# Patient Record
Sex: MALE | Race: WHITE | NOT HISPANIC OR LATINO | Employment: FULL TIME | URBAN - METROPOLITAN AREA
[De-identification: names, ages, dates, MRNs, and addresses within clinical notes are randomized per-mention and may not be internally consistent; named-entity substitution may affect disease eponyms.]

---

## 2017-03-30 ENCOUNTER — THERAPY VISIT (OUTPATIENT)
Dept: PHYSICAL THERAPY | Facility: CLINIC | Age: 67
End: 2017-03-30
Payer: COMMERCIAL

## 2017-03-30 ENCOUNTER — OFFICE VISIT (OUTPATIENT)
Dept: SURGERY | Facility: CLINIC | Age: 67
End: 2017-03-30
Payer: COMMERCIAL

## 2017-03-30 VITALS
BODY MASS INDEX: 33.32 KG/M2 | DIASTOLIC BLOOD PRESSURE: 100 MMHG | HEIGHT: 71 IN | SYSTOLIC BLOOD PRESSURE: 172 MMHG | WEIGHT: 238 LBS | HEART RATE: 81 BPM

## 2017-03-30 DIAGNOSIS — M25.561 RIGHT KNEE PAIN: ICD-10-CM

## 2017-03-30 DIAGNOSIS — K42.9 UMBILICAL HERNIA WITHOUT OBSTRUCTION AND WITHOUT GANGRENE: Primary | ICD-10-CM

## 2017-03-30 PROCEDURE — 99203 OFFICE O/P NEW LOW 30 MIN: CPT | Performed by: SURGERY

## 2017-03-30 PROCEDURE — 97112 NEUROMUSCULAR REEDUCATION: CPT | Mod: GP | Performed by: PHYSICAL THERAPIST

## 2017-03-30 PROCEDURE — 97110 THERAPEUTIC EXERCISES: CPT | Mod: GP | Performed by: PHYSICAL THERAPIST

## 2017-03-30 NOTE — MR AVS SNAPSHOT
"              After Visit Summary   3/30/2017    Don Samson    MRN: [id number]           Patient Information     Date Of Birth          1950        Visit Information        Provider Department      3/30/2017 1:30 PM Tay Kothari MD [facility]       Follow-ups after your visit        Your next 10 appointments already scheduled     Apr 14, 2017  8:10 AM CDT   DANA Extremity with Tee Rai, PT   [facility] ([facility]  )    [address] [id number] [phone]              Who to contact     If you have questions or need follow up information about today's clinic visit or your schedule please contact [facility] directly at [phone].  Normal or non-critical lab and imaging results will be communicated to you by [employer], letter or phone within 4 business days after the clinic has received the results. If you do not hear from us within 7 days, please contact the clinic through [employer] or phone. If you have a critical or abnormal lab result, we will notify you by phone as soon as possible.  Submit refill requests through [employer] or call your pharmacy and they will forward the refill request to us. Please allow 3 business days for your refill to be completed.          Additional Information About Your Visit        MyChart Information     [employer] lets you send messages to your doctor, view your test results, renew your prescriptions, schedule appointments and more. To sign up, go to [email or web address] . Click on \"Log in\" on the left side of the screen, which will take you to the Welcome page. Then click on \"Sign up Now\" on the right side of the page.     You will be asked to enter the access code listed below, as well as some personal information. Please follow the directions to create your username and password.     Your access code is: " [email or web address]  Expires: 2017  1:52 PM     Your access code will  in 90 days. If you need help or a new code, please call your [place] clinic or [phone].        Care EveryWhere ID     This is your Care EveryWhere ID. This could be used by other organizations to access your [facility] medical records  [id number]        Your Vitals Were     Pulse Height BMI (Body Mass Index)             81 5' 11\" (1.803 m) 33.19 kg/m2          Blood Pressure from Last 3 Encounters:   17 (!) 172/100   16 152/90   16 (!) 159/91    Weight from Last 3 Encounters:   17 238 lb (108 kg)   16 235 lb (106.6 kg)   16 230 lb (104.3 kg)              Today, you had the following     No orders found for display       Primary Care Provider Office Phone # Fax #    William Blanco [phone]       [address]  [facility] [address]        Thank you!     Thank you for choosing [facility]  for your care. Our goal is always to provide you with excellent care. Hearing back from our patients is one way we can continue to improve our services. Please take a few minutes to complete the written survey that you may receive in the mail after your visit with us. Thank you!             Your Updated Medication List - Protect others around you: Learn how to safely use, store and throw away your medicines at www.disposemymeds.org.          This list is accurate as of: 3/30/17  1:52 PM.  Always use your most recent med list.                   Brand Name Dispense Instructions for use    acetaminophen-caffeine 500-65 MG Tabs    EXCEDRIN TENSION HEADACHE     Take 1 tablet by mouth 2 times daily as needed for mild pain Reported on 3/30/2017       acetaminophen-codeine 300-30 MG per tablet    TYLENOL #3    18 tablet    Take 1-2 tablets by mouth nightly as needed for pain maximum 2 tablet(s) per day       ALEVE PO      Reported on 3/30/2017       baclofen 10 MG tablet "    LIORESAL    90 tablet    Take 1 tablet (10 mg) by mouth 3 times daily       COLD MEDICINE PLUS PO      Take 325 mg by mouth as needed       * diclofenac 1 % Gel topical gel    VOLTAREN    100 g    Apply 2 times daily to feet/foot.       * diclofenac 1 % Gel topical gel    VOLTAREN    100 g    Apply 4 grams to knees or 2 grams to hands four times daily using enclosed dosing card.       HYDROcodone-acetaminophen 5-325 MG per tablet    NORCO    30 tablet    Take 1 tablet by mouth every 4 hours as needed for pain maximum 2 tablet(s) per day       naproxen 500 MG tablet    NAPROSYN    60 tablet    Take 1 tablet (500 mg) by mouth 2 times daily as needed for moderate pain       * Notice:  This list has 2 medication(s) that are the same as other medications prescribed for you. Read the directions carefully, and ask your doctor or other care provider to review them with you.

## 2017-03-30 NOTE — MR AVS SNAPSHOT
"              After Visit Summary   3/30/2017    Don Samson    MRN: [id number]           Patient Information     Date Of Birth          1950        Visit Information        Provider Department      3/30/2017 8:10 AM Tee Rai PT [facility]        Today's Diagnoses     Right knee pain           Follow-ups after your visit        Your next 10 appointments already scheduled     Apr 14, 2017  8:10 AM CDT   DANA Extremity with Tee Rai PT   [facility] ([facility]  )    [address] [place] [id number] [phone]              Who to contact     If you have questions or need follow up information about today's clinic visit or your schedule please contact [facility] directly at [phone].  Normal or non-critical lab and imaging results will be communicated to you by MyChart, letter or phone within 4 business days after the clinic has received the results. If you do not hear from us within 7 days, please contact the clinic through [employer] or phone. If you have a critical or abnormal lab result, we will notify you by phone as soon as possible.  Submit refill requests through [employer] or call your pharmacy and they will forward the refill request to us. Please allow 3 business days for your refill to be completed.          Additional Information About Your Visit        MyChart Information     [employer] lets you send messages to your doctor, view your test results, renew your prescriptions, schedule appointments and more. To sign up, go to [email or web address] . Click on \"Log in\" on the left side of the screen, which will take you to the Welcome page. Then click on \"Sign up Now\" on the right side of the page.     You will be asked to enter the access code listed below, as well as some personal information. Please follow the " directions to create your username and password.     Your access code is: [email or web address]  Expires: 2017  1:52 PM     Your access code will  in 90 days. If you need help or a new code, please call your [place] clinic or [phone].        Care EveryWhere ID     This is your Care EveryWhere ID. This could be used by other organizations to access your [facility] medical records  [id number]         Blood Pressure from Last 3 Encounters:   17 (!) 172/100   16 152/90   16 (!) 159/91    Weight from Last 3 Encounters:   17 108 kg (238 lb)   16 106.6 kg (235 lb)   16 104.3 kg (230 lb)              We Performed the Following     NEUROMUSCULAR RE-EDUCATION     THERAPEUTIC EXERCISES        Primary Care Provider Office Phone # Fax #    William Blanco [phone]       [address] [address]        Thank you!     Thank you for [facility]  for your care. Our goal is always to provide you with excellent care. Hearing back from our patients is one way we can continue to improve our services. Please take a few minutes to complete the written survey that you may receive in the mail after your visit with us. Thank you!             Your Updated Medication List - Protect others around you: Learn how to safely use, store and throw away your medicines at www.disposemymeds.org.          This list is accurate as of: 3/30/17 11:59 PM.  Always use your most recent med list.                   Brand Name Dispense Instructions for use    acetaminophen-caffeine 500-65 MG Tabs    EXCEDRIN TENSION HEADACHE     Take 1 tablet by mouth 2 times daily as needed for mild pain Reported on 3/30/2017       acetaminophen-codeine 300-30 MG per tablet    TYLENOL #3    18 tablet    Take 1-2 tablets by mouth nightly as needed for pain maximum 2 tablet(s) per day       ALEVE PO      Reported on 3/30/2017        baclofen 10 MG tablet    LIORESAL    90 tablet    Take 1 tablet (10 mg) by mouth 3 times daily       COLD MEDICINE PLUS PO      Take 325 mg by mouth as needed       * diclofenac 1 % Gel topical gel    VOLTAREN    100 g    Apply 2 times daily to feet/foot.       * diclofenac 1 % Gel topical gel    VOLTAREN    100 g    Apply 4 grams to knees or 2 grams to hands four times daily using enclosed dosing card.       HYDROcodone-acetaminophen 5-325 MG per tablet    NORCO    30 tablet    Take 1 tablet by mouth every 4 hours as needed for pain maximum 2 tablet(s) per day       naproxen 500 MG tablet    NAPROSYN    60 tablet    Take 1 tablet (500 mg) by mouth 2 times daily as needed for moderate pain       * Notice:  This list has 2 medication(s) that are the same as other medications prescribed for you. Read the directions carefully, and ask your doctor or other care provider to review them with you.

## 2017-03-30 NOTE — PROGRESS NOTES
"[facility]  Surgery Consultation    HPI: Patient is a 67 year old male who is here for consultation requested by William Blanco [phone] for evaluation of umbilical hernia. Hernia has been present for last 10 + years. He was mainly asymptomatic from it but did develop some pain prompting evaluation. Pain is primarily located in the periumbilical. Patient states pain is worse with exercise. Pain is rated as mild. Symptoms are improved with rest. Hernia has not been incarcerated. Patient has not had any symptoms of bowel obstruction. He has had an open repair of bilateral inguinal hernias in 2004 and 85. His CT showed a questionable fat containing hernia in the left groin but he denies any symptoms from this at this time. Patient denies fevers, chills, nausea, vomiting, SOB, chest pain, abdominal pain..    PMH:   has a past medical history of Depression and HTN (hypertension).  PSH:    has a past surgical history that includes nasal/sinus polypectomy and Herniorrhaphy inguinal bilateral.  Social History:   reports that he quit smoking about 47 years ago. He has never used smokeless tobacco. He reports that he drinks alcohol. He reports that he does not use illicit drugs.  Family History:   family history includes Hypertension in his mother.  Medications/Allergies: Home medications and allergies reviewed.    ROS:  The 10 point Review of Systems is negative other than noted in the HPI.    Physical Exam:  BP (!) 172/100  Pulse 81  Ht 1.803 m (5' 11\")  Wt 108 kg (238 lb)  BMI 33.19 kg/m2  GENERAL: Generally appears well.  Psych: Alert and Oriented.  Normal affect  Eyes: Sclera clear  Respiratory:  Lungs clear to ausculation bilaterally with good air excursion  Cardiovascular:  Regular Rate and Rhythm with no murmurs gallops or rubs, normal peripheral pulses  GI: Abdomen Soft moderate to large umbilical hernia. Large amount of reducible fat reduced. Defect measures approximately 4-5 cm.  Groin- " I examined the patient in both the standing and supine positions. Right Groin- No hernia Palpated.  No tenderness on palpation. Left Groin- questionable very small hernia versus a cord lipoma. No tenderness. N scrotal or testicle abnormalities.  Lymphatic/Hematologic/Immune:  No femoral or cervical lymphadenopathy.  Integumentary:  No rashes  Neurological: grossly intact     All new lab and imaging data was reviewed.     Impression and Plan:  Patient is a 67 year old male with umbilical hernia    PLAN:   I discussed the pathophysiology of hernias and options for repair including laparoscopic VS open. The patient has a large symptomatic umbilical hernia. There is question about a left inguinal hernia but I think this is related to his repair and I would not recommend any further treatment at this time. I would recommend a laparoscopic and open repair of this umbilical hernia at his convenience. He will schedule when he is able to get off work. The risks associated with the procedure including, but not limited to, recurrence, nerve entrapment or injury, persistence of pain, injury to the bowel/bladder, infertility, hematoma, mesh migration, mesh infection, MI, and PE were discussed with the patient. He indicated understanding of the discussion, asked appropriate questions, and provided consent. Signs and symptoms of incarceration were discussed. If these develop in the interim, he promises to call or go straight to the ER. I have provided the patient with an information pamphlet.      Thank you very much for this consult.    Tay Kothari M.D.  [facility]  [phone]    Please route or send letter to:  Primary Care Provider (PCP) and Referring Provider

## 2017-03-31 NOTE — PROGRESS NOTES
Subjective:    HPI                    Objective:    System    Physical Exam    General     ROS    Assessment/Plan:      {REHAB NOTES:500847}

## 2017-04-14 ENCOUNTER — THERAPY VISIT (OUTPATIENT)
Dept: PHYSICAL THERAPY | Facility: CLINIC | Age: 67
End: 2017-04-14
Payer: COMMERCIAL

## 2017-04-14 DIAGNOSIS — M25.561 RIGHT KNEE PAIN: ICD-10-CM

## 2017-04-14 PROCEDURE — 97112 NEUROMUSCULAR REEDUCATION: CPT | Mod: GP | Performed by: PHYSICAL THERAPIST

## 2017-04-14 PROCEDURE — 97110 THERAPEUTIC EXERCISES: CPT | Mod: GP | Performed by: PHYSICAL THERAPIST

## 2017-05-31 ENCOUNTER — RADIANT APPOINTMENT (OUTPATIENT)
Dept: GENERAL RADIOLOGY | Facility: CLINIC | Age: 67
End: 2017-05-31
Attending: EMERGENCY MEDICINE
Payer: COMMERCIAL

## 2017-05-31 ENCOUNTER — OFFICE VISIT (OUTPATIENT)
Dept: URGENT CARE | Facility: URGENT CARE | Age: 67
End: 2017-05-31
Payer: COMMERCIAL

## 2017-05-31 ENCOUNTER — TELEPHONE (OUTPATIENT)
Dept: FAMILY MEDICINE | Facility: CLINIC | Age: 67
End: 2017-05-31

## 2017-05-31 VITALS
HEIGHT: 71 IN | OXYGEN SATURATION: 98 % | SYSTOLIC BLOOD PRESSURE: 137 MMHG | HEART RATE: 78 BPM | DIASTOLIC BLOOD PRESSURE: 87 MMHG | WEIGHT: 238 LBS | BODY MASS INDEX: 33.32 KG/M2 | TEMPERATURE: 97.8 F

## 2017-05-31 DIAGNOSIS — R05.9 COUGH: ICD-10-CM

## 2017-05-31 DIAGNOSIS — J22 LOWER RESPIRATORY INFECTION: ICD-10-CM

## 2017-05-31 DIAGNOSIS — R05.9 COUGH: Primary | ICD-10-CM

## 2017-05-31 PROCEDURE — 99214 OFFICE O/P EST MOD 30 MIN: CPT | Performed by: EMERGENCY MEDICINE

## 2017-05-31 PROCEDURE — 71020 XR CHEST 2 VW: CPT

## 2017-05-31 RX ORDER — AZITHROMYCIN 250 MG/1
TABLET, FILM COATED ORAL
Qty: 6 TABLET | Refills: 0 | Status: SHIPPED | OUTPATIENT
Start: 2017-05-31 | End: 2017-10-27

## 2017-05-31 RX ORDER — CODEINE PHOSPHATE AND GUAIFENESIN 10; 100 MG/5ML; MG/5ML
1 SOLUTION ORAL EVERY 4 HOURS PRN
Qty: 120 ML | Refills: 0 | Status: SHIPPED | OUTPATIENT
Start: 2017-05-31 | End: 2017-10-27

## 2017-05-31 NOTE — TELEPHONE ENCOUNTER
STAN to schedule MA only appointment for blood pressure follow up or he can go to any [place] pharmacy as well.    Anisa Looney

## 2017-06-01 ASSESSMENT — ENCOUNTER SYMPTOMS
NAUSEA: 0
VOMITING: 0
FEVER: 1
MYALGIAS: 0
SORE THROAT: 0
COUGH: 1
CHILLS: 1
WEAKNESS: 0
FATIGUE: 1
ANOREXIA: 0
DIAPHORESIS: 0
ABDOMINAL PAIN: 0

## 2017-06-01 NOTE — PATIENT INSTRUCTIONS
What Is Acute Bronchitis?  Acute or short-term bronchitis last for days or weeks. It occurs when the bronchial tubes (airways in the lungs) are irritated by a virus, bacteria, or allergen. This causes a cough that produces yellow or greenish mucus.  Inside healthy lungs    Air travels in and out of the lungs through the airways. The linings of these airways produce sticky mucus. This mucus traps particles that enter the lungs. Tiny structures called cilia then sweep the particles out of the airways.     Healthy airway: Airways are normally open. Air moves in and out easily.      Healthy cilia: Tiny, hairlike cilia sweep mucus and particles up and out of the airways.   Lungs with bronchitis  Bronchitis often occurs with a cold or the flu virus. The airways become inflamed (red and swollen). There is a deep  hacking  cough from the extra mucus. Other symptoms may include:    Wheezing    Chest discomfort    Shortness of breath    Mild fever  A second infection, this time due to bacteria, may then occur. And airways irritated by allergens or smoke are more likely to get infected.        Inflamed airway: Inflammation and extra mucus narrow the airway, causing shortness of breath.      Impaired cilia: Extra mucus impairs cilia, causing congestion and wheezing. Smoking makes the problem worse.   Making a diagnosis  A physical exam, health history, and certain tests help your healthcare provider make the diagnosis.  Health history  Your healthcare provider will ask you about your symptoms.  The exam  Your provider listens to your chest for signs of congestion. He or she may also check your ears, nose, and throat.  Possible tests    A sputum test for bacteria. This requires a sample of mucus from the lungs.    A nasal or throat swab for bacterial infection.    A chest X-ray if your healthcare provider thinks you have pneumonia.    Tests to check for an underlying condition, such as allergies, asthma, or COPD. You may need  to see a specialist for more lung function testing.  Treating a cough  The main treatment for bronchitis is easing symptoms. Avoiding smoke, allergens, and other things that trigger coughing can often help. If the infection is bacterial, you may be given antibiotics. During the illness, it's important to get plenty of sleep. To ease symptoms:    Don t smoke, and avoid secondhand smoke.    Use a humidifier, or breathe in steam from a hot shower. This may help loosen mucus.    Drink a lot of water and juice. They can soothe the throat and may help thin mucus.    Sit up or use extra pillows when in bed to help lessen coughing and congestion.    Ask your provider about using cough medicine, pain and fever medicine, or a decongestant.  Antibiotics  Most cases of bronchitis are caused by cold or flu viruses. Antibiotics don t treat viral illness. Taking antibiotics when they are not needed increases your risk of getting an infection later that is antibiotic-resistant. Your provider will prescribe antibiotics if the infection is caused by bacteria. If they are prescribed:    Take the medicine until it is used up, even if symptoms have improved. If you don t, the bronchitis may come back.    Take them as directed. For instance, some medicines should be taken with food.    Ask your provider or pharmacist what side effects are common, and what to do about them.  Follow-up care  You should see your provider again in 2 to 3 weeks. By this time, symptoms should have improved. An infection that lasts longer may mean you have a more serious problem.  Prevention    Avoid tobacco smoke. If you smoke, quit. Stay away from smoky places. Ask friends and family not to smoke around you, or in your home or car.    Get checked for allergies.    Ask your provider about getting a yearly flu shot, and pneumococcal or pneumonia shots.    Wash your hands often. This helps reduce the chance of picking up viruses that cause colds and flu.  Call  your healthcare provider if:    Symptoms worsen, or new symptoms develop.    Breathing problems worsen or  become severe.    Symptoms don t get better within a week, or within 3 days of taking antibiotics.     9898-6906 [employer]. [address] [place] [address], [place] [address]. All rights reserved. This information is not intended as a substitute for professional medical care. Always follow your healthcare professional's instructions.

## 2017-06-01 NOTE — NURSING NOTE
"Chief Complaint   Patient presents with     Urgent Care     Nasal Congestion     c/o nasal congestion for 1 week       Initial /87  Pulse 78  Temp 97.8  F (36.6  C) (Tympanic)  Ht 5' 11\" (1.803 m)  Wt 238 lb (108 kg)  SpO2 98%  BMI 33.19 kg/m2 Estimated body mass index is 33.19 kg/(m^2) as calculated from the following:    Height as of this encounter: 5' 11\" (1.803 m).    Weight as of this encounter: 238 lb (108 kg).  Medication Reconciliation: complete   Brooke Bradford MA    "

## 2017-06-01 NOTE — PROGRESS NOTES
"URI   This is a new problem. The current episode started in the past 7 days. The problem occurs constantly. The problem has been unchanged. Associated symptoms include chills, congestion, coughing, fatigue and a fever. Pertinent negatives include no abdominal pain, anorexia, diaphoresis, myalgias, nausea, rash, sore throat, vomiting or weakness. Nothing aggravates the symptoms. Treatments tried: dayquil. The treatment provided no relief.       Past Medical History:   Diagnosis Date     Depression     declines treatment 4/1/16     HTN (hypertension)     declines treatment 4/1/16       Past Surgical History:   Procedure Laterality Date     HERNIORRHAPHY INGUINAL BILATERAL      RIH 2004, LIH 1985     NASAL/SINUS POLYPECTOMY      Nasal-Sinus Polypectomy       Social History     Social History     Marital status: Single     Spouse name: N/A     Number of children: N/A     Years of education: N/A     Occupational History     Not on file.     Social History Main Topics     Smoking status: Former Smoker     Quit date: 1/1/1970     Smokeless tobacco: Never Used     Alcohol use 0.0 oz/week     0 Standard drinks or equivalent per week     Drug use: No     Sexual activity: No     Other Topics Concern     Parent/Sibling W/ Cabg, Mi Or Angioplasty Before 65f 55m? No     Social History Narrative       Family History   Problem Relation Age of Onset     Hypertension Mother        Review of Systems   Constitutional: Positive for chills, fatigue, fever and malaise/fatigue. Negative for diaphoresis.   HENT: Positive for congestion. Negative for sore throat.    Respiratory: Positive for cough.    Gastrointestinal: Negative for abdominal pain, anorexia, nausea and vomiting.   Musculoskeletal: Negative for myalgias.   Skin: Negative for rash.   Neurological: Negative for weakness.       /87  Pulse 78  Temp 97.8  F (36.6  C) (Tympanic)  Ht 5' 11\" (1.803 m)  Wt 238 lb (108 kg)  SpO2 98%  BMI 33.19 kg/m2    Physical Exam "   Constitutional: He is oriented to person, place, and time and well-developed, well-nourished, and in no distress. No distress.   HENT:   Head: Normocephalic and atraumatic.   Right Ear: External ear normal.   Left Ear: External ear normal.   Mouth/Throat: Oropharynx is clear and moist. No oropharyngeal exudate.   Eyes: EOM are normal.   Cardiovascular: Normal rate, regular rhythm and normal heart sounds.    Pulmonary/Chest: Effort normal and breath sounds normal.   Neurological: He is alert and oriented to person, place, and time.   Skin: Skin is warm and dry. He is not diaphoretic.   Psychiatric: Mood normal. His affect is blunt. He is agitated.   Nursing note and vitals reviewed.      CXR:  No acute findings per my exam, await final read.    Assessment/Plan:  Don was seen today for urgent care and nasal congestion.    Diagnoses and all orders for this visit:    Cough  -     XR Chest 2 Views; Future  -     guaiFENesin-codeine (ROBITUSSIN AC) 100-10 MG/5ML SOLN solution; Take 5 mLs by mouth every 4 hours as needed for cough    Lower respiratory infection  -     azithromycin (ZITHROMAX) 250 MG tablet; Two tablets first day, then one tablet daily for four days      Plan as above.  I do think that he has a cold, but since he has been ill for a week or so I did give him a course of azithromycin to use if not getting better over the nest few days.  Recommend f/u if getting worse.  He agreed to plans.    Flavio Woo MD

## 2017-10-27 ENCOUNTER — OFFICE VISIT (OUTPATIENT)
Dept: FAMILY MEDICINE | Facility: CLINIC | Age: 67
End: 2017-10-27
Payer: COMMERCIAL

## 2017-10-27 VITALS
HEART RATE: 82 BPM | TEMPERATURE: 99.1 F | SYSTOLIC BLOOD PRESSURE: 167 MMHG | DIASTOLIC BLOOD PRESSURE: 90 MMHG | RESPIRATION RATE: 20 BRPM

## 2017-10-27 DIAGNOSIS — F43.23 ADJUSTMENT DISORDER WITH MIXED ANXIETY AND DEPRESSED MOOD: ICD-10-CM

## 2017-10-27 DIAGNOSIS — J20.9 ACUTE BRONCHITIS WITH SYMPTOMS > 10 DAYS: Primary | ICD-10-CM

## 2017-10-27 PROCEDURE — 99213 OFFICE O/P EST LOW 20 MIN: CPT | Performed by: FAMILY MEDICINE

## 2017-10-27 RX ORDER — AZITHROMYCIN 250 MG/1
TABLET, FILM COATED ORAL
Qty: 6 TABLET | Refills: 0 | Status: SHIPPED | OUTPATIENT
Start: 2017-10-27 | End: 2018-03-07

## 2017-10-27 NOTE — PROGRESS NOTES
SUBJECTIVE:   Don Samson is a 67 year old male who presents to clinic today for the following health issues:      RESPIRATORY SYMPTOMS      Duration: 2 weeks    Description  cough, fever and chills    Severity: severe    Accompanying signs and symptoms: None    History (predisposing factors):  none    Precipitating or alleviating factors: None    Therapies tried and outcome:  rest and fluids    Not really coughing anything up. Not getting better.    ROS  Has felt feverish  No sore throat    EXAM:  /90  Pulse 82  Temp 99.1  F (37.3  C) (Oral)  Resp 20  Constitutional: Healthy, alert, no distress   EENT: PERRL, TM's clear bilaterally, oropharynx with moderate erythema, no anterior cervical lymphadenopathy   Cardiovascular: RRR. No murmurs   Respiratory: Clear to auscultation, mild cough/congestion    ASSESSMENT    ICD-10-CM    1. Acute bronchitis with symptoms > 10 days J20.9 azithromycin (ZITHROMAX) 250 MG tablet   2. Adjustment disorder with mixed anxiety and depressed mood F43.23 DEPRESSION ACTION PLAN (DAP)      Plan:  Patient Instructions   Guaifenisen 400-600mg twice daily to loosen secretions.  Avoid decongestants.  Herbal (mint or césar tea) with lemon and honey.    Consider returning for BP check.     Pal Burton MD  Family Medicine Physician

## 2017-10-27 NOTE — PATIENT INSTRUCTIONS
Guaifenisen 400-600mg twice daily to loosen secretions.  Avoid decongestants.  Herbal (mint or césar tea) with lemon and honey.    Consider returning for BP check.

## 2017-10-27 NOTE — MR AVS SNAPSHOT
"              After Visit Summary   10/27/2017    Don Samson    MRN: [id number]           Patient Information     Date Of Birth          1950        Visit Information        Provider Department      10/27/2017 10:15 AM Pal Clark MD [facility]        Today's Diagnoses     Acute bronchitis with symptoms > 10 days    -  1    Adjustment disorder with mixed anxiety and depressed mood          Care Instructions    Guaifenisen 400-600mg twice daily to loosen secretions.  Avoid decongestants.  Herbal (mint or césar tea) with lemon and honey.    Consider returning for BP check.          Follow-ups after your visit        Who to contact     If you have questions or need follow up information about today's clinic visit or your schedule please contact [facility] directly at [phone].  Normal or non-critical lab and imaging results will be communicated to you by MyChart, letter or phone within 4 business days after the clinic has received the results. If you do not hear from us within 7 days, please contact the clinic through MyChart or phone. If you have a critical or abnormal lab result, we will notify you by phone as soon as possible.  Submit refill requests through [employer] or call your pharmacy and they will forward the refill request to us. Please allow 3 business days for your refill to be completed.          Additional Information About Your Visit        MyChart Information     [employer] lets you send messages to your doctor, view your test results, renew your prescriptions, schedule appointments and more. To sign up, go to [email or web address] . Click on \"Log in\" on the left side of the screen, which will take you to the Welcome page. Then click on \"Sign up Now\" on the right side of the page.     You will be asked to enter the access code listed below, as well as some personal information. Please follow the directions to create your username " and password.     Your access code is: [email or web address]  Expires: 2018  4:30 PM     Your access code will  in 90 days. If you need help or a new code, please call your [place] clinic or [phone].        Care EveryWhere ID     This is your Care EveryWhere ID. This could be used by other organizations to access your [facility] medical records  [id number]        Your Vitals Were     Pulse Temperature Respirations             82 99.1  F (37.3  C) (Oral) 20          Blood Pressure from Last 3 Encounters:   10/27/17 167/90   17 137/87   17 (!) 172/100    Weight from Last 3 Encounters:   17 238 lb (108 kg)   17 238 lb (108 kg)   16 235 lb (106.6 kg)              We Performed the Following     DEPRESSION ACTION PLAN (DAP)          Today's Medication Changes          These changes are accurate as of: 10/27/17  4:30 PM.  If you have any questions, ask your nurse or doctor.               Start taking these medicines.        Dose/Directions    azithromycin 250 MG tablet   Commonly known as:  ZITHROMAX   Used for:  Acute bronchitis with symptoms > 10 days   Started by:  Pal Clark MD        Two tablets first day, then one tablet daily for four days.   Quantity:  6 tablet   Refills:  0            Where to get your medicines      These medications were sent to [facility] - [place], [place] - [address] [place], [place] [address], [place] [address]     Phone:  [phone]     azithromycin 250 MG tablet                Primary Care Provider Office Phone # Fax #    William Blanco [phone]       [address]  [place] [id number]        Equal Access to Services     [facility] Hospital[facility]ONNA AH: Hadii tom latham Sodemario, waaxda luqadaha, qaybta kaalmada adeegyada, ruddy rooney. So [facility] [phone].    ATENCIÓN: Si habla español, tiene a torres disposición servicios gratuitos de asistencia lingüística. Llame al  [phone].    We comply with applicable federal civil rights laws and [place] laws. We do not discriminate on the basis of race, color, national origin, age, disability, sex, sexual orientation, or gender identity.            Thank you!     Thank you for choosing [facility]  for your care. Our goal is always to provide you with excellent care. Hearing back from our patients is one way we can continue to improve our services. Please take a few minutes to complete the written survey that you may receive in the mail after your visit with us. Thank you!             Your Updated Medication List - Protect others around you: Learn how to safely use, store and throw away your medicines at www.disposemymeds.org.          This list is accurate as of: 10/27/17  4:30 PM.  Always use your most recent med list.                   Brand Name Dispense Instructions for use Diagnosis    acetaminophen-caffeine 500-65 MG Tabs    EXCEDRIN TENSION HEADACHE     Take 1 tablet by mouth 2 times daily as needed for mild pain Reported on 3/30/2017        acetaminophen-codeine 300-30 MG per tablet    TYLENOL #3    18 tablet    Take 1-2 tablets by mouth nightly as needed for pain maximum 2 tablet(s) per day    Acute pain of right knee       ALEVE PO      Reported on 3/30/2017        azithromycin 250 MG tablet    ZITHROMAX    6 tablet    Two tablets first day, then one tablet daily for four days.    Acute bronchitis with symptoms > 10 days       baclofen 10 MG tablet    LIORESAL    90 tablet    Take 1 tablet (10 mg) by mouth 3 times daily    Midline low back pain without sciatica, unspecified chronicity       COLD MEDICINE PLUS PO      Take 325 mg by mouth as needed        * diclofenac 1 % Gel topical gel    VOLTAREN    100 g    Apply 2 times daily to feet/foot.    Chronic pain of left ankle       * diclofenac 1 % Gel topical gel    VOLTAREN    100 g    Apply 4 grams to knees or 2 grams to hands four times daily using  enclosed dosing card.    Acute pain of right knee       HYDROcodone-acetaminophen 5-325 MG per tablet    NORCO    30 tablet    Take 1 tablet by mouth every 4 hours as needed for pain maximum 2 tablet(s) per day    Chronic pain of right knee       naproxen 500 MG tablet    NAPROSYN    60 tablet    Take 1 tablet (500 mg) by mouth 2 times daily as needed for moderate pain    Acute pain of right knee       * Notice:  This list has 2 medication(s) that are the same as other medications prescribed for you. Read the directions carefully, and ask your doctor or other care provider to review them with you.

## 2017-10-27 NOTE — LETTER
[address] [address]  Phone: [phone]    October 27, 2017        Don Samson  [address]  [facility] [address]          To whom it may concern:    RE: Don CLARKE [place]    Patient was seen and treated today at our clinic. He may return to work when symptoms resolve.    Sincerely,        Pal Burton MD

## 2017-10-27 NOTE — NURSING NOTE
"Chief Complaint   Patient presents with     URI       Initial /90  Pulse 82  Temp 99.1  F (37.3  C) (Oral)  Resp 20 Estimated body mass index is 33.19 kg/(m^2) as calculated from the following:    Height as of 5/31/17: 5' 11\" (1.803 m).    Weight as of 5/31/17: 238 lb (108 kg).  Medication Reconciliation: unable or not appropriate to perform     Dot Martin MA    "

## 2017-11-18 ENCOUNTER — OFFICE VISIT (OUTPATIENT)
Dept: URGENT CARE | Facility: URGENT CARE | Age: 67
End: 2017-11-18
Payer: COMMERCIAL

## 2017-11-18 VITALS
WEIGHT: 235 LBS | SYSTOLIC BLOOD PRESSURE: 144 MMHG | OXYGEN SATURATION: 95 % | BODY MASS INDEX: 32.9 KG/M2 | HEART RATE: 67 BPM | TEMPERATURE: 97.4 F | HEIGHT: 71 IN | DIASTOLIC BLOOD PRESSURE: 94 MMHG

## 2017-11-18 DIAGNOSIS — S05.91XA EYE INJURY, NON-PENETRATING, RIGHT, INITIAL ENCOUNTER: Primary | ICD-10-CM

## 2017-11-18 PROCEDURE — 99213 OFFICE O/P EST LOW 20 MIN: CPT | Performed by: INTERNAL MEDICINE

## 2017-11-18 NOTE — PATIENT INSTRUCTIONS
Cool compress for discomfort  Tylenol or aleve      See Ophthalmology if not improved   Call or return to clinic if symptoms worsen or fail to improve as anticipated.

## 2017-11-18 NOTE — PROGRESS NOTES
SUBJECTIVE:  Chief Complaint:   Chief Complaint   Patient presents with     Urgent Care     Eye Problem     c/o eye pain      History of Present Illness:    Cleaning with hose & sprayed self with eye    No vision changes  Slight intermittently painful  right eye       Associated Signs and Symptoms: none  Treatment measures tried include: none  Contact wearer : No    Past Medical History:   Diagnosis Date     Depression     declines treatment 4/1/16     HTN (hypertension)     declines treatment 4/1/16     Current Outpatient Prescriptions   Medication Sig Dispense Refill     azithromycin (ZITHROMAX) 250 MG tablet Two tablets first day, then one tablet daily for four days. (Patient not taking: Reported on 11/18/2017) 6 tablet 0     HYDROcodone-acetaminophen (NORCO) 5-325 MG per tablet Take 1 tablet by mouth every 4 hours as needed for pain maximum 2 tablet(s) per day (Patient not taking: Reported on 11/18/2017) 30 tablet 0     acetaminophen-codeine (TYLENOL #3) 300-30 MG per tablet Take 1-2 tablets by mouth nightly as needed for pain maximum 2 tablet(s) per day (Patient not taking: Reported on 11/18/2017) 18 tablet 0     Naproxen Sodium (ALEVE PO) Reported on 3/30/2017       diclofenac (VOLTAREN) 1 % GEL Apply 4 grams to knees or 2 grams to hands four times daily using enclosed dosing card. (Patient not taking: Reported on 11/18/2017) 100 g 1     naproxen (NAPROSYN) 500 MG tablet Take 1 tablet (500 mg) by mouth 2 times daily as needed for moderate pain (Patient not taking: Reported on 11/18/2017) 60 tablet 0     Chlorphen-Pseudoephed-APAP (COLD MEDICINE PLUS PO) Take 325 mg by mouth as needed        diclofenac (VOLTAREN) 1 % GEL Apply 2 times daily to feet/foot. (Patient not taking: Reported on 11/18/2017) 100 g 1     baclofen (LIORESAL) 10 MG tablet Take 1 tablet (10 mg) by mouth 3 times daily (Patient not taking: Reported on 11/18/2017) 90 tablet 0     acetaminophen-caffeine (EXCEDRIN TENSION HEADACHE) 500-65 MG TABS  "Take 1 tablet by mouth 2 times daily as needed for mild pain Reported on 3/30/2017          OBJECTIVE:  BP (!) 144/94  Pulse 67  Temp 97.4  F (36.3  C) (Tympanic)  Ht 5' 11\" (1.803 m)  Wt 235 lb (106.6 kg)  SpO2 95%  BMI 32.78 kg/m2  General: no acute distress  Eye exam: right eye normal lid,   Mild conjunctival injection,   cornea, pupil   PERRLA, EOMI.  Topical anesthetic applied. Fluorescein dye exam shows no abrasions  ASSESSMENT:    ICD-10-CM    1. Eye injury, non-penetrating, right, initial encounter S05.91XA     no abrasion noted.  should heal fine       Patient Instructions   Cool compress for discomfort  Tylenol or aleve      See Ophthalmology if not improved   Call or return to clinic if symptoms worsen or fail to improve as anticipated.      "

## 2017-11-18 NOTE — NURSING NOTE
"Chief Complaint   Patient presents with     Urgent Care     Eye Problem     c/o eye pain        Initial BP (!) 144/94  Pulse 67  Temp 97.4  F (36.3  C) (Tympanic)  Ht 5' 11\" (1.803 m)  Wt 235 lb (106.6 kg)  SpO2 95%  BMI 32.78 kg/m2 Estimated body mass index is 32.78 kg/(m^2) as calculated from the following:    Height as of this encounter: 5' 11\" (1.803 m).    Weight as of this encounter: 235 lb (106.6 kg).  Medication Reconciliation: complete   Brooke Bradford MA    "

## 2018-01-19 ENCOUNTER — OFFICE VISIT (OUTPATIENT)
Dept: URGENT CARE | Facility: URGENT CARE | Age: 68
End: 2018-01-19
Payer: COMMERCIAL

## 2018-01-19 VITALS
SYSTOLIC BLOOD PRESSURE: 144 MMHG | OXYGEN SATURATION: 97 % | DIASTOLIC BLOOD PRESSURE: 84 MMHG | TEMPERATURE: 98.1 F | HEIGHT: 72 IN | HEART RATE: 76 BPM | WEIGHT: 240 LBS | BODY MASS INDEX: 32.51 KG/M2

## 2018-01-19 DIAGNOSIS — M62.830 BACK MUSCLE SPASM: Primary | ICD-10-CM

## 2018-01-19 PROCEDURE — 99213 OFFICE O/P EST LOW 20 MIN: CPT | Performed by: FAMILY MEDICINE

## 2018-01-19 RX ORDER — CYCLOBENZAPRINE HCL 10 MG
10 TABLET ORAL
Qty: 14 TABLET | Refills: 0 | Status: SHIPPED | OUTPATIENT
Start: 2018-01-19 | End: 2018-04-10

## 2018-01-19 NOTE — PATIENT INSTRUCTIONS
Aleve 2 tablets (take with food) every 12 hours for the next 2 days, then 1 tablet every 12 hours as needed.   Heat (or ice) if needed.   Avoid aggravating activity.   Recheck if not improving over the next 2 weeks as expected.       You can take the muscle relaxant at bedtime if desired, along with the aleve.

## 2018-01-19 NOTE — MR AVS SNAPSHOT
"              After Visit Summary   1/19/2018    Don Samson    MRN: [id number]           Patient Information     Date Of Birth          1950        Visit Information        Provider Department      1/19/2018 5:00 PM Erika Lucero DO [facility]        Today's Diagnoses     Back muscle spasm    -  1      Care Instructions    Aleve 2 tablets (take with food) every 12 hours for the next 2 days, then 1 tablet every 12 hours as needed.   Heat (or ice) if needed.   Avoid aggravating activity.   Recheck if not improving over the next 2 weeks as expected.       You can take the muscle relaxant at bedtime if desired, along with the aleve.             Follow-ups after your visit        Who to contact     If you have questions or need follow up information about today's clinic visit or your schedule please contact [facility] directly at [phone].  Normal or non-critical lab and imaging results will be communicated to you by [employer], letter or phone within 4 business days after the clinic has received the results. If you do not hear from us within 7 days, please contact the clinic through [employer] or phone. If you have a critical or abnormal lab result, we will notify you by phone as soon as possible.  Submit refill requests through [facility] or call your pharmacy and they will forward the refill request to us. Please allow 3 business days for your refill to be completed.          Additional Information About Your Visit        MyChart Information     [facility] lets you send messages to your doctor, view your test results, renew your prescriptions, schedule appointments and more. To sign up, go to [email or web address] . Click on \"Log in\" on the left side of the screen, which will take you to the Welcome page. Then click on \"Sign up Now\" on the right side of the page.     You will be asked to enter the access code listed below, as well as some personal " information. Please follow the directions to create your username and password.     Your access code is: [email or web address]  Expires: 2018  3:30 PM     Your access code will  in 90 days. If you need help or a new code, please call your [place] clinic or [phone].        Care EveryWhere ID     This is your Care EveryWhere ID. This could be used by other organizations to access your [place] medical records  [id number]        Your Vitals Were     Pulse Temperature Height Pulse Oximetry BMI (Body Mass Index)       76 98.1  F (36.7  C) (Tympanic) 6' (1.829 m) 97% 32.55 kg/m2        Blood Pressure from Last 3 Encounters:   18 144/84   17 (!) 144/94   10/27/17 167/90    Weight from Last 3 Encounters:   18 240 lb (108.9 kg)   17 235 lb (106.6 kg)   17 238 lb (108 kg)              Today, you had the following     No orders found for display         Today's Medication Changes          These changes are accurate as of: 18  5:17 PM.  If you have any questions, ask your nurse or doctor.               Start taking these medicines.        Dose/Directions    cyclobenzaprine 10 MG tablet   Commonly known as:  FLEXERIL   Used for:  Back muscle spasm   Started by:  Erika Lucero DO        Dose:  10 mg   Take 1 tablet (10 mg) by mouth nightly as needed for muscle spasms   Quantity:  14 tablet   Refills:  0            Where to get your medicines      [facility] [address] [place] [address] AT [address] [address]     Phone:  [phone]     cyclobenzaprine 10 MG tablet                Primary Care Provider Office Phone # Fax #    William Blanco [phone] [address] [facility] [address]        Equal Access to Services     ANDRE TAYLOR AH: Hadii tom Garcia, wajonda luqadaha, qaybta karuddy bonilla  lahammad rooney. So [facility] [phone].    ATENCIÓN: Si habla yadira, tiene a torres disposición servicios gratuitos de asistencia lingüística. Deya celeste [phone].    We comply with applicable federal civil rights laws and [place] laws. We do not discriminate on the basis of race, color, national origin, age, disability, sex, sexual orientation, or gender identity.            Thank you!     Thank you for choosing [facility]  for your care. Our goal is always to provide you with excellent care. Hearing back from our patients is one way we can continue to improve our services. Please take a few minutes to complete the written survey that you may receive in the mail after your visit with us. Thank you!             Your Updated Medication List - Protect others around you: Learn how to safely use, store and throw away your medicines at www.disposemymeds.org.          This list is accurate as of: 1/19/18  5:17 PM.  Always use your most recent med list.                   Brand Name Dispense Instructions for use Diagnosis    acetaminophen-caffeine 500-65 MG Tabs    EXCEDRIN TENSION HEADACHE     Take 1 tablet by mouth 2 times daily as needed for mild pain Reported on 3/30/2017        acetaminophen-codeine 300-30 MG per tablet    TYLENOL #3    18 tablet    Take 1-2 tablets by mouth nightly as needed for pain maximum 2 tablet(s) per day    Acute pain of right knee       ALEVE PO      Reported on 3/30/2017        azithromycin 250 MG tablet    ZITHROMAX    6 tablet    Two tablets first day, then one tablet daily for four days.    Acute bronchitis with symptoms > 10 days       baclofen 10 MG tablet    LIORESAL    90 tablet    Take 1 tablet (10 mg) by mouth 3 times daily    Midline low back pain without sciatica, unspecified chronicity       COLD MEDICINE PLUS PO      Take 325 mg by mouth as needed        cyclobenzaprine 10 MG tablet    FLEXERIL    14 tablet    Take 1 tablet (10 mg) by mouth nightly as needed for  muscle spasms    Back muscle spasm       * diclofenac 1 % Gel topical gel    VOLTAREN    100 g    Apply 2 times daily to feet/foot.    Chronic pain of left ankle       * diclofenac 1 % Gel topical gel    VOLTAREN    100 g    Apply 4 grams to knees or 2 grams to hands four times daily using enclosed dosing card.    Acute pain of right knee       HYDROcodone-acetaminophen 5-325 MG per tablet    NORCO    30 tablet    Take 1 tablet by mouth every 4 hours as needed for pain maximum 2 tablet(s) per day    Chronic pain of right knee       naproxen 500 MG tablet    NAPROSYN    60 tablet    Take 1 tablet (500 mg) by mouth 2 times daily as needed for moderate pain    Acute pain of right knee       * Notice:  This list has 2 medication(s) that are the same as other medications prescribed for you. Read the directions carefully, and ask your doctor or other care provider to review them with you.

## 2018-01-19 NOTE — PROGRESS NOTES
SUBJECTIVE  Don Samson is a 67 year old male is here with complaints of back pain.  Pain is described as intermittent bilateral lower back muscle spasms.   Radiating: does not radiate  Pain started how long ago? ~ 2 days ago  Aggravating factors: none   Therapies to improve symptoms include: ice and aleve  Recent injury:none recalled by the patient  Personal hx of back pain is recurrent self limited episodes of low back pain in the past.  No h/o back surgery or injections.  No saddle anesthesia, no numbness or weakness, no foot drop.  No bowel/bladder changes    Also wondering about his elevated BP's for past several months.  Was on BP meds in the past.   Doesn't take any currently.       OBJECTIVE:  /84  Pulse 76  Temp 98.1  F (36.7  C) (Tympanic)  Ht 6' (1.829 m)  Wt 240 lb (108.9 kg)  SpO2 97%  BMI 32.55 kg/m2  Back examination: No CVA tenderness.  No swelling, erythema or bruising.  No midline TTP.  No tissue tension.     GENERAL APPEARANCE: healthy, alert and no distress  NEURO: Normal strength and tone with no weakness or sensory deficit noted, reflexes normal   SKIN: no suspicious lesions or rashes    ASSESSMENT/PLAN    ICD-10-CM    1. Back muscle spasm M62.830 cyclobenzaprine (FLEXERIL) 10 MG tablet     No injury.   We discussed the expected course and symptomatic cares in detail, including return to care if symptoms not improving as expected, do not resolve completely, or if any new or worsening symptoms develop.    Patient Instructions   Aleve 2 tablets (take with food) every 12 hours for the next 2 days, then 1 tablet every 12 hours as needed.   Heat (or ice) if needed.   Avoid aggravating activity.   Recheck if not improving over the next 2 weeks as expected.       You can take the muscle relaxant at bedtime if desired, along with the aleve.

## 2018-01-19 NOTE — NURSING NOTE
Chief Complaint   Patient presents with     Urgent Care     Back Pain     c/o back pain for 1 day       Initial /84  Pulse 76  Temp 98.1  F (36.7  C) (Tympanic)  Ht 6' (1.829 m)  Wt 240 lb (108.9 kg)  SpO2 97%  BMI 32.55 kg/m2 Estimated body mass index is 32.55 kg/(m^2) as calculated from the following:    Height as of this encounter: 6' (1.829 m).    Weight as of this encounter: 240 lb (108.9 kg).  Medication Reconciliation: complete   Brooke Bradford MA

## 2018-03-07 ENCOUNTER — OFFICE VISIT (OUTPATIENT)
Dept: FAMILY MEDICINE | Facility: CLINIC | Age: 68
End: 2018-03-07
Payer: COMMERCIAL

## 2018-03-07 VITALS
DIASTOLIC BLOOD PRESSURE: 94 MMHG | RESPIRATION RATE: 12 BRPM | WEIGHT: 244 LBS | BODY MASS INDEX: 33.09 KG/M2 | OXYGEN SATURATION: 98 % | SYSTOLIC BLOOD PRESSURE: 152 MMHG | HEART RATE: 76 BPM | TEMPERATURE: 97.4 F

## 2018-03-07 DIAGNOSIS — M54.2 CERVICALGIA: ICD-10-CM

## 2018-03-07 DIAGNOSIS — A08.4 VIRAL GASTROENTERITIS: Primary | ICD-10-CM

## 2018-03-07 DIAGNOSIS — I10 HYPERTENSION GOAL BP (BLOOD PRESSURE) < 140/90: ICD-10-CM
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
## 2018-03-07 PROCEDURE — 99214 OFFICE O/P EST MOD 30 MIN: CPT | Performed by: NURSE PRACTITIONER

## 2018-03-07 RX ORDER — LISINOPRIL 10 MG/1
10 TABLET ORAL DAILY
Qty: 30 TABLET | Refills: 1 | Status: SHIPPED | OUTPATIENT
Start: 2018-03-07 | End: 2018-03-15

## 2018-03-07 RX ORDER — ONDANSETRON 8 MG/1
4-8 TABLET, FILM COATED ORAL EVERY 8 HOURS PRN
Qty: 20 TABLET | Refills: 1 | Status: SHIPPED | OUTPATIENT
Start: 2018-03-07 | End: 2018-03-15
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
## 2018-03-07 NOTE — LETTER
[place] [id number] [place] [id number]  Phone: [phone]    March 7, 2018        Don Samson  [address]  [facility] [address]          To whom it may concern:    RE: Don CLARKE [place]    Patient was seen and treated today at our clinic and missed work.  He was diagnosed with medical illness and advised to not return to work until his symptoms improve.      Please contact me for questions or concerns.      Sincerely,        CONSTANCE Doyle CNP
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
## 2018-03-15 ENCOUNTER — OFFICE VISIT (OUTPATIENT)
Dept: FAMILY MEDICINE | Facility: CLINIC | Age: 68
End: 2018-03-15
Payer: COMMERCIAL

## 2018-03-15 VITALS
RESPIRATION RATE: 14 BRPM | BODY MASS INDEX: 31.87 KG/M2 | OXYGEN SATURATION: 97 % | WEIGHT: 235 LBS | TEMPERATURE: 97.9 F | DIASTOLIC BLOOD PRESSURE: 84 MMHG | SYSTOLIC BLOOD PRESSURE: 154 MMHG | HEART RATE: 66 BPM

## 2018-03-15 DIAGNOSIS — I10 HYPERTENSION GOAL BP (BLOOD PRESSURE) < 140/90: Primary | ICD-10-CM

## 2018-03-15 DIAGNOSIS — F33.1 MAJOR DEPRESSIVE DISORDER, RECURRENT, MODERATE (H): ICD-10-CM

## 2018-03-15 PROCEDURE — 99213 OFFICE O/P EST LOW 20 MIN: CPT | Performed by: NURSE PRACTITIONER

## 2018-03-15 RX ORDER — LISINOPRIL 40 MG/1
40 TABLET ORAL DAILY
Qty: 30 TABLET | Refills: 0 | Status: SHIPPED | OUTPATIENT
Start: 2018-03-15 | End: 2018-03-22

## 2018-03-15 NOTE — PATIENT INSTRUCTIONS
1.  Increase lisinopril to 40mg once a dya, labs today, follow up in 1 week for recheck  2.  I think you would benefit from meeting with Hernando Guillen our therapist here.

## 2018-03-15 NOTE — MR AVS SNAPSHOT
After Visit Summary   3/15/2018    Don Samson    MRN: [id number]           Patient Information     Date Of Birth          1950        Visit Information        Provider Department      3/15/2018 10:00 AM Flakita Pastor APRN CNP [facility]        Today's Diagnoses     Hypertension goal BP (blood pressure) < 140/90    -  1      Care Instructions    1.  Increase lisinopril to 40mg once a dya, labs today, follow up in 1 week for recheck  2.  I think you would benefit from meeting with Hernando Guillen our therapist here.            Follow-ups after your visit        Your next 10 appointments already scheduled     Mar 22, 2018  4:00 PM CDT   Office Visit with CONSTANCE Dolye CNP   [facility]    [address] [place] [id number] [phone]           Bring a current list of meds and any records pertaining to this visit. For Physicals, please bring immunization records and any forms needing to be filled out. Please arrive 10 minutes early to complete paperwork.            Mar 28, 2018 10:00 AM CDT   New Visit with YUDI Solis   [facility]    [address] [place] [id number] [phone]              Who to contact     If you have questions or need follow up information about today's clinic visit or your schedule please contact [facility] directly at [phone].  Normal or non-critical lab and imaging results will be communicated to you by MyChart, letter or phone within 4 business days after the clinic has received the results. If you do not hear from us within 7 days, please contact the clinic through [employer] or phone. If you have a critical or abnormal lab result, we will notify you by phone as soon as possible.  Submit refill requests through [employer] or call your pharmacy and they will forward the refill request to  "us. Please allow 3 business days for your refill to be completed.          Additional Information About Your Visit        [employer] Information     [employer] lets you send messages to your doctor, view your test results, renew your prescriptions, schedule appointments and more. To sign up, go to [email or web address] . Click on \"Log in\" on the left side of the screen, which will take you to the Welcome page. Then click on \"Sign up Now\" on the right side of the page.     You will be asked to enter the access code listed below, as well as some personal information. Please follow the directions to create your username and password.     Your access code is: [email or web address]  Expires: 2018 11:30 AM     Your access code will  in 90 days. If you need help or a new code, please call your [facility] clinic or [phone].        Care EveryWhere ID     This is your [facility] EveryWhere ID. This could be used by other organizations to access your [facility] medical records  [id number]        Your Vitals Were     Pulse Temperature Respirations Pulse Oximetry BMI (Body Mass Index)       66 97.9  F (36.6  C) (Oral) 14 97% 31.87 kg/m2        Blood Pressure from Last 3 Encounters:   03/15/18 154/84   18 (!) 152/94   18 144/84    Weight from Last 3 Encounters:   03/15/18 235 lb (106.6 kg)   18 244 lb (110.7 kg)   18 240 lb (108.9 kg)              We Performed the Following     Basic metabolic panel          Today's Medication Changes          These changes are accurate as of 3/15/18 10:41 AM.  If you have any questions, ask your nurse or doctor.               These medicines have changed or have updated prescriptions.        Dose/Directions    lisinopril 40 MG tablet   Commonly known as:  PRINIVIL/ZESTRIL   This may have changed:    - medication strength  - how much to take   Used for:  Hypertension goal BP (blood pressure) < 140/90   Changed by:  Flakita Pastor APRN CNP        Dose:  40 mg   Take 1 " tablet (40 mg) by mouth daily   Quantity:  30 tablet   Refills:  0         Stop taking these medicines if you haven't already. Please contact your care team if you have questions.     ondansetron 8 MG tablet   Commonly known as:  ZOFRAN   Stopped by:  Flaktia Pastor APRN CNP                Where to get your medicines      These medications were sent to [facility] [place], [place] - [address]  [address], [facility] [address]     Phone:  [phone]     lisinopril 40 MG tablet                Primary Care Provider Office Phone # Fax #    William Jaleel Blanco [phone] [address]  [facility] [address]        Equal Access to Services     [facility] CLAUDIA : Hadii tom escalante hadasho Sodemario, waaxda luqadaha, qaybta kaalmada adeegyada, waxjose e vincentin colt bach . So [facility] [phone].    ATENCIÓN: Si habla español, tiene a torres disposición servicios gratuitos de asistencia lingüística. [facility] [phone].    We comply with applicable federal civil rights laws and [place] laws. We do not discriminate on the basis of race, color, national origin, age, disability, sex, sexual orientation, or gender identity.            Thank you!     Thank you for choosing [facility]  for your care. Our goal is always to provide you with excellent care. Hearing back from our patients is one way we can continue to improve our services. Please take a few minutes to complete the written survey that you may receive in the mail after your visit with us. Thank you!             Your Updated Medication List - Protect others around you: Learn how to safely use, store and throw away your medicines at www.disposemymeds.org.          This list is accurate as of 3/15/18 10:41 AM.  Always use your most recent med list.                   Brand Name Dispense Instructions for use Diagnosis    cyclobenzaprine 10 MG tablet    FLEXERIL    14 tablet    Take 1 tablet (10 mg) by  mouth nightly as needed for muscle spasms    Back muscle spasm       lisinopril 40 MG tablet    PRINIVIL/ZESTRIL    30 tablet    Take 1 tablet (40 mg) by mouth daily    Hypertension goal BP (blood pressure) < 140/90

## 2018-03-20 ENCOUNTER — TELEPHONE (OUTPATIENT)
Dept: FAMILY MEDICINE | Facility: CLINIC | Age: 68
End: 2018-03-20

## 2018-03-20 NOTE — TELEPHONE ENCOUNTER
Recommend he take the medication at night.  If that doesn't work for him we can consider dose reduction and adding second med.    Flakita Pastor, CNP

## 2018-03-20 NOTE — TELEPHONE ENCOUNTER
Left message on machine to call back.  Ask to speak to an RN, let them know it's a return call.  Leave a number and time that you can be reached. Joanne Ortiz RN

## 2018-03-20 NOTE — TELEPHONE ENCOUNTER
"Patient states she was started on a blood pressure medication a couple weeks ago  Per Chart was started on Lisinopril 10 mg on 3/7/18  Dose was increased to 40 mg a day on 3/15/18  Patient states medication is making him way too sleepy.  States he takes med at 5 AM when he gets up and by later in the day he is falling asleep  Patient reports he goes to bed around 10 M and falls asleep around 11 or 12 PM  Gets up at 5 AM.  States this is a \"pretty bad side effect.\"  Patient does have a follow-up appointment scheduled for 3/22/18 (This Thursday).  I asked if patient noticed feeling tired after initiating the medication at the 10 mg dose and he is not sure.  Might have just started after the dose increase.  Please advise.  Joanne Ortiz RN  "

## 2018-03-21 NOTE — TELEPHONE ENCOUNTER
Flakita HOLLIS,  --Patient called back and I read your message to him.  --He said he takes it in the morning and is not willing to try taking it at night.  --He said he is just thinks he is going to stop taking it.  --I told him there are a lot of other options for medication for blood pressure.  --I asked him if he would be in for his appointment tomorrow and can discuss alternatives and he said he was not sure if he was going to keep that appointment and would let us know.  --I told him it is important to take something for elevated blood pressure because of  negative effects of elevated pressure.  --He said the side effects are not worth it and hung up.     Glenda DHALIWAL

## 2018-03-21 NOTE — PROGRESS NOTES
SUBJECTIVE:   Don Samson is a 68 year old male who presents to clinic today for the following health issues:    Hypertension Follow-up      Outpatient blood pressures are not being checked.    Low Salt Diet: no added salt      Noted to be hypertensive at recent visit 3/7/18, was previously on HCTZ and amlodipine (caused nausea, pt had stopped).  Started on lisinopril last week and dose increased to 40mg 3/15 due to uncontrolled blood pressure.  Pt called 3/20 noting lisinopril made him tired.  Advised to change to hs dosing but pt declined.    Denies other causes of fatigue including increased stress or sleep changes.    Concern for uncontrolled depression symptoms last visit, pt scheduled with Hernando Guillen 4/6/18.  Doesn't want to talk about medications or other treatment options.    Not interested in FIT or PCV vaccine today.    Patient Active Problem List   Diagnosis     Plantar fasciitis     Ankle sprain and strain     Left knee pain     Adjustment disorder with mixed anxiety and depressed mood     Depression     Ankle pain     Tibialis posterior tendonitis     CARDIOVASCULAR SCREENING; LDL GOAL LESS THAN 130     Fatigue     Right knee pain     Hypertension goal BP (blood pressure) < 140/90     Major depressive disorder, recurrent, moderate (H)     Past Surgical History:   Procedure Laterality Date     HERNIORRHAPHY INGUINAL BILATERAL      [facility] 2004, [facility] 1985     NASAL/SINUS POLYPECTOMY      Nasal-Sinus Polypectomy       Social History   Substance Use Topics     Smoking status: Former Smoker     Quit date: 1/1/1970     Smokeless tobacco: Never Used     Alcohol use 0.0 oz/week     0 Standard drinks or equivalent per week     Family History   Problem Relation Age of Onset     Hypertension Mother          Current Outpatient Prescriptions   Medication Sig Dispense Refill     lisinopril (PRINIVIL/ZESTRIL) 40 MG tablet Take 1 tablet (40 mg) by mouth daily 90 tablet 3     cyclobenzaprine (FLEXERIL) 10 MG tablet  Take 1 tablet (10 mg) by mouth nightly as needed for muscle spasms 14 tablet 0     [DISCONTINUED] lisinopril (PRINIVIL/ZESTRIL) 40 MG tablet Take 1 tablet (40 mg) by mouth daily 30 tablet 0       ROS:  CV, Resp, Psych as above, otherwise negative       OBJECTIVE:                                                    /72 (BP Location: Right arm, Patient Position: Chair, Cuff Size: Adult Large)  Pulse 68  Temp 97.7  F (36.5  C) (Oral)  Resp 12  Wt 238 lb (108 kg)  SpO2 99%  BMI 32.28 kg/m2   GENERAL APPEARANCE: healthy, alert and no distress  EYES: Eyes grossly normal to inspection and conjunctivae and sclerae normal  RESP: lungs clear to auscultation - no rales, rhonchi or wheezes  CV: regular rates and rhythm, normal S1 S2, no S3 or S4 and no murmur, click or rub  PSYCH: affect flat        ASSESSMENT/PLAN:                                                    (I10) Hypertension goal BP (blood pressure) < 140/90  (primary encounter diagnosis)  Comment: now controlled, fatigue on lisinopril  Plan: Basic metabolic panel, lisinopril         (PRINIVIL/ZESTRIL) 40 MG tablet        Discussed changing to hs dosing or considering witching to bb (but can also cause fatigue).  He is up for sticking with lisinopril, fatigue seems to be improving.  Follow up BMP today.  He will follow up if fatigue does not cont to improve.      (F33.1) Major depressive disorder, recurrent, moderate (H)  Comment: with flat affect and psychomotor slowing, not interested in discussing treatment, he is scheduled to see Hernando Guillen  Plan: follow up with Hernando Guillen as planned      See Patient Instructions    Flakita Pastor, [facility]    Patient Instructions   1.  Blood pressure is normal today, lab recheck today, consider taking it in the evening.  Follow up if fatigue is not improving.  We should see you once a year or sooner if needed    2.  See Hernando Guillen in April as scheduled

## 2018-03-22 ENCOUNTER — OFFICE VISIT (OUTPATIENT)
Dept: FAMILY MEDICINE | Facility: CLINIC | Age: 68
End: 2018-03-22
Payer: COMMERCIAL

## 2018-03-22 VITALS
RESPIRATION RATE: 12 BRPM | TEMPERATURE: 97.7 F | DIASTOLIC BLOOD PRESSURE: 72 MMHG | SYSTOLIC BLOOD PRESSURE: 121 MMHG | BODY MASS INDEX: 32.28 KG/M2 | HEART RATE: 68 BPM | OXYGEN SATURATION: 99 % | WEIGHT: 238 LBS

## 2018-03-22 DIAGNOSIS — I10 HYPERTENSION GOAL BP (BLOOD PRESSURE) < 140/90: Primary | ICD-10-CM

## 2018-03-22 DIAGNOSIS — F33.1 MAJOR DEPRESSIVE DISORDER, RECURRENT, MODERATE (H): ICD-10-CM

## 2018-03-22 PROCEDURE — 36415 COLL VENOUS BLD VENIPUNCTURE: CPT | Performed by: NURSE PRACTITIONER

## 2018-03-22 PROCEDURE — 99213 OFFICE O/P EST LOW 20 MIN: CPT | Performed by: NURSE PRACTITIONER

## 2018-03-22 PROCEDURE — 80048 BASIC METABOLIC PNL TOTAL CA: CPT | Performed by: NURSE PRACTITIONER

## 2018-03-22 RX ORDER — LISINOPRIL 40 MG/1
40 TABLET ORAL DAILY
Qty: 90 TABLET | Refills: 3 | Status: SHIPPED | OUTPATIENT
Start: 2018-03-22 | End: 2018-07-02

## 2018-03-22 NOTE — PATIENT INSTRUCTIONS
1.  Blood pressure is normal today, lab recheck today, consider taking it in the evening.  Follow up if fatigue is not improving.  We should see you once a year or sooner if needed    2.  See Hernando Guillen in April as scheduled

## 2018-03-22 NOTE — NURSING NOTE
"Pt declined PHQ-9 stating that he \"does not\" do this questionnaire.    Darlene Lindsay, [facility]    "

## 2018-03-22 NOTE — LETTER
March 23, 2018      Don Samson  [address]  [facility] [address]        Dear ,    We are writing to inform you of your test results.    Labs are stable.    Resulted Orders   Basic metabolic panel   Result Value Ref Range    Sodium 143 133 - 144 mmol/L    Potassium 4.5 3.4 - 5.3 mmol/L    Chloride 108 94 - 109 mmol/L    Carbon Dioxide 28 20 - 32 mmol/L    Anion Gap 7 3 - 14 mmol/L    Glucose 97 70 - 99 mg/dL      Comment:      Non Fasting    Urea Nitrogen 17 7 - 30 mg/dL    Creatinine 1.00 0.66 - 1.25 mg/dL    GFR Estimate 75 >60 mL/min/1.7m2      Comment:      Non  GFR Calc    GFR Estimate If Black >90 >60 mL/min/1.7m2      Comment:       GFR Calc    Calcium 9.0 8.5 - 10.1 mg/dL       If you have any questions or concerns, please call the clinic at the number listed above.     Sincerely,    Flakita Pastor, APRN CNP/nr

## 2018-03-23 LAB
ANION GAP SERPL CALCULATED.3IONS-SCNC: 7 MMOL/L (ref 3–14)
BUN SERPL-MCNC: 17 MG/DL (ref 7–30)
CALCIUM SERPL-MCNC: 9 MG/DL (ref 8.5–10.1)
CHLORIDE SERPL-SCNC: 108 MMOL/L (ref 94–109)
CO2 SERPL-SCNC: 28 MMOL/L (ref 20–32)
CREAT SERPL-MCNC: 1 MG/DL (ref 0.66–1.25)
GFR SERPL CREATININE-BSD FRML MDRD: 75 ML/MIN/1.7M2
GLUCOSE SERPL-MCNC: 97 MG/DL (ref 70–99)
POTASSIUM SERPL-SCNC: 4.5 MMOL/L (ref 3.4–5.3)
SODIUM SERPL-SCNC: 143 MMOL/L (ref 133–144)

## 2018-03-23 NOTE — PROGRESS NOTES
Please send out result letter with the following note, thanks.    Labs are stable.    -Flakita Pastor, CNP

## 2018-04-06 ENCOUNTER — OFFICE VISIT (OUTPATIENT)
Dept: BEHAVIORAL HEALTH | Facility: CLINIC | Age: 68
End: 2018-04-06
Payer: COMMERCIAL

## 2018-04-06 DIAGNOSIS — F33.1 MODERATE EPISODE OF RECURRENT MAJOR DEPRESSIVE DISORDER (H): Primary | ICD-10-CM

## 2018-04-06 PROCEDURE — 90837 PSYTX W PT 60 MINUTES: CPT | Performed by: SOCIAL WORKER

## 2018-04-06 NOTE — PROGRESS NOTES
"[facility]  April 6, 2018    Behavioral Health Clinician Progress Note    Voice recognition technology may have been utilized for some of the information in this medical record.      Patient Name: Don Samson         Service Type: Individual           Service Location:  Face to Face in Clinic      Session Start Time:  11  Session End Time: 12      Session Length: 53 - 60      Attendees: Client    Visit Activities (Refresh list every visit): NEW      Diagnostic Assessment Date: To be completed if patient continues.  Patient had refused to complete the PHQ 9, and FCC intake package.  Patient states this is \"useless\".  Initial focus was on engaging patient in the therapeutic process.    Treatment Plan Review Date: To be completed.      Depression and Anxiety Follow-Up    Status since last visit:     PHQ-9 9/25/2015 4/7/2016 9/28/2016   Total Score 9 8 4   Q9: Suicide Ideation Not at all Not at all Not at all     ANNE-7 SCORE 4/7/2016   Total Score 10         ANTOINETTE LEVEL:  ANTOINETTE Score (Last Two) 1/13/2016   ANTOINETTE Raw Score 36   Activation Score 47.4   ANTOINETTE Level 2       DATA  Extended Session (60+ minutes): No  Interactive Complexity: No  Crisis: No  Merged with [facility] Patient No    Treatment Objective(s) Addressed in This Session:  Target Behavior(s):  Depressed Mood: Decrease frequency and intensity of feeling down, depressed, hopeless  Improve quantity and quality of night time sleep / decrease daytime naps  Feel less tired and more energy during the day   Improve diet, appetite, mindful eating, and / or meal planning  Identify negative self-talk and behaviors: challenge core beliefs, myths, and actions  Improve concentration, focus, and mindfulness in daily activities   Feel less fidgety, restless or slow in daily activities / interpersonal interactions      Current Stressors / Issues:    Patient had met with his primary care physician on March 22 and was referred to the [facility].  Please see summary below by " "primary care physician.  **Hypertension Follow-up       Outpatient blood pressures are not being checked.    Low Salt Diet: no added salt        Noted to be hypertensive at recent visit 3/7/18, was previously on HCTZ and amlodipine (caused nausea, pt had stopped).  Started on lisinopril last week and dose increased to 40mg 3/15 due to uncontrolled blood pressure.  Pt called 3/20 noting lisinopril made him tired.  Advised to change to hs dosing but pt declined.     Denies other causes of fatigue including increased stress or sleep changes.     Concern for uncontrolled depression symptoms last visit, pt scheduled with Hernando Guillen 18.  Doesn't want to talk about medications or other treatment options*    Today    Patient presents as lethargic, withdrawn and guarded.  Initial focus was on engaging patient in therapeutic process.  Patient declined to complete any paperwork or answer any PHQ 9, ANNE 7 questions.  Began to explore patient's perception of his mood issues.  Patient began to open up stating he does have a history of depression and was prescribed medication by psychiatrist and therapist in the past.  Patient was extremely vague about time during the interview.  Patient states therapy and medication was not helpful.  Patient states there is a reason why he is resistant at the present time.    Patient's initial statement was \"life sucks and then you die\".  Patient denied active suicidal thoughts but admits passive thoughts.  Patient states if he  of a heart attack tomorrow that would be okay but has note intent or plan to harm himself.  He denied a history of substance abuse, inpatient admission or previous suicide attempts.    Patient states experiencing less energy, anhedonia, psychomotor retardation, and sleeps most of the day.  On a scale 1-10, 10 being happier patient reports his mood is usually under 2 or 3.  Patient states his appetite is poor and most of the time except food or eats leftovers.  " "Patient states he used to prepare meals for himself.  Patient states she lives alone, has few friends.  Patient was  when he was younger but has no current partner or children.  Patient states he does not like people and prefers to be by himself.  Question diagnosis more of schizoid Personality disorder.    Patient states he does like his life.  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  Patient reports other staff identify them as the \"[employer]\".  Patient expressed a lot of frustration and irritability at work.  Reflected back to patient that he is 68 and eligible for nursing home.  Patient states he has not explored that possibility or Medicare either.  Patient responds to most questions was I do not care\".    Patient identified 1 support is going to the coffee shop where he talks with the staff.  Patient admits he is probably more irritable is attending they are less often.    Focused on patient's energy and motivation to come see the therapist today.  She reluctantly shifted and stated he may be open to change.  Patient struggled to identify activities that bring him enjoyment with things he used to do in the past.  Patient states he spends most of his days by himself, sleeping and watching TV.  However pot patient was able to identify he enjoyed riding his bicycle but has not done that for the past year.  Patient identified some repairs that needed to be done.  [facility] supportive patient to go to a local bike store to repair his bike.  [facility] has a connection with a local bike store.  Affect seemed to improve talking about his bicycle and where he used to go riding.  Patient described the past he used to do the 2 day michael rides across [place].    Plan    Patient felt the session was \"somewhat\" helpful and agreed to reschedule.  Discussed the possibility of medication but patient refused at this time.    Progress on Treatment Objective(s) / " Homework:  Minimal progress - CONTEMPLATION (Considering change and yet undecided); Intervened by assessing the negative and positive thinking (ambivalence) about behavior change    Motivational Interviewing    MI Intervention: Expressed Empathy/Understanding, Supported Autonomy, Collaboration, Evocation, Permission to raise concern or advise and Open-ended questions     Change Talk Expressed by the Patient: Reasons to change    Provider Response to Change Talk: E - Evoked more info from patient about behavior change and A - Affirmed patient's thoughts, decisions, or attempts at behavior change    Also provided psychoeducation about behavioral health condition, symptoms, and treatment options    PSYCHODYMANIC PSYCHOTHERAPY: Discussed patient's emotional dynamics and issues and how they impact behaviors,Explored patient's history of relationships and how they impact present behaviors, Explored how to work with and make changes in these schemas and patterns    Care Plan review completed: No    Medication Review:  No current psychiatric medications prescribed    Medication Compliance:  NA    Changes in Health Issues:   None reported    Chemical Use Review:   Substance Use: Chemical use reviewed, no active concerns identified      Tobacco Use: No current tobacco use.      Assessment: Current Emotional / Mental Status (status of significant symptoms):    Risk status (Self / Other harm or suicidal ideation)  Patient denies current fears or concerns for personal safety.  Patient reports the following current or recent suicidal ideation or behaviors: See summary.  Patient denies current or recent homicidal ideation or behaviors.  Patient denies current or recent self injurious behavior or ideation.  Patient denies other safety concerns.  A safety and risk management plan has not been developed at this time, however patient was encouraged to call [facility] should there be a change in any of these risk  factors.    Appearance:   Disheveled   Eye Contact:   Poor  Psychomotor Behavior: Retarded (Slowed)   Attitude:   Guarded   Orientation:   All  Speech   Rate / Production: Monotone  Slow    Volume:  Soft   Mood:    Depressed   Affect:    Flat   Thought Content:  Clear   Thought Form:  Blocking  Coherent   Insight:    Poor     Provisional Diagnoses:  1. Moderate episode of recurrent major depressive disorder (H)        Collateral Reports Completed:  Routed note to PCP    Plan: (Homework, other):  Patient was given information about behavioral services and encouraged to schedule a follow up appointment with the clinic [facility] in 2 weeks.  He was also given information about mental health symptoms and treatment options .  CD Recommendations: No indications of CD issues.  YUDI Brower, [facility]

## 2018-04-06 NOTE — MR AVS SNAPSHOT
"              After Visit Summary   2018    Don Samson    MRN: [id number]           Patient Information     Date Of Birth          1950        Visit Information        Provider Department      2018 10:30 AM Hernando Guillen, [facility]        Today's Diagnoses     Moderate episode of recurrent major depressive disorder (H)    -  1       Follow-ups after your visit        Who to contact     If you have questions or need follow up information about today's clinic visit or your schedule please contact [facility] directly at [phone].  Normal or non-critical lab and imaging results will be communicated to you by [employer], letter or phone within 4 business days after the clinic has received the results. If you do not hear from us within 7 days, please contact the clinic through [employer] or phone. If you have a critical or abnormal lab result, we will notify you by phone as soon as possible.  Submit refill requests through [employer] or call your pharmacy and they will forward the refill request to us. Please allow 3 business days for your refill to be completed.          Additional Information About Your Visit        MyChart Information     [employer] lets you send messages to your doctor, view your test results, renew your prescriptions, schedule appointments and more. To sign up, go to [email or web address] . Click on \"Log in\" on the left side of the screen, which will take you to the Welcome page. Then click on \"Sign up Now\" on the right side of the page.     You will be asked to enter the access code listed below, as well as some personal information. Please follow the directions to create your username and password.     Your access code is: [email or web address]  Expires: 2018 11:30 AM     Your access code will  in 90 days. If you need help or a new code, please call your [facility] or [phone].        Care EveryWhere ID     This is your Care EveryWhere " ID. This could be used by other organizations to access your [facility] medical records  [id number]         Blood Pressure from Last 3 Encounters:   03/22/18 121/72   03/15/18 154/84   03/07/18 (!) 152/94    Weight from Last 3 Encounters:   03/22/18 108 kg (238 lb)   03/15/18 106.6 kg (235 lb)   03/07/18 110.7 kg (244 lb)              Today, you had the following     No orders found for display       Primary Care Provider Office Phone # Fax #    Flakita Newman Dawit, APRN [facility] [phone] [address]  [facility] [address]        Equal Access to Services     ANDRE TAYLOR : Hadii tom manriqueo Sodemario, waaxda luqadaha, qaybta kaalmada adeegyada, ruddy bach . So [facility] [phone].    ATENCIÓN: Si habla español, tiene a torres disposición servicios gratuitos de asistencia lingüística. Llame al [phone].    We comply with applicable federal civil rights laws and [place] laws. We do not discriminate on the basis of race, color, national origin, age, disability, sex, sexual orientation, or gender identity.            Thank you!     Thank you for choosing [facility]  for your care. Our goal is always to provide you with excellent care. Hearing back from our patients is one way we can continue to improve our services. Please take a few minutes to complete the written survey that you may receive in the mail after your visit with us. Thank you!             Your Updated Medication List - Protect others around you: Learn how to safely use, store and throw away your medicines at www.disposemymeds.org.          This list is accurate as of 4/6/18  1:06 PM.  Always use your most recent med list.                   Brand Name Dispense Instructions for use Diagnosis    cyclobenzaprine 10 MG tablet    FLEXERIL    14 tablet    Take 1 tablet (10 mg) by mouth nightly as needed for muscle spasms    Back muscle spasm       lisinopril 40 MG tablet    PRINIVIL/ZESTRIL     90 tablet    Take 1 tablet (40 mg) by mouth daily    Hypertension goal BP (blood pressure) < 140/90

## 2018-04-10 ENCOUNTER — OFFICE VISIT (OUTPATIENT)
Dept: FAMILY MEDICINE | Facility: CLINIC | Age: 68
End: 2018-04-10
Payer: COMMERCIAL

## 2018-04-10 VITALS
OXYGEN SATURATION: 98 % | TEMPERATURE: 97.7 F | BODY MASS INDEX: 31.19 KG/M2 | RESPIRATION RATE: 17 BRPM | DIASTOLIC BLOOD PRESSURE: 82 MMHG | SYSTOLIC BLOOD PRESSURE: 124 MMHG | HEART RATE: 71 BPM | WEIGHT: 230 LBS

## 2018-04-10 DIAGNOSIS — M54.50 ACUTE BILATERAL LOW BACK PAIN WITHOUT SCIATICA: Primary | ICD-10-CM

## 2018-04-10 DIAGNOSIS — M62.830 BACK MUSCLE SPASM: ICD-10-CM

## 2018-04-10 PROCEDURE — 99213 OFFICE O/P EST LOW 20 MIN: CPT | Performed by: PHYSICIAN ASSISTANT

## 2018-04-10 RX ORDER — CYCLOBENZAPRINE HCL 10 MG
10 TABLET ORAL
Qty: 30 TABLET | Refills: 1 | Status: SHIPPED | OUTPATIENT
Start: 2018-04-10 | End: 2018-11-13

## 2018-04-10 NOTE — LETTER
[place] [id number] [place] [id number] [phone]          April 10, 2018    RE:  Don Samson                                                                                                                                                        [id number]  [facility] [address]            To whom it may concern:      The above named patient was seen in office today for acute symptoms.    Sincerely,        Alysia Gomez PA-C

## 2018-04-10 NOTE — PATIENT INSTRUCTIONS
For acute pain, rest and application of heat and ice intermittently as needed and especially after any offending activity (do not sleep on heating pad).   May use over the counter analgesics (NSAIDS (ibuprofen, advil, aleve type products) 400-600 mg every 6 to 8 hours as needed for pain, please take with food ) and/or acetaminophen (Tylenol - 1000 mg three times a day)  Prescription for muscle relaxants (flexeril) at bedtime especially suggested as well.  Discussed longer term treatment plan of prn NSAIDS (ibuprofen, advil, aleve type products) and importance at home stretches/exercise program.    Proper lifting with avoidance of heavy lifting discussed. As pain recedes, begin normal activities slowly as tolerated.    Consider Physical Therapy and XRay studies if not improving. Call or return to clinic prn if these symptoms worsen or fail to improve as anticipated with symptomatic care.

## 2018-04-10 NOTE — PROGRESS NOTES
SUBJECTIVE:   Don Samson is a 68 year old male who presents to clinic today for the following health issues:    Back Pain       Duration: few days        Specific cause: none    Description:   Location of pain: middle of back  Character of pain: intermittent  Pain radiation:none  New numbness or weakness in legs, not attributed to pain:  no     Intensity: Currently 5/10    History:   Pain interferes with job: YES  Sees a specialist for back pain:  No    Alleviating factors:   Improved by: cold, heat, aleve and massage      Precipitating factors:  Worsened by: Lying flat for too long    History of similar symptoms earlier in the year - given prescription for muscle relaxer with some improvement.      Problem list and histories reviewed & adjusted, as indicated.  Additional history: as documented    Patient Active Problem List   Diagnosis     Plantar fasciitis     Ankle sprain and strain     Left knee pain     Depression     Ankle pain     Tibialis posterior tendonitis     CARDIOVASCULAR SCREENING; LDL GOAL LESS THAN 130     Fatigue     Right knee pain     Hypertension goal BP (blood pressure) < 140/90     Major depressive disorder, recurrent, moderate (H)     Past Surgical History:   Procedure Laterality Date     HERNIORRHAPHY INGUINAL BILATERAL      [facility] 2004, [facility] 1985     NASAL/SINUS POLYPECTOMY      Nasal-Sinus Polypectomy       Social History   Substance Use Topics     Smoking status: Former Smoker     Quit date: 1/1/1970     Smokeless tobacco: Never Used     Alcohol use 0.0 oz/week     0 Standard drinks or equivalent per week     Family History   Problem Relation Age of Onset     Hypertension Mother          Current Outpatient Prescriptions   Medication Sig Dispense Refill     cyclobenzaprine (FLEXERIL) 10 MG tablet Take 1 tablet (10 mg) by mouth nightly as needed for muscle spasms 30 tablet 1     lisinopril (PRINIVIL/ZESTRIL) 40 MG tablet Take 1 tablet (40 mg) by mouth daily 90 tablet 3     No Known  Allergies  BP Readings from Last 3 Encounters:   04/10/18 124/82   03/22/18 121/72   03/15/18 154/84    Wt Readings from Last 3 Encounters:   04/10/18 230 lb (104.3 kg)   03/22/18 238 lb (108 kg)   03/15/18 235 lb (106.6 kg)                 Reviewed and updated as needed this visit by clinical staff  Tobacco  Allergies  Meds  Problems  Med Hx  Surg Hx  Fam Hx  Soc Hx        Reviewed and updated as needed this visit by Provider  Allergies  Meds  Problems         ROS:  Constitutional, HEENT, cardiovascular, pulmonary, GI, , musculoskeletal, neuro, skin, endocrine and psych systems are negative, except as otherwise noted.    OBJECTIVE:     /82 (BP Location: Left arm, Patient Position: Sitting, Cuff Size: Adult Large)  Pulse 71  Temp 97.7  F (36.5  C) (Oral)  Resp 17  Wt 230 lb (104.3 kg)  SpO2 98%  BMI 31.19 kg/m2  Body mass index is 31.19 kg/(m^2).  GENERAL: healthy, alert and no distress  NECK: no adenopathy, no asymmetry, masses, or scars and thyroid normal to palpation  RESP: lungs clear to auscultation - no rales, rhonchi or wheezes  CV: regular rate and rhythm, normal S1 S2, no S3 or S4, no murmur, click or rub, no peripheral edema and peripheral pulses strong  MS: no gross musculoskeletal defects noted, no edema  BACK: no CVA tenderness, no paralumbar tenderness but slight midline tenderness in lumbar area to deep palpation; FROM and negative straight leg raise.  PSYCH: mentation appears normal, affect normal/bright    Diagnostic Test Results:  none     ASSESSMENT/PLAN:       ICD-10-CM    1. Acute bilateral low back pain without sciatica M54.5 cyclobenzaprine (FLEXERIL) 10 MG tablet   2. Back muscle spasm M62.830 cyclobenzaprine (FLEXERIL) 10 MG tablet       Patient Instructions   For acute pain, rest and application of heat and ice intermittently as needed and especially after any offending activity (do not sleep on heating pad).   May use over the counter analgesics (NSAIDS (ibuprofen,  advil, aleve type products) 400-600 mg every 6 to 8 hours as needed for pain, please take with food ) and/or acetaminophen (Tylenol - 1000 mg three times a day)  Prescription for muscle relaxants (flexeril) at bedtime especially suggested as well.  Discussed longer term treatment plan of prn NSAIDS (ibuprofen, advil, aleve type products) and importance at home stretches/exercise program.    Proper lifting with avoidance of heavy lifting discussed. As pain recedes, begin normal activities slowly as tolerated.    Consider Physical Therapy and XRay studies if not improving. Call or return to clinic prn if these symptoms worsen or fail to improve as anticipated with symptomatic care.       Alsyia Gomez PA-C  [facility]

## 2018-04-10 NOTE — MR AVS SNAPSHOT
After Visit Summary   4/10/2018    Don Samson    MRN: [id number]           Patient Information     Date Of Birth          1950        Visit Information        Provider Department      4/10/2018 10:40 AM Alysia Gomez PA-C [facility]        Today's Diagnoses     Acute bilateral low back pain without sciatica    -  1    Back muscle spasm          Care Instructions    For acute pain, rest and application of heat and ice intermittently as needed and especially after any offending activity (do not sleep on heating pad).   May use over the counter analgesics (NSAIDS (ibuprofen, advil, aleve type products) 400-600 mg every 6 to 8 hours as needed for pain, please take with food ) and/or acetaminophen (Tylenol - 1000 mg three times a day)  Prescription for muscle relaxants (flexeril) at bedtime especially suggested as well.  Discussed longer term treatment plan of prn NSAIDS (ibuprofen, advil, aleve type products) and importance at home stretches/exercise program.    Proper lifting with avoidance of heavy lifting discussed. As pain recedes, begin normal activities slowly as tolerated.    Consider Physical Therapy and XRay studies if not improving. Call or return to clinic prn if these symptoms worsen or fail to improve as anticipated with symptomatic care.           Follow-ups after your visit        Who to contact     If you have questions or need follow up information about today's clinic visit or your schedule please contact [facility] directly at [phone].  Normal or non-critical lab and imaging results will be communicated to you by MyChart, letter or phone within 4 business days after the clinic has received the results. If you do not hear from us within 7 days, please contact the clinic through [employer] or phone. If you have a critical or abnormal lab result, we will notify you by phone as soon as possible.  Submit refill requests through [employer] or  "call your pharmacy and they will forward the refill request to us. Please allow 3 business days for your refill to be completed.          Additional Information About Your Visit        MyChart Information     [employer] lets you send messages to your doctor, view your test results, renew your prescriptions, schedule appointments and more. To sign up, go to [email or web address] . Click on \"Log in\" on the left side of the screen, which will take you to the Welcome page. Then click on \"Sign up Now\" on the right side of the page.     You will be asked to enter the access code listed below, as well as some personal information. Please follow the directions to create your username and password.     Your access code is: [email or web address]  Expires: 2018 11:30 AM     Your access code will  in 90 days. If you need help or a new code, please call your [place] clinic or [phone].        Care EveryWhere ID     This is your Care EveryWhere ID. This could be used by other organizations to access your [place] medical records  [id number]        Your Vitals Were     Pulse Temperature Respirations Pulse Oximetry BMI (Body Mass Index)       66 97.7  F (36.5  C) (Oral) 17 98% 31.19 kg/m2        Blood Pressure from Last 3 Encounters:   04/10/18 147/89   18 121/72   03/15/18 154/84    Weight from Last 3 Encounters:   04/10/18 230 lb (104.3 kg)   18 238 lb (108 kg)   03/15/18 235 lb (106.6 kg)              Today, you had the following     No orders found for display         Where to get your medicines      These medications were sent to [facility] [address] [facility] [address] AT [address] [address]     Phone:  [phone]     cyclobenzaprine 10 MG tablet          Primary Care Provider Office Phone # Fax #    Flakita CON[facility] [phone] [address] [facility] [address]        Equal Access to " Services     [facility]: Hadii aad ku hadkarendimas Sarademario, waaxda luqadaha, qaybta kaalmapiper [place], ruddy bach . So [facility] [phone].    ATENCIÓN: Si habla español, tiene a torres disposición servicios gratuitos de asistencia lingüística. Llame al [phone].    We comply with applicable federal civil rights laws and [place] laws. We do not discriminate on the basis of race, color, national origin, age, disability, sex, sexual orientation, or gender identity.            Thank you!     Thank you for choosing [facility]  for your care. Our goal is always to provide you with excellent care. Hearing back from our patients is one way we can continue to improve our services. Please take a few minutes to complete the written survey that you may receive in the mail after your visit with us. Thank you!             Your Updated Medication List - Protect others around you: Learn how to safely use, store and throw away your medicines at www.disposemymeds.org.          This list is accurate as of 4/10/18 11:00 AM.  Always use your most recent med list.                   Brand Name Dispense Instructions for use Diagnosis    cyclobenzaprine 10 MG tablet    FLEXERIL    30 tablet    Take 1 tablet (10 mg) by mouth nightly as needed for muscle spasms    Back muscle spasm, Acute bilateral low back pain without sciatica       lisinopril 40 MG tablet    PRINIVIL/ZESTRIL    90 tablet    Take 1 tablet (40 mg) by mouth daily    Hypertension goal BP (blood pressure) < 140/90

## 2018-04-10 NOTE — LETTER
April 10, 2018      Don Samson   [address]  [facility] [address]        To Whom It May Concern,      Patient was seen in office today for acute symptoms.        Sincerely,        Alysia Gomez PA-C

## 2018-04-13 ENCOUNTER — OFFICE VISIT (OUTPATIENT)
Dept: FAMILY MEDICINE | Facility: CLINIC | Age: 68
End: 2018-04-13
Payer: COMMERCIAL

## 2018-04-13 VITALS
HEART RATE: 104 BPM | DIASTOLIC BLOOD PRESSURE: 86 MMHG | SYSTOLIC BLOOD PRESSURE: 124 MMHG | TEMPERATURE: 97.3 F | HEIGHT: 72 IN | OXYGEN SATURATION: 96 %

## 2018-04-13 DIAGNOSIS — J01.90 ACUTE SINUSITIS WITH SYMPTOMS > 10 DAYS: ICD-10-CM

## 2018-04-13 DIAGNOSIS — M54.50 ACUTE MIDLINE LOW BACK PAIN WITHOUT SCIATICA: Primary | ICD-10-CM

## 2018-04-13 PROCEDURE — 99214 OFFICE O/P EST MOD 30 MIN: CPT | Performed by: PHYSICIAN ASSISTANT

## 2018-04-13 NOTE — NURSING NOTE
Chief Complaint   Patient presents with     URI     /86  Pulse 104  Temp 97.3  F (36.3  C) (Oral)  Ht 6' (1.829 m)  SpO2 96% Estimated body mass index is 31.19 kg/(m^2) as calculated from the following:    Height as of 1/19/18: 6' (1.829 m).    Weight as of 4/10/18: 230 lb (104.3 kg).  Medication Reconciliation: complete      Health Maintenance due pending provider review:  NONE    n/a    Briseyda Brewer CMA

## 2018-04-13 NOTE — MR AVS SNAPSHOT
After Visit Summary   4/13/2018    Don Samson    MRN: [id number]           Patient Information     Date Of Birth          1950        Visit Information        Provider Department      4/13/2018 9:40 AM Joel Rocha PA-C [facility]        Today's Diagnoses     Acute midline low back pain without sciatica    -  1    Acute sinusitis with symptoms > 10 days           Follow-ups after your visit        Additional Services     [facility] PT, HAND, AND CHIROPRACTIC REFERRAL       **This order will print in the [facility] Scheduling Office**    Physical Therapy, Hand Therapy and Chiropractic Care are available through:    *[facility]  *[facility]  *[facility]    Call one number to schedule at any of the above locations: [phone].    Your provider has referred you to: Physical Therapy at [facility] or [facility]    Indication/Reason for Referral: Low Back Pain  Onset of Illness: 1 week  Therapy Orders: Evaluate and Treat  Special Programs: None  Special Request: None    Bean Del Angel      Additional Comments for the Therapist or Chiropractor:     Please be aware that coverage of these services is subject to the terms and limitations of your health insurance plan.  Call member services at your health plan with any benefit or coverage questions.      Please bring the following to your appointment:    *Your personal calendar for scheduling future appointments  *Comfortable clothing                  Your next 10 appointments already scheduled     Apr 13, 2018  9:40 AM CDT   Office Visit with Joel Rocha PA-C   [facility] ([facility])    [address] [facility] [address] [phone]           Bring a current list of meds and any records pertaining to this visit. For Physicals, please bring immunization records and any forms needing to be filled out. Please arrive 10 minutes early to complete  "paperwork.              Who to contact     If you have questions or need follow up information about today's clinic visit or your schedule please contact [facility] directly at [phone].  Normal or non-critical lab and imaging results will be communicated to you by MyChart, letter or phone within 4 business days after the clinic has received the results. If you do not hear from us within 7 days, please contact the clinic through MyChart or phone. If you have a critical or abnormal lab result, we will notify you by phone as soon as possible.  Submit refill requests through [employer] Up or call your pharmacy and they will forward the refill request to us. Please allow 3 business days for your refill to be completed.          Additional Information About Your Visit        [facility] Information     [employer] lets you send messages to your doctor, view your test results, renew your prescriptions, schedule appointments and more. To sign up, go to [email or web address] . Click on \"Log in\" on the left side of the screen, which will take you to the Welcome page. Then click on \"Sign up Now\" on the right side of the page.     You will be asked to enter the access code listed below, as well as some personal information. Please follow the directions to create your username and password.     Your access code is: [email or web address]  Expires: 2018 11:30 AM     Your access code will  in 90 days. If you need help or a new code, please call your [place] clinic or [phone].        Care EveryWhere ID     This is your Care EveryWhere ID. This could be used by other organizations to access your [place] medical records  [id number]        Your Vitals Were     Pulse Temperature Height Pulse Oximetry          104 97.3  F (36.3  C) (Oral) 6' (1.829 m) 96%         Blood Pressure from Last 3 Encounters:   18 124/86   04/10/18 124/82   18 121/72    Weight from Last 3 Encounters:   04/10/18 230 lb (104.3 kg) "   03/22/18 238 lb (108 kg)   03/15/18 235 lb (106.6 kg)              We Performed the Following     [facility] PT, HAND, AND CHIROPRACTIC REFERRAL          Today's Medication Changes          These changes are accurate as of 4/13/18  9:23 AM.  If you have any questions, ask your nurse or doctor.               Start taking these medicines.        Dose/Directions    amoxicillin-clavulanate 875-125 MG per tablet   Commonly known as:  AUGMENTIN   Used for:  Acute sinusitis with symptoms > 10 days   Started by:  Joel Rocha PA-C        Dose:  1 tablet   Take 1 tablet by mouth 2 times daily   Quantity:  20 tablet   Refills:  0            Where to get your medicines      These medications were sent to [facility] [address] [place] [address] AT [address] [address]     Phone:  [phone]     amoxicillin-clavulanate 875-125 MG per tablet                Primary Care Provider Office Phone # Fax #    FlakitaCONSTANCE [facility] [phone] [address] [facility] [address]        Equal Access to Services     [facility] : Hadii aad ku hadasho Soomaali, waaxda luqadaha, qaybta kaalmada adeegyada, waxay marlene hayzeinabn ines rooney. So [facility] [phone].    ATENCIÓN: Si habla español, tiene a torres disposición servicios gratuitos de asistencia lingüística. [facility] [phone].    We comply with applicable federal civil rights laws and [place] laws. We do not discriminate on the basis of race, color, national origin, age, disability, sex, sexual orientation, or gender identity.            Thank you!     Thank you for choosing [facility]  for your care. Our goal is always to provide you with excellent care. Hearing back from our patients is one way we can continue to improve our services. Please take a few minutes to complete the written survey that you may receive in the mail after your visit with  us. Thank you!             Your Updated Medication List - Protect others around you: Learn how to safely use, store and throw away your medicines at www.disposemymeds.org.          This list is accurate as of 4/13/18  9:23 AM.  Always use your most recent med list.                   Brand Name Dispense Instructions for use Diagnosis    amoxicillin-clavulanate 875-125 MG per tablet    AUGMENTIN    20 tablet    Take 1 tablet by mouth 2 times daily    Acute sinusitis with symptoms > 10 days       cyclobenzaprine 10 MG tablet    FLEXERIL    30 tablet    Take 1 tablet (10 mg) by mouth nightly as needed for muscle spasms    Back muscle spasm, Acute bilateral low back pain without sciatica       lisinopril 40 MG tablet    PRINIVIL/ZESTRIL    90 tablet    Take 1 tablet (40 mg) by mouth daily    Hypertension goal BP (blood pressure) < 140/90

## 2018-04-13 NOTE — PROGRESS NOTES
SUBJECTIVE:   Don Samson is a 68 year old male who presents to clinic today for the following health issues:      Back Pain       Duration: few days        Specific cause: none    Description:   Location of pain: middle of back midline  Character of pain: intermittent  Pain radiation:none  New numbness or weakness in legs, not attributed to pain:  no     Intensity: Currently 5/10    History:   Pain interferes with job: YES,   History of back problems: no prior back problems  Any previous MRI or X-rays: None  Sees a specialist for back pain:  No  Therapies tried without relief: aleve and muscle relaxants    Alleviating factors:   Improved by: not working      Precipitating factors:  Worsened by: activity    Functional and Psychosocial Screen (Bean STarT Back):      Not performed today          Accompanying Signs & Symptoms:  Risk of Fracture:  Age >64  Risk of Cauda Equina:  None  Risk of Infection:  None  Risk of Cancer:  None  Risk of Ankylosing Spondylitis:  Onset at age <35, male, AND morning back stiffness. no                  RESPIRATORY SYMPTOMS      Duration: ongoing for 3 weeks    Description  headache and myalgias    Severity: mild    Accompanying signs and symptoms: None    History (predisposing factors):  none    Precipitating or alleviating factors: None    Therapies tried and outcome:  none      Problem list and histories reviewed & adjusted, as indicated.  Additional history: 69 y/o male here to discuss a couple of issues.  He has been dealing with some thoracic back pain for the last few days.  He was evaluated 4/10 and treated with flexeril 5 mg TID as needed.  He does feel that the pain is about the same.  He admits that he pretty much just stayed in bed the last few days.  In the past, he has worked with PHYSICAL THERAPY, and did have some injections that he did not feel helped about 7 years ago.  He last flare was maybe 6 months ago.      Unrelated, he has not been feeling well for the  last 3 weeks with congestion being the worst symptoms.  He did not mention this at his visit 3 days ago.  He has taken a bit of dayquil.  He is prone to sinus infections, although he did have sinus surgery a few years back that has lowered how frequent he gets them.    BP Readings from Last 3 Encounters:   04/13/18 124/86   04/10/18 124/82   03/22/18 121/72    Wt Readings from Last 3 Encounters:   04/10/18 230 lb (104.3 kg)   03/22/18 238 lb (108 kg)   03/15/18 235 lb (106.6 kg)                    Reviewed and updated as needed this visit by clinical staff       Reviewed and updated as needed this visit by Provider         ROS:  Constitutional, HEENT, cardiovascular, pulmonary, gi and gu systems are negative, except as otherwise noted.    OBJECTIVE:     /86  Pulse 104  Temp 97.3  F (36.3  C) (Oral)  Ht 6' (1.829 m)  SpO2 96%  There is no height or weight on file to calculate BMI.  GENERAL: alert and no distress  EYES: Eyes grossly normal to inspection  HENT: b/l TM dullness with loss of light reflux nasal mucosa is erythematous and edematous   NECK: no adenopathy, no asymmetry, masses, or scars and thyroid normal to palpation  RESP: lungs clear to auscultation - no rales, rhonchi or wheezes  CV: regular rate and rhythm, normal S1 S2, no S3 or S4, no murmur, click or rub, no peripheral edema and peripheral pulses strong  MS: non tender spinous process lumbar spine.  Some mild tenderness para lumbar b/l.  Negative straight leg raise.  Grossly normal sensation and strength distally.    Diagnostic Test Results:  none     ASSESSMENT/PLAN:             1. Acute midline low back pain without sciatica  Will start ice, and gentle painless ROM exercises.  Can continue flexeril.  Will get into PHYSICAL THERAPY.  - [facility] PT, HAND, AND CHIROPRACTIC REFERRAL    2. Acute sinusitis with symptoms > 10 days  OTC probiotics while on antibiotic to help limit some potential side effects.   - amoxicillin-clavulanate (AUGMENTIN)  875-125 MG per tablet; Take 1 tablet by mouth 2 times daily  Dispense: 20 tablet; Refill: 0        Joel Rocha PA-C  [facility]

## 2018-04-13 NOTE — LETTER
[facility]  [address]  [facility] [address]  Phone: [phone]    April 13, 2018        Don CLARKE Samson  [address]  [facility] [address]          To whom it may concern:    RE: Don CLARKE [place]    Patient was seen and treated today at our clinic and missed work.    Please contact me for questions or concerns.      Sincerely,        Joel Rocha PA-C

## 2018-05-01 ENCOUNTER — OFFICE VISIT (OUTPATIENT)
Dept: FAMILY MEDICINE | Facility: CLINIC | Age: 68
End: 2018-05-01
Payer: COMMERCIAL

## 2018-05-01 VITALS
WEIGHT: 233.5 LBS | SYSTOLIC BLOOD PRESSURE: 136 MMHG | DIASTOLIC BLOOD PRESSURE: 71 MMHG | RESPIRATION RATE: 16 BRPM | HEART RATE: 60 BPM | TEMPERATURE: 97.9 F | BODY MASS INDEX: 31.67 KG/M2 | OXYGEN SATURATION: 99 %

## 2018-05-01 DIAGNOSIS — J06.9 UPPER RESPIRATORY TRACT INFECTION, UNSPECIFIED TYPE: Primary | ICD-10-CM

## 2018-05-01 DIAGNOSIS — I10 HYPERTENSION GOAL BP (BLOOD PRESSURE) < 140/90: ICD-10-CM

## 2018-05-01 PROCEDURE — 99214 OFFICE O/P EST MOD 30 MIN: CPT | Performed by: FAMILY MEDICINE

## 2018-05-01 RX ORDER — FLUTICASONE PROPIONATE 50 MCG
1 SPRAY, SUSPENSION (ML) NASAL DAILY
Qty: 9.9 ML | Refills: 0 | Status: SHIPPED | OUTPATIENT
Start: 2018-05-01 | End: 2018-05-15

## 2018-05-01 RX ORDER — CETIRIZINE HYDROCHLORIDE 10 MG/1
10 TABLET ORAL EVERY EVENING
Qty: 14 TABLET | Refills: 0 | Status: SHIPPED | OUTPATIENT
Start: 2018-05-01 | End: 2019-02-24

## 2018-05-01 RX ORDER — AZITHROMYCIN 250 MG/1
TABLET, FILM COATED ORAL
Qty: 6 TABLET | Refills: 0 | Status: SHIPPED | OUTPATIENT
Start: 2018-05-01 | End: 2018-07-20

## 2018-05-01 RX ORDER — BENZONATATE 200 MG/1
200 CAPSULE ORAL 3 TIMES DAILY
Qty: 21 CAPSULE | Refills: 0 | Status: SHIPPED | OUTPATIENT
Start: 2018-05-01 | End: 2019-02-24

## 2018-05-01 NOTE — LETTER
[place] [address] [place] [address]  Phone: [phone]    May 1, 2018        Don CLARKE Samson  [address]  [facility] [address]          To whom it may concern:    RE: Don Samson    Patient was seen and treated today at our clinic and missed work.    Please contact me for questions or concerns.      Sincerely,        Yumi Rojas MD

## 2018-05-01 NOTE — PROGRESS NOTES
SUBJECTIVE:   Don Samson is a 68 year old male who presents to clinic today for the following health issues:    Pt is here to follow up with URI that does not seem to get better, was seen by Dr. Rocha 4/13/2018, with little improvement. He  states that all the symptom came back.  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, numbness and tingling right hand in the past, seen 4/1/16 by Dr Mora for right foot abrasion, Elevated BP declined medication adjustment, hx of chronic depressed mood, declined medications, then seen by Dr Blanco 4/7 for depressed mood, counseling offered, has tried & did not like Cymbalta or Effexor in the past, started on Wellbutrin but did not take for long, seen 3/22/18 by PCP ricky for uncontrolled HTN ( had not tolerated amlodipine or hctz ) and lisinopril increased to 40 mg, noted flat affect and psychomotor slowing , not interested in meds, referred to Hernando Guillen who he saw, note reviewed, seen by Jason 4/10/18 for back pain and given flexeril, seen 4/13/18 for 3 weeks for respiratory symptoms, rhinorrhea, hoarse voice, fatigue, malaise, headache, myalgia S, congestion, was taking Dayquil, given Augmentin 500 mg bid 10 days completed likely on 4/22. [place]  showed received norco 1/13/18 # 4 by [facility] and robitussin with codeine 118 ml on 6/2017. Here for cold symptoms that persist    Started with a cold end of march, hx very vague. No fever O chills, No fever or chills, no headache or dizziness, no double or  blurry vision, no facial pain, beginning had earache & sore throat no  Resolved, some runny nose, some post nasal drip, phlegm clear to yellow, no trouble hearing, smelling, tasting or swallowing, some cough usually dry,  no chest pain, trouble breathing or palpitations, No abdominal pain, heart burn, reflux, nausea or vomiting or diarrhea or constipation, no blood in stools or black stools, no weight loss or night sweats. No dysuria, hematuria, frequency, urgency, hesitancy, incontinence, No pelvic complaints. No leg swelling or joint pain. No rash.    No travel recently. 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  Feels it was a terrible medicine, just made him gassy  [place] may have got mildly better but then states cold came back right away, denies any sneezing or itchy eyes. Not tried any allergy meds. Reports no allergies. At times nose is dry, tried to soften in shower which helped. Using day quil Nyquil couple times    BP better on recheck    No new aches and pains, uses flexeril prn    Problem list and histories reviewed & adjusted, as indicated.  Additional history: as documented    Patient Active Problem List   Diagnosis     Tibialis posterior tendonitis     Fatigue     Right knee pain     Hypertension goal BP (blood pressure) < 140/90     Major depressive disorder, recurrent, moderate (H)     Past Surgical History:   Procedure Laterality Date     HERNIORRHAPHY INGUINAL BILATERAL      [facility] 2004, [facility] 1985     NASAL/SINUS POLYPECTOMY      Nasal-Sinus Polypectomy       Social History   Substance Use Topics     Smoking status: Former Smoker     Quit date: 1/1/1970     Smokeless tobacco: Never Used     Alcohol use 0.0 oz/week     0 Standard drinks or equivalent per week     Family History   Problem Relation Age of Onset     Hypertension Mother          Current Outpatient Prescriptions   Medication Sig Dispense Refill     cyclobenzaprine (FLEXERIL)  10 MG tablet Take 1 tablet (10 mg) by mouth nightly as needed for muscle spasms 30 tablet 1     lisinopril (PRINIVIL/ZESTRIL) 40 MG tablet Take 1 tablet (40 mg) by mouth daily 90 tablet 3     No Known Allergies  Recent Labs   Lab Test  03/22/18   1621  09/28/16   1613  09/15/16   1659  06/20/16   1000  01/06/16   1110   LDL   --   52   --    --    --    HDL   --   30*   --    --    --    TRIG   --   285*   --    --    --    ALT   --    --   35  41  39   CR  1.00  1.07  0.99  0.97  1.11   GFRESTIMATED  75  69  76  77  67   GFRESTBLACK  >90  84  >90   GFR Calc    >90   GFR Calc    81   POTASSIUM  4.5  3.9  3.9  3.8  4.7   TSH   --    --    --   1.45   --       BP Readings from Last 3 Encounters:   05/01/18 136/71   04/13/18 124/86   04/10/18 124/82    Wt Readings from Last 3 Encounters:   05/01/18 233 lb 8 oz (105.9 kg)   04/10/18 230 lb (104.3 kg)   03/22/18 238 lb (108 kg)                  Labs reviewed in EPIC    Reviewed and updated as needed this visit by clinical staff  Tobacco  Allergies  Meds  Med Hx  Surg Hx  Fam Hx  Soc Hx      Reviewed and updated as needed this visit by Provider         ROS:  Constitutional, HEENT, cardiovascular, pulmonary, GI, , musculoskeletal, neuro, skin, endocrine and psych systems are negative, except as otherwise noted.    OBJECTIVE:     /71 (BP Location: Left arm, Patient Position: Chair, Cuff Size: Adult Large)  Pulse 60  Temp 97.9  F (36.6  C) (Oral)  Resp 16  Wt 233 lb 8 oz (105.9 kg)  SpO2 99%  BMI 31.67 kg/m2  Body mass index is 31.67 kg/(m^2).  GENERAL: alert, no distress, obese, fatigued and appears older than stated age  EYES: Eyes grossly normal to inspection, PERRL and conjunctivae and sclerae normal  HENT: normal cephalic/atraumatic, both ears: clear effusion, nose and mouth without ulcers or lesions, nasal mucosa edematous , rhinorrhea clear, oropharynx clear, oral mucous membranes moist and sinuses: not tender,  copious clear post nasal drainage  NECK: no adenopathy, no asymmetry, masses, or scars and thyroid normal to palpation  RESP: lungs clear to auscultation - no rales, rhonchi or wheezes, no coughing heard  CV: regular rate and rhythm, normal S1 S2, no S3 or S4, no murmur, click or rub, no peripheral edema and peripheral pulses strong  ABDOMEN: soft, non tender, no hepatosplenomegaly, no masses and bowel sounds normal  MS: no gross musculoskeletal defects noted, no edema  SKIN: no suspicious lesions or rashes  NEURO: Normal strength and tone, mentation intact and speech normal  PSYCH: mentation appears normal, affect flat, fatigued, appearance well groomed and chronically disgruntled looking    Diagnostic Test Results:  No results found for this or any previous visit (from the past 24 hour(s)).    ASSESSMENT/PLAN:     1. Upper respiratory tract infection, unspecified type  Suspect sinus symptoms more from allergies an virus as given Augmentin an didn't work but insistent its not allergies and desiring antibiotic despite risk of side effects so given duration of symptoms can do another trial of a different antibiotic like zpak. And counseled if symptoms persist after that it is like viral or allergies and just supportive care recommended. Advised for symptom relief to also try tessalon three times a day as needed for a cough. Chest xray not needed currently. Lungs clear and no sign of a pneumonia. Flonase 1 spray each nostril daily 2 weeks. Zyrtec 10 mg daily 2 week. mucinex 600 mg twice a day 1 week. Humidifier in room may help. supportive care as below. Go to the Er if worse. Follow up with primary if symptoms persist. Lisinopril sometimes can cause a dry hacking cough, and advised if not better to see primary regarding this med. Note given excusing from work today. For symptom relief I suggest tryin. Steam.  Take a long, hot shower.  Or if you don't want to get in the shower just run it with the bathroom door  shut for a few minutes and breathe the steam.  2. Drink hot liquids frequently such as tea or hot water with honey and lemon.  3. Acetaminophen (Tylenol) and ibuprofen (Motrin or Advil) as needed for headache, sore throat, body aches, or fever.  4. For loosening phlegm and sputum try guaifenesin (available in many combination products and alone as plain Robitussin or plain Mucinex) and for cough suppression you can try dextromethorphan (Delsym or combined in other products).  5. For nasal congestion try:    An oral decongestant.  The only decongestant I recommend is pseudoephedrine. Ask the pharmacist for the over the counter (but real) pseudoephedrine - not phenylephrine.  This can raise your blood pressure and heart rate so do not use this if you have hypertension.       Afrin spray for 3 days.  (Never use afrin nasal spray for more than 3 days as there is a risk of developing tolerance and rebound/worsening nasal congestion if used longer than this.)    Nealmed sinus rinses.    Nasal steroid spray such as Nasacort or Flonase, which are over-the-counter.  6. And most importantly: plenty of rest and sleep  especially while you have a fever.     Stop smoking and avoid secondhand smoke    Drink lots of fluids such as water and clear soups. Fluids help loosen mucus. Fluids are also important because they help prevent dehydration.    Gargle with warm salt water a few times a day to relieve a sore throat. Throat sprays or lozenges may also help relieve the pain.    Avoid alcohol.    Use saline (salt water) nose drops to help loosen mucus and moisten the tender skin in your nose.  Encouraged mucinex, warm salt water gargles, Cepacol spray, soothers/lozenges, sinus rinses (neilmed), Flonase (2 sprays per nostril daily x 2 weeks), vitamin C, fluids and rest.  May alternate tylenol and NSAID'S (ibuprofen, Advil, aleve type products) every 4-6 hours for the next few days as needed.   - benzonatate (TESSALON) 200 MG capsule;  Take 1 capsule (200 mg) by mouth 3 times daily for 7 days  Dispense: 21 capsule; Refill: 0  - fluticasone (FLONASE) 50 MCG/ACT spray; Spray 1 spray into both nostrils daily for 14 days  Dispense: 9.9 mL; Refill: 0  - cetirizine (ZYRTEC) 10 MG tablet; Take 1 tablet (10 mg) by mouth every evening for 14 days  Dispense: 14 tablet; Refill: 0  - azithromycin (ZITHROMAX) 250 MG tablet; Two tablets first day, then one tablet daily for four days.  Dispense: 6 tablet; Refill: 0    2. Hypertension goal BP (blood pressure) < 140/90  Fei stable despite cold symptoms. Avoid meds that will raise BP, given tessalon for cough. BP overall improved from before on lisinopril 40 mg . Lisinopril sometimes can cause a dry hacking cough, and advised if not better to see primary regarding this med. Due for colon cancer screen, work on advance directives, due for Prevnar 13 can get when better. Continue follow up with miladys. Didn't seem too interested or enthusiastic about any of this.      See Patient Instructions    Yumi Rojas MD  [facility]

## 2018-05-01 NOTE — PATIENT INSTRUCTIONS
Suspect symptoms more from allergies an virus as given Augmentin an didnt work but can do another trial of a different antibiotic like zpak  Also try tessalon three times a day as needed for a cough   Chest xray not needed currently.lungs clear and no sign of a pneumonia  flonase 1 spray each nostril daily 2 weeks  Zyrtec 10 mg daily 2 week  mucinex 600 mg twice a day 1 week  Humidifier in room may help  supportive care as below  Go to the Er if worse  Follow up with primary if symptoms persist   Lisinopril sometimes can cause a dry hacking cough, if not better see primary regarding this med   Due for colon cancer screen, work on advance directives, due for prevnar 13 can get when better   Continue follow up with miladys     For symptom relief I suggest tryin. Steam.  Take a long, hot shower.  Or if you don't want to get in the shower just run it with the bathroom door shut for a few minutes and breathe the steam.  2. Drink hot liquids frequently such as tea or hot water with honey and lemon.  3. Acetaminophen (Tylenol) and ibuprofen (Motrin or Advil) as needed for headache, sore throat, body aches, or fever.  4. For loosening phlegm and sputum try guaifenesin (available in many combination products and alone as plain Robitussin or plain Mucinex) and for cough suppression you can try dextromethorphan (Delsym or combined in other products).  5. For nasal congestion try:    An oral decongestant.  The only decongestant I recommend is pseudoephedrine. Ask the pharmacist for the over the counter (but real) pseudoephedrine - not phenylephrine.  This can raise your blood pressure and heart rate so do not use this if you have hypertension.       Afrin spray for 3 days.  (Never use afrin nasal spray for more than 3 days as there is a risk of developing tolerance and rebound/worsening nasal congestion if used longer than this.)    Nealmed sinus rinses.    Nasal steroid spray such as nasacort or flonase, which are  over-the-counter.  6. And most importantly: plenty of rest and sleep  especially while you have a fever.     Stop smoking and avoid secondhand smoke    Drink lots of fluids such as water and clear soups. Fluids help loosen mucus. Fluids are also important because they help prevent dehydration.    Gargle with warm salt water a few times a day to relieve a sore throat. Throat sprays or lozenges may also help relieve the pain.    Avoid alcohol.    Use saline (salt water) nose drops to help loosen mucus and moisten the tender skin in your nose.  Encouraged mucinex, warm salt water gargles, cepacol spray, soothers/lozenges, sinus rinses (neilmed), flonase (2 sprays per nostril daily x 2 weeks), vitamin c, fluids and rest.  May alternate tylenol and NSAIDS (ibuprofen, advil, aleve type products) every 4-6 hours for the next few days as needed.

## 2018-05-01 NOTE — MR AVS SNAPSHOT
After Visit Summary   2018    Don Samson    MRN: [id number]           Patient Information     Date Of Birth          1950        Visit Information        Provider Department      2018 8:40 AM Yumi Rojas MD [facility]        Today's Diagnoses     Upper respiratory tract infection, unspecified type    -  1    Hypertension goal BP (blood pressure) < 140/90          Care Instructions    Suspect symptoms more from allergies an virus as given Augmentin an didnt work but can do another trial of a different antibiotic like zpak  Also try tessalon three times a day as needed for a cough   Chest xray not needed currently.lungs clear and no sign of a pneumonia  flonase 1 spray each nostril daily 2 weeks  Zyrtec 10 mg daily 2 week  mucinex 600 mg twice a day 1 week  Humidifier in room may help  supportive care as below  Go to the Er if worse  Follow up with primary if symptoms persist   Lisinopril sometimes can cause a dry hacking cough, if not better see primary regarding this med   Due for colon cancer screen, work on advance directives, due for prevnar 13 can get when better   Continue follow up with miladys     For symptom relief I suggest tryin. Steam.  Take a long, hot shower.  Or if you don't want to get in the shower just run it with the bathroom door shut for a few minutes and breathe the steam.  2. Drink hot liquids frequently such as tea or hot water with honey and lemon.  3. Acetaminophen (Tylenol) and ibuprofen (Motrin or Advil) as needed for headache, sore throat, body aches, or fever.  4. For loosening phlegm and sputum try guaifenesin (available in many combination products and alone as plain Robitussin or plain Mucinex) and for cough suppression you can try dextromethorphan (Delsym or combined in other products).  5. For nasal congestion try:    An oral decongestant.  The only decongestant I recommend is pseudoephedrine. Ask the pharmacist for the over the  counter (but real) pseudoephedrine - not phenylephrine.  This can raise your blood pressure and heart rate so do not use this if you have hypertension.       Afrin spray for 3 days.  (Never use afrin nasal spray for more than 3 days as there is a risk of developing tolerance and rebound/worsening nasal congestion if used longer than this.)    Nealmed sinus rinses.    Nasal steroid spray such as nasacort or flonase, which are over-the-counter.  6. And most importantly: plenty of rest and sleep  especially while you have a fever.     Stop smoking and avoid secondhand smoke    Drink lots of fluids such as water and clear soups. Fluids help loosen mucus. Fluids are also important because they help prevent dehydration.    Gargle with warm salt water a few times a day to relieve a sore throat. Throat sprays or lozenges may also help relieve the pain.    Avoid alcohol.    Use saline (salt water) nose drops to help loosen mucus and moisten the tender skin in your nose.  Encouraged mucinex, warm salt water gargles, cepacol spray, soothers/lozenges, sinus rinses (neilmed), flonase (2 sprays per nostril daily x 2 weeks), vitamin c, fluids and rest.  May alternate tylenol and NSAIDS (ibuprofen, advil, aleve type products) every 4-6 hours for the next few days as needed.               Follow-ups after your visit        Who to contact     If you have questions or need follow up information about today's clinic visit or your schedule please contact [facility] directly at [phone].  Normal or non-critical lab and imaging results will be communicated to you by [employer], letter or phone within 4 business days after the clinic has received the results. If you do not hear from us within 7 days, please contact the clinic through [employer] or phone. If you have a critical or abnormal lab result, we will notify you by phone as soon as possible.  Submit refill requests through [employer] or call your pharmacy and they will  "forward the refill request to us. Please allow 3 business days for your refill to be completed.          Additional Information About Your Visit        [employer] Information     [email or web address] lets you send messages to your doctor, view your test results, renew your prescriptions, schedule appointments and more. To sign up, go to [email or web address] . Click on \"Log in\" on the left side of the screen, which will take you to the Welcome page. Then click on \"Sign up Now\" on the right side of the page.     You will be asked to enter the access code listed below, as well as some personal information. Please follow the directions to create your username and password.     Your access code is: [email or web address]  Expires: 2018 11:30 AM     Your access code will  in 90 days. If you need help or a new code, please call your [place] clinic or [phone].        Care EveryWhere ID     This is your Care EveryWhere ID. This could be used by other organizations to access your [place] medical records  [id number]        Your Vitals Were     Pulse Temperature Respirations Pulse Oximetry BMI (Body Mass Index)       60 97.9  F (36.6  C) (Oral) 16 99% 31.67 kg/m2        Blood Pressure from Last 3 Encounters:   18 136/71   18 124/86   04/10/18 124/82    Weight from Last 3 Encounters:   18 233 lb 8 oz (105.9 kg)   04/10/18 230 lb (104.3 kg)   18 238 lb (108 kg)              Today, you had the following     No orders found for display         Today's Medication Changes          These changes are accurate as of 18  9:05 AM.  If you have any questions, ask your nurse or doctor.               Start taking these medicines.        Dose/Directions    azithromycin 250 MG tablet   Commonly known as:  ZITHROMAX   Used for:  Upper respiratory tract infection, unspecified type   Started by:  Yumi Rojas MD        Two tablets first day, then one tablet daily for four days.   Quantity:  6 tablet   Refills:  0       " benzonatate 200 MG capsule   Commonly known as:  TESSALON   Used for:  Upper respiratory tract infection, unspecified type   Started by:  Yumi Rojas MD        Dose:  200 mg   Take 1 capsule (200 mg) by mouth 3 times daily for 7 days   Quantity:  21 capsule   Refills:  0       cetirizine 10 MG tablet   Commonly known as:  zyrTEC   Used for:  Upper respiratory tract infection, unspecified type   Started by:  Yumi Rojas MD        Dose:  10 mg   Take 1 tablet (10 mg) by mouth every evening for 14 days   Quantity:  14 tablet   Refills:  0       fluticasone 50 MCG/ACT spray   Commonly known as:  FLONASE   Used for:  Upper respiratory tract infection, unspecified type   Started by:  Yumi Rojas MD        Dose:  1 spray   Spray 1 spray into both nostrils daily for 14 days   Quantity:  9.9 mL   Refills:  0            Where to get your medicines      These medications were sent to [facility] [address]  [address] [facility] [address]     Phone:  [phone]     azithromycin 250 MG tablet    benzonatate 200 MG capsule    cetirizine 10 MG tablet    fluticasone 50 MCG/ACT spray                Primary Care Provider Office Phone # Fax #    Flakita Paty Pastor, [facility] [phone]       [address]  [facility] [address]        Equal Access to Services     [facility] AH: Hadii tom ku hadasho Solenyali, waaxda luqadaha, qaybta kaalmada adeegyada, waxay melisain hayrichard bach . So [facility] [phone].    ATENCIÓN: Si habla español, tiene a torres disposición servicios gratuitos de asistencia lingüística. cecilia al [phone].    We comply with applicable federal civil rights laws and [place] laws. We do not discriminate on the basis of race, color, national origin, age, disability, sex, sexual orientation, or gender identity.            Thank you!     Thank you for choosing [facility]  for your care. Our goal is always to provide you  with excellent care. Hearing back from our patients is one way we can continue to improve our services. Please take a few minutes to complete the written survey that you may receive in the mail after your visit with us. Thank you!             Your Updated Medication List - Protect others around you: Learn how to safely use, store and throw away your medicines at www.disposemymeds.org.          This list is accurate as of 5/1/18  9:05 AM.  Always use your most recent med list.                   Brand Name Dispense Instructions for use Diagnosis    azithromycin 250 MG tablet    ZITHROMAX    6 tablet    Two tablets first day, then one tablet daily for four days.    Upper respiratory tract infection, unspecified type       benzonatate 200 MG capsule    TESSALON    21 capsule    Take 1 capsule (200 mg) by mouth 3 times daily for 7 days    Upper respiratory tract infection, unspecified type       cetirizine 10 MG tablet    zyrTEC    14 tablet    Take 1 tablet (10 mg) by mouth every evening for 14 days    Upper respiratory tract infection, unspecified type       cyclobenzaprine 10 MG tablet    FLEXERIL    30 tablet    Take 1 tablet (10 mg) by mouth nightly as needed for muscle spasms    Back muscle spasm, Acute bilateral low back pain without sciatica       fluticasone 50 MCG/ACT spray    FLONASE    9.9 mL    Spray 1 spray into both nostrils daily for 14 days    Upper respiratory tract infection, unspecified type       lisinopril 40 MG tablet    PRINIVIL/ZESTRIL    90 tablet    Take 1 tablet (40 mg) by mouth daily    Hypertension goal BP (blood pressure) < 140/90

## 2018-06-19 ENCOUNTER — OFFICE VISIT (OUTPATIENT)
Dept: FAMILY MEDICINE | Facility: CLINIC | Age: 68
End: 2018-06-19
Payer: COMMERCIAL

## 2018-06-19 VITALS
HEART RATE: 66 BPM | SYSTOLIC BLOOD PRESSURE: 150 MMHG | RESPIRATION RATE: 12 BRPM | WEIGHT: 231.5 LBS | OXYGEN SATURATION: 97 % | DIASTOLIC BLOOD PRESSURE: 97 MMHG | BODY MASS INDEX: 31.4 KG/M2 | TEMPERATURE: 97.7 F

## 2018-06-19 DIAGNOSIS — R05.9 COUGH: ICD-10-CM

## 2018-06-19 DIAGNOSIS — M20.62 TOE DEFORMITY, LEFT: ICD-10-CM

## 2018-06-19 DIAGNOSIS — Z13.220 SCREENING FOR LIPID DISORDERS: ICD-10-CM

## 2018-06-19 DIAGNOSIS — I10 HYPERTENSION GOAL BP (BLOOD PRESSURE) < 140/90: Primary | ICD-10-CM

## 2018-06-19 DIAGNOSIS — Z13.1 SCREENING FOR DIABETES MELLITUS: ICD-10-CM

## 2018-06-19 PROCEDURE — 99214 OFFICE O/P EST MOD 30 MIN: CPT | Performed by: FAMILY MEDICINE

## 2018-06-19 RX ORDER — LOSARTAN POTASSIUM 50 MG/1
50 TABLET ORAL DAILY
Qty: 30 TABLET | Refills: 1 | Status: SHIPPED | OUTPATIENT
Start: 2018-06-19 | End: 2018-07-20

## 2018-06-19 NOTE — PROGRESS NOTES
SUBJECTIVE:   Don Samson is a 68 year old male who presents to clinic today for the following health issues:      Hypertension Follow-up      Outpatient blood pressures are not being checked.    Low Salt Diet: low salt    Pt has had a cough since starting lisinopril. He wants to stop the medication.   He notes that it is too hot at work and that causes his blood pressure to be elevated.   Unsure if he has PND. No shortness of breath, chest pain, rhinorrhea, nasal congestion, heartburn or allergies.    Many years ago he got run over by a cart on his left great toe. He has had it trimmed it before and he is wondering if he should have it removed if it won't grow back right.     Problem list and histories reviewed & adjusted, as indicated.  Additional history: as documented    Labs reviewed in EPIC    Reviewed and updated as needed this visit by clinical staff       Reviewed and updated as needed this visit by Provider         ROS:  Constitutional, HEENT, cardiovascular, pulmonary, gi and gu systems are negative, except as otherwise noted.    OBJECTIVE:     BP (!) 150/97  Pulse 66  Temp 97.7  F (36.5  C) (Tympanic)  Resp 12  Wt 231 lb 8 oz (105 kg)  SpO2 97%  BMI 31.4 kg/m2  Body mass index is 31.4 kg/(m^2).  GENERAL: healthy, alert and no distress  EYES: Eyes grossly normal to inspection  HENT:  nose and mouth without ulcers or lesions  RESP: lungs clear to auscultation - no rales, rhonchi or wheezes  CV: regular rate and rhythm, normal S1 S2    Diagnostic Test Results:  none     ASSESSMENT/PLAN:       1. Hypertension goal BP (blood pressure) < 140/90  - Pt has a chronic cough and is currently on Lisinopril. He wants to switch to a different antihypertensive. Labs are not current and previous microalbumin normal. We discussed that I would like to obtain a microalbumin level and I will call pt tomorrow to switch antihypertensive's. If current labs showed microalbuminuria, I would then switch to Losartan,  otherwise I would prescribe HCTZ or Norvasc. Pt wasn't interested in waiting until tomorrow and wanted medication switched today. As I have no recent labs and concern for microalbuminuria, I have switched to an ARB since these drugs are associated with a much lower rate of cough than ACE inhibitors. Advised pt to f/u next month for MA only blood pressure visit.  - Basic metabolic panel  - Albumin Random Urine Quantitative with Creat Ratio  - losartan (COZAAR) 50 MG tablet; Take 1 tablet (50 mg) by mouth daily  Dispense: 30 tablet; Refill: 1    2. Toe deformity, left  - PODIATRY/FOOT & ANKLE SURGERY REFERRAL    3. Screening for lipid disorders  - Lipid Profile (Chol, Trig, HDL, LDL calc)    4. Screening for diabetes mellitus  - JUST IN CASE      Kiya Darnell MD  [facility]

## 2018-06-19 NOTE — MR AVS SNAPSHOT
After Visit Summary   6/19/2018    Don Samson    MRN: [id number]           Patient Information     Date Of Birth          1950        Visit Information        Provider Department      6/19/2018 9:20 AM Kiya Darnell MD [facility]        Today's Diagnoses     Hypertension goal BP (blood pressure) < 140/90    -  1    Toe deformity, left        Screening for lipid disorders        Screening for diabetes mellitus          Care Instructions    Blood pressure -   Stop Lisinopril 40 mg daily and start Losartan 50 mg daily  Please follow up in one month for a nurse only visit to recheck your blood pressure     Toe -   Follow up with podiatry for the great toe.           Follow-ups after your visit        Additional Services     PODIATRY/FOOT & ANKLE SURGERY REFERRAL       Your provider has referred you to: FMG: [facility] [phone]   [email or web address]  FM[facility]: [facility] [phone]   [email or web address]    Please be aware that coverage of these services is subject to the terms and limitations of your health insurance plan.  Call member services at your health plan with any benefit or coverage questions.      Please bring the following to your appointment:  >>   Any x-rays, CTs or MRIs which have been performed.  Contact the facility where they were done to arrange for  prior to your scheduled appointment.    >>   List of current medications   >>   This referral request   >>   Any documents/labs given to you for this referral                  Who to contact     If you have questions or need follow up information about today's clinic visit or your schedule please contact [facility] directly at [phone].  Normal or non-critical lab and imaging results will be communicated to you by MyChart, letter or phone within 4 business days after the  clinic has received the results. If you do not hear from us within 7 days, please contact the clinic through [employer] or phone. If you have a critical or abnormal lab result, we will notify you by phone as soon as possible.  Submit refill requests through [employer] or call your pharmacy and they will forward the refill request to us. Please allow 3 business days for your refill to be completed.          Additional Information About Your Visit        Care EveryWhere ID     This is your Care EveryWhere ID. This could be used by other organizations to access your [place] medical records  [id number]        Your Vitals Were     Pulse Temperature Respirations Pulse Oximetry BMI (Body Mass Index)       66 97.7  F (36.5  C) (Tympanic) 12 97% 31.4 kg/m2        Blood Pressure from Last 3 Encounters:   06/19/18 (!) 150/97   05/01/18 136/71   04/13/18 124/86    Weight from Last 3 Encounters:   06/19/18 231 lb 8 oz (105 kg)   05/01/18 233 lb 8 oz (105.9 kg)   04/10/18 230 lb (104.3 kg)              We Performed the Following     Albumin Random Urine Quantitative with Creat Ratio     Basic metabolic panel     JUST IN CASE     Lipid Profile (Chol, Trig, HDL, LDL calc)     PODIATRY/FOOT & ANKLE SURGERY REFERRAL          Today's Medication Changes          These changes are accurate as of 6/19/18  9:49 AM.  If you have any questions, ask your nurse or doctor.               Start taking these medicines.        Dose/Directions    losartan 50 MG tablet   Commonly known as:  COZAAR   Used for:  Hypertension goal BP (blood pressure) < 140/90   Started by:  Kiya Darnell MD        Dose:  50 mg   Take 1 tablet (50 mg) by mouth daily   Quantity:  30 tablet   Refills:  1            Where to get your medicines      These medications were sent to [facility] [address] [place] [address] AT [address] [address]    Hours:  24-hours Phone:  [phone]      losartan 50 MG tablet                Primary Care Provider Office Phone # Fax #    Flakita Pastor, APRN [facility] [phone] [address]  [facility] [address]        Equal Access to Services     ANDRE TAYLOR : Hadqi tom escalante burtono Solenyali, waaxda luqadaha, qaybta kaalmada adeegyada, waxjose e idiin pilyn ines cueva lahammad rooney. So [facility] [phone].    ATENCIÓN: Si habla español, tiene a torres disposición servicios gratuitos de asistencia lingüística. Llame al [phone].    We comply with applicable federal civil rights laws and [place] laws. We do not discriminate on the basis of race, color, national origin, age, disability, sex, sexual orientation, or gender identity.            Thank you!     Thank you for choosing [facility]  for your care. Our goal is always to provide you with excellent care. Hearing back from our patients is one way we can continue to improve our services. Please take a few minutes to complete the written survey that you may receive in the mail after your visit with us. Thank you!             Your Updated Medication List - Protect others around you: Learn how to safely use, store and throw away your medicines at www.disposemymeds.org.          This list is accurate as of 6/19/18  9:49 AM.  Always use your most recent med list.                   Brand Name Dispense Instructions for use Diagnosis    azithromycin 250 MG tablet    ZITHROMAX    6 tablet    Two tablets first day, then one tablet daily for four days.    Upper respiratory tract infection, unspecified type       cyclobenzaprine 10 MG tablet    FLEXERIL    30 tablet    Take 1 tablet (10 mg) by mouth nightly as needed for muscle spasms    Back muscle spasm, Acute bilateral low back pain without sciatica       lisinopril 40 MG tablet    PRINIVIL/ZESTRIL    90 tablet    Take 1 tablet (40 mg) by mouth daily    Hypertension goal BP (blood pressure) < 140/90       losartan 50 MG tablet    COZAAR     30 tablet    Take 1 tablet (50 mg) by mouth daily    Hypertension goal BP (blood pressure) < 140/90

## 2018-06-19 NOTE — PATIENT INSTRUCTIONS
Blood pressure -   Stop Lisinopril 40 mg daily and start Losartan 50 mg daily  Please follow up in one month for a nurse only visit to recheck your blood pressure     Toe -   Follow up with podiatry for the great toe.

## 2018-07-02 ENCOUNTER — OFFICE VISIT (OUTPATIENT)
Dept: FAMILY MEDICINE | Facility: CLINIC | Age: 68
End: 2018-07-02
Payer: COMMERCIAL

## 2018-07-02 VITALS
TEMPERATURE: 98.1 F | WEIGHT: 229 LBS | HEART RATE: 76 BPM | BODY MASS INDEX: 31.06 KG/M2 | OXYGEN SATURATION: 100 % | SYSTOLIC BLOOD PRESSURE: 146 MMHG | DIASTOLIC BLOOD PRESSURE: 85 MMHG | RESPIRATION RATE: 16 BRPM

## 2018-07-02 DIAGNOSIS — R11.0 NAUSEA: Primary | ICD-10-CM

## 2018-07-02 DIAGNOSIS — I10 HYPERTENSION GOAL BP (BLOOD PRESSURE) < 140/90: ICD-10-CM

## 2018-07-02 PROCEDURE — 99214 OFFICE O/P EST MOD 30 MIN: CPT | Performed by: FAMILY MEDICINE

## 2018-07-02 RX ORDER — ONDANSETRON 4 MG/1
4 TABLET, ORALLY DISINTEGRATING ORAL
Qty: 20 TABLET | Refills: 0 | Status: SHIPPED | OUTPATIENT
Start: 2018-07-02 | End: 2018-07-20

## 2018-07-02 NOTE — PROGRESS NOTES
SUBJECTIVE:   Don Samson is a 68 year old male who presents to clinic today for the following health issues:    Hypertension Follow-up      Cough has improved since stopping the lisinopril.   Saturday he got over heated and he started experiencing nausea.   He also had a diarrhea and he took Imodium which helped.   He has constant nausea. He also endorses a headache. He also endorses stomach cramps when he had a BM. He has had a couple of loose stools over the last 24 hours.   No fever, chills, vision/hearing changes, paresthesias, confusion, difficulty speaking, hematochezia or melena.  Pt is staying hydrated and he has decreased appetite.   No outside foods.   No recent abx.   No sick contact.   He only took one Imodium.   Symptoms all started on Saturday.       Problem list and histories reviewed & adjusted, as indicated.  Additional history: as documented    Labs reviewed in EPIC    Reviewed and updated as needed this visit by clinical staff       Reviewed and updated as needed this visit by Provider         ROS:  Constitutional, HEENT, cardiovascular, pulmonary, gi and gu systems are negative, except as otherwise noted.    OBJECTIVE:     /85 (BP Location: Right arm, Patient Position: Sitting, Cuff Size: Adult Large)  Pulse 76  Temp 98.1  F (36.7  C) (Oral)  Resp 16  Wt 229 lb (103.9 kg)  SpO2 100%  BMI 31.06 kg/m2  Body mass index is 31.06 kg/(m^2).  GENERAL: healthy, alert and no distress  EYES: Eyes grossly normal to inspection  HENT: nose and mouth without ulcers or lesions  ABDOMEN: soft, nontender, no hepatosplenomegaly, + umbilical hernia which is reducible     Diagnostic Test Results:  none     ASSESSMENT/PLAN:     1. Nausea  - likely due to GE, pt declined stool sample to r/u bacterial etiology  - advised to stay hydrated and BRAT diet   - ondansetron (ZOFRAN ODT) 4 MG ODT tab; Take 1 tablet (4 mg) by mouth 3 times daily (before meals)  Dispense: 20 tablet; Refill: 0  - Follow if  symptoms worsen or fail to improve.    2. Hypertension goal BP (blood pressure) < 140/90  - pt declined rechecking B/P  - advised to f/u in 3 weeks to recheck B/P and BMP as he previously declined kidney function testing       Kiya Darnell MD  [facility]

## 2018-07-02 NOTE — LETTER
July 2, 2018      Don Samson  [address]  [facility] [address]        To Whom It May Concern,      Please excuse Don from work due to illness. Please call me if you have any questions or concerns.           Sincerely,        Kiya Darnell MD

## 2018-07-02 NOTE — LETTER
July 2, 2018      Don Samson  [address]  [facility] [address]        To Whom It May Concern,     Don was seen today at clinic due to a medical illness. He may return to work when his symptoms resolve. Please call me if you have any questions or concerns.         Sincerely,        Kiya Darnell MD

## 2018-07-02 NOTE — PATIENT INSTRUCTIONS
- ondansetron (ZOFRAN ODT) 4 MG ODT tab; Take 1 tablet (4 mg) by mouth 3 times daily (before meals)  - lots of fluid and small frequent meals   - follow up in 2 weeks to recheck your blood pressure and kidney functions

## 2018-07-11 DIAGNOSIS — Z13.220 SCREENING FOR LIPID DISORDERS: ICD-10-CM

## 2018-07-11 DIAGNOSIS — Z13.1 SCREENING FOR DIABETES MELLITUS: ICD-10-CM

## 2018-07-11 DIAGNOSIS — I10 HYPERTENSION GOAL BP (BLOOD PRESSURE) < 140/90: ICD-10-CM

## 2018-07-11 LAB
ANION GAP SERPL CALCULATED.3IONS-SCNC: 10 MMOL/L (ref 3–14)
BUN SERPL-MCNC: 18 MG/DL (ref 7–30)
CALCIUM SERPL-MCNC: 8.4 MG/DL (ref 8.5–10.1)
CHLORIDE SERPL-SCNC: 109 MMOL/L (ref 94–109)
CHOLEST SERPL-MCNC: 146 MG/DL
CO2 SERPL-SCNC: 22 MMOL/L (ref 20–32)
CREAT SERPL-MCNC: 0.93 MG/DL (ref 0.66–1.25)
CREAT UR-MCNC: 133 MG/DL
GFR SERPL CREATININE-BSD FRML MDRD: 81 ML/MIN/1.7M2
GLUCOSE SERPL-MCNC: 129 MG/DL (ref 70–99)
HDLC SERPL-MCNC: 23 MG/DL
LDLC SERPL CALC-MCNC: ABNORMAL MG/DL
MICROALBUMIN UR-MCNC: 11 MG/L
MICROALBUMIN/CREAT UR: 7.97 MG/G CR (ref 0–17)
NONHDLC SERPL-MCNC: 123 MG/DL
POTASSIUM SERPL-SCNC: 3.7 MMOL/L (ref 3.4–5.3)
SODIUM SERPL-SCNC: 141 MMOL/L (ref 133–144)
TRIGL SERPL-MCNC: 466 MG/DL

## 2018-07-11 PROCEDURE — 80048 BASIC METABOLIC PNL TOTAL CA: CPT | Performed by: FAMILY MEDICINE

## 2018-07-11 PROCEDURE — 36415 COLL VENOUS BLD VENIPUNCTURE: CPT | Performed by: FAMILY MEDICINE

## 2018-07-11 PROCEDURE — 80061 LIPID PANEL: CPT | Performed by: FAMILY MEDICINE

## 2018-07-11 PROCEDURE — 82043 UR ALBUMIN QUANTITATIVE: CPT | Performed by: FAMILY MEDICINE

## 2018-07-11 NOTE — LETTER
July 24, 2018      Don Samson  [address]  [facility] [address]        Dear ,    We are writing to inform you of your test results.    Here are your results for your recent labs.   Your kidney functions are stable.   Your total cholesterol is normal however your triglycerides are elevated. This is usually related to too much sugar in your diet, including sweets, starches, carbohydrates, and alcohol.   I recommend increasing your physical activity to increase your HDL, and reducing sugar in your diet to lower your triglycerices. You can also take Omega-3 fatty acids (available in capsules) to improve your triglycerides.  You do not need any treatment (medication) at this time but I would recommend rechecking your levels in three months.   Please call or message me if you have questions or concerns.     Resulted Orders   **Basic metabolic panel FUTURE anytime   Result Value Ref Range    Sodium 141 133 - 144 mmol/L    Potassium 3.7 3.4 - 5.3 mmol/L    Chloride 109 94 - 109 mmol/L    Carbon Dioxide 22 20 - 32 mmol/L    Anion Gap 10 3 - 14 mmol/L    Glucose 129 (H) 70 - 99 mg/dL      Comment:      Non Fasting    Urea Nitrogen 18 7 - 30 mg/dL    Creatinine 0.93 0.66 - 1.25 mg/dL    GFR Estimate 81 >60 mL/min/1.7m2      Comment:      Non  GFR Calc    GFR Estimate If Black >90 >60 mL/min/1.7m2      Comment:       GFR Calc    Calcium 8.4 (L) 8.5 - 10.1 mg/dL   Albumin Random Urine Quantitative with Creat Ratio   Result Value Ref Range    Creatinine Urine 133 mg/dL    Albumin Urine mg/L 11 mg/L    Albumin Urine mg/g Cr 7.97 0 - 17 mg/g Cr   Lipid Profile (Chol, Trig, HDL, LDL calc)   Result Value Ref Range    Cholesterol 146 <200 mg/dL    Triglycerides 466 (H) <150 mg/dL      Comment:      Borderline high:  150-199 mg/dl  High:             200-499 mg/dl  Very high:       >499 mg/dl  Non Fasting      HDL Cholesterol 23 (L) >39 mg/dL    LDL Cholesterol Calculated  <100 mg/dL      Cannot estimate LDL when triglyceride exceeds 400 mg/dL    Non HDL Cholesterol 123 <130 mg/dL       If you have any questions or concerns, please call the clinic at the number listed above.       Sincerely,    Kiya Darnell MD/nr

## 2018-07-20 ENCOUNTER — OFFICE VISIT (OUTPATIENT)
Dept: FAMILY MEDICINE | Facility: CLINIC | Age: 68
End: 2018-07-20
Payer: COMMERCIAL

## 2018-07-20 VITALS
TEMPERATURE: 99.1 F | DIASTOLIC BLOOD PRESSURE: 90 MMHG | HEART RATE: 76 BPM | WEIGHT: 232 LBS | BODY MASS INDEX: 31.46 KG/M2 | OXYGEN SATURATION: 95 % | SYSTOLIC BLOOD PRESSURE: 158 MMHG | RESPIRATION RATE: 16 BRPM

## 2018-07-20 DIAGNOSIS — I10 HYPERTENSION GOAL BP (BLOOD PRESSURE) < 140/90: Primary | ICD-10-CM

## 2018-07-20 DIAGNOSIS — Z71.89 ADVANCED DIRECTIVES, COUNSELING/DISCUSSION: ICD-10-CM

## 2018-07-20 DIAGNOSIS — F33.1 MAJOR DEPRESSIVE DISORDER, RECURRENT, MODERATE (H): ICD-10-CM

## 2018-07-20 DIAGNOSIS — K46.9 ABDOMINAL HERNIA WITHOUT OBSTRUCTION AND WITHOUT GANGRENE, RECURRENCE NOT SPECIFIED, UNSPECIFIED HERNIA TYPE: ICD-10-CM

## 2018-07-20 PROCEDURE — 99214 OFFICE O/P EST MOD 30 MIN: CPT | Performed by: FAMILY MEDICINE

## 2018-07-20 RX ORDER — LOSARTAN POTASSIUM 50 MG/1
75 TABLET ORAL DAILY
Qty: 45 TABLET | Refills: 0 | Status: SHIPPED | OUTPATIENT
Start: 2018-07-20 | End: 2018-08-10

## 2018-07-20 NOTE — PROGRESS NOTES
SUBJECTIVE:   Don Samson is a 68 year old male who presents to clinic today for the following health issues:      Hypertension Follow-up      Outpatient blood pressures are being checked at work.  Results are 150's/ 90's.    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, numbness and tingling right hand in the past, seen 4/1/16 by Dr Mora for right foot abrasion, Elevated BP declined medication adjustment, hx of chronic depressed mood, declined medications, then seen by Dr Blanco 4/7 for depressed mood, counseling offered, has tried & did not like Cymbalta or Effexor in the past, started on Wellbutrin but did not take for long, seen 3/22/18 by PCP ricky for uncontrolled HTN ( had not tolerated amlodipine or hctz ) and lisinopril increased to 40 mg, noted flat affect and psychomotor slowing , not interested in meds, referred to Hernando Guillen who he saw, note reviewed, seen by Jason 4/10/18 for back pain and given flexeril, seen 4/13/18 for 3 weeks for respiratory symptoms, rhinorrhea, hoarse voice, fatigue, malaise, headache, myalgia S, congestion, was taking Dayquil, given Augmentin 500 mg bid 10 days completed likely on 4/22.     Seen 5/1/18 for URI, given zpak on insistence. HTN not goal . Seen by Dr Nash 6/19/18 for chronic cough & lisinopril changed to losartan 50 mg & cough since improved. Referred to podiatry for noted left toe pain. Seen 7/2 for nausea secondary to gastroenteritis, he suspected from heat exhaustion, declined recheck  for B which was elevated basic metabolic panel was normal . No showed 7/11.     [place] norco 1/13/18 # 4 by DDS and robitussin with codeine 118 ml on 6/2017    Notes heat exhaustion better. Zofran given to him for nausea caused him to get constipated resolved with stool softeners. He feels he is getting better & drinking enough water. Also feels the inside of his nose & the cough he had is better off the lisinopril. Feels losartan not working as BP still high. Would be ok increasing dose &considering addition of a second med to regimen if need be. Ok with pgen referral if qualifies & seeing MTM for options too. Notes his PCP is Flakita garcía.  Declines to fill phq9 or talk about depression or meet with miladys. Declines prevnar 13, colon cancer screen & not interested in discussing advance directives.  No fever or chills, no headache or dizziness, no double or blurry vision, no facial pain, earache, sore throat, runny nose, post nasal drip, no trouble hearing, smelling, tasting or swallowing, no cough , no chest pain, trouble breathing or palpitations, No abdominal pain, heart burn, reflux, nausea or vomiting or diarrhea or constipation, no blood in stools or black stools, no weight loss or night sweats. No dysuria, hematuria, frequency, urgency, hesitancy, incontinence, No pelvic complaints. No leg swelling or joint pain. No rash.  Problem list and histories reviewed & adjusted, as indicated.  Additional history: as documented    Patient Active Problem List   Diagnosis     Tibialis posterior tendonitis     Fatigue     Right knee pain     Hypertension goal BP (blood pressure) < 140/90     Major depressive disorder, recurrent, moderate (H)     Past Surgical History:   Procedure Laterality Date     HERNIORRHAPHY INGUINAL BILATERAL      RIH 2004, LIH 1985     NASAL/SINUS POLYPECTOMY      Nasal-Sinus Polypectomy       Social History   Substance Use Topics     Smoking status: Former Smoker     Quit date:  1/1/1970     Smokeless tobacco: Never Used     Alcohol use 0.0 oz/week     0 Standard drinks or equivalent per week     Family History   Problem Relation Age of Onset     Hypertension Mother          Current Outpatient Prescriptions   Medication Sig Dispense Refill     cyclobenzaprine (FLEXERIL) 10 MG tablet Take 1 tablet (10 mg) by mouth nightly as needed for muscle spasms 30 tablet 1     losartan (COZAAR) 50 MG tablet Take 1.5 tablets (75 mg) by mouth daily 45 tablet 0     No Known Allergies  Recent Labs   Lab Test  07/11/18   0837  03/22/18   1621  09/28/16   1613  09/15/16   1659  06/20/16   1000  01/06/16   1110   LDL  Cannot estimate LDL when triglyceride exceeds 400 mg/dL   --   52   --    --    --    HDL  23*   --   30*   --    --    --    TRIG  466*   --   285*   --    --    --    ALT   --    --    --   35  41  39   CR  0.93  1.00  1.07  0.99  0.97  1.11   GFRESTIMATED  81  75  69  76  77  67   GFRESTBLACK  >90  >90  84  >90   GFR Calc    >90   GFR Calc    81   POTASSIUM  3.7  4.5  3.9  3.9  3.8  4.7   TSH   --    --    --    --   1.45   --       BP Readings from Last 3 Encounters:   07/20/18 158/90   07/02/18 146/85   06/19/18 (!) 150/97    Wt Readings from Last 3 Encounters:   07/20/18 232 lb (105.2 kg)   07/02/18 229 lb (103.9 kg)   06/19/18 231 lb 8 oz (105 kg)                  Labs reviewed in EPIC    Reviewed and updated as needed this visit by clinical staff       Reviewed and updated as needed this visit by Provider         ROS:  Constitutional, HEENT, cardiovascular, pulmonary, GI, , musculoskeletal, neuro, skin, endocrine and psych systems are negative, except as otherwise noted.    OBJECTIVE:     /90 (BP Location: Left arm, Patient Position: Chair, Cuff Size: Adult Large)  Pulse 76  Temp 99.1  F (37.3  C) (Tympanic)  Resp 16  Wt 232 lb (105.2 kg)  SpO2 95%  BMI 31.46 kg/m2  Body mass index is 31.46 kg/(m^2).  GENERAL: healthy, alert and no  distress  EYES: Eyes grossly normal to inspection, PERRL and conjunctivae and sclerae normal  HENT: ear canals and TM's normal, nose and mouth without ulcers or lesions  NECK: no adenopathy, no asymmetry, masses, or scars and thyroid normal to palpation  RESP: lungs clear to auscultation - no rales, rhonchi or wheezes  CV: regular rate and rhythm, normal S1 S2, no S3 or S4, no murmur, click or rub, no peripheral edema and peripheral pulses strong  ABDOMEN: has a visible ventral hernia through shirt, wont left me do an abdominal exam as does not like anyone to touch his hernia.   MS: no gross musculoskeletal defects noted, no edema  SKIN: no suspicious lesions or rashes  NEURO: Normal strength and tone, mentation intact and speech normal  PSYCH: mentation appears normal, affect flat, fatigued, appearance well groomed and seems like has a lot of resentment & anger    Diagnostic Test Results:  No results found for this or any previous visit (from the past 24 hour(s)).    ASSESSMENT/PLAN:     1. Hypertension goal BP (blood pressure) < 140/90  Not goal. Asymptomatic. Increase losartan to 75 mg daily ( 1.5 tab ) daily. Return in 2 weeks for BP check with BMP. Referral to Pgen and MTM made if this does not work. May need to have a second BP med if remains uncontrolled. Consider colon cancer screen, Prevnar 13, advance directives, meeting with miladys. Monitor hernia for worsening. If gets worse go to the ER or see a surgeon  - Basic metabolic panel  (Ca, Cl, CO2, Creat, Gluc, K, Na, BUN); Future  - losartan (COZAAR) 50 MG tablet; Take 1.5 tablets (75 mg) by mouth daily  Dispense: 45 tablet; Refill: 0  - Contact patient for PGEN study  - MED THERAPY MANAGE REFERRAL    2. Major depressive disorder, recurrent, moderate (H)  Declines addressing it. Seems future oriented.    3. Advanced directives, counseling/discussion  Given brochure. Attempted to speak about it. Not interested . Returned brochure.    4. Need for vaccination  with 13-polyvalent pneumococcal conjugate vaccine  Declined     5. Special screening for malignant neoplasms, colon  Declined.    6. Abdominal hernia without obstruction and without gangrene, recurrence not specified, unspecified hernia type  Declines abdominal exam. Reports stable. Has seen surgeon for it before. Tender to touch but soft & denies redness. No longer vomiting. Resolved GE. Clinically does not appear to have an incarcerated hernia. Monitor hernia for worsening. If gets worse go to the ER or see a surgeon      See Patient Instructions    Yumi Rojas MD  [facility]

## 2018-07-20 NOTE — PATIENT INSTRUCTIONS
Increase losartan to 75 mg daily ( 1.5 tab ) daily   Return in 2 weeks for BP check with BMP   Referral to Pgen and MTM made  If this does not work  May need to have a second BP med if remains uncontrolled  consider colon cancer screen, Prevnar 13, advance directives, meeting with miladys  Monitor hernia for worsening  If gets worse go to the ER or see a surgeon

## 2018-07-20 NOTE — MR AVS SNAPSHOT
After Visit Summary   7/20/2018    Don Samson    MRN: [id number]           Patient Information     Date Of Birth          1950        Visit Information        Provider Department      7/20/2018 9:40 AM Yumi Rojas MD [facility]        Today's Diagnoses     Hypertension goal BP (blood pressure) < 140/90    -  1    Major depressive disorder, recurrent, moderate (H)        Advanced directives, counseling/discussion        Need for vaccination with 13-polyvalent pneumococcal conjugate vaccine        Special screening for malignant neoplasms, colon        Abdominal hernia without obstruction and without gangrene, recurrence not specified, unspecified hernia type          Care Instructions    Increase losartan to 75 mg daily ( 1.5 tab ) daily   Return in 2 weeks for BP check with BMP   Referral to Pgen and MTM made  If this does not work  May need to have a second BP med if remains uncontrolled  consider colon cancer screen, Prevnar 13, advance directives, meeting with miladys  Monitor hernia for worsening  If gets worse go to the ER or see a surgeon          Follow-ups after your visit        Additional Services     MED THERAPY MANAGE REFERRAL       Your provider has referred you to: **[facility] (numerous locations) [phone]   [email or web address]  FMG: [facility] [phone]   [email or web address]    Reason for Referral: HTN    The [facility] department will contact you to schedule an appointment.  You may also schedule the appointment by calling [phone].  For [facility] [place] patients, please call [phone] to confirm/schedule your appointment on the next business day.    This service is designed to help you get the most from your medications.  A specially trained Pharmacist will  work closely with you and your providers to solve any questions, concerns, issues or problems related to your medications.    Please bring all of your prescription and non-prescription medications (such as vitamins, over-the-counter medications, and herbals) or a detailed medication list to your appointment.    If you have a glucose meter or other home monitoring information, please also bring this to your appointment (i.e. blood glucose log, blood pressure log, pain log, etc.).                  Your next 10 appointments already scheduled     Aug 06, 2018  4:00 PM CDT   SHORT with  MEDICAL ASSISTANT   [facility] ([facility])    [address] [place] [address] [phone]              Future tests that were ordered for you today     Open Future Orders        Priority Expected Expires Ordered    Basic metabolic panel  (Ca, Cl, CO2, Creat, Gluc, K, Na, BUN) Routine 8/3/2018 7/20/2019 7/20/2018            Who to contact     If you have questions or need follow up information about today's clinic visit or your schedule please contact [facility] directly at [phone].  Normal or non-critical lab and imaging results will be communicated to you by MyChart, letter or phone within 4 business days after the clinic has received the results. If you do not hear from us within 7 days, please contact the clinic through MyChart or phone. If you have a critical or abnormal lab result, we will notify you by phone as soon as possible.  Submit refill requests through [email or web address] or call your pharmacy and they will forward the refill request to us. Please allow 3 business days for your refill to be completed.          Additional Information About Your Visit        Care EveryWhere ID     This is your Care EveryWhere ID. This could be used by other organizations to access your [place] medical records  [id number]        Your Vitals Were     Pulse Temperature Respirations  Pulse Oximetry BMI (Body Mass Index)       76 99.1  F (37.3  C) (Tympanic) 16 95% 31.46 kg/m2        Blood Pressure from Last 3 Encounters:   07/20/18 158/90   07/02/18 146/85   06/19/18 (!) 150/97    Weight from Last 3 Encounters:   07/20/18 232 lb (105.2 kg)   07/02/18 229 lb (103.9 kg)   06/19/18 231 lb 8 oz (105 kg)              We Performed the Following     Contact patient for PGEN study     MED THERAPY MANAGE REFERRAL          Today's Medication Changes          These changes are accurate as of 7/20/18 10:12 AM.  If you have any questions, ask your nurse or doctor.               These medicines have changed or have updated prescriptions.        Dose/Directions    losartan 50 MG tablet   Commonly known as:  COZAAR   This may have changed:  how much to take   Used for:  Hypertension goal BP (blood pressure) < 140/90   Changed by:  Yumi Rojas MD        Dose:  75 mg   Take 1.5 tablets (75 mg) by mouth daily   Quantity:  45 tablet   Refills:  0            Where to get your medicines      These medications were sent to [facility] [address] [place] [address] AT [address] [address]    Hours:  24-hours Phone:  [phone]     losartan 50 MG tablet                Primary Care Provider Office Phone # Fax #    Flakita Newman Dawit, CONSTANCE [facility] [phone] [address] [facility] [address]        Equal Access to Services     ANDRE TAYLOR AH: Sharon manriqueo Sodemario, waaxda luqadaha, qaybta kaalmada adeegyada, ruddy rooney. So [facility] [phone].    ATENCIÓN: Si habla español, tiene a torres disposición servicios gratuitos de asistencia lingüística. Llame al [phone].    We comply with applicable federal civil rights laws and [place] laws. We do not discriminate on the basis of race, color, national origin, age, disability, sex, sexual orientation, or gender identity.             Thank you!     Thank you for choosing [facility]  for your care. Our goal is always to provide you with excellent care. Hearing back from our patients is one way we can continue to improve our services. Please take a few minutes to complete the written survey that you may receive in the mail after your visit with us. Thank you!             Your Updated Medication List - Protect others around you: Learn how to safely use, store and throw away your medicines at www.disposemymeds.org.          This list is accurate as of 7/20/18 10:12 AM.  Always use your most recent med list.                   Brand Name Dispense Instructions for use Diagnosis    cyclobenzaprine 10 MG tablet    FLEXERIL    30 tablet    Take 1 tablet (10 mg) by mouth nightly as needed for muscle spasms    Back muscle spasm, Acute bilateral low back pain without sciatica       losartan 50 MG tablet    COZAAR    45 tablet    Take 1.5 tablets (75 mg) by mouth daily    Hypertension goal BP (blood pressure) < 140/90

## 2018-07-23 ENCOUNTER — TELEPHONE (OUTPATIENT)
Dept: NURSING | Facility: CLINIC | Age: 68
End: 2018-07-23

## 2018-07-23 NOTE — PROGRESS NOTES
Please send the letter to the patient with the following.         Here are your results for your recent labs.   Your kidney functions are stable.   Your total cholesterol is normal however your triglycerides are elevated. This is usually related to too much sugar in your diet, including sweets, starches, carbohydrates, and alcohol.   I recommend increasing your physical activity to increase your HDL, and reducing sugar in your diet to lower your triglycerices. You can also take Omega-3 fatty acids (available in capsules) to improve your triglycerides.  You do not need any treatment (medication) at this time but I would recommend rechecking your levels in three months.   Please call or message me if you have questions or concerns.

## 2018-07-24 NOTE — TELEPHONE ENCOUNTER
"Discussed PGen study with patient - he became upset when told for the study he would need to go off of his current medication for 30 days. He stated this is a \"waste of time\" and that if the new dose of his current medication does not work for him he will just stop the medication anyway as \"nothing is working\", and he hung up.   "

## 2018-08-06 ENCOUNTER — TELEPHONE (OUTPATIENT)
Dept: FAMILY MEDICINE | Facility: CLINIC | Age: 68
End: 2018-08-06

## 2018-08-06 ENCOUNTER — ALLIED HEALTH/NURSE VISIT (OUTPATIENT)
Dept: NURSING | Facility: CLINIC | Age: 68
End: 2018-08-06
Payer: COMMERCIAL

## 2018-08-06 VITALS — SYSTOLIC BLOOD PRESSURE: 132 MMHG | DIASTOLIC BLOOD PRESSURE: 78 MMHG

## 2018-08-06 DIAGNOSIS — I10 HYPERTENSION GOAL BP (BLOOD PRESSURE) < 140/90: ICD-10-CM

## 2018-08-06 DIAGNOSIS — Z01.30 BLOOD PRESSURE CHECK: Primary | ICD-10-CM

## 2018-08-06 PROCEDURE — 99207 ZZC NO CHARGE NURSE ONLY: CPT

## 2018-08-06 PROCEDURE — 36415 COLL VENOUS BLD VENIPUNCTURE: CPT | Performed by: FAMILY MEDICINE

## 2018-08-06 PROCEDURE — 80048 BASIC METABOLIC PNL TOTAL CA: CPT | Performed by: FAMILY MEDICINE

## 2018-08-06 NOTE — LETTER
August 7, 2018      Don Samson  [address]  [facility] [address]        Dear ,    We are writing to inform you of your test results.    Results within acceptable limits.  -Kidney function is normal (Cr, GFR), Sodium is normal, Potassium is normal, Calcium is normal, Glucose is normal (diabetes screening test). Continue same dose of medication. If Bp not controlled, make an apt with pharmacist or provider in clinic to add another med to what you are already taking    Resulted Orders   Basic metabolic panel  (Ca, Cl, CO2, Creat, Gluc, K, Na, BUN)   Result Value Ref Range    Sodium 143 133 - 144 mmol/L    Potassium 4.3 3.4 - 5.3 mmol/L    Chloride 109 94 - 109 mmol/L    Carbon Dioxide 26 20 - 32 mmol/L    Anion Gap 8 3 - 14 mmol/L    Glucose 111 (H) 70 - 99 mg/dL      Comment:      Non Fasting    Urea Nitrogen 18 7 - 30 mg/dL    Creatinine 1.10 0.66 - 1.25 mg/dL    GFR Estimate 67 >60 mL/min/1.7m2      Comment:      Non  GFR Calc    GFR Estimate If Black 81 >60 mL/min/1.7m2      Comment:       GFR Calc    Calcium 8.7 8.5 - 10.1 mg/dL     If you have any questions or concerns, please call the clinic at the number listed above.   Sincerely,    Yumi Rojas MDnr

## 2018-08-06 NOTE — NURSING NOTE
.Don Samson is a 68 year old patient who comes in today for a Blood Pressure check.  Initial BP:  /78 (BP Location: Left arm, Patient Position: Chair, Cuff Size: Adult Regular)     Data Unavailable  Disposition: results routed to provider and pt requested to talk to a Nurse

## 2018-08-06 NOTE — TELEPHONE ENCOUNTER
Pt was in for BP check with MA today.  BP Readings from Last 3 Encounters:   08/06/18 132/78   07/20/18 158/90   07/02/18 146/85      He was started on losartan 75 mg on 7/20/18. He had BMP drawn today.  Pt wanted to speak to RN but by the time I got to his room he had left    Attempted to reach, unable. Left message  to call back.  Need to ask how he is feeling on the new higher dose of losartan and then ask Dr Rojas for plan      Sheila Mallory RN

## 2018-08-06 NOTE — MR AVS SNAPSHOT
After Visit Summary   8/6/2018    Don Samson    MRN: [id number]           Patient Information     Date Of Birth          1950        Visit Information        Provider Department      8/6/2018 4:00 PM HW MEDICAL ASSISTANT [facility]        Today's Diagnoses     Blood pressure check    -  1       Follow-ups after your visit        Follow-up notes from your care team     Return for BP Recheck.      Your next 10 appointments already scheduled     Aug 20, 2018  4:30 PM CDT   TELEMEDICINE with Sravanthi Woo RPH   [facility] ([facility])    [address] [facility] [id number] [phone]           Note: this is not an onsite visit; there is no need to come to the facility.              Who to contact     If you have questions or need follow up information about today's clinic visit or your schedule please contact [facility] directly at [phone].  Normal or non-critical lab and imaging results will be communicated to you by MyChart, letter or phone within 4 business days after the clinic has received the results. If you do not hear from us within 7 days, please contact the clinic through MyChart or phone. If you have a critical or abnormal lab result, we will notify you by phone as soon as possible.  Submit refill requests through [employer] or call your pharmacy and they will forward the refill request to us. Please allow 3 business days for your refill to be completed.          Additional Information About Your Visit        Care EveryWhere ID     This is your Care EveryWhere ID. This could be used by other organizations to access your [place] medical records  [id number]         Blood Pressure from Last 3 Encounters:   08/06/18 132/78   07/20/18 158/90   07/02/18 146/85    Weight from Last 3 Encounters:   07/20/18 232 lb (105.2 kg)   07/02/18 229 lb (103.9 kg)   06/19/18 231 lb 8 oz (105 kg)              We  Performed the Following     NO CHARGE LOS        Primary Care Provider Office Phone # Fax #    Flakita Pastor, CONSTANCE [facility] [phone]       [address]  [facility] [address]        Equal Access to Services     ANDRE TAYLOR : Hadii tom ku hadkareno Soomaali, waaxda luqadaha, qaybta kaalmada adeegyada, waxay idiin hayzeinabn adeeg hammad isreal rooney. So [facility] [phone].    ATENCIÓN: Si habla español, tiene a torres disposición servicios gratuitos de asistencia lingüística. Llame al [phone].    We comply with applicable federal civil rights laws and [place] laws. We do not discriminate on the basis of race, color, national origin, age, disability, sex, sexual orientation, or gender identity.            Thank you!     Thank you for choosing [facility]  for your care. Our goal is always to provide you with excellent care. Hearing back from our patients is one way we can continue to improve our services. Please take a few minutes to complete the written survey that you may receive in the mail after your visit with us. Thank you!             Your Updated Medication List - Protect others around you: Learn how to safely use, store and throw away your medicines at www.disposemymeds.org.          This list is accurate as of 8/6/18  4:35 PM.  Always use your most recent med list.                   Brand Name Dispense Instructions for use Diagnosis    cyclobenzaprine 10 MG tablet    FLEXERIL    30 tablet    Take 1 tablet (10 mg) by mouth nightly as needed for muscle spasms    Back muscle spasm, Acute bilateral low back pain without sciatica       losartan 50 MG tablet    COZAAR    45 tablet    Take 1.5 tablets (75 mg) by mouth daily    Hypertension goal BP (blood pressure) < 140/90

## 2018-08-06 NOTE — TELEPHONE ENCOUNTER
Left voice mail asking patient to call back and ask to speak with triage nurse.    Merissa Payan RN, BSN

## 2018-08-07 LAB
ANION GAP SERPL CALCULATED.3IONS-SCNC: 8 MMOL/L (ref 3–14)
BUN SERPL-MCNC: 18 MG/DL (ref 7–30)
CALCIUM SERPL-MCNC: 8.7 MG/DL (ref 8.5–10.1)
CHLORIDE SERPL-SCNC: 109 MMOL/L (ref 94–109)
CO2 SERPL-SCNC: 26 MMOL/L (ref 20–32)
CREAT SERPL-MCNC: 1.1 MG/DL (ref 0.66–1.25)
GFR SERPL CREATININE-BSD FRML MDRD: 67 ML/MIN/1.7M2
GLUCOSE SERPL-MCNC: 111 MG/DL (ref 70–99)
POTASSIUM SERPL-SCNC: 4.3 MMOL/L (ref 3.4–5.3)
SODIUM SERPL-SCNC: 143 MMOL/L (ref 133–144)

## 2018-08-07 NOTE — PROGRESS NOTES
Results within acceptable limits.  -Kidney function is normal (Cr, GFR), Sodium is normal, Potassium is normal, Calcium is normal, Glucose is normal (diabetes screening test). Continue same dose of medication. If Bp not controlled, make an apt with pharmacist or provider in clinic to add another med to what you are already taking

## 2018-08-10 ENCOUNTER — OFFICE VISIT (OUTPATIENT)
Dept: FAMILY MEDICINE | Facility: CLINIC | Age: 68
End: 2018-08-10
Payer: COMMERCIAL

## 2018-08-10 VITALS
DIASTOLIC BLOOD PRESSURE: 92 MMHG | HEIGHT: 72 IN | WEIGHT: 226 LBS | BODY MASS INDEX: 30.61 KG/M2 | RESPIRATION RATE: 14 BRPM | SYSTOLIC BLOOD PRESSURE: 151 MMHG | TEMPERATURE: 98.3 F | OXYGEN SATURATION: 97 % | HEART RATE: 79 BPM

## 2018-08-10 DIAGNOSIS — I10 HYPERTENSION GOAL BP (BLOOD PRESSURE) < 140/90: Primary | ICD-10-CM

## 2018-08-10 DIAGNOSIS — Z12.11 SCREEN FOR COLON CANCER: ICD-10-CM

## 2018-08-10 PROCEDURE — 99214 OFFICE O/P EST MOD 30 MIN: CPT | Performed by: FAMILY MEDICINE

## 2018-08-10 RX ORDER — CHLORTHALIDONE 25 MG/1
12.5 TABLET ORAL DAILY
Qty: 45 TABLET | Refills: 1 | Status: SHIPPED | OUTPATIENT
Start: 2018-08-10 | End: 2018-08-15

## 2018-08-10 NOTE — PROGRESS NOTES
"  SUBJECTIVE:   Don Samson is a 68 year old male who presents to clinic today for the following health issues:      Hypertension Follow-up      Outpatient blood pressures are not being checked.    Low Salt Diet: low salt      Amount of exercise or physical activity: None    Problems taking medications regularly: No    Medication side effects: possible indegestion     Diet: low salt    Had a few teeth pulled and noted bleeding afterwards.    Noted BP is elevated    Doesn't socialize a lot, does go to coffee shops and also to work regularly. No one lives at home. Only nearby family is one distant brother.    ROS  Has had some nausea that he relates it sometimes to the losartan.  Mood fair  No fever  No cough    EXAM:  BP (!) 151/92  Pulse 79  Temp 98.3  F (36.8  C) (Oral)  Resp 14  Ht 5' 11.5\" (1.816 m)  Wt 226 lb (102.5 kg)  SpO2 97%  BMI 31.08 kg/m2  Constitutional: Healthy, alert, no distress   Cardiovascular: RRR. No murmurs   Respiratory: Clear to auscultation   Psych: mood fair, affect a bit flat, insight and judgment fair, function fair    ASSESSMENT    ICD-10-CM    1. Hypertension goal BP (blood pressure) < 140/90 I10 chlorthalidone (HYGROTON) 25 MG tablet   2. Screen for colon cancer Z12.11 Fecal colorectal cancer screen (FIT)      Plan:  Patient Instructions   Stop losartan.  Start chlorthalidone 12.5mg daily.  Increase to 25mg daily after 2 weeks.  Follow-up with me in the office in 6 weeks.     BP not controlled.    Pal Burton MD  Family Medicine Physician     "

## 2018-08-10 NOTE — MR AVS SNAPSHOT
After Visit Summary   8/10/2018    Don Samson    MRN: [id number]           Patient Information     Date Of Birth          1950        Visit Information        Provider Department      8/10/2018 4:45 PM Pal Clark MD [facility]        Today's Diagnoses     Hypertension goal BP (blood pressure) < 140/90    -  1      Care Instructions    Stop losartan.  Start chlorthalidone 12.5mg daily.  Increase to 25mg daily after 2 weeks.  Follow-up with me in the office in 6 weeks.          Follow-ups after your visit        Your next 10 appointments already scheduled     Aug 15, 2018 11:00 AM CDT   Office Visit with Sravanthi Woo SARA   [facility] ([facility])    [address] [place] [address] [phone]           Bring a current list of meds and any records pertaining to this visit. For Physicals, please bring immunization records and any forms needing to be filled out. Please arrive 10 minutes early to complete paperwork.              Who to contact     If you have questions or need follow up information about today's clinic visit or your schedule please contact [facility] directly at [phone].  Normal or non-critical lab and imaging results will be communicated to you by MyChart, letter or phone within 4 business days after the clinic has received the results. If you do not hear from us within 7 days, please contact the clinic through MyChart or phone. If you have a critical or abnormal lab result, we will notify you by phone as soon as possible.  Submit refill requests through [employer] or call your pharmacy and they will forward the refill request to us. Please allow 3 business days for your refill to be completed.          Additional Information About Your Visit        Care EveryWhere ID     This is your Care EveryWhere ID. This could be used by other organizations to  "access your [place] medical records  [id number]        Your Vitals Were     Pulse Temperature Respirations Height Pulse Oximetry BMI (Body Mass Index)    79 98.3  F (36.8  C) (Oral) 14 5' 11.5\" (1.816 m) 97% 31.08 kg/m2       Blood Pressure from Last 3 Encounters:   08/10/18 (!) 151/92   08/06/18 132/78   07/20/18 158/90    Weight from Last 3 Encounters:   08/10/18 226 lb (102.5 kg)   07/20/18 232 lb (105.2 kg)   07/02/18 229 lb (103.9 kg)              Today, you had the following     No orders found for display         Today's Medication Changes          These changes are accurate as of 8/10/18  5:16 PM.  If you have any questions, ask your nurse or doctor.               Start taking these medicines.        Dose/Directions    chlorthalidone 25 MG tablet   Commonly known as:  HYGROTON   Used for:  Hypertension goal BP (blood pressure) < 140/90   Started by:  Pal Clark MD        Dose:  12.5 mg   Take 0.5 tablets (12.5 mg) by mouth daily   Quantity:  45 tablet   Refills:  1            Where to get your medicines      These medications were sent to [facility] - [place], [place] - [address] AT [facility]  [address], [place] [address]     Phone:  [phone]     chlorthalidone 25 MG tablet                Primary Care Provider Office Phone # Fax #    Flakitaana maria Pastor, APRN [facility] [phone] [address] [facility] [address]        Equal Access to Services     ANDRE TAYLOR AH: Sharon Garcia, wajonda luqwilliams, qaybta kaalmaruddy martinez. So [facility] [phone].    ATENCIÓN: Si habla español, tiene a torres disposición servicios gratuitos de asistencia lingüística. Deya al [phone].    We comply with applicable federal civil rights laws and [place] laws. We do not discriminate on the basis of race, color, national origin, age, disability, sex, sexual orientation, or gender " identity.            Thank you!     Thank you for choosing [facility]  for your care. Our goal is always to provide you with excellent care. Hearing back from our patients is one way we can continue to improve our services. Please take a few minutes to complete the written survey that you may receive in the mail after your visit with us. Thank you!             Your Updated Medication List - Protect others around you: Learn how to safely use, store and throw away your medicines at www.disposemymeds.org.          This list is accurate as of 8/10/18  5:16 PM.  Always use your most recent med list.                   Brand Name Dispense Instructions for use Diagnosis    chlorthalidone 25 MG tablet    HYGROTON    45 tablet    Take 0.5 tablets (12.5 mg) by mouth daily    Hypertension goal BP (blood pressure) < 140/90       cyclobenzaprine 10 MG tablet    FLEXERIL    30 tablet    Take 1 tablet (10 mg) by mouth nightly as needed for muscle spasms    Back muscle spasm, Acute bilateral low back pain without sciatica

## 2018-08-10 NOTE — PATIENT INSTRUCTIONS
Stop losartan.  Start chlorthalidone 12.5mg daily.  Increase to 25mg daily after 2 weeks.  Follow-up with me in the office in 6 weeks.

## 2018-08-15 ENCOUNTER — OFFICE VISIT (OUTPATIENT)
Dept: PHARMACY | Facility: CLINIC | Age: 68
End: 2018-08-15
Attending: FAMILY MEDICINE
Payer: COMMERCIAL

## 2018-08-15 VITALS
SYSTOLIC BLOOD PRESSURE: 144 MMHG | HEART RATE: 76 BPM | BODY MASS INDEX: 31.3 KG/M2 | WEIGHT: 227.6 LBS | OXYGEN SATURATION: 98 % | DIASTOLIC BLOOD PRESSURE: 88 MMHG

## 2018-08-15 DIAGNOSIS — Z12.11 SCREEN FOR COLON CANCER: ICD-10-CM

## 2018-08-15 DIAGNOSIS — I10 HYPERTENSION GOAL BP (BLOOD PRESSURE) < 140/90: Primary | ICD-10-CM

## 2018-08-15 LAB — HEMOCCULT STL QL IA: POSITIVE

## 2018-08-15 PROCEDURE — 99607 MTMS BY PHARM ADDL 15 MIN: CPT | Performed by: PHARMACIST

## 2018-08-15 PROCEDURE — 82274 ASSAY TEST FOR BLOOD FECAL: CPT | Performed by: FAMILY MEDICINE

## 2018-08-15 PROCEDURE — 99605 MTMS BY PHARM NP 15 MIN: CPT | Performed by: PHARMACIST

## 2018-08-15 RX ORDER — AMLODIPINE BESYLATE 5 MG/1
5 TABLET ORAL AT BEDTIME
Qty: 30 TABLET | Refills: 1 | Status: SHIPPED | OUTPATIENT
Start: 2018-08-15 | End: 2018-08-29

## 2018-08-15 RX ORDER — OLMESARTAN MEDOXOMIL 20 MG/1
20 TABLET ORAL
Qty: 30 TABLET | Refills: 1 | Status: SHIPPED | OUTPATIENT
Start: 2018-08-15 | End: 2018-08-29

## 2018-08-15 NOTE — Clinical Note
Dr. Rojas, Flakita Forrest --thank you for MTm referral --I had nice chat with Don and changed his BP meds --lets see how it goes?  Thanks , Sravanthi Woo, [facility]. Medication Therapy Management Provider [phone]

## 2018-08-15 NOTE — PROGRESS NOTES
SUBJECTIVE/OBJECTIVE:                           Don Samson is a 68 year old male coming in for an initial visit for Medication Therapy Management.  He was referred to me from Dr. Zhao .    Chief Complaint: here for BP consult.  He declines to start a water pill--no fun to pee all the time. Had cough from lisinopril in past . He feels losartan makes him have more frequunt bm's.     .  Personal Healthcare Goals: fix Bp w/out se's.     Allergies/ADRs: Reviewed in Epic; states cough from ace-lisinopril   Tobacco: Exposure to secondhand smoke  Alcohol: 1 drink q 6 months.   Caffeine: 1 cups/day of coffee  Activity: walks, bicycles if weather ok.   PMH: Reviewed in Epic    Medication Adherence/Access:  no issues reported      Hypertension: Current medications include losartan 50mg --1.5 tabs qam  (he declined to start the chlorthalidone ).  Patient does not self-monitor BP.  Patient reports the following medication side effects: frequent bm's.         Today's Vitals: /88  Pulse 76  Wt 227 lb 9.6 oz (103.2 kg)  SpO2 98%  BMI 31.3 kg/m2      ASSESSMENT:                             Current medications were reviewed today.     Medication Adherence: excellent, no issues identified    Hypertension: Needs Improvement. Patient is not meeting BP goal of < 140/90mmHg.  Pt would benefit from the following changes - addition of Amlodipine and Olmesartan, discontinuation of Losartan , continued BP monitoring, watching diet, increasing exercise and weight loss and sodium restriction      PLAN:                            1.  BP today =144/88mhg --Goal BP is < 140/90mmhg  And optimum BP is <130/80mmhg.    Lets stop your losartan and change you to Olmesartan 20mg. Tablet -- 1 every AM at breakfast and lets ADD in Amlodipine 5mg tablet --once daily at bedtime.    Fyi--donot salt your foods.  Avoid microwave and canned soups .       Next MTM visit: see me in 2 weeks for BP recheck --Wednesday August 29th at 4pm.     I  spent 45 minutes with this patient today. All changes were made via collaborative practice agreement with Flakita Pastor. A copy of the visit note was provided to the patient's referring provider.Dr. Rojas.         The patient was given a summary of these recommendations as an after visit summary.     Sravanthi Woo Rp.  Medication Therapy Management Provider  [phone]

## 2018-08-15 NOTE — MR AVS SNAPSHOT
After Visit Summary   8/15/2018    Don Samson    MRN: [id number]           Patient Information     Date Of Birth          1950        Visit Information        Provider Department      8/15/2018 11:00 AM Sravanthi Woo RPH [facility] MTM        Today's Diagnoses     Hypertension goal BP (blood pressure) < 140/90    -  1      Care Instructions    Recommendations from today's MTM visit:                                                    MTM (medication therapy management) is a service provided by a clinical pharmacist designed to help you get the most of out of your medicines.   Today we reviewed what your medicines are for, how to know if they are working, that your medicines are safe and how to make your medicine regimen as easy as possible.     1.  BP today =144/88mhg --Goal BP is < 140/90mmhg  And optimum BP is <130/80mmhg.    Lets stop your losartan and change you to Olmesartan 20mg. Tablet -- 1 every AM at breakfast and lets ADD in Amlodipine 5mg tablet --once daily at bedtime.    Lets         Next MTM visit: see me in 2 weeks for BP recheck --Wednesday August 29th at 4pm.     To schedule another MTM appointment, please call the clinic directly or you may call the MTM scheduling line at [phone] or toll-free at [phone].     My Clinical Pharmacist's contact information:                                                      It was a pleasure seeing you today!  Please feel free to contact me with any questions or concerns you have.      Sravanthi Woo [facility].  Medication Therapy Management Provider  [phone]      You may receive a survey about the MTM services you received.  I would appreciate your feedback to help me serve you better in the future. Please fill it out and return it when you can. Your comments will be anonymous.                      Follow-ups after your visit        Your next 10 appointments already scheduled     Aug 29, 2018  4:00 PM CDT    SHORT with Sravanthi Woo RPH   [facility] MT ([facility])    [address]  [place] [address] [phone]              Who to contact     If you have questions or need follow up information about today's clinic visit or your schedule please contact [facility] directly at [phone].  Normal or non-critical lab and imaging results will be communicated to you by MyChart, letter or phone within 4 business days after the clinic has received the results. If you do not hear from us within 7 days, please contact the clinic through MyChart or phone. If you have a critical or abnormal lab result, we will notify you by phone as soon as possible.  Submit refill requests through [employer] or call your pharmacy and they will forward the refill request to us. Please allow 3 business days for your refill to be completed.          Additional Information About Your Visit        Care EveryWhere ID     This is your Care EveryWhere ID. This could be used by other organizations to access your [place] medical records  [id number]        Your Vitals Were     Pulse Pulse Oximetry BMI (Body Mass Index)             76 98% 31.3 kg/m2          Blood Pressure from Last 3 Encounters:   08/15/18 144/88   08/10/18 (!) 151/92   08/06/18 132/78    Weight from Last 3 Encounters:   08/15/18 227 lb 9.6 oz (103.2 kg)   08/10/18 226 lb (102.5 kg)   07/20/18 232 lb (105.2 kg)              Today, you had the following     No orders found for display         Today's Medication Changes          These changes are accurate as of 8/15/18 11:46 AM.  If you have any questions, ask your nurse or doctor.               Start taking these medicines.        Dose/Directions    amLODIPine 5 MG tablet   Commonly known as:  NORVASC   Used for:  Hypertension goal BP (blood pressure) < 140/90   Started by:  Sravanthi Woo RPH        Dose:  5 mg   Take 1 tablet (5 mg) by mouth At  Bedtime   Quantity:  30 tablet   Refills:  1       olmesartan 20 MG tablet   Commonly known as:  BENICAR   Used for:  Hypertension goal BP (blood pressure) < 140/90   Started by:  Sravanthi Woo RPH        Dose:  20 mg   Take 1 tablet (20 mg) by mouth daily (with breakfast)   Quantity:  30 tablet   Refills:  1            Where to get your medicines      These medications were sent to [facility] [place], [place] - [address]  [address], [place] [address]     Phone:  [phone]     amLODIPine 5 MG tablet    olmesartan 20 MG tablet                Primary Care Provider Office Phone # Fax #    Flakita Paty Pastor, APRN [facility] [phone] [address]  [facility] [address]        Equal Access to Services     ANDRE [facility] : Hadii tom escalante hadasho Soomaali, waaxda luqadaha, qaybta kaalmada adeegyada, ruddy bach . So [facility] [phone].    ATENCIÓN: Si habla español, tiene a torres disposición servicios gratuitos de asistencia lingüística. Deya al [phone].    We comply with applicable federal civil rights laws and [place] laws. We do not discriminate on the basis of race, color, national origin, age, disability, sex, sexual orientation, or gender identity.            Thank you!     Thank you for choosing [facility]  for your care. Our goal is always to provide you with excellent care. Hearing back from our patients is one way we can continue to improve our services. Please take a few minutes to complete the written survey that you may receive in the mail after your visit with us. Thank you!             Your Updated Medication List - Protect others around you: Learn how to safely use, store and throw away your medicines at www.disposemymeds.org.          This list is accurate as of 8/15/18 11:46 AM.  Always use your most recent med list.                   Brand Name Dispense Instructions for use Diagnosis     amLODIPine 5 MG tablet    NORVASC    30 tablet    Take 1 tablet (5 mg) by mouth At Bedtime    Hypertension goal BP (blood pressure) < 140/90       cyclobenzaprine 10 MG tablet    FLEXERIL    30 tablet    Take 1 tablet (10 mg) by mouth nightly as needed for muscle spasms    Back muscle spasm, Acute bilateral low back pain without sciatica       olmesartan 20 MG tablet    BENICAR    30 tablet    Take 1 tablet (20 mg) by mouth daily (with breakfast)    Hypertension goal BP (blood pressure) < 140/90

## 2018-08-15 NOTE — PATIENT INSTRUCTIONS
Recommendations from today's MTM visit:                                                    MTM (medication therapy management) is a service provided by a clinical pharmacist designed to help you get the most of out of your medicines.   Today we reviewed what your medicines are for, how to know if they are working, that your medicines are safe and how to make your medicine regimen as easy as possible.     1.  BP today =144/88mhg --Goal BP is < 140/90mmhg  And optimum BP is <130/80mmhg.    Lets stop your losartan and change you to Olmesartan 20mg. Tablet -- 1 every AM at breakfast and lets ADD in Amlodipine 5mg tablet --once daily at bedtime.    Fyi--donot salt your foods.  Avoid microwave and canned soups .       Next MTM visit: see me in 2 weeks for BP recheck --Wednesday August 29th at 4pm.     To schedule another MTM appointment, please call the clinic directly or you may call the MTM scheduling line at [phone] or toll-free at [phone].     My Clinical Pharmacist's contact information:                                                      It was a pleasure seeing you today!  Please feel free to contact me with any questions or concerns you have.      Sravanthi Woo [facility].  Medication Therapy Management Provider  [phone]      You may receive a survey about the MTM services you received.  I would appreciate your feedback to help me serve you better in the future. Please fill it out and return it when you can. Your comments will be anonymous.

## 2018-08-29 ENCOUNTER — OFFICE VISIT (OUTPATIENT)
Dept: PHARMACY | Facility: CLINIC | Age: 68
End: 2018-08-29
Payer: COMMERCIAL

## 2018-08-29 VITALS
DIASTOLIC BLOOD PRESSURE: 72 MMHG | BODY MASS INDEX: 32.32 KG/M2 | OXYGEN SATURATION: 98 % | WEIGHT: 235 LBS | SYSTOLIC BLOOD PRESSURE: 118 MMHG | HEART RATE: 72 BPM

## 2018-08-29 DIAGNOSIS — I10 HYPERTENSION GOAL BP (BLOOD PRESSURE) < 140/90: ICD-10-CM

## 2018-08-29 PROCEDURE — 99607 MTMS BY PHARM ADDL 15 MIN: CPT | Performed by: PHARMACIST

## 2018-08-29 PROCEDURE — 99606 MTMS BY PHARM EST 15 MIN: CPT | Performed by: PHARMACIST

## 2018-08-29 RX ORDER — OLMESARTAN MEDOXOMIL 20 MG/1
20 TABLET ORAL
Qty: 90 TABLET | Refills: 1 | Status: SHIPPED | OUTPATIENT
Start: 2018-08-29 | End: 2019-02-27

## 2018-08-29 RX ORDER — AMLODIPINE BESYLATE 5 MG/1
5 TABLET ORAL AT BEDTIME
Qty: 90 TABLET | Refills: 1 | Status: SHIPPED | OUTPATIENT
Start: 2018-08-29 | End: 2018-11-28 | Stop reason: DRUGHIGH

## 2018-08-29 NOTE — PROGRESS NOTES
SUBJECTIVE/OBJECTIVE:                           Don Samson is a 68 year old male coming in for a f/up (8-14-18) visit for Medication Therapy Management.  He was referred to me from Dr. Zhao .    Chief Complaint: here for 2 weeks BP recheck.  fyi--he feels he had 2 teeth pulled --cause him to bleed --thus + FIT test he claims. He is not interested in rechecking this test right now.        .  Personal Healthcare Goals: fix Bp w/out se's.     Allergies/ADRs: Reviewed in Epic; states cough from ace-lisinopril   Tobacco: Exposure to secondhand smoke  Alcohol: 1 drink q 6 months.   Caffeine: 1 cups/day of coffee  Activity: walks, bicycles if weather ok.   PMH: Reviewed in Epic    Medication Adherence/Access:  no issues reported      Hypertension: Current medications include : Olmesartan 20mg qam and amlodipine 5mg hs .  Patient does not self-monitor BP.  Patient reports the following medication side effects: none.             Today's Vitals: /72  Pulse 72  Wt 235 lb (106.6 kg)  SpO2 98%  BMI 32.32 kg/m2      ASSESSMENT:                             Current medications were reviewed today.     Medication Adherence: excellent, no issues identified    Hypertension: Needs Improvement. Patient is not meeting BP goal of < 140/90mmHg.  Pt would benefit from the following changes - addition of Amlodipine and Olmesartan, discontinuation of Losartan , continued BP monitoring, watching diet, increasing exercise and weight loss and sodium restriction      PLAN:                              1. BP today = 118/72mmhg --perfect --stay on current BP meds as is .     2. FYI--Your FIT test was positive --f/up with cassandar to discuss options at next OV with her.            Next MTM visit:  Wednesday November 28th at 3;30pm.     I spent 20 minutes with this patient today. All changes were made via collaborative practice agreement with Cassandra Pastor. A copy of the visit note was provided to the patient's referring  provider.Dr. Rojas.         The patient was given a summary of these recommendations as an after visit summary.     Sravanthi Woo [facility].  Medication Therapy Management Provider  [phone]

## 2018-08-29 NOTE — PATIENT INSTRUCTIONS
Recommendations from today's MTM visit:                                                        1. BP today = 118/72mmhg --perfect --stay on current BP meds as is .     2. FYI--Your FIT test was positive --f/up with cassandra to discuss options at next OV with her.            Next MTM visit:  Wednesday November 28th at 3;30pm.     To schedule another MTM appointment, please call the clinic directly or you may call the MTM scheduling line at [phone] or toll-free at [phone].     My Clinical Pharmacist's contact information:                                                      It was a pleasure seeing you today!  Please feel free to contact me with any questions or concerns you have.      Sravanthi Woo [facility].  Medication Therapy Management Provider  [phone]      You may receive a survey about the MTM services you received.  I would appreciate your feedback to help me serve you better in the future. Please fill it out and return it when you can. Your comments will be anonymous.

## 2018-08-29 NOTE — MR AVS SNAPSHOT
After Visit Summary   8/29/2018    Don Samson    MRN: [id number]           Patient Information     Date Of Birth          1950        Visit Information        Provider Department      8/29/2018 4:00 PM Sravanthi Woo RPH [facility]        Today's Diagnoses     Hypertension goal BP (blood pressure) < 140/90          Care Instructions    Recommendations from today's MT visit:                                                        1. BP today = 118/72mmhg --perfect --stay on current BP meds as is .     2. FYI--Your FIT test was positive --f/up with cassandra to discuss options at next OV with her.            Next MTM visit:  Wednesday November 28th at 3;30pm.     To schedule another MTM appointment, please call the clinic directly or you may call the MT scheduling line at [phone] or toll-free at [phone].     My Clinical Pharmacist's contact information:                                                      It was a pleasure seeing you today!  Please feel free to contact me with any questions or concerns you have.      Sravanthi Woo [facility].  Medication Therapy Management Provider  [phone]      You may receive a survey about the MT services you received.  I would appreciate your feedback to help me serve you better in the future. Please fill it out and return it when you can. Your comments will be anonymous.                    Follow-ups after your visit        Your next 10 appointments already scheduled     Nov 28, 2018  3:30 PM CST   SHORT with Sravanthi Woo RPH   [facility] MT ([facility])    [address] [place] [id number] [phone]              Who to contact     If you have questions or need follow up information about today's clinic visit or your schedule please contact [facility] directly at [phone].  Normal or non-critical lab and imaging results will be  communicated to you by MyChart, letter or phone within 4 business days after the clinic has received the results. If you do not hear from us within 7 days, please contact the clinic through MyChart or phone. If you have a critical or abnormal lab result, we will notify you by phone as soon as possible.  Submit refill requests through [employer] or call your pharmacy and they will forward the refill request to us. Please allow 3 business days for your refill to be completed.          Additional Information About Your Visit        Care EveryWhere ID     This is your Care EveryWhere ID. This could be used by other organizations to access your [place] medical records  [id number]        Your Vitals Were     Pulse Pulse Oximetry BMI (Body Mass Index)             72 98% 32.32 kg/m2          Blood Pressure from Last 3 Encounters:   08/29/18 118/72   08/15/18 144/88   08/10/18 (!) 151/92    Weight from Last 3 Encounters:   08/29/18 235 lb (106.6 kg)   08/15/18 227 lb 9.6 oz (103.2 kg)   08/10/18 226 lb (102.5 kg)              Today, you had the following     No orders found for display         Where to get your medicines      These medications were sent to [facility] [address] [place] [address] AT [address] [address]    Hours:  24-hours Phone:  [phone]     amLODIPine 5 MG tablet    olmesartan 20 MG tablet          Primary Care Provider Office Phone # Fax #    Flakita Paty Pastor, APRN [facility] [phone] [address] [facility] [address]        Equal Access to Services     ANDRE TAYLOR : Hadqi Garcia, yesi joseph, qaruddy cheema. So [facility] [phone].    ATENCIÓN: Si habla español, tiene a torres disposición servicios gratuitos de asistencia lingüística. Deya al [phone].    We comply with applicable federal civil rights laws and [place]  laws. We do not discriminate on the basis of race, color, national origin, age, disability, sex, sexual orientation, or gender identity.            Thank you!     Thank you for choosing [facility]  for your care. Our goal is always to provide you with excellent care. Hearing back from our patients is one way we can continue to improve our services. Please take a few minutes to complete the written survey that you may receive in the mail after your visit with us. Thank you!             Your Updated Medication List - Protect others around you: Learn how to safely use, store and throw away your medicines at www.disposemymeds.org.          This list is accurate as of 8/29/18  4:32 PM.  Always use your most recent med list.                   Brand Name Dispense Instructions for use Diagnosis    amLODIPine 5 MG tablet    NORVASC    90 tablet    Take 1 tablet (5 mg) by mouth At Bedtime    Hypertension goal BP (blood pressure) < 140/90       cyclobenzaprine 10 MG tablet    FLEXERIL    30 tablet    Take 1 tablet (10 mg) by mouth nightly as needed for muscle spasms    Back muscle spasm, Acute bilateral low back pain without sciatica       olmesartan 20 MG tablet    BENICAR    90 tablet    Take 1 tablet (20 mg) by mouth daily (with breakfast)    Hypertension goal BP (blood pressure) < 140/90

## 2018-08-29 NOTE — Clinical Note
fyi--BP all fixed now , + FIT test he blames on 2 teeth being pulled so he refuses retest and/or colonoscopy at this time.  Lind[place]., Sravanthi Woo, [facility]. Medication Therapy Management Provider [phone]

## 2018-09-07 ENCOUNTER — RADIANT APPOINTMENT (OUTPATIENT)
Dept: GENERAL RADIOLOGY | Facility: CLINIC | Age: 68
End: 2018-09-07
Attending: NURSE PRACTITIONER
Payer: COMMERCIAL

## 2018-09-07 ENCOUNTER — OFFICE VISIT (OUTPATIENT)
Dept: URGENT CARE | Facility: URGENT CARE | Age: 68
End: 2018-09-07
Payer: COMMERCIAL

## 2018-09-07 VITALS
HEIGHT: 72 IN | BODY MASS INDEX: 31.83 KG/M2 | HEART RATE: 85 BPM | DIASTOLIC BLOOD PRESSURE: 66 MMHG | OXYGEN SATURATION: 99 % | SYSTOLIC BLOOD PRESSURE: 108 MMHG | TEMPERATURE: 97.6 F | RESPIRATION RATE: 18 BRPM | WEIGHT: 235 LBS

## 2018-09-07 DIAGNOSIS — R06.89 DECREASED LUNG SOUNDS: Primary | ICD-10-CM

## 2018-09-07 DIAGNOSIS — R06.89 DECREASED LUNG SOUNDS: ICD-10-CM

## 2018-09-07 LAB
BASOPHILS # BLD AUTO: 0 10E9/L (ref 0–0.2)
BASOPHILS NFR BLD AUTO: 0.6 %
DIFFERENTIAL METHOD BLD: ABNORMAL
EOSINOPHIL # BLD AUTO: 0.4 10E9/L (ref 0–0.7)
EOSINOPHIL NFR BLD AUTO: 5.6 %
ERYTHROCYTE [DISTWIDTH] IN BLOOD BY AUTOMATED COUNT: 12.3 % (ref 10–15)
HCT VFR BLD AUTO: 38.5 % (ref 40–53)
HGB BLD-MCNC: 12.8 G/DL (ref 13.3–17.7)
LYMPHOCYTES # BLD AUTO: 1.6 10E9/L (ref 0.8–5.3)
LYMPHOCYTES NFR BLD AUTO: 23.8 %
MCH RBC QN AUTO: 28.6 PG (ref 26.5–33)
MCHC RBC AUTO-ENTMCNC: 33.2 G/DL (ref 31.5–36.5)
MCV RBC AUTO: 86 FL (ref 78–100)
MONOCYTES # BLD AUTO: 0.6 10E9/L (ref 0–1.3)
MONOCYTES NFR BLD AUTO: 9.3 %
NEUTROPHILS # BLD AUTO: 4 10E9/L (ref 1.6–8.3)
NEUTROPHILS NFR BLD AUTO: 60.7 %
PLATELET # BLD AUTO: 205 10E9/L (ref 150–450)
RBC # BLD AUTO: 4.47 10E12/L (ref 4.4–5.9)
WBC # BLD AUTO: 6.6 10E9/L (ref 4–11)

## 2018-09-07 PROCEDURE — 99213 OFFICE O/P EST LOW 20 MIN: CPT | Performed by: NURSE PRACTITIONER

## 2018-09-07 PROCEDURE — 71046 X-RAY EXAM CHEST 2 VIEWS: CPT

## 2018-09-07 PROCEDURE — 36415 COLL VENOUS BLD VENIPUNCTURE: CPT | Performed by: NURSE PRACTITIONER

## 2018-09-07 PROCEDURE — 85025 COMPLETE CBC W/AUTO DIFF WBC: CPT | Performed by: NURSE PRACTITIONER

## 2018-09-07 NOTE — PROGRESS NOTES
"SUBJECTIVE:   Don Samson is a 68 year old male presenting with a chief complaint of runny nose and cough - productive for 7 days which seems to be getting worse.  Patient would like medication for the cough.  Patient has tried Dayquil or Nyquil once which he wasn't sure helped..    Severity moderately severe  Current and Associated symptoms: runny nose, cough - productive and body aches  Predisposing factors include no history of asthma    After NP reviewed low hemoglobin with patient asked patient about bowel movements. Patient states he has some teeth pulled at the dentist on August 10 and had blood in stool but denied black or blood in stool now.     Past Medical History:   Diagnosis Date     Ankle pain 5/21/2016     Ankle sprain and strain 9/16/2010     CARDIOVASCULAR SCREENING; LDL GOAL LESS THAN 130 9/6/2016     Depression     declines treatment 4/1/16     HTN (hypertension)     declines treatment 4/1/16     Left knee pain 6/18/2015     Plantar fasciitis 9/16/2010     Current Outpatient Prescriptions   Medication Sig Dispense Refill     amLODIPine (NORVASC) 5 MG tablet Take 1 tablet (5 mg) by mouth At Bedtime 90 tablet 1     olmesartan (BENICAR) 20 MG tablet Take 1 tablet (20 mg) by mouth daily (with breakfast) 90 tablet 1     cyclobenzaprine (FLEXERIL) 10 MG tablet Take 1 tablet (10 mg) by mouth nightly as needed for muscle spasms (Patient not taking: Reported on 8/29/2018) 30 tablet 1     Social History   Substance Use Topics     Smoking status: Former Smoker     Quit date: 1/1/1970     Smokeless tobacco: Never Used     Alcohol use 0.0 oz/week     0 Standard drinks or equivalent per week       ROS:  CONSTITUTIONAL:NEGATIVE for fever, chills,   INTEGUMENTARY/SKIN: NEGATIVE for  rashes,   CV: NEGATIVE for chest pain, palpitations or peripheral edema  GI: NEGATIVE for nausea, vomiting nor diarrhea    OBJECTIVE:  /66  Pulse 85  Temp 97.6  F (36.4  C) (Oral)  Resp 18  Ht 5' 11.5\" (1.816 m)  Wt 235 " lb (106.6 kg)  SpO2 99%  BMI 32.32 kg/m2  GENERAL APPEARANCE: healthy, alert and no distress  EYES: EOMI,  PERRL, conjunctiva clear  HENT: ear canals and TM's normal.  Nose and mouth without ulcers, erythema or lesions  NECK: supple, nontender, no lymphadenopathy  RESP: bilateral decreased in posterior in the bases of lungs to auscultation   CV S1 S2   NEURO:  normal speech and mentation  SKIN: no  rashes    ASSESSMENT: Viral URI    PLAN:  Chest xray negative  CBC RESULTS:   Recent Labs   Lab Test  09/07/18   1733   WBC  6.6   RBC  4.47   HGB  12.8*   HCT  38.5*   MCV  86   MCH  28.6   MCHC  33.2   RDW  12.3   PLT  205   NP told patient verbally to follow up with primary due to drop in hemoglobin from 2016 and gave him copies of CBC then and current lab this evening.  Patient agreed to follow up this week 9/10/2018 or sooner.     Tylenol, Fluids, Rest and OTC Plain Robitussin as directed  If increased cough wheeze or SOB return to clinic and reviewed when to seek medical care   See orders in Epic

## 2018-09-07 NOTE — MR AVS SNAPSHOT
After Visit Summary   9/7/2018    Don Samson    MRN: [id number]           Patient Information     Date Of Birth          1950        Visit Information        Provider Department      9/7/2018 5:00 PM Ramona Curtis NP [facility]        Today's Diagnoses     Decreased lung sounds    -  1      Care Instructions      Adult Self-Care for Colds    Colds are caused by viruses. They can't be cured with antibiotics. However, you can ease symptoms and support your body's efforts to heal itself. No matter which symptoms you have, be sure to:    Drink plenty of fluids (water or clear soup)    Stop smoking and drinking alcohol    Get plenty of rest  Understand a fever    Take your temperature several times a day. If your fever is 100.4 F (38.0 C) for more than a day, call your healthcare provider.    Relax, lie down. Go to bed if you want. Just get off your feet and rest. Also, drink plenty of fluids to avoid dehydration.    Take acetaminophen or a nonsteroidal anti-inflammatory agent (NSAID), such as ibuprofen.  Treat a troubled nose kindly    Breathe steam or heated humidified air to open blocked nasal passages.  a hot shower or use a vaporizer. Be careful not to get burned by the steam.    Saline nasal sprays and decongestant tablets help open a stuffy nose. Antihistamines can also help, but they can cause side effects such as drowsiness and drying of the eyes, nose, and mouth.  Soothe a sore throat and cough    Gargle every 2 hours with 1/4 teaspoon of salt dissolved in 1/2 cup of warm water. Suck on throat lozenges and cough drops to moisten your throat.    Cough medicines are available but it is unclear how well they actually work.    Take acetaminophen or an NSAID, such as ibuprofen, to ease throat pain  Ease digestive problems    Put fluids back into your body. Take frequent sips of clear liquids such as water or broth. Avoid drinks that have a lot of sugar in  them, such as juices and sodas. These can make diarrhea worse. Older children and adults can drink sports drinks.    As your appetite returns, you can resume your normal diet. Ask your healthcare provider if there are any foods you should avoid.  When to seek medical care  When you first notice symptoms, ask your healthcare provider if antiviral medicines are appropriate. Antibiotics should not be taken for colds or flu. Also, call your healthcare provider if you have any of the following symptoms or if you aren't feeling better after 7 days:    Shortness of breath    Pain or pressure in the chest or belly (abdomen)    Worsening symptoms, especially after a period of improvement    Fever of 100.4 F  (38.0 C) or higher, or fever that doesn't go down with medicine    Sudden dizziness or confusion    Severe or continued vomiting    Signs of dehydration, including extreme thirst, dark urine, infrequent urination, dry mouth    Spotted, red, or very sore throat   Date Last Reviewed: 12/1/2016 2000-2017 [employer]. [address] [place] [address]. All rights reserved. This information is not intended as a substitute for professional medical care. Always follow your healthcare professional's instructions.  Robutissin over counter as directed  IF not improving in 48 to 72 hours return clinic  Patient to follow up with primary clinic this week 9/10/2018      Adult Self-Care for Colds    Colds are caused by viruses. They can't be cured with antibiotics. However, you can ease symptoms and support your body's efforts to heal itself. No matter which symptoms you have, be sure to:    Drink plenty of fluids (water or clear soup)    Stop smoking and drinking alcohol    Get plenty of rest  Understand a fever    Take your temperature several times a day. If your fever is 100.4 F (38.0 C) for more than a day, call your healthcare provider.    Relax, lie down. Go to bed if you want. Just get off your feet and  rest. Also, drink plenty of fluids to avoid dehydration.    Take acetaminophen or a nonsteroidal anti-inflammatory agent (NSAID), such as ibuprofen.  Treat a troubled nose kindly    Breathe steam or heated humidified air to open blocked nasal passages.  a hot shower or use a vaporizer. Be careful not to get burned by the steam.    Saline nasal sprays and decongestant tablets help open a stuffy nose. Antihistamines can also help, but they can cause side effects such as drowsiness and drying of the eyes, nose, and mouth.  Soothe a sore throat and cough    Gargle every 2 hours with 1/4 teaspoon of salt dissolved in 1/2 cup of warm water. Suck on throat lozenges and cough drops to moisten your throat.    Cough medicines are available but it is unclear how well they actually work.    Take acetaminophen or an NSAID, such as ibuprofen, to ease throat pain  Ease digestive problems    Put fluids back into your body. Take frequent sips of clear liquids such as water or broth. Avoid drinks that have a lot of sugar in them, such as juices and sodas. These can make diarrhea worse. Older children and adults can drink sports drinks.    As your appetite returns, you can resume your normal diet. Ask your healthcare provider if there are any foods you should avoid.  When to seek medical care  When you first notice symptoms, ask your healthcare provider if antiviral medicines are appropriate. Antibiotics should not be taken for colds or flu. Also, call your healthcare provider if you have any of the following symptoms or if you aren't feeling better after 7 days:    Shortness of breath    Pain or pressure in the chest or belly (abdomen)    Worsening symptoms, especially after a period of improvement    Fever of 100.4 F  (38.0 C) or higher, or fever that doesn't go down with medicine    Sudden dizziness or confusion    Severe or continued vomiting    Signs of dehydration, including extreme thirst, dark urine, infrequent  "urination, dry mouth    Spotted, red, or very sore throat   Date Last Reviewed: 12/1/2016 2000-2017 [employer]. [address] [place] [address], [place] [address]. All rights reserved. This information is not intended as a substitute for professional medical care. Always follow your healthcare professional's instructions.                Follow-ups after your visit        Your next 10 appointments already scheduled     Nov 28, 2018  3:30 PM CST   SHORT with Sravanthi Woo [facility] ([facility])    [address] [place] [id number] [phone]              Who to contact     If you have questions or need follow up information about today's clinic visit or your schedule please contact [facility] directly at [phone].  Normal or non-critical lab and imaging results will be communicated to you by MyChart, letter or phone within 4 business days after the clinic has received the results. If you do not hear from us within 7 days, please contact the clinic through MyChart or phone. If you have a critical or abnormal lab result, we will notify you by phone as soon as possible.  Submit refill requests through [employer] or call your pharmacy and they will forward the refill request to us. Please allow 3 business days for your refill to be completed.          Additional Information About Your Visit        Care EveryWhere ID     This is your Care EveryWhere ID. This could be used by other organizations to access your [facility] medical records  [id number]        Your Vitals Were     Pulse Temperature Respirations Height Pulse Oximetry BMI (Body Mass Index)    85 97.6  F (36.4  C) (Oral) 18 5' 11.5\" (1.816 m) 99% 32.32 kg/m2       Blood Pressure from Last 3 Encounters:   09/07/18 108/66   08/29/18 118/72   08/15/18 144/88    Weight from Last 3 Encounters:   09/07/18 235 lb (106.6 kg)   08/29/18 235 lb (106.6 kg)   08/15/18 " 227 lb 9.6 oz (103.2 kg)              We Performed the Following     CBC with platelets differential        Primary Care Provider Office Phone # Fax #    CONSTANCE Doyle [facility] [phone] [address]  [facility] [address]        Equal Access to Services     ANDRE TAYLOR : Hadii aad ku hadasho Soomaali, waaxda luqadaha, qaybta kaalmada adeegyada, waxay idiin hayaan adeeg kharash la'zeinabn . So [facility] [phone].    ATENCIÓN: Si habla español, tiene a torres disposición servicios gratuitos de asistencia lingüística. [facility] [phone].    We comply with applicable federal civil rights laws and [place] laws. We do not discriminate on the basis of race, color, national origin, age, disability, sex, sexual orientation, or gender identity.            Thank you!     Thank you for choosing [facility]  for your care. Our goal is always to provide you with excellent care. Hearing back from our patients is one way we can continue to improve our services. Please take a few minutes to complete the written survey that you may receive in the mail after your visit with us. Thank you!             Your Updated Medication List - Protect others around you: Learn how to safely use, store and throw away your medicines at www.disposemymeds.org.          This list is accurate as of 9/7/18  5:57 PM.  Always use your most recent med list.                   Brand Name Dispense Instructions for use Diagnosis    amLODIPine 5 MG tablet    NORVASC    90 tablet    Take 1 tablet (5 mg) by mouth At Bedtime    Hypertension goal BP (blood pressure) < 140/90       cyclobenzaprine 10 MG tablet    FLEXERIL    30 tablet    Take 1 tablet (10 mg) by mouth nightly as needed for muscle spasms    Back muscle spasm, Acute bilateral low back pain without sciatica       olmesartan 20 MG tablet    BENICAR    90 tablet    Take 1 tablet (20 mg) by mouth daily (with breakfast)    Hypertension goal BP (blood  pressure) < 140/90

## 2018-09-07 NOTE — PATIENT INSTRUCTIONS
Adult Self-Care for Colds    Colds are caused by viruses. They can't be cured with antibiotics. However, you can ease symptoms and support your body's efforts to heal itself. No matter which symptoms you have, be sure to:    Drink plenty of fluids (water or clear soup)    Stop smoking and drinking alcohol    Get plenty of rest  Understand a fever    Take your temperature several times a day. If your fever is 100.4 F (38.0 C) for more than a day, call your healthcare provider.    Relax, lie down. Go to bed if you want. Just get off your feet and rest. Also, drink plenty of fluids to avoid dehydration.    Take acetaminophen or a nonsteroidal anti-inflammatory agent (NSAID), such as ibuprofen.  Treat a troubled nose kindly    Breathe steam or heated humidified air to open blocked nasal passages.  a hot shower or use a vaporizer. Be careful not to get burned by the steam.    Saline nasal sprays and decongestant tablets help open a stuffy nose. Antihistamines can also help, but they can cause side effects such as drowsiness and drying of the eyes, nose, and mouth.  Soothe a sore throat and cough    Gargle every 2 hours with 1/4 teaspoon of salt dissolved in 1/2 cup of warm water. Suck on throat lozenges and cough drops to moisten your throat.    Cough medicines are available but it is unclear how well they actually work.    Take acetaminophen or an NSAID, such as ibuprofen, to ease throat pain  Ease digestive problems    Put fluids back into your body. Take frequent sips of clear liquids such as water or broth. Avoid drinks that have a lot of sugar in them, such as juices and sodas. These can make diarrhea worse. Older children and adults can drink sports drinks.    As your appetite returns, you can resume your normal diet. Ask your healthcare provider if there are any foods you should avoid.  When to seek medical care  When you first notice symptoms, ask your healthcare provider if antiviral medicines are  appropriate. Antibiotics should not be taken for colds or flu. Also, call your healthcare provider if you have any of the following symptoms or if you aren't feeling better after 7 days:    Shortness of breath    Pain or pressure in the chest or belly (abdomen)    Worsening symptoms, especially after a period of improvement    Fever of 100.4 F  (38.0 C) or higher, or fever that doesn't go down with medicine    Sudden dizziness or confusion    Severe or continued vomiting    Signs of dehydration, including extreme thirst, dark urine, infrequent urination, dry mouth    Spotted, red, or very sore throat   Date Last Reviewed: 12/1/2016 2000-2017 [employer]. [address] [place] [address], [place] [address]. All rights reserved. This information is not intended as a substitute for professional medical care. Always follow your healthcare professional's instructions.  Robutissin over counter as directed  IF not improving in 48 to 72 hours return clinic  Patient to follow up with primary clinic this week 9/10/2018      Adult Self-Care for Colds    Colds are caused by viruses. They can't be cured with antibiotics. However, you can ease symptoms and support your body's efforts to heal itself. No matter which symptoms you have, be sure to:    Drink plenty of fluids (water or clear soup)    Stop smoking and drinking alcohol    Get plenty of rest  Understand a fever    Take your temperature several times a day. If your fever is 100.4 F (38.0 C) for more than a day, call your healthcare provider.    Relax, lie down. Go to bed if you want. Just get off your feet and rest. Also, drink plenty of fluids to avoid dehydration.    Take acetaminophen or a nonsteroidal anti-inflammatory agent (NSAID), such as ibuprofen.  Treat a troubled nose kindly    Breathe steam or heated humidified air to open blocked nasal passages.  a hot shower or use a vaporizer. Be careful not to get burned by the steam.    Saline nasal sprays  and decongestant tablets help open a stuffy nose. Antihistamines can also help, but they can cause side effects such as drowsiness and drying of the eyes, nose, and mouth.  Soothe a sore throat and cough    Gargle every 2 hours with 1/4 teaspoon of salt dissolved in 1/2 cup of warm water. Suck on throat lozenges and cough drops to moisten your throat.    Cough medicines are available but it is unclear how well they actually work.    Take acetaminophen or an NSAID, such as ibuprofen, to ease throat pain  Ease digestive problems    Put fluids back into your body. Take frequent sips of clear liquids such as water or broth. Avoid drinks that have a lot of sugar in them, such as juices and sodas. These can make diarrhea worse. Older children and adults can drink sports drinks.    As your appetite returns, you can resume your normal diet. Ask your healthcare provider if there are any foods you should avoid.  When to seek medical care  When you first notice symptoms, ask your healthcare provider if antiviral medicines are appropriate. Antibiotics should not be taken for colds or flu. Also, call your healthcare provider if you have any of the following symptoms or if you aren't feeling better after 7 days:    Shortness of breath    Pain or pressure in the chest or belly (abdomen)    Worsening symptoms, especially after a period of improvement    Fever of 100.4 F  (38.0 C) or higher, or fever that doesn't go down with medicine    Sudden dizziness or confusion    Severe or continued vomiting    Signs of dehydration, including extreme thirst, dark urine, infrequent urination, dry mouth    Spotted, red, or very sore throat   Date Last Reviewed: 12/1/2016 2000-2017 [employer]. [address] [place] [address], [place] [address]. All rights reserved. This information is not intended as a substitute for professional medical care. Always follow your healthcare professional's instructions.

## 2018-09-12 ENCOUNTER — OFFICE VISIT (OUTPATIENT)
Dept: URGENT CARE | Facility: URGENT CARE | Age: 68
End: 2018-09-12
Payer: COMMERCIAL

## 2018-09-12 VITALS
BODY MASS INDEX: 31.83 KG/M2 | HEART RATE: 76 BPM | RESPIRATION RATE: 16 BRPM | HEIGHT: 72 IN | OXYGEN SATURATION: 99 % | TEMPERATURE: 98.1 F | WEIGHT: 235 LBS | SYSTOLIC BLOOD PRESSURE: 132 MMHG | DIASTOLIC BLOOD PRESSURE: 80 MMHG

## 2018-09-12 DIAGNOSIS — J01.00 ACUTE NON-RECURRENT MAXILLARY SINUSITIS: Primary | ICD-10-CM

## 2018-09-12 PROCEDURE — 99213 OFFICE O/P EST LOW 20 MIN: CPT | Performed by: INTERNAL MEDICINE

## 2018-09-12 RX ORDER — FLUTICASONE PROPIONATE 50 MCG
1-2 SPRAY, SUSPENSION (ML) NASAL DAILY
Qty: 1 BOTTLE | Refills: 0 | Status: SHIPPED | OUTPATIENT
Start: 2018-09-12 | End: 2018-11-13

## 2018-09-12 NOTE — MR AVS SNAPSHOT
"              After Visit Summary   9/12/2018    Don Samson    MRN: [id number]           Patient Information     Date Of Birth          1950        Visit Information        Provider Department      9/12/2018 5:00 PM Miladis Malhotra MD [facility]        Today's Diagnoses     Acute non-recurrent maxillary sinusitis    -  1       Follow-ups after your visit        Your next 10 appointments already scheduled     Nov 28, 2018  3:30 PM CST   SHORT with Sravanthi Woo [facility] ([facility])    [address] [place] [id number] [phone]              Who to contact     If you have questions or need follow up information about today's clinic visit or your schedule please contact [facility] directly at [phone].  Normal or non-critical lab and imaging results will be communicated to you by MyChart, letter or phone within 4 business days after the clinic has received the results. If you do not hear from us within 7 days, please contact the clinic through MyChart or phone. If you have a critical or abnormal lab result, we will notify you by phone as soon as possible.  Submit refill requests through [employer] or call your pharmacy and they will forward the refill request to us. Please allow 3 business days for your refill to be completed.          Additional Information About Your Visit        Care EveryWhere ID     This is your Care EveryWhere ID. This could be used by other organizations to access your [facility] medical records  [id number]        Your Vitals Were     Pulse Temperature Respirations Height Pulse Oximetry BMI (Body Mass Index)    76 98.1  F (36.7  C) (Oral) 16 5' 11.5\" (1.816 m) 99% 32.32 kg/m2       Blood Pressure from Last 3 Encounters:   09/12/18 132/80   09/07/18 108/66   08/29/18 118/72    Weight from Last 3 Encounters:   09/12/18 235 lb (106.6 kg)   09/07/18 235 lb " (106.6 kg)   08/29/18 235 lb (106.6 kg)              Today, you had the following     No orders found for display         Today's Medication Changes          These changes are accurate as of 9/12/18  5:16 PM.  If you have any questions, ask your nurse or doctor.               Start taking these medicines.        Dose/Directions    amoxicillin-clavulanate 875-125 MG per tablet   Commonly known as:  AUGMENTIN   Used for:  Acute non-recurrent maxillary sinusitis   Started by:  Miladis Malhotra MD        Dose:  1 tablet   Take 1 tablet by mouth 2 times daily for 5 days   Quantity:  10 tablet   Refills:  0       fluticasone 50 MCG/ACT spray   Commonly known as:  FLONASE   Used for:  Acute non-recurrent maxillary sinusitis   Started by:  Miladis Malhotra MD        Dose:  1-2 spray   Spray 1-2 sprays into both nostrils daily   Quantity:  1 Bottle   Refills:  0            Where to get your medicines      These medications were sent to [facility] - [place], [place] - [address]  [address], [place] [address]     Phone:  [phone]     amoxicillin-clavulanate 875-125 MG per tablet    fluticasone 50 MCG/ACT spray                Primary Care Provider Office Phone # Fax #    Flakita CONSTANCE Yan [facility] [phone] [address]  [facility] [address]        Equal Access to Services     [facility] AH: Hadii tom ku hadasho Soomaali, waaxda luqadaha, qaybta kaalmada adeegyada, ruddy rosario hayrichard bach . So [facility] [phone].    ATENCIÓN: Si habla español, tiene a torres disposición servicios gratuitos de asistencia lingüística. Llame al [phone].    We comply with applicable federal civil rights laws and [place] laws. We do not discriminate on the basis of race, color, national origin, age, disability, sex, sexual orientation, or gender identity.            Thank you!     Thank you for choosing [facility]  for your care.  Our goal is always to provide you with excellent care. Hearing back from our patients is one way we can continue to improve our services. Please take a few minutes to complete the written survey that you may receive in the mail after your visit with us. Thank you!             Your Updated Medication List - Protect others around you: Learn how to safely use, store and throw away your medicines at www.disposemymeds.org.          This list is accurate as of 9/12/18  5:16 PM.  Always use your most recent med list.                   Brand Name Dispense Instructions for use Diagnosis    amLODIPine 5 MG tablet    NORVASC    90 tablet    Take 1 tablet (5 mg) by mouth At Bedtime    Hypertension goal BP (blood pressure) < 140/90       amoxicillin-clavulanate 875-125 MG per tablet    AUGMENTIN    10 tablet    Take 1 tablet by mouth 2 times daily for 5 days    Acute non-recurrent maxillary sinusitis       cyclobenzaprine 10 MG tablet    FLEXERIL    30 tablet    Take 1 tablet (10 mg) by mouth nightly as needed for muscle spasms    Back muscle spasm, Acute bilateral low back pain without sciatica       fluticasone 50 MCG/ACT spray    FLONASE    1 Bottle    Spray 1-2 sprays into both nostrils daily    Acute non-recurrent maxillary sinusitis       olmesartan 20 MG tablet    BENICAR    90 tablet    Take 1 tablet (20 mg) by mouth daily (with breakfast)    Hypertension goal BP (blood pressure) < 140/90

## 2018-09-12 NOTE — LETTER
[facility]  [address] [facility] [address]  Phone: [phone]    September 12, 2018        Don Samson  [address]  [facility] [address]          To whom it may concern:    RE: Don CLARKE [place]    Patient was seen and treated today at our clinic.  Please excuse work absence up to 2 days.    Please contact me for questions or concerns.      Sincerely,        Miladis Malhotra MD

## 2018-09-12 NOTE — PROGRESS NOTES
SUBJECTIVE:   Don Samson is a 68 year old male presenting with a chief complaint of   Chief Complaint   Patient presents with     Urgent Care     URI     cold symptoms x 1 week.        He is an established patient of [facility].    URI Adult    Onset of symptoms was 12 day(s) ago.  Course of illness is worsening.      Current and Associated symptoms: cough - non-productive and congestion  Treatment measures tried include Decongestants.  Predisposing factors include no hx asthma/copd.      Seen 9/7  No abx    Component      Latest Ref Rng & Units 9/7/2018   WBC      4.0 - 11.0 10e9/L 6.6   RBC Count      4.4 - 5.9 10e12/L 4.47   Hemoglobin      13.3 - 17.7 g/dL 12.8 (L)   Hematocrit      40.0 - 53.0 % 38.5 (L)   MCV      78 - 100 fl 86   MCH      26.5 - 33.0 pg 28.6   MCHC      31.5 - 36.5 g/dL 33.2   RDW      10.0 - 15.0 % 12.3   Platelet Count      150 - 450 10e9/L 205   Diff Method       Automated Method   % Neutrophils      % 60.7   % Lymphocytes      % 23.8   % Monocytes      % 9.3   % Eosinophils      % 5.6   % Basophils      % 0.6   Absolute Neutrophil      1.6 - 8.3 10e9/L 4.0   Absolute Lymphocytes      0.8 - 5.3 10e9/L 1.6   Absolute Monocytes      0.0 - 1.3 10e9/L 0.6   Absolute Eosinophils      0.0 - 0.7 10e9/L 0.4   CHEST TWO VIEWS 9/7/2018 5:54 PM      HISTORY: Decreased lung sounds.     COMPARISON: May 31, 2017      FINDINGS: There are no acute infiltrates. The cardiac silhouette is  not enlarged. Pulmonary vasculature is unremarkable.         IMPRESSION: No acute disease.  Review of Systems    Past Medical History:   Diagnosis Date     Ankle pain 5/21/2016     Ankle sprain and strain 9/16/2010     CARDIOVASCULAR SCREENING; LDL GOAL LESS THAN 130 9/6/2016     Depression     declines treatment 4/1/16     HTN (hypertension)     declines treatment 4/1/16     Left knee pain 6/18/2015     Plantar fasciitis 9/16/2010     Family History   Problem Relation Age of Onset     Hypertension Mother      Current  "Outpatient Prescriptions   Medication Sig Dispense Refill     amLODIPine (NORVASC) 5 MG tablet Take 1 tablet (5 mg) by mouth At Bedtime 90 tablet 1     amoxicillin-clavulanate (AUGMENTIN) 875-125 MG per tablet Take 1 tablet by mouth 2 times daily for 5 days 10 tablet 0     fluticasone (FLONASE) 50 MCG/ACT spray Spray 1-2 sprays into both nostrils daily 1 Bottle 0     olmesartan (BENICAR) 20 MG tablet Take 1 tablet (20 mg) by mouth daily (with breakfast) 90 tablet 1     cyclobenzaprine (FLEXERIL) 10 MG tablet Take 1 tablet (10 mg) by mouth nightly as needed for muscle spasms (Patient not taking: Reported on 8/29/2018) 30 tablet 1     Social History   Substance Use Topics     Smoking status: Former Smoker     Quit date: 1/1/1970     Smokeless tobacco: Never Used     Alcohol use 0.0 oz/week     0 Standard drinks or equivalent per week       OBJECTIVE  /80  Pulse 76  Temp 98.1  F (36.7  C) (Oral)  Resp 16  Ht 5' 11.5\" (1.816 m)  Wt 235 lb (106.6 kg)  SpO2 99%  BMI 32.32 kg/m2    Physical Exam   Constitutional: He appears well-developed and well-nourished.   HENT:   Nose: Nose normal.   Mouth/Throat: No oropharyngeal exudate.   TM nl B  Sinus tenderness   Cardiovascular: Normal rate, regular rhythm and normal heart sounds.    Pulmonary/Chest: Effort normal and breath sounds normal.   Lymphadenopathy:     He has no cervical adenopathy.   Vitals reviewed.      Labs:  No results found for this or any previous visit (from the past 24 hour(s)).     BP Readings from Last 6 Encounters:   09/12/18 132/80   09/07/18 108/66   08/29/18 118/72   08/15/18 144/88   08/10/18 (!) 151/92   08/06/18 132/78       ASSESSMENT:      ICD-10-CM    1. Acute non-recurrent maxillary sinusitis J01.00 fluticasone (FLONASE) 50 MCG/ACT spray     amoxicillin-clavulanate (AUGMENTIN) 875-125 MG per tablet        Medical Decision Making:      PLAN:  Sinusitis 5 day course abx  flonase        Followup:    If not improving or if condition " worsens, follow up with your Primary Care Provider

## 2018-11-13 ENCOUNTER — OFFICE VISIT (OUTPATIENT)
Dept: FAMILY MEDICINE | Facility: CLINIC | Age: 68
End: 2018-11-13
Payer: COMMERCIAL

## 2018-11-13 VITALS
BODY MASS INDEX: 33.42 KG/M2 | TEMPERATURE: 97.9 F | RESPIRATION RATE: 12 BRPM | WEIGHT: 243 LBS | DIASTOLIC BLOOD PRESSURE: 82 MMHG | SYSTOLIC BLOOD PRESSURE: 138 MMHG | HEART RATE: 82 BPM

## 2018-11-13 DIAGNOSIS — I10 HYPERTENSION GOAL BP (BLOOD PRESSURE) < 140/90: ICD-10-CM

## 2018-11-13 DIAGNOSIS — R19.5 POSITIVE FIT (FECAL IMMUNOCHEMICAL TEST): ICD-10-CM

## 2018-11-13 DIAGNOSIS — M54.50 ACUTE BILATERAL LOW BACK PAIN WITHOUT SCIATICA: Primary | ICD-10-CM

## 2018-11-13 DIAGNOSIS — F33.1 MAJOR DEPRESSIVE DISORDER, RECURRENT, MODERATE (H): ICD-10-CM

## 2018-11-13 PROCEDURE — 99213 OFFICE O/P EST LOW 20 MIN: CPT | Performed by: NURSE PRACTITIONER

## 2018-11-13 RX ORDER — CYCLOBENZAPRINE HCL 10 MG
5-10 TABLET ORAL 3 TIMES DAILY PRN
Qty: 30 TABLET | Refills: 1 | Status: SHIPPED | OUTPATIENT
Start: 2018-11-13 | End: 2018-12-04

## 2018-11-13 NOTE — PATIENT INSTRUCTIONS
1.   Up to 90% of patients with lower back pain improve within the first 2 weeks.  For pain control try ibuprofen up to 800mg (four over the counter tablets) three times per day.  I recommend gentle exercise such as walking and normal daily activities, I do no recommend bedrest.  Heat or ice application may also help reduce pain.  Flexeril 5-10mg three times a day as needed for pain, watch for drowsiness.  If no improvement within 1 week call for physical therapy referral    2.  Blood pressure looks good today.  Consider med minder for blood pressure medication    3.  Positive FIT test is concerning for colon cancer, call to schedule colonoscopy

## 2018-11-13 NOTE — LETTER
[place] [id number] [place] [address]  Phone: [phone]    November 13, 2018        Don Samson  [address]  [facility] [address]          To whom it may concern:    RE: Don CLARKE [place]    Patient was seen and treated today at our clinic and missed work.  He will be absent from work Tuesday 11/13/18 due to acute injury.    Please contact me for questions or concerns.      Sincerely,        CONSTANCE Doyle CNP

## 2018-11-13 NOTE — MR AVS SNAPSHOT
After Visit Summary   11/13/2018    Don Samson    MRN: [id number]           Patient Information     Date Of Birth          1950        Visit Information        Provider Department      11/13/2018 10:00 AM Flakita Pastor APRN [facility]        Today's Diagnoses     Acute bilateral low back pain without sciatica    -  1    Positive FIT (fecal immunochemical test)        Hypertension goal BP (blood pressure) < 140/90          Care Instructions    1.   Up to 90% of patients with lower back pain improve within the first 2 weeks.  For pain control try ibuprofen up to 800mg (four over the counter tablets) three times per day.  I recommend gentle exercise such as walking and normal daily activities, I do no recommend bedrest.  Heat or ice application may also help reduce pain.  Flexeril 5-10mg three times a day as needed for pain, watch for drowsiness.  If no improvement within 1 week call for physical therapy referral    2.  Blood pressure looks good today.  Consider med minder for blood pressure medication    3.  Positive FIT test is concerning for colon cancer, call to schedule colonoscopy            Follow-ups after your visit        Additional Services     GASTROENTEROLOGY ADULT REF PROCEDURE ONLY [facility] [phone]       Last Lab Result: Creatinine (mg/dL)       Date                     Value                 08/06/2018               1.10             ----------  Body mass index is 33.42 kg/(m^2).     Needed:  No  Language:  English    Patient will be contacted to schedule procedure.     Please be aware that coverage of these services is subject to the terms and limitations of your health insurance plan.  Call member services at your health plan with any benefit or coverage questions.  Any procedures must be performed at a [facility] facility OR coordinated by your clinic's referral office.    Please bring the following with you to your  appointment:    (1) Any X-Rays, CTs or MRIs which have been performed.  Contact the facility where they were done to arrange for  prior to your scheduled appointment.    (2) List of current medications   (3) This referral request   (4) Any documents/labs given to you for this referral                  Your next 10 appointments already scheduled     Nov 28, 2018  3:30 PM CST   SHORT with Sravanthi Woo [facility] ([facility])    [address]  [place] [address] [phone]              Who to contact     If you have questions or need follow up information about today's clinic visit or your schedule please [facility] directly at [phone].  Normal or non-critical lab and imaging results will be communicated to you by MyChart, letter or phone within 4 business days after the clinic has received the results. If you do not hear from us within 7 days, please contact the clinic through MyChart or phone. If you have a critical or abnormal lab result, we will notify you by phone as soon as possible.  Submit refill requests through [employer] or call your pharmacy and they will forward the refill request to us. Please allow 3 business days for your refill to be completed.          Additional Information About Your Visit        Care EveryWhere ID     This is your Care EveryWhere ID. This could be used by other organizations to access your [facility] medical records  [id number]        Your Vitals Were     Pulse Temperature Respirations BMI (Body Mass Index)          82 97.9  F (36.6  C) (Oral) 12 33.42 kg/m2         Blood Pressure from Last 3 Encounters:   11/13/18 138/82   09/12/18 132/80   09/07/18 108/66    Weight from Last 3 Encounters:   11/13/18 243 lb (110.2 kg)   09/12/18 235 lb (106.6 kg)   09/07/18 235 lb (106.6 kg)              We Performed the Following     DEPRESSION ACTION PLAN (DAP)     GASTROENTEROLOGY ADULT  REF PROCEDURE ONLY [facility] [phone]          Today's Medication Changes          These changes are accurate as of 11/13/18 10:13 AM.  If you have any questions, ask your nurse or doctor.               These medicines have changed or have updated prescriptions.        Dose/Directions    cyclobenzaprine 10 MG tablet   Commonly known as:  FLEXERIL   This may have changed:    - how much to take  - when to take this   Used for:  Acute bilateral low back pain without sciatica   Changed by:  Flakita Pastor APRN CNP        Dose:  5-10 mg   Take 0.5-1 tablets (5-10 mg) by mouth 3 times daily as needed for muscle spasms   Quantity:  30 tablet   Refills:  1         Stop taking these medicines if you haven't already. Please contact your care team if you have questions.     fluticasone 50 MCG/ACT spray   Commonly known as:  FLONASE   Stopped by:  Flakita Pastor APRN CNP                Where to get your medicines      These medications were sent to [facility] [address] [place] [address] AT [facility] & [address] [place] [address]     Phone:  [phone]     cyclobenzaprine 10 MG tablet                Primary Care Provider Office Phone # Fax #    CONSTANCE Doyle [phone] [address] [facility] [address]        Equal Access to Services     [facility] AH: Hadii aad ku hadasho Solenyali, waaxda luqadaha, qaybta kaalmada adeegyada, ruddy rooney. So [facility] [phone].    ATENCIÓN: Si habla español, tiene a torres disposición servicios gratuitos de asistencia lingüística. Deya celeste [phone].    We comply with applicable federal civil rights laws and [place] laws. We do not discriminate on the basis of race, color, national origin, age, disability, sex, sexual orientation, or gender identity.            Thank you!     Thank you for choosing [facility]note 2]  for  your care. Our goal is always to provide you with excellent care. Hearing back from our patients is one way we can continue to improve our services. Please take a few minutes to complete the written survey that you may receive in the mail after your visit with us. Thank you!             Your Updated Medication List - Protect others around you: Learn how to safely use, store and throw away your medicines at www.disposemymeds.org.          This list is accurate as of 11/13/18 10:13 AM.  Always use your most recent med list.                   Brand Name Dispense Instructions for use Diagnosis    amLODIPine 5 MG tablet    NORVASC    90 tablet    Take 1 tablet (5 mg) by mouth At Bedtime    Hypertension goal BP (blood pressure) < 140/90       cyclobenzaprine 10 MG tablet    FLEXERIL    30 tablet    Take 0.5-1 tablets (5-10 mg) by mouth 3 times daily as needed for muscle spasms    Acute bilateral low back pain without sciatica       olmesartan 20 MG tablet    BENICAR    90 tablet    Take 1 tablet (20 mg) by mouth daily (with breakfast)    Hypertension goal BP (blood pressure) < 140/90

## 2018-11-13 NOTE — LETTER
My Depression Action Plan  Name: Don Samson   Date of Birth 1950  Date: 11/13/2018    My doctor: Flakita Pastor   My clinic: [address] [address] [phone]          GREEN    ZONE   Good Control    What it looks like:     Things are going generally well. You have normal up s and down s. You may even feel depressed from time to time, but bad moods usually last less than a day.   What you need to do:  1. Continue to care for yourself (see self care plan)  2. Check your depression survival kit and update it as needed  3. Follow your physician s recommendations including any medication.  4. Do not stop taking medication unless you consult with your physician first.           YELLOW         ZONE Getting Worse    What it looks like:     Depression is starting to interfere with your life.     It may be hard to get out of bed; you may be starting to isolate yourself from others.    Symptoms of depression are starting to last most all day and this has happened for several days.     You may have suicidal thoughts but they are not constant.   What you need to do:     1. Call your care team, your response to treatment will improve if you keep your care team informed of your progress. Yellow periods are signs an adjustment may need to be made.     2. Continue your self-care, even if you have to fake it!    3. Talk to someone in your support network    4. Open up your depression survival kit           RED    ZONE Medical Alert - Get Help    What it looks like:     Depression is seriously interfering with your life.     You may experience these or other symptoms: You can t get out of bed most days, can t work or engage in other necessary activities, you have trouble taking care of basic hygiene, or basic responsibilities, thoughts of suicide or death that will not go away, self-injurious behavior.     What you need to do:  1. Call your care  team and request a same-day appointment. If they are not available (weekends or after hours) call your local crisis line, emergency room or 911.            Depression Self Care Plan / Survival Kit    Self-Care for Depression  Here s the deal. Your body and mind are really not as separate as most people think.  What you do and think affects how you feel and how you feel influences what you do and think. This means if you do things that people who feel good do, it will help you feel better.  Sometimes this is all it takes.  There is also a place for medication and therapy depending on how severe your depression is, so be sure to consult with your medical provider and/ or Behavioral Health Consultant if your symptoms are worsening or not improving.     In order to better manage my stress, I will:    Exercise  Get some form of exercise, every day. This will help reduce pain and release endorphins, the  feel good  chemicals in your brain. This is almost as good as taking antidepressants!  This is not the same as joining a gym and then never going! (they count on that by the way ) It can be as simple as just going for a walk or doing some gardening, anything that will get you moving.      Hygiene   Maintain good hygiene (Get out of bed in the morning, Make your bed, Brush your teeth, Take a shower, and Get dressed like you were going to work, even if you are unemployed).  If your clothes don't fit try to get ones that do.    Diet  I will strive to eat foods that are good for me, drink plenty of water, and avoid excessive sugar, caffeine, alcohol, and other mood-altering substances.  Some foods that are helpful in depression are: complex carbohydrates, B vitamins, flaxseed, fish or fish oil, fresh fruits and vegetables.    Psychotherapy  I agree to participate in Individual Therapy (if recommended).    Medication  If prescribed medications, I agree to take them.  Missing doses can result in serious side effects.  I  understand that drinking alcohol, or other illicit drug use, may cause potential side effects.  I will not stop my medication abruptly without first discussing it with my provider.    Staying Connected With Others  I will stay in touch with my friends, family members, and my primary care provider/team.    Use your imagination  Be creative.  We all have a creative side; it doesn t matter if it s oil painting, sand castles, or mud pies! This will also kick up the endorphins.    Witness Beauty  (AKA stop and smell the roses) Take a look outside, even in mid-winter. Notice colors, textures. Watch the squirrels and birds.     Service to others  Be of service to others.  There is always someone else in need.  By helping others we can  get out of ourselves  and remember the really important things.  This also provides opportunities for practicing all the other parts of the program.    Humor  Laugh and be silly!  Adjust your TV habits for less news and crime-drama and more comedy.    Control your stress  Try breathing deep, massage therapy, biofeedback, and meditation. Find time to relax each day.     My support system    Clinic Contact:  Phone number:    Contact 1:  Phone number:    Contact 2:  Phone number:    Confucianism/:  Phone number:    Therapist:  Phone number:    Local crisis center:    Phone number:    Other community support:  Phone number:

## 2018-11-14 ENCOUNTER — OFFICE VISIT (OUTPATIENT)
Dept: FAMILY MEDICINE | Facility: CLINIC | Age: 68
End: 2018-11-14
Payer: COMMERCIAL

## 2018-11-14 VITALS
HEART RATE: 75 BPM | DIASTOLIC BLOOD PRESSURE: 78 MMHG | SYSTOLIC BLOOD PRESSURE: 136 MMHG | OXYGEN SATURATION: 100 % | TEMPERATURE: 98 F | RESPIRATION RATE: 12 BRPM

## 2018-11-14 DIAGNOSIS — M54.50 ACUTE BILATERAL LOW BACK PAIN WITHOUT SCIATICA: Primary | ICD-10-CM

## 2018-11-14 PROCEDURE — 99207 ZZC NO BILLABLE SERVICE THIS VISIT: CPT | Performed by: NURSE PRACTITIONER

## 2018-11-14 NOTE — PROGRESS NOTES
"  SUBJECTIVE:   Don Samson is a 68 year old male who presents to clinic today for the following health issues:      Back pain follow-up      Pt is here today requesting a different note for work. Wanting it to be \"not so harsh\" doesn't want it to state that he was injured.    Pain is doing slightly better since previous visit       Seen yesterday for episode of back pain triggered by helping friend move.  Advised on NSAIDs, flexeril, heat/ice, and physical therapy.      Just needs updated work note, will not be charged for todays visit.    Flakita Pastor, CNP   "

## 2018-11-14 NOTE — LETTER
[place] [address] [place] [address]  Phone: [phone]    November 14, 2018        Don Samson  [address]  [facility] [address]          To whom it may concern:    RE: Don Deweyanson    Patient was seen and treated today at our clinic and missed work.  He was absent from work Tuesday 11/13/18 - Wednesday 11/14/18 due to medical illness.  He can return to work Thursday 11/15/18 without restrictions.      Please contact me for questions or concerns.      Sincerely,        CONSTANCE Doyle CNP

## 2018-11-14 NOTE — MR AVS SNAPSHOT
After Visit Summary   11/14/2018    Don Samson    MRN: [id number]           Patient Information     Date Of Birth          1950        Visit Information        Provider Department      11/14/2018 11:20 AM Flakita Pastor APRN CNP [facility]        Today's Diagnoses     Acute bilateral low back pain without sciatica    -  1       Follow-ups after your visit        Your next 10 appointments already scheduled     Nov 28, 2018  3:30 PM CST   [facility]    [address]  [place] [address]   [phone]              Future tests that were ordered for you today     Open Future Orders        Priority Expected Expires Ordered    [facility] PT, HAND, AND CHIROPRACTIC REFERRAL Routine  11/14/2019 11/14/2018            Who to contact     If you have questions or need follow up information about today's clinic visit or your schedule please contact [facility] directly at [phone].  Normal or non-critical lab and imaging results will be communicated to you by MyChart, letter or phone within 4 business days after the clinic has received the results. If you do not hear from us within 7 days, please contact the clinic through MyChart or phone. If you have a critical or abnormal lab result, we will notify you by phone as soon as possible.  Submit refill requests through [employer] or call your pharmacy and they will forward the refill request to us. Please allow 3 business days for your refill to be completed.          Additional Information About Your Visit        Care EveryWhere ID     This is your Care EveryWhere ID. This could be used by other organizations to access your [place] medical records  [id number]        Your Vitals Were     Pulse Temperature Respirations Pulse Oximetry          75 98  F (36.7  C) (Oral) 12 100%         Blood Pressure from Last 3  Encounters:   11/14/18 136/78   11/13/18 138/82   09/12/18 132/80    Weight from Last 3 Encounters:   11/13/18 243 lb (110.2 kg)   09/12/18 235 lb (106.6 kg)   09/07/18 235 lb (106.6 kg)              Today, you had the following     No orders found for display       Primary Care Provider Office Phone # Fax #    CONSTANCE Doyle [facility] [phone]       [address]  [facility] [address]        Equal Access to Services     [facility] Center[facility]ONNA : Hadii aad ku hadasho Soomaali, waaxda luqadaha, qaybta kaalmada adedaniloyapiper, ruddy bach . So [facility] [phone].    ATENCIÓN: Si habla español, tiene a torres disposición servicios gratuitos de asistencia lingüística. [facility] [phone].    We comply with applicable federal civil rights laws and [place] laws. We do not discriminate on the basis of race, color, national origin, age, disability, sex, sexual orientation, or gender identity.            Thank you!     Thank you for choosing [facility]  for your care. Our goal is always to provide you with excellent care. Hearing back from our patients is one way we can continue to improve our services. Please take a few minutes to complete the written survey that you may receive in the mail after your visit with us. Thank you!             Your Updated Medication List - Protect others around you: Learn how to safely use, store and throw away your medicines at www.disposemymeds.org.          This list is accurate as of 11/14/18 11:36 AM.  Always use your most recent med list.                   Brand Name Dispense Instructions for use Diagnosis    amLODIPine 5 MG tablet    NORVASC    90 tablet    Take 1 tablet (5 mg) by mouth At Bedtime    Hypertension goal BP (blood pressure) < 140/90       cyclobenzaprine 10 MG tablet    FLEXERIL    30 tablet    Take 0.5-1 tablets (5-10 mg) by mouth 3 times daily as needed for muscle spasms    Acute bilateral low back pain without sciatica        olmesartan 20 MG tablet    BENICAR    90 tablet    Take 1 tablet (20 mg) by mouth daily (with breakfast)    Hypertension goal BP (blood pressure) < 140/90

## 2018-11-28 ENCOUNTER — OFFICE VISIT (OUTPATIENT)
Dept: PHARMACY | Facility: CLINIC | Age: 68
End: 2018-11-28
Payer: COMMERCIAL

## 2018-11-28 VITALS
SYSTOLIC BLOOD PRESSURE: 148 MMHG | HEART RATE: 73 BPM | BODY MASS INDEX: 33.97 KG/M2 | DIASTOLIC BLOOD PRESSURE: 80 MMHG | OXYGEN SATURATION: 98 % | WEIGHT: 247 LBS

## 2018-11-28 DIAGNOSIS — I10 HYPERTENSION GOAL BP (BLOOD PRESSURE) < 140/90: Primary | ICD-10-CM

## 2018-11-28 DIAGNOSIS — I10 HYPERTENSION GOAL BP (BLOOD PRESSURE) < 140/90: ICD-10-CM

## 2018-11-28 PROCEDURE — 99607 MTMS BY PHARM ADDL 15 MIN: CPT | Performed by: PHARMACIST

## 2018-11-28 PROCEDURE — 99606 MTMS BY PHARM EST 15 MIN: CPT | Performed by: PHARMACIST

## 2018-11-28 PROCEDURE — 36415 COLL VENOUS BLD VENIPUNCTURE: CPT | Performed by: NURSE PRACTITIONER

## 2018-11-28 PROCEDURE — 80048 BASIC METABOLIC PNL TOTAL CA: CPT | Performed by: NURSE PRACTITIONER

## 2018-11-28 RX ORDER — AMLODIPINE BESYLATE 10 MG/1
10 TABLET ORAL DAILY
Qty: 90 TABLET | Refills: 1 | Status: SHIPPED | OUTPATIENT
Start: 2018-11-28 | End: 2019-07-15

## 2018-11-28 NOTE — PROGRESS NOTES
"SUBJECTIVE/OBJECTIVE:                           Don Samson is a 68 year old male coming in for a f/up (8-29-18) visit for Medication Therapy Management.  He was referred to me from Flakita Pastor -CNP-[facility].    Chief Complaint: here for 2 weeks BP recheck.  fyi--he feels he had 2 teeth pulled --cause him to bleed --thus + FIT test he claims. He is not interested in rechecking this test right now.        .  Personal Healthcare Goals: fix Bp w/out se's.     Allergies/ADRs: Reviewed in Epic; states cough from ace-lisinopril   Tobacco: Exposure to secondhand smoke  Alcohol: 1 drink q 6 months.   Caffeine: 1 cups/day of coffee  Activity: walks, bicycles if weather ok.   PMH: Reviewed in Epic    Medication Adherence/Access:  Rare missed dose .       Hypertension: Current medications include : Olmesartan 20mg qam and amlodipine 5mg hs .  Patient does not self-monitor BP.  Patient reports the following medication side effects: none.             BP Readings from Last 1 Encounters:   11/28/18 148/80     Pulse Readings from Last 1 Encounters:   11/28/18 73     Wt Readings from Last 1 Encounters:   11/28/18 247 lb (112 kg)     Ht Readings from Last 1 Encounters:   09/12/18 5' 11.5\" (1.816 m)     Estimated body mass index is 33.97 kg/(m^2) as calculated from the following:    Height as of 9/12/18: 5' 11.5\" (1.816 m).    Weight as of this encounter: 247 lb (112 kg).    Temp Readings from Last 1 Encounters:   11/14/18 98  F (36.7  C) (Oral)         ASSESSMENT:                             Current medications were reviewed today.     Medication Adherence: excellent, no issues identified    Hypertension: Needs Improvement. Patient is not meeting BP goal of < 140/90mmHg.  Pt would benefit from the following changes - increasing the dose of Amlodipine, continued BP monitoring, watching diet, increasing exercise and weight loss and sodium restriction      PLAN:                              1. BP today 148/80mmhg .  Goal BP is " < 140/90mmhg.    Lets have you stay on Olmesartan 20mg in AM daily and lets increase your amlodipine to 10mg tab -1 daily at bedtime.         Next MT visit:  Wednesday December 26th at 10;00 AM for BP recheck.     I spent 30 minutes with this patient today. All changes were made via collaborative practice agreement with Flakita Pastor. A copy of the visit note was provided to the patient's referring provider.Dr. Rojas.         The patient was given a summary of these recommendations as an after visit summary.     Sravanthi Woo [facility].  Medication Therapy Management Provider  [phone]

## 2018-11-28 NOTE — Clinical Note
Flakita-- BUNNY--BP elevated today - I increased his amlodipine dose and will recheck BP in 4 weeks.   Nessa

## 2018-11-28 NOTE — PATIENT INSTRUCTIONS
Recommendations from today's MTM visit:                                                        1. BP today 148/80mmhg .  Goal BP is < 140/90mmhg.    Lets have you stay on Olmesartan 20mg in AM daily and lets increase your amlodipine to 10mg tab -1 daily at bedtime.         Next MTM visit:  Wednesday December 26th at 10;00 AM for BP recheck.     To schedule another MTM appointment, please call the clinic directly or you may call the MTM scheduling line at [phone] or toll-free at [phone].     My Clinical Pharmacist's contact information:                                                      It was a pleasure talking with you today!  Please feel free to contact me with any questions or concerns you have.      Sravanthi Woo Rph.  Medication Therapy Management Provider  [phone]      You may receive a survey about the MTM services you received.  I would appreciate your feedback to help me serve you better in the future. Please fill it out and return it when you can. Your comments will be anonymous.

## 2018-11-28 NOTE — MR AVS SNAPSHOT
After Visit Summary   11/28/2018    Don Samson    MRN: [id number]           Patient Information     Date Of Birth          1950        Visit Information        Provider Department      11/28/2018 3:30 PM Sravanthi Woo RPH [facility] MTM        Today's Diagnoses     Hypertension goal BP (blood pressure) < 140/90    -  1      Care Instructions    Recommendations from today's MTM visit:                                                        1. BP today 148/80mmhg .  Goal BP is < 140/90mmhg.    Lets have you stay on Olmesartan 20mg in AM daily and lets increase your amlodipine to 10mg tab -1 daily at bedtime.         Next MTM visit:  Wednesday December 26th at 10;00 AM for BP recheck.     To schedule another MTM appointment, please call the clinic directly or you may call the MTM scheduling line at [phone] or toll-free at [phone].     My Clinical Pharmacist's contact information:                                                      It was a pleasure talking with you today!  Please feel free to contact me with any questions or concerns you have.      Sravanthi Woo Rph.  Medication Therapy Management Provider  [phone]      You may receive a survey about the MTM services you received.  I would appreciate your feedback to help me serve you better in the future. Please fill it out and return it when you can. Your comments will be anonymous.                    Follow-ups after your visit        Your next 10 appointments already scheduled     Nov 28, 2018  4:00 PM CST   [facility]    [address] [place] [id number] [phone]           Please do not eat 10-12 hours before your appointment if you are coming in fasting for labs on lipids, cholesterol, or glucose (sugar). This does not apply to pregnant women. Water, hot tea and black coffee (with nothing added) are okay. Do not  drink other fluids, diet soda or chew gum.            Dec 26, 2018 10:30 AM CST   SHORT with Sravanthi Woo RPH   [facility] MTM ([facility])    [address]  [place] [id number] [phone]              Future tests that were ordered for you today     Open Future Orders        Priority Expected Expires Ordered    Basic metabolic panel Routine  5/28/2019 11/28/2018            Who to contact     If you have questions or need follow up information about today's clinic visit or your schedule please contact [facility] MT directly at [phone].  Normal or non-critical lab and imaging results will be communicated to you by MyChart, letter or phone within 4 business days after the clinic has received the results. If you do not hear from us within 7 days, please contact the clinic through MyChart or phone. If you have a critical or abnormal lab result, we will notify you by phone as soon as possible.  Submit refill requests through [employer] or call your pharmacy and they will forward the refill request to us. Please allow 3 business days for your refill to be completed.          Additional Information About Your Visit        Care EveryWhere ID     This is your Care EveryWhere ID. This could be used by other organizations to access your [place] medical records  [id number]        Your Vitals Were     Pulse Pulse Oximetry BMI (Body Mass Index)             73 98% 33.97 kg/m2          Blood Pressure from Last 3 Encounters:   11/28/18 148/80   11/14/18 136/78   11/13/18 138/82    Weight from Last 3 Encounters:   11/28/18 247 lb (112 kg)   11/13/18 243 lb (110.2 kg)   09/12/18 235 lb (106.6 kg)                 Today's Medication Changes          These changes are accurate as of 11/28/18  3:50 PM.  If you have any questions, ask your nurse or doctor.               These medicines have changed or have updated prescriptions.        Dose/Directions     * amLODIPine 5 MG tablet   Commonly known as:  NORVASC   This may have changed:  Another medication with the same name was added. Make sure you understand how and when to take each.   Used for:  Hypertension goal BP (blood pressure) < 140/90   Changed by:  Sravanthi Woo RPH        Dose:  5 mg   Take 1 tablet (5 mg) by mouth At Bedtime   Quantity:  90 tablet   Refills:  1       * amLODIPine 10 MG tablet   Commonly known as:  NORVASC   This may have changed:  You were already taking a medication with the same name, and this prescription was added. Make sure you understand how and when to take each.   Used for:  Hypertension goal BP (blood pressure) < 140/90   Changed by:  Sravanthi Woo RPH        Dose:  10 mg   Take 1 tablet (10 mg) by mouth daily   Quantity:  90 tablet   Refills:  1       * Notice:  This list has 2 medication(s) that are the same as other medications prescribed for you. Read the directions carefully, and ask your doctor or other care provider to review them with you.         Where to get your medicines      These medications were sent to [facility] [address] [place] [address] AT [address] [address]     Phone:  [phone]     amLODIPine 10 MG tablet                Primary Care Provider Office Phone # Fax #    Flakita Newman Dawit, APRN [facility] [phone] [address] [facility] [address]        Equal Access to Services     ANDRE TAYLOR : Hadii tom ku hadasho Soomaali, waaxda luqadaha, qaybta kaalmada adeegyada, ruddy bach . So [facility] [phone].    ATENCIÓN: Si habla español, tiene a torres disposición servicios gratuitos de asistencia lingüística. Llame al [phone].    We comply with applicable federal civil rights laws and [place] laws. We do not discriminate on the basis of race, color, national origin, age, disability, sex, sexual orientation, or gender  identity.            Thank you!     Thank you for choosing [facility]  for your care. Our goal is always to provide you with excellent care. Hearing back from our patients is one way we can continue to improve our services. Please take a few minutes to complete the written survey that you may receive in the mail after your visit with us. Thank you!             Your Updated Medication List - Protect others around you: Learn how to safely use, store and throw away your medicines at www.disposemymeds.org.          This list is accurate as of 11/28/18  3:50 PM.  Always use your most recent med list.                   Brand Name Dispense Instructions for use Diagnosis    * amLODIPine 5 MG tablet    NORVASC    90 tablet    Take 1 tablet (5 mg) by mouth At Bedtime    Hypertension goal BP (blood pressure) < 140/90       * amLODIPine 10 MG tablet    NORVASC    90 tablet    Take 1 tablet (10 mg) by mouth daily    Hypertension goal BP (blood pressure) < 140/90       cyclobenzaprine 10 MG tablet    FLEXERIL    30 tablet    Take 0.5-1 tablets (5-10 mg) by mouth 3 times daily as needed for muscle spasms    Acute bilateral low back pain without sciatica       olmesartan 20 MG tablet    BENICAR    90 tablet    Take 1 tablet (20 mg) by mouth daily (with breakfast)    Hypertension goal BP (blood pressure) < 140/90       * Notice:  This list has 2 medication(s) that are the same as other medications prescribed for you. Read the directions carefully, and ask your doctor or other care provider to review them with you.

## 2018-11-29 LAB
ANION GAP SERPL CALCULATED.3IONS-SCNC: 9 MMOL/L (ref 3–14)
BUN SERPL-MCNC: 20 MG/DL (ref 7–30)
CALCIUM SERPL-MCNC: 8.9 MG/DL (ref 8.5–10.1)
CHLORIDE SERPL-SCNC: 108 MMOL/L (ref 94–109)
CO2 SERPL-SCNC: 24 MMOL/L (ref 20–32)
CREAT SERPL-MCNC: 1.11 MG/DL (ref 0.66–1.25)
GFR SERPL CREATININE-BSD FRML MDRD: 66 ML/MIN/1.7M2
GLUCOSE SERPL-MCNC: 127 MG/DL (ref 70–99)
POTASSIUM SERPL-SCNC: 4.2 MMOL/L (ref 3.4–5.3)
SODIUM SERPL-SCNC: 141 MMOL/L (ref 133–144)

## 2018-12-04 ENCOUNTER — OFFICE VISIT (OUTPATIENT)
Dept: FAMILY MEDICINE | Facility: CLINIC | Age: 68
End: 2018-12-04
Payer: COMMERCIAL

## 2018-12-04 VITALS
SYSTOLIC BLOOD PRESSURE: 128 MMHG | RESPIRATION RATE: 16 BRPM | TEMPERATURE: 97.8 F | HEART RATE: 77 BPM | OXYGEN SATURATION: 97 % | DIASTOLIC BLOOD PRESSURE: 73 MMHG

## 2018-12-04 DIAGNOSIS — G89.29 CHRONIC BILATERAL LOW BACK PAIN WITHOUT SCIATICA: Primary | ICD-10-CM

## 2018-12-04 DIAGNOSIS — M54.50 CHRONIC BILATERAL LOW BACK PAIN WITHOUT SCIATICA: Primary | ICD-10-CM

## 2018-12-04 DIAGNOSIS — F33.1 MAJOR DEPRESSIVE DISORDER, RECURRENT, MODERATE (H): ICD-10-CM

## 2018-12-04 DIAGNOSIS — I10 HYPERTENSION GOAL BP (BLOOD PRESSURE) < 140/90: ICD-10-CM

## 2018-12-04 PROCEDURE — 99214 OFFICE O/P EST MOD 30 MIN: CPT | Performed by: FAMILY MEDICINE

## 2018-12-04 RX ORDER — CYCLOBENZAPRINE HCL 10 MG
10 TABLET ORAL
Qty: 14 TABLET | Refills: 0 | Status: SHIPPED | OUTPATIENT
Start: 2018-12-04 | End: 2019-02-24

## 2018-12-04 NOTE — LETTER
[place] [address] [place] [id number]  Phone: [phone]    December 4, 2018        Don CLARKE Samson  [address]  [facility] [address]          To whom it may concern:    RE: Don Samson    Patient was seen and treated today at our clinic and missed work.    Please contact me for questions or concerns.      Sincerely,        Yumi Rojas MD
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
## 2018-12-04 NOTE — PATIENT INSTRUCTIONS
Given few flexeril till can get refill from previous script still valid filled  Referral to Pt made the will be calling you   Continue bp  Continue antibiotics  See dentist as planned

## 2018-12-04 NOTE — MR AVS SNAPSHOT
After Visit Summary   12/4/2018    Don Samson    MRN: [id number]           Patient Information     Date Of Birth          1950        Visit Information        Provider Department      12/4/2018 1:40 PM Yumi Rojas MD [facility]        Today's Diagnoses     Chronic bilateral low back pain without sciatica    -  1    Hypertension goal BP (blood pressure) < 140/90        Major depressive disorder, recurrent, moderate (H)          Care Instructions    Given few flexeril till can get refill from previous script still valid filled  Referral to Pt made the will be calling you   Continue bp  Continue antibiotics  See dentist as planned             Follow-ups after your visit        Additional Services     [facility] PT, HAND, AND CHIROPRACTIC REFERRAL       Physical Therapy, Hand Therapy and Chiropractic Care are available through:  *[facility]  *Hand Therapy (Occupational Therapy or Physical Therapy)  *[facility]    Call one number to schedule at any of the above locations: [phone].    Physical therapy, Hand therapy and/or Chiropractic care has been recommended by your physician as an excellent treatment option to reduce pain and help people return to normal activities, including sports.  Therapy and/or chiropractic care services are a great complement or alternative to expensive and invasive surgery, injections, or long-term use of prescription medications. The primary goal is to identify the underlying problem and provide you the tools to manage your condition on your own.     Please be aware that coverage of these services is subject to the terms and limitations of your health insurance plan.  Call member services at your health plan with any benefit or coverage questions.      Please bring the following to your appointment:  *Your personal calendar for scheduling future appointments  *Comfortable clothing                  Follow-up  notes from your care team     Return in about 2 weeks (around 12/18/2018), or if symptoms worsen or fail to improve, for Follow up with PCP ricky for backpain .      Your next 10 appointments already scheduled     Dec 26, 2018 10:30 AM CST   SHORT with Sravanthi Woo [facility] ([facility])    [address]  [place] [address] [phone]              Future tests that were ordered for you today     Open Future Orders        Priority Expected Expires Ordered    [facility] PT, HAND, AND CHIROPRACTIC REFERRAL Routine  12/4/2019 12/4/2018            Who to contact     If you have questions or need follow up information about today's clinic visit or your schedule please contact [facility] directly at [phone].  Normal or non-critical lab and imaging results will be communicated to you by MyChart, letter or phone within 4 business days after the clinic has received the results. If you do not hear from us within 7 days, please contact the clinic through MyChart or phone. If you have a critical or abnormal lab result, we will notify you by phone as soon as possible.  Submit refill requests through [facility] or call your pharmacy and they will forward the refill request to us. Please allow 3 business days for your refill to be completed.          Additional Information About Your Visit        Care EveryWhere ID     This is your Care EveryWhere ID. This could be used by other organizations to access your [facility] medical records  [id number]        Your Vitals Were     Pulse Temperature Respirations Pulse Oximetry          77 97.8  F (36.6  C) (Oral) 16 97%         Blood Pressure from Last 3 Encounters:   12/04/18 128/73   11/28/18 148/80   11/14/18 136/78    Weight from Last 3 Encounters:   11/28/18 247 lb (112 kg)   11/13/18 243 lb (110.2 kg)   09/12/18 235 lb (106.6 kg)                 Today's Medication Changes          These changes are  accurate as of 12/4/18  2:05 PM.  If you have any questions, ask your nurse or doctor.               Start taking these medicines.        Dose/Directions    cyclobenzaprine 10 MG tablet   Commonly known as:  FLEXERIL   Used for:  Chronic bilateral low back pain without sciatica   Started by:  Yumi Rojas MD        Dose:  10 mg   Take 1 tablet (10 mg) by mouth nightly as needed for muscle spasms   Quantity:  14 tablet   Refills:  0            Where to get your medicines      These medications were sent to "[facility] [address] [place] [address] AT [facility] & [address] [place] [address]     Phone:  [phone]     cyclobenzaprine 10 MG tablet                Primary Care Provider Office Phone # Fax #    Flakita Newman [facility] [phone]       [address]  [facility] [address]        Equal Access to Services     ANDRE TAYLOR AH: Hadii aad ku hadasho Soomaali, waaxda luqadaha, qaybta kaalmada adeegyada, waxay idiin hayaan ines khwilly bach . So [facility] [phone].    ATENCIÓN: Si vickyla espyuval, tiene a torres disposición servicios gratuitos de asistencia lingüística. Llame al [phone].    We comply with applicable federal civil rights laws and [place] laws. We do not discriminate on the basis of race, color, national origin, age, disability, sex, sexual orientation, or gender identity.            Thank you!     Thank you for choosing [facility]  for your care. Our goal is always to provide you with excellent care. Hearing back from our patients is one way we can continue to improve our services. Please take a few minutes to complete the written survey that you may receive in the mail after your visit with us. Thank you!             Your Updated Medication List - Protect others around you: Learn how to safely use, store and throw away your medicines at www.disposemymeds.org.          This list is accurate as of  12/4/18  2:05 PM.  Always use your most recent med list.                   Brand Name Dispense Instructions for use Diagnosis    amLODIPine 10 MG tablet    NORVASC    90 tablet    Take 1 tablet (10 mg) by mouth daily    Hypertension goal BP (blood pressure) < 140/90       cyclobenzaprine 10 MG tablet    FLEXERIL    14 tablet    Take 1 tablet (10 mg) by mouth nightly as needed for muscle spasms    Chronic bilateral low back pain without sciatica       olmesartan 20 MG tablet    BENICAR    90 tablet    Take 1 tablet (20 mg) by mouth daily (with breakfast)    Hypertension goal BP (blood pressure) < 140/90

## 2018-12-17 ENCOUNTER — ANCILLARY PROCEDURE (OUTPATIENT)
Dept: GENERAL RADIOLOGY | Facility: CLINIC | Age: 68
End: 2018-12-17
Attending: FAMILY MEDICINE
Payer: COMMERCIAL

## 2018-12-17 ENCOUNTER — OFFICE VISIT (OUTPATIENT)
Dept: FAMILY MEDICINE | Facility: CLINIC | Age: 68
End: 2018-12-17
Payer: COMMERCIAL

## 2018-12-17 VITALS
WEIGHT: 234.5 LBS | BODY MASS INDEX: 32.25 KG/M2 | RESPIRATION RATE: 16 BRPM | TEMPERATURE: 98 F | SYSTOLIC BLOOD PRESSURE: 125 MMHG | DIASTOLIC BLOOD PRESSURE: 74 MMHG | HEART RATE: 85 BPM | OXYGEN SATURATION: 99 %

## 2018-12-17 DIAGNOSIS — G89.29 CHRONIC BILATERAL LOW BACK PAIN WITHOUT SCIATICA: ICD-10-CM

## 2018-12-17 DIAGNOSIS — M54.50 CHRONIC BILATERAL LOW BACK PAIN WITHOUT SCIATICA: ICD-10-CM

## 2018-12-17 DIAGNOSIS — M54.50 CHRONIC BILATERAL LOW BACK PAIN WITHOUT SCIATICA: Primary | ICD-10-CM

## 2018-12-17 DIAGNOSIS — G89.29 CHRONIC BILATERAL LOW BACK PAIN WITHOUT SCIATICA: Primary | ICD-10-CM

## 2018-12-17 DIAGNOSIS — J06.9 VIRAL URI: ICD-10-CM

## 2018-12-17 PROCEDURE — 72100 X-RAY EXAM L-S SPINE 2/3 VWS: CPT

## 2018-12-17 PROCEDURE — 99214 OFFICE O/P EST MOD 30 MIN: CPT | Performed by: FAMILY MEDICINE

## 2018-12-17 RX ORDER — BENZONATATE 200 MG/1
200 CAPSULE ORAL 3 TIMES DAILY PRN
Qty: 21 CAPSULE | Refills: 0 | Status: CANCELLED | OUTPATIENT
Start: 2018-12-17 | End: 2018-12-24

## 2018-12-17 NOTE — PATIENT INSTRUCTIONS
See PT for back, and if not better see orthopedics especially due to recurrence  Diclofenac gel to back 4 times a day 1 week  Tylenol/ motrin for pain  Ice and heat to area  Use flexeril sparingly can cause muscle weakness and falls  Try vaporizer in room   And saline gel to nose    Supportive care for cold  See pharmacist as planned  Go to the ER if worse  Continue routine care with PCP Dawit  flonase 1 spray each nostril daily 2 weeks  Zyrtec 10 mg daily 2 week  mucinex 600 mg twice a day 1 week  Humidifier in room may help  supportive care as below  Go to the Er if worse  Follow up with primary if symptoms persist   No need for oral antibiotic at this time.  Your symptoms and exam today indicate that you have a viral upper respiratory illness.  This includes viral rhinosinusitis and viral bronchitis.  Antibiotics do not help viral illnesses; the best remedies treat the symptoms (see below).  The typical course of a viral illness is that you feel rather miserable for the first few days - with sore throat, runny nose/nasal congestion, cough, and sometimes fever and body aches.  You should start to feel better after about 5-7 days and much better by 10-14 days.  If you develop sudden worsening of symptoms or fever after the first 5-7 days, or if you have persistence of your symptoms beyond 14 days, let us know as you may have developed a secondary bacterial infection.      For symptom relief I suggest tryin. Steam.  Take a long, hot shower.  Or if you don't want to get in the shower just run it with the bathroom door shut for a few minutes and breathe the steam.  2. Drink hot liquids frequently such as tea or hot water with honey and lemon.  3. Acetaminophen (Tylenol) and ibuprofen (Motrin or Advil) as needed for headache, sore throat, body aches, or fever.  4. For loosening phlegm and sputum try guaifenesin (available in many combination products and alone as plain Robitussin or plain Mucinex) and for cough  suppression you can try dextromethorphan (Delsym or combined in other products).  5. For nasal congestion try:    An oral decongestant.  The only decongestant I recommend is pseudoephedrine. Ask the pharmacist for the over the counter (but real) pseudoephedrine - not phenylephrine.  This can raise your blood pressure and heart rate so do not use this if you have hypertension.       Afrin spray for 3 days.  (Never use afrin nasal spray for more than 3 days as there is a risk of developing tolerance and rebound/worsening nasal congestion if used longer than this.)    Nealmed sinus rinses.    Nasal steroid spray such as nasacort or flonase, which are over-the-counter.  6. And most importantly: plenty of rest and sleep  especially while you have a fever.     Stop smoking and avoid secondhand smoke    Drink lots of fluids such as water and clear soups. Fluids help loosen mucus. Fluids are also important because they help prevent dehydration.    Gargle with warm salt water a few times a day to relieve a sore throat. Throat sprays or lozenges may also help relieve the pain.    Avoid alcohol.    Use saline (salt water) nose drops to help loosen mucus and moisten the tender skin in your nose.  Encouraged mucinex, warm salt water gargles, cepacol spray, soothers/lozenges, sinus rinses (neilmed), flonase (2 sprays per nostril daily x 2 weeks), vitamin c, fluids and rest.  May alternate tylenol and NSAIDS (ibuprofen, advil, aleve type products) every 4-6 hours for the next few days as needed.

## 2018-12-17 NOTE — LETTER
December 18, 2018      Don Samson  [address]  [facility] [address]        Dear [place],    We are writing to inform you of your test results.    Normal lumbar spine    Resulted Orders   XR Lumbar Spine 2/3 Views    Narrative    LUMBAR SPINE TWO TO THREE VIEWS  12/17/2018 9:35 AM     HISTORY: Chronic bilateral low back pain without sciatica.    COMPARISON: None.      Impression    IMPRESSION: Five functional lumbar vertebral segments are present.  Alignment is normal. No acute bony abnormality and no significant  degenerative changes. Minimal vascular calcifications are noted.    RAINA NOLASCO MD       If you have any questions or concerns, please call the clinic at the number listed above.       Sincerely,    Yumi Rojas MD/nr
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
## 2018-12-17 NOTE — LETTER
[facility]  [address] [place] [id number]  Phone: [phone]    December 17, 2018        Don CLARKE Samson  [address]  [facility] [address]          To whom it may concern:    RE: Don CLARKE Mali    Patient was seen and treated today at our clinic and missed work 12/15, 12/16 and 12/17 /18.    Please contact me for questions or concerns.      Sincerely,        Yumi Rojas MD

## 2018-12-19 DIAGNOSIS — I10 HYPERTENSION GOAL BP (BLOOD PRESSURE) < 140/90: ICD-10-CM

## 2018-12-20 NOTE — TELEPHONE ENCOUNTER
"Requested Prescriptions   Pending Prescriptions Disp Refills     olmesartan (BENICAR) 20 MG tablet [Pharmacy Med Name: OLMESARTAN MEDOXOMIL 20MG TABLETS]  Last Written Prescription Date:  8/29/2018  Last Fill Quantity: 90 tablet,  # refills: 1   Last Office Visit: 12/17/2018   Future Office Visit:    Next 5 appointments (look out 90 days)    Dec 26, 2018 10:30 AM CST  SHORT with Sravanthi Woo RPH  [facility] ([facility]) [address] [place] [address]  [phone]   Jan 07, 2019  4:20 PM CST  Office Visit with CONSTANCE Doyle CNP  [facility] ([facility]) [address] [place] [id number] [phone]        90 tablet 0     Sig: TAKE 1 TABLET BY MOUTH DAILY WITH BREAKFAST    Angiotensin-II Receptors Passed - 12/19/2018  3:33 PM       Passed - Blood pressure under 140/90 in past 12 months    BP Readings from Last 3 Encounters:   12/17/18 125/74   12/04/18 128/73   11/28/18 148/80          Passed - Recent (12 mo) or future (30 days) visit within the authorizing provider's specialty    Patient had office visit in the last 12 months or has a visit in the next 30 days with authorizing provider or within the authorizing provider's specialty.  See \"Patient Info\" tab in inbasket, or \"Choose Columns\" in Meds & Orders section of the refill encounter.           Passed - Patient is age 18 or older       Passed - Normal serum creatinine on file in past 12 months    Recent Labs   Lab Test 11/28/18  1553   CR 1.11          Passed - Normal serum potassium on file in past 12 months    Recent Labs   Lab Test 11/28/18  1553   POTASSIUM 4.2                      "

## 2018-12-22 RX ORDER — OLMESARTAN MEDOXOMIL 20 MG/1
TABLET ORAL
Qty: 90 TABLET | Refills: 0 | OUTPATIENT
Start: 2018-12-22

## 2018-12-26 ENCOUNTER — OFFICE VISIT (OUTPATIENT)
Dept: PHARMACY | Facility: CLINIC | Age: 68
End: 2018-12-26
Payer: COMMERCIAL

## 2018-12-26 VITALS
DIASTOLIC BLOOD PRESSURE: 72 MMHG | HEART RATE: 64 BPM | WEIGHT: 236 LBS | OXYGEN SATURATION: 97 % | SYSTOLIC BLOOD PRESSURE: 124 MMHG | BODY MASS INDEX: 32.46 KG/M2

## 2018-12-26 DIAGNOSIS — I10 HYPERTENSION GOAL BP (BLOOD PRESSURE) < 140/90: Primary | ICD-10-CM

## 2018-12-26 PROCEDURE — 99607 MTMS BY PHARM ADDL 15 MIN: CPT | Performed by: PHARMACIST

## 2018-12-26 PROCEDURE — 99606 MTMS BY PHARM EST 15 MIN: CPT | Performed by: PHARMACIST

## 2018-12-26 NOTE — PATIENT INSTRUCTIONS
Recommendations from today's MTM visit:                                                        1. BP = 124/72mmhg.  Pulse 64.   Excellent control --please stay on all current medications as is.         Next MTM visit:  See me April 17th -2019 at 10;30am.  Recheck blood pressure.     To schedule another MTM appointment, please call the clinic directly or you may call the MTM scheduling line at [phone] or toll-free at [phone].     My Clinical Pharmacist's contact information:                                                      It was a pleasure talking with you today!  Please feel free to contact me with any questions or concerns you have.      Sravanthi Woo Rph.  Medication Therapy Management Provider  [phone]  Tatyana Quarles , Pharm-D2.     You may receive a survey about the MTM services you received.  I would appreciate your feedback to help me serve you better in the future. Please fill it out and return it when you can. Your comments will be anonymous.
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
## 2019-01-07 ENCOUNTER — OFFICE VISIT (OUTPATIENT)
Dept: FAMILY MEDICINE | Facility: CLINIC | Age: 69
End: 2019-01-07
Payer: COMMERCIAL

## 2019-01-07 VITALS
HEART RATE: 75 BPM | OXYGEN SATURATION: 96 % | DIASTOLIC BLOOD PRESSURE: 67 MMHG | TEMPERATURE: 98.4 F | BODY MASS INDEX: 33.01 KG/M2 | SYSTOLIC BLOOD PRESSURE: 134 MMHG | WEIGHT: 240 LBS

## 2019-01-07 DIAGNOSIS — G89.29 CHRONIC MIDLINE LOW BACK PAIN WITHOUT SCIATICA: Primary | ICD-10-CM
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
## 2019-01-07 DIAGNOSIS — M54.50 CHRONIC MIDLINE LOW BACK PAIN WITHOUT SCIATICA: Primary | ICD-10-CM

## 2019-01-07 DIAGNOSIS — I10 HYPERTENSION GOAL BP (BLOOD PRESSURE) < 140/90: ICD-10-CM

## 2019-01-07 PROCEDURE — 99213 OFFICE O/P EST LOW 20 MIN: CPT | Performed by: NURSE PRACTITIONER

## 2019-01-07 NOTE — PATIENT INSTRUCTIONS
1.  Agree with physical therapy for low back  2.  Due for teatnus and pneumonia shots  3.  Blood pressure looks good today

## 2019-01-24 ENCOUNTER — OFFICE VISIT (OUTPATIENT)
Dept: FAMILY MEDICINE | Facility: CLINIC | Age: 69
End: 2019-01-24
Payer: COMMERCIAL

## 2019-01-24 VITALS
BODY MASS INDEX: 33.85 KG/M2 | HEART RATE: 66 BPM | WEIGHT: 241.8 LBS | TEMPERATURE: 97.6 F | HEIGHT: 71 IN | DIASTOLIC BLOOD PRESSURE: 72 MMHG | SYSTOLIC BLOOD PRESSURE: 128 MMHG | RESPIRATION RATE: 20 BRPM | OXYGEN SATURATION: 96 %

## 2019-01-24 DIAGNOSIS — G89.29 CHRONIC BILATERAL LOW BACK PAIN WITHOUT SCIATICA: Primary | ICD-10-CM

## 2019-01-24 DIAGNOSIS — M54.50 CHRONIC BILATERAL LOW BACK PAIN WITHOUT SCIATICA: Primary | ICD-10-CM

## 2019-01-24 PROCEDURE — 99213 OFFICE O/P EST LOW 20 MIN: CPT | Performed by: INTERNAL MEDICINE

## 2019-01-24 RX ORDER — NAPROXEN 500 MG/1
500 TABLET ORAL 2 TIMES DAILY WITH MEALS
Qty: 40 TABLET | Refills: 0 | Status: SHIPPED | OUTPATIENT
Start: 2019-01-24 | End: 2019-05-03

## 2019-01-24 ASSESSMENT — MIFFLIN-ST. JEOR: SCORE: 1898.93

## 2019-01-24 ASSESSMENT — PAIN SCALES - GENERAL: PAINLEVEL: MODERATE PAIN (5)

## 2019-01-24 NOTE — PROGRESS NOTES
SUBJECTIVE:   Don Samson is a 68 year old male presenting with a chief complaint of   Chief Complaint   Patient presents with     Back Pain     low back pain x 2d Hx of this pain off and on x 2years  Was previously referred to PT, but they did not see referral       He is an established patient of [facility].    Back Pain    Onset of flare-up symptoms was 2 day(s) ago.  Location: bilateral low back  Radiation: does not radiate  Context:      no injury recalled to set off symptoms     Current and Associated symptoms: pain  Denies: fecal incontinence, urinary incontinence, lower extremity numbness, lower extremity weakness and paresthesia    Aggravating Factors: 'i don't know  Therapies to improve symptoms include: massage  Past history: recurrent self limited episodes of low back pain in the past and   A prescription for cream, muscle relaxant,         LUMBAR SPINE TWO TO THREE VIEWS  12/17/2018 9:35 AM      HISTORY: Chronic bilateral low back pain without sciatica.     COMPARISON: None.                                                                      IMPRESSION: Five functional lumbar vertebral segments are present.  Alignment is normal. No acute bony abnormality and no significant  degenerative changes. Minimal vascular calcifications are noted    Review of Systems    Past Medical History:   Diagnosis Date     Ankle pain 5/21/2016     Ankle sprain and strain 9/16/2010     CARDIOVASCULAR SCREENING; LDL GOAL LESS THAN 130 9/6/2016     Depression     declines treatment 4/1/16     HTN (hypertension)     declines treatment 4/1/16     Left knee pain 6/18/2015     Plantar fasciitis 9/16/2010     Family History   Problem Relation Age of Onset     Hypertension Mother      Current Outpatient Medications   Medication Sig Dispense Refill     amLODIPine (NORVASC) 10 MG tablet Take 1 tablet (10 mg) by mouth daily 90 tablet 1     naproxen (NAPROSYN) 500 MG tablet Take 1 tablet (500 mg) by mouth 2 times daily (with meals)  "for 20 days 40 tablet 0     olmesartan (BENICAR) 20 MG tablet Take 1 tablet (20 mg) by mouth daily (with breakfast) 90 tablet 1     Social History     Tobacco Use     Smoking status: Former Smoker     Last attempt to quit: 1970     Years since quittin.0     Smokeless tobacco: Never Used   Substance Use Topics     Alcohol use: Yes     Alcohol/week: 0.0 oz       OBJECTIVE  /72 (BP Location: Right leg, Patient Position: Sitting, Cuff Size: Adult Large)   Pulse 66   Temp 97.6  F (36.4  C) (Oral)   Resp 20   Ht 1.803 m (5' 11\")   Wt 109.7 kg (241 lb 12.8 oz)   SpO2 96%   BMI 33.72 kg/m      Physical Exam   Constitutional: He appears well-developed and well-nourished.   Musculoskeletal:   BACK:   Lumbosacral spine area reveals local tenderness around L4-5/S1.   Straight leg raise is negative  at 45 degrees on bilateral .  DTR's, motor strength and sensation normal, including heel and toe gait.    Vitals reviewed.      Labs:  No results found for this or any previous visit (from the past 24 hour(s)).    X-Ray was not done.    ASSESSMENT:      ICD-10-CM    1. Chronic bilateral low back pain without sciatica M54.5 naproxen (NAPROSYN) 500 MG tablet    G89.29 [facility] REFERR[facility]          Patient Instructions   Naprosyn 500 mg 2 x day with food  Aspercreme.  [place] balm    Physical Therapy     Consider beginning yoga.    Discussed tight hamstrings can contribute to back pain.    Reassured xray shows no arthritis.    LUMBAR SPINE TWO TO THREE VIEWS  2018 9:35 AM      HISTORY: Chronic bilateral low back pain without sciatica.     COMPARISON: None.                                                                      IMPRESSION: Five functional lumbar vertebral segments are present.  Alignment is normal. No acute bony abnormality and no significant  degenerative changes. Minimal vascular calcifications are noted.      Recheck 3-4 weeks with primary if not better.    Patient Education "     Back Care Tips    Caring for your back  These are things you can do to prevent a recurrence of acute back pain and to reduce symptoms from chronic back pain:    Maintain a healthy weight. If you are overweight, losing weight will help most types of back pain.    Exercise is an important part of recovery from most types of back pain. The muscles behind and in front of the spine support the back. This means strengthening both the back muscles and the abdominal muscles will provide better support for your spine.     Swimming and brisk walking are good overall exercises to improve your fitness level.    Practice safe lifting methods (below).    Practice good posture when sitting, standing and walking. Avoid prolonged sitting. This puts more stress on the lower back than standing or walking.    Wear quality shoes with sufficient arch support. Foot and ankle alignment can affect back symptoms. Women should avoid wearing high heels.    Therapeutic massage can help relax the back muscles without stretching them.    During the first 24 to 72 hours after an acute injury or flare-up of chronic back pain, apply an ice pack to the painful area for 20 minutes and then remove it for 20 minutes, over a period of 60 to 90 minutes, or several times a day. As a safety precaution, do not use a heating pad at bedtime. Sleeping on a heating pad can lead to skin burns or tissue damage.    You can alternate ice and heat therapies.  Medicines  Talk to your healthcare provider before using medicines, especially if you have other medical problems or are taking other medicines.    You may use acetaminophen or ibuprofen to control pain, unless your healthcare provider prescribed other pain medicine. If you have chronic conditions like diabetes, liver or kidney disease, stomach ulcers, or gastrointestinal bleeding, or are taking blood thinners, talk with your healthcare provider before taking any medicines.    Be careful if you are given  prescription pain medicines, narcotics, or medicine for muscle spasm. They can cause drowsiness, affect your coordination, reflexes, and judgment. Do not drive or operate heavy machinery while taking these types of medicines. Take prescription pain medicine only as prescribed by your healthcare provider.  Lumbar stretch  Here is a simple stretching exercise that will help relax muscle spasm and keep your back more limber. If exercise makes your back pain worse, don t do it.    Lie on your back with your knees bent and both feet on the ground.    Slowly raise your left knee to your chest as you flatten your lower back against the floor. Hold for 5 seconds.    Relax and repeat the exercise with your right knee.    Do 10 of these exercises for each leg.  Safe lifting method    Don t bend over at the waist to lift an object off the floor.  Instead, bend your knees and hips in a squat.     Keep your back and head upright    Hold the object close to your body, directly in front of you.    Straighten your legs to lift the object.     Lower the object to the floor in the reverse fashion.    If you must slide something across the floor, push it.  Posture tips  Sitting  Sit in chairs with straight backs or low-back support. Keep your knees lower than your hips, with your feet flat on the floor.  When driving, sit up straight. Adjust the seat forward so you are not leaning toward the steering wheel.  A small pillow or rolled towel behind your lower back may help if you are driving long distances.   Standing  When standing for long periods, shift most of your weight to one leg at a time. Alternate legs every few minutes.   Sleeping  The best way to sleep is on your side with your knees bent. Put a low pillow under your head to support your neck in a neutral spine position. Avoid thick pillows that bend your neck to one side. Put a pillow between your legs to further relax your lower back. If you sleep on your back, put pillows  under your knees to support your legs in a slightly flexed position. Use a firm mattress. If your mattress sags, replace it, or use a 1/2-inch plywood board under the mattress to add support.  Follow-up care  Follow up with your healthcare provider, or as advised.  If X-rays, a CT scan or an MRI scan were taken, they will be reviewed by a radiologist. You will be notified of any new findings that may affect your care.  Call 911  Call 911 if any of the following occur:    Trouble breathing    Confusion    Very drowsy    Fainting or loss of consciousness    Rapid or very slow heart rate    Loss of  bowel or bladder control  When to seek medical advice  Call your healthcare provider right away if any of the following occur:    Pain becomes worse or spreads to your arms or legs    Weakness or numbness in one or both arms or legs    Numbness in the groin area  Date Last Reviewed: 6/1/2016 2000-2018 [email or web address]. [address] [place] [address]. All rights reserved. This information is not intended as a substitute for professional medical care. Always follow your healthcare professional's instructions.           Patient Education     Back Pain (Acute or Chronic)    Back pain is one of the most common problems. The good news is that most people feel better in 1 to 2 weeks, and most of the rest in 1 to 2 months. Most people can remain active.  People experience and describe pain differently; not everyone is the same.    The pain can be sharp, stabbing, shooting, aching, cramping or burning.    Movement, standing, bending, lifting, sitting, or walking may worsen pain.    It can be localized to one spot or area, or it can be more generalized.    It can spread or radiate upwards, to the front, or go down your arms or legs (sciatica).    It can cause muscle spasm.  Most of the time, mechanical problems with the muscles or spine cause the pain. Mechanical problems are usually caused by an injury to the  muscles or ligaments. While illness can cause back pain, it is usually not caused by a serious illness. Mechanical problems include:     Physical activity such as sports, exercise, work, or normal activity    Overexertion, lifting, pushing, pulling incorrectly or too aggressively    Sudden twisting, bending, or stretching from an accident, or accidental movement    Poor posture    Stretching or moving wrong, without noticing pain at the time    Poor coordination, lack of regular exercise (check with your doctor about this)    Spinal disc disease or arthritis    Stress  Pain can also be related to pregnancy, or illness like appendicitis, bladder or kidney infections, pelvic infections, and many other things.  Acute back pain usually gets better in 1 to 2 weeks. Back pain related to disk disease, arthritis in the spinal joints or spinal stenosis (narrowing of the spinal canal) can become chronic and last for months or years.  Unless you had a physical injury (for example, a car accident or fall) X-rays are usually not needed for the initial evaluation of back pain. If pain continues and does not respond to medical treatment, X-rays and other tests may be needed.  Home care  Try these home care recommendations:    When in bed, try to find a position of comfort. A firm mattress is best. Try lying flat on your back with pillows under your knees. You can also try lying on your side with your knees bent up towards your chest and a pillow between your knees.    At first, do not try to stretch out the sore spots. If there is a strain, it is not like the good soreness you get after exercising without an injury. In this case, stretching may make it worse.    Avoid prolong sitting, long car rides, or travel. This puts more stress on the lower back than standing or walking.    During the first 24 to 72 hours after an acute injury or flare up of chronic back pain, apply an ice pack to the painful area for 20 minutes and then  remove it for 20 minutes. Do this over a period of 60 to 90 minutes or several times a day. This will reduce swelling and pain. Wrap the ice pack in a thin towel or plastic to protect your skin.    You can start with ice, then switch to heat. Heat (hot shower, hot bath, or heating pad) reduces pain and works well for muscle spasms. Heat can be applied to the painful area for 20 minutes then remove it for 20 minutes. Do this over a period of 60 to 90 minutes or several times a day. Do not sleep on a heating pad. It can lead to skin burns or tissue damage.    You can alternate ice and heat therapy. Talk with your doctor about the best treatment for your back pain.    Therapeutic massage can help relax the back muscles without stretching them.    Be aware of safe lifting methods and do not lift anything without stretching first.  Medicines  Talk to your doctor before using medicine, especially if you have other medical problems or are taking other medicines.    You may use over-the-counter medicine as directed on the bottle to control pain, unless another pain medicine was prescribed. If you have chronic conditions like diabetes, liver or kidney disease, stomach ulcers, or gastrointestinal bleeding, or are taking blood thinners, talk to your doctor before taking any medicine.    Be careful if you are given a prescription medicines, narcotics, or medicine for muscle spasms. They can cause drowsiness, affect your coordination, reflexes, and judgement. Do not drive or operate heavy machinery.  Follow-up care  Follow up with your healthcare provider, or as advised.   A radiologist will review any X-rays that were taken. Your provide will notify you of any new findings that may affect your care.  Call 911  Call emergency services if any of the following occur:    Trouble breathing    Confusion    Very drowsy or trouble awakening    Fainting or loss of consciousness    Rapid or very slow heart rate    Loss of bowel or  bladder control  When to seek medical advice  Call your healthcare provider right away if any of these occur:     Pain becomes worse or spreads to your legs    Weakness or numbness in one or both legs    Numbness in the groin or genital area  Date Last Reviewed: 7/1/2016 2000-2017 [employer]. [address] [place] [id number]. All rights reserved. This information is not intended as a substitute for professional medical care. Always follow your healthcare professional's instructions.

## 2019-01-24 NOTE — PATIENT INSTRUCTIONS
Naprosyn 500 mg 2 x day with food  Aspercreme.  Hazel balm    Physical Therapy     Consider beginning yoga.    Discussed tight hamstrings can contribute to back pain.    Reassured xray shows no arthritis.    LUMBAR SPINE TWO TO THREE VIEWS  12/17/2018 9:35 AM      HISTORY: Chronic bilateral low back pain without sciatica.     COMPARISON: None.                                                                      IMPRESSION: Five functional lumbar vertebral segments are present.  Alignment is normal. No acute bony abnormality and no significant  degenerative changes. Minimal vascular calcifications are noted.      Recheck 3-4 weeks with primary if not better.    Patient Education     Back Care Tips    Caring for your back  These are things you can do to prevent a recurrence of acute back pain and to reduce symptoms from chronic back pain:    Maintain a healthy weight. If you are overweight, losing weight will help most types of back pain.    Exercise is an important part of recovery from most types of back pain. The muscles behind and in front of the spine support the back. This means strengthening both the back muscles and the abdominal muscles will provide better support for your spine.     Swimming and brisk walking are good overall exercises to improve your fitness level.    Practice safe lifting methods (below).    Practice good posture when sitting, standing and walking. Avoid prolonged sitting. This puts more stress on the lower back than standing or walking.    Wear quality shoes with sufficient arch support. Foot and ankle alignment can affect back symptoms. Women should avoid wearing high heels.    Therapeutic massage can help relax the back muscles without stretching them.    During the first 24 to 72 hours after an acute injury or flare-up of chronic back pain, apply an ice pack to the painful area for 20 minutes and then remove it for 20 minutes, over a period of 60 to 90 minutes, or several times a day.  As a safety precaution, do not use a heating pad at bedtime. Sleeping on a heating pad can lead to skin burns or tissue damage.    You can alternate ice and heat therapies.  Medicines  Talk to your healthcare provider before using medicines, especially if you have other medical problems or are taking other medicines.    You may use acetaminophen or ibuprofen to control pain, unless your healthcare provider prescribed other pain medicine. If you have chronic conditions like diabetes, liver or kidney disease, stomach ulcers, or gastrointestinal bleeding, or are taking blood thinners, talk with your healthcare provider before taking any medicines.    Be careful if you are given prescription pain medicines, narcotics, or medicine for muscle spasm. They can cause drowsiness, affect your coordination, reflexes, and judgment. Do not drive or operate heavy machinery while taking these types of medicines. Take prescription pain medicine only as prescribed by your healthcare provider.  Lumbar stretch  Here is a simple stretching exercise that will help relax muscle spasm and keep your back more limber. If exercise makes your back pain worse, don t do it.    Lie on your back with your knees bent and both feet on the ground.    Slowly raise your left knee to your chest as you flatten your lower back against the floor. Hold for 5 seconds.    Relax and repeat the exercise with your right knee.    Do 10 of these exercises for each leg.  Safe lifting method    Don t bend over at the waist to lift an object off the floor.  Instead, bend your knees and hips in a squat.     Keep your back and head upright    Hold the object close to your body, directly in front of you.    Straighten your legs to lift the object.     Lower the object to the floor in the reverse fashion.    If you must slide something across the floor, push it.  Posture tips  Sitting  Sit in chairs with straight backs or low-back support. Keep your knees lower than your  hips, with your feet flat on the floor.  When driving, sit up straight. Adjust the seat forward so you are not leaning toward the steering wheel.  A small pillow or rolled towel behind your lower back may help if you are driving long distances.   Standing  When standing for long periods, shift most of your weight to one leg at a time. Alternate legs every few minutes.   Sleeping  The best way to sleep is on your side with your knees bent. Put a low pillow under your head to support your neck in a neutral spine position. Avoid thick pillows that bend your neck to one side. Put a pillow between your legs to further relax your lower back. If you sleep on your back, put pillows under your knees to support your legs in a slightly flexed position. Use a firm mattress. If your mattress sags, replace it, or use a 1/2-inch plywood board under the mattress to add support.  Follow-up care  Follow up with your healthcare provider, or as advised.  If X-rays, a CT scan or an MRI scan were taken, they will be reviewed by a radiologist. You will be notified of any new findings that may affect your care.  Call 911  Call 911 if any of the following occur:    Trouble breathing    Confusion    Very drowsy    Fainting or loss of consciousness    Rapid or very slow heart rate    Loss of  bowel or bladder control  When to seek medical advice  Call your healthcare provider right away if any of the following occur:    Pain becomes worse or spreads to your arms or legs    Weakness or numbness in one or both arms or legs    Numbness in the groin area  Date Last Reviewed: 6/1/2016 2000-2018 [employer]. [address] [place] [id number]. All rights reserved. This information is not intended as a substitute for professional medical care. Always follow your healthcare professional's instructions.           Patient Education     Back Pain (Acute or Chronic)    Back pain is one of the most common problems. The good news is  that most people feel better in 1 to 2 weeks, and most of the rest in 1 to 2 months. Most people can remain active.  People experience and describe pain differently; not everyone is the same.    The pain can be sharp, stabbing, shooting, aching, cramping or burning.    Movement, standing, bending, lifting, sitting, or walking may worsen pain.    It can be localized to one spot or area, or it can be more generalized.    It can spread or radiate upwards, to the front, or go down your arms or legs (sciatica).    It can cause muscle spasm.  Most of the time, mechanical problems with the muscles or spine cause the pain. Mechanical problems are usually caused by an injury to the muscles or ligaments. While illness can cause back pain, it is usually not caused by a serious illness. Mechanical problems include:     Physical activity such as sports, exercise, work, or normal activity    Overexertion, lifting, pushing, pulling incorrectly or too aggressively    Sudden twisting, bending, or stretching from an accident, or accidental movement    Poor posture    Stretching or moving wrong, without noticing pain at the time    Poor coordination, lack of regular exercise (check with your doctor about this)    Spinal disc disease or arthritis    Stress  Pain can also be related to pregnancy, or illness like appendicitis, bladder or kidney infections, pelvic infections, and many other things.  Acute back pain usually gets better in 1 to 2 weeks. Back pain related to disk disease, arthritis in the spinal joints or spinal stenosis (narrowing of the spinal canal) can become chronic and last for months or years.  Unless you had a physical injury (for example, a car accident or fall) X-rays are usually not needed for the initial evaluation of back pain. If pain continues and does not respond to medical treatment, X-rays and other tests may be needed.  Home care  Try these home care recommendations:    When in bed, try to find a position  of comfort. A firm mattress is best. Try lying flat on your back with pillows under your knees. You can also try lying on your side with your knees bent up towards your chest and a pillow between your knees.    At first, do not try to stretch out the sore spots. If there is a strain, it is not like the good soreness you get after exercising without an injury. In this case, stretching may make it worse.    Avoid prolong sitting, long car rides, or travel. This puts more stress on the lower back than standing or walking.    During the first 24 to 72 hours after an acute injury or flare up of chronic back pain, apply an ice pack to the painful area for 20 minutes and then remove it for 20 minutes. Do this over a period of 60 to 90 minutes or several times a day. This will reduce swelling and pain. Wrap the ice pack in a thin towel or plastic to protect your skin.    You can start with ice, then switch to heat. Heat (hot shower, hot bath, or heating pad) reduces pain and works well for muscle spasms. Heat can be applied to the painful area for 20 minutes then remove it for 20 minutes. Do this over a period of 60 to 90 minutes or several times a day. Do not sleep on a heating pad. It can lead to skin burns or tissue damage.    You can alternate ice and heat therapy. Talk with your doctor about the best treatment for your back pain.    Therapeutic massage can help relax the back muscles without stretching them.    Be aware of safe lifting methods and do not lift anything without stretching first.  Medicines  Talk to your doctor before using medicine, especially if you have other medical problems or are taking other medicines.    You may use over-the-counter medicine as directed on the bottle to control pain, unless another pain medicine was prescribed. If you have chronic conditions like diabetes, liver or kidney disease, stomach ulcers, or gastrointestinal bleeding, or are taking blood thinners, talk to your doctor  before taking any medicine.    Be careful if you are given a prescription medicines, narcotics, or medicine for muscle spasms. They can cause drowsiness, affect your coordination, reflexes, and judgement. Do not drive or operate heavy machinery.  Follow-up care  Follow up with your healthcare provider, or as advised.   A radiologist will review any X-rays that were taken. Your provide will notify you of any new findings that may affect your care.  Call 911  Call emergency services if any of the following occur:    Trouble breathing    Confusion    Very drowsy or trouble awakening    Fainting or loss of consciousness    Rapid or very slow heart rate    Loss of bowel or bladder control  When to seek medical advice  Call your healthcare provider right away if any of these occur:     Pain becomes worse or spreads to your legs    Weakness or numbness in one or both legs    Numbness in the groin or genital area  Date Last Reviewed: 7/1/2016 2000-2017 [employer]. [address] [place] [address]. All rights reserved. This information is not intended as a substitute for professional medical care. Always follow your healthcare professional's instructions.

## 2019-02-24 ENCOUNTER — OFFICE VISIT (OUTPATIENT)
Dept: URGENT CARE | Facility: URGENT CARE | Age: 69
End: 2019-02-24
Payer: COMMERCIAL

## 2019-02-24 VITALS
WEIGHT: 247 LBS | HEART RATE: 79 BPM | TEMPERATURE: 97.2 F | OXYGEN SATURATION: 97 % | SYSTOLIC BLOOD PRESSURE: 144 MMHG | DIASTOLIC BLOOD PRESSURE: 80 MMHG | BODY MASS INDEX: 34.45 KG/M2

## 2019-02-24 DIAGNOSIS — J06.9 VIRAL URI WITH COUGH: Primary | ICD-10-CM

## 2019-02-24 PROCEDURE — 99213 OFFICE O/P EST LOW 20 MIN: CPT | Performed by: PHYSICIAN ASSISTANT

## 2019-02-24 RX ORDER — BENZONATATE 100 MG/1
100 CAPSULE ORAL 3 TIMES DAILY PRN
Qty: 16 CAPSULE | Refills: 0 | Status: SHIPPED | OUTPATIENT
Start: 2019-02-24 | End: 2019-02-27

## 2019-02-24 ASSESSMENT — ENCOUNTER SYMPTOMS
DIARRHEA: 0
MYALGIAS: 0
HEADACHES: 0
FOCAL WEAKNESS: 0
SHORTNESS OF BREATH: 0
NAUSEA: 0
SORE THROAT: 0
COUGH: 1
VOMITING: 0
SINUS PAIN: 0
FEVER: 0
CHILLS: 0
ABDOMINAL PAIN: 0

## 2019-02-24 NOTE — PROGRESS NOTES
HPI  2019    HPI: Don Samson is a 69 year old male who complains of moderate nasal congestion & dry cough onset 1 week ago. Symptoms are constant in duration. No treatments tried. Denies myalgias, sinus pressure, ear pain, sore throat, fever/chills, HA, CP, SOB, abd pain, N/V/D, rash, or any other symptoms. Patient denies sick contacts.    Past Medical History:   Diagnosis Date     Ankle pain 2016     Ankle sprain and strain 2010     CARDIOVASCULAR SCREENING; LDL GOAL LESS THAN 130 2016     Depression     declines treatment 16     HTN (hypertension)     declines treatment 16     Left knee pain 2015     Plantar fasciitis 2010     Past Surgical History:   Procedure Laterality Date     HERNIORRHAPHY INGUINAL BILATERAL      RIH , [facility]      NASAL/SINUS POLYPECTOMY      Nasal-Sinus Polypectomy     Social History     Tobacco Use     Smoking status: Former Smoker     Last attempt to quit: 1970     Years since quittin.1     Smokeless tobacco: Never Used   Substance Use Topics     Alcohol use: Yes     Alcohol/week: 0.0 oz     Drug use: No     Patient Active Problem List   Diagnosis     Tibialis posterior tendonitis     Fatigue     Right knee pain     Hypertension goal BP (blood pressure) < 140/90     Major depressive disorder, recurrent, moderate (H)     Family History   Problem Relation Age of Onset     Hypertension Mother         Problem list, Medication list, Allergies, and Medical/Social/Surgical histories reviewed in [facility] and updated as appropriate.      Review of Systems   Constitutional: Negative for chills and fever.   HENT: Positive for congestion. Negative for sinus pain and sore throat.    Respiratory: Positive for cough. Negative for shortness of breath.    Cardiovascular: Negative for chest pain.   Gastrointestinal: Negative for abdominal pain, diarrhea, nausea and vomiting.   Musculoskeletal: Negative for myalgias.   Skin: Negative for rash.    Neurological: Negative for focal weakness and headaches.   All other systems reviewed and are negative.        Physical Exam   Constitutional: He is oriented to person, place, and time.   HENT:   Head: Normocephalic and atraumatic.   Right Ear: Tympanic membrane and external ear normal.   Left Ear: Tympanic membrane and external ear normal.   Nose: Rhinorrhea present.   Mouth/Throat: Uvula is midline, oropharynx is clear and moist and mucous membranes are normal.   Cardiovascular: Normal rate, regular rhythm and normal heart sounds.   Pulmonary/Chest: Effort normal and breath sounds normal.   Musculoskeletal: Normal range of motion.   Neurological: He is alert and oriented to person, place, and time.   Skin: Skin is warm and dry.   Nursing note and vitals reviewed.    Vital Signs  /80   Pulse 79   Temp 97.2  F (36.2  C) (Tympanic)   Wt 112 kg (247 lb)   SpO2 97%   BMI 34.45 kg/m       Diagnostic Test Results:  none     ASSESSMENT/PLAN      ICD-10-CM    1. Viral URI with cough J06.9 benzonatate (TESSALON) 100 MG capsule    B97.89       Lungs CTAB, afebrile, no resp distress. C/w viral URI- Rx Tessalon perles for cough.       I have discussed any lab or imaging results, the patient's diagnosis, and my plan of treatment with the patient and/or family. Patient is aware to come back in if with worsening symptoms or if no relief despite treatment plan.  Patient voiced understanding and had no further questions.       Follow Up: Return in about 1 week (around 3/3/2019) for Follow up w/ PCP if not better.    NISHA Renee, PALizetC  [facility]

## 2019-02-24 NOTE — LETTER
[facility]  [address] [facility] [address]  Phone: [phone]    February 24, 2019        Don Samson  [address]  [facility] [address]          To whom it may concern:    RE: Don Samson    Patient was seen and treated today at our clinic and missed work. He may return to work when he is without symptoms for 24 hours.    Please contact me for questions or concerns.      Sincerely,        Erika Kwan PA-C

## 2019-02-27 ENCOUNTER — OFFICE VISIT (OUTPATIENT)
Dept: FAMILY MEDICINE | Facility: CLINIC | Age: 69
End: 2019-02-27
Payer: COMMERCIAL

## 2019-02-27 VITALS
DIASTOLIC BLOOD PRESSURE: 74 MMHG | OXYGEN SATURATION: 97 % | HEART RATE: 90 BPM | SYSTOLIC BLOOD PRESSURE: 118 MMHG | TEMPERATURE: 97.4 F | WEIGHT: 243 LBS | BODY MASS INDEX: 33.89 KG/M2 | RESPIRATION RATE: 16 BRPM

## 2019-02-27 DIAGNOSIS — J06.9 VIRAL URI WITH COUGH: ICD-10-CM

## 2019-02-27 DIAGNOSIS — I10 HYPERTENSION GOAL BP (BLOOD PRESSURE) < 140/90: ICD-10-CM

## 2019-02-27 PROCEDURE — 99213 OFFICE O/P EST LOW 20 MIN: CPT | Performed by: FAMILY MEDICINE

## 2019-02-27 RX ORDER — BENZONATATE 100 MG/1
100 CAPSULE ORAL 3 TIMES DAILY PRN
Qty: 16 CAPSULE | Refills: 0 | Status: SHIPPED | OUTPATIENT
Start: 2019-02-27 | End: 2019-03-25

## 2019-02-27 NOTE — TELEPHONE ENCOUNTER
"Requested Prescriptions   Pending Prescriptions Disp Refills     olmesartan (BENICAR) 20 MG tablet [Pharmacy Med Name: OLMESARTAN MEDOXOMIL 20MG TABLETS]  Last Written Prescription Date:  8/29/2018  Last Fill Quantity: 90 tablet,  # refills: 1   Last Office Visit: 12/27/2018   Future Office Visit:    Next 5 appointments (look out 90 days)    Apr 17, 2019 10:30 AM CDT  SHORT with Sravanthi Woo RPH  [facility] ([facility]) [address] [place] [address]  [phone]        90 tablet 0     Sig: TAKE 1 TABLET BY MOUTH DAILY WITH BREAKFAST    Angiotensin-II Receptors Passed - 2/27/2019 11:38 AM       Passed - Blood pressure under 140/90 in past 12 months    BP Readings from Last 3 Encounters:   02/27/19 118/74   02/24/19 144/80   01/24/19 128/72          Passed - Recent (12 mo) or future (30 days) visit within the authorizing provider's specialty    Patient had office visit in the last 12 months or has a visit in the next 30 days with authorizing provider or within the authorizing provider's specialty.  See \"Patient Info\" tab in inbasket, or \"Choose Columns\" in Meds & Orders section of the refill encounter.           Passed - Medication is active on med list       Passed - Patient is age 18 or older       Passed - Normal serum creatinine on file in past 12 months    Recent Labs   Lab Test 11/28/18  1553   CR 1.11          Passed - Normal serum potassium on file in past 12 months    Recent Labs   Lab Test 11/28/18  1553   POTASSIUM 4.2                      "

## 2019-02-27 NOTE — PROGRESS NOTES
SUBJECTIVE:   Don Samson is a 69 year old male who presents to clinic today for the following health issues:      RESPIRATORY SYMPTOMS      Duration: 1.5 weeks     Description  nasal congestion and cough    Accompanying signs and symptoms: lower back pain comes and goes x years     History (predisposing factors):  none    Therapies tried and outcome:  None     Went to urgent care - did not get rx as he does not drive and back is bothering him. Would like to send rx to our pharmacy as he would like to  before he goes.   No change in cold  symptoms since urgent care but not worsening.   Nasal congestion, cough,   No sinus pressure or headaches.   No fever or chills.   No sick contact.   Had a flu shot this year.   For back already scheduled to see physical therapist.     Problem list and histories reviewed & adjusted, as indicated.  Additional history: as documented    Labs reviewed in EPIC    Reviewed and updated as needed this visit by clinical staff  Tobacco  Allergies  Meds  Med Hx  Surg Hx  Fam Hx  Soc Hx      Reviewed and updated as needed this visit by Provider      Social History     Occupational History     Not on file   Tobacco Use     Smoking status: Former Smoker     Last attempt to quit: 1970     Years since quittin.1     Smokeless tobacco: Never Used   Substance and Sexual Activity     Alcohol use: Yes     Alcohol/week: 0.0 oz     Drug use: No     Sexual activity: Not Currently     No Known Allergies  Patient Active Problem List   Diagnosis     Tibialis posterior tendonitis     Fatigue     Right knee pain     Hypertension goal BP (blood pressure) < 140/90     Major depressive disorder, recurrent, moderate (H)     Reviewed medications, social history and  past medical and surgical history.    Review of system: for general, respiratory, CVS, GI and psychiatry negative except for noted above.     EXAM:  /74 (BP Location: Right arm, Patient Position: Sitting, Cuff Size:  Adult Large)   Pulse 90   Temp 97.4  F (36.3  C) (Oral)   Resp 16   Wt 110.2 kg (243 lb)   SpO2 97%   BMI 33.89 kg/m    Constitutional: healthy, alert and no distress   Psychiatric: mentation appears normal and affect normal/bright  Cardiovascular: RRR. No murmurs,  Respiratory: negative, Lungs clear. No crackles or wheezing. No tachypnea.   Head: Normocephalic. No masses, lesions, tenderness or abnormalities  Neck: Neck supple. No adenopathy.    ENT: Both TM exam normal, no neck nodes or sinus tenderness, minimal nasal turbinate hypertrophy, throat clear       ASSESSMENT / PLAN:  (J06.9,  B97.89) Viral URI with cough  Comment: no change in exam. Would like to  rx from this pharmacy. New rx sent. If not improving in a week - follow up with PCP.   Plan: benzonatate (TESSALON) 100 MG capsule             Follow up:  With pcp.     The above note was dictated using voice recognition. Although reviewed after completion, some word and grammatical error may remain .

## 2019-02-28 ENCOUNTER — THERAPY VISIT (OUTPATIENT)
Dept: PHYSICAL THERAPY | Facility: CLINIC | Age: 69
End: 2019-02-28
Payer: COMMERCIAL

## 2019-02-28 DIAGNOSIS — M54.50 LUMBAGO: ICD-10-CM

## 2019-02-28 DIAGNOSIS — G89.29 CHRONIC BILATERAL LOW BACK PAIN WITHOUT SCIATICA: ICD-10-CM

## 2019-02-28 DIAGNOSIS — M54.50 CHRONIC BILATERAL LOW BACK PAIN WITHOUT SCIATICA: ICD-10-CM

## 2019-02-28 PROCEDURE — 97110 THERAPEUTIC EXERCISES: CPT | Mod: GP | Performed by: PHYSICAL THERAPIST

## 2019-02-28 PROCEDURE — 97530 THERAPEUTIC ACTIVITIES: CPT | Mod: GP | Performed by: PHYSICAL THERAPIST

## 2019-02-28 PROCEDURE — 97161 PT EVAL LOW COMPLEX 20 MIN: CPT | Mod: GP | Performed by: PHYSICAL THERAPIST

## 2019-03-01 PROBLEM — M54.50 LUMBAGO: Status: ACTIVE | Noted: 2019-03-01

## 2019-03-03 RX ORDER — OLMESARTAN MEDOXOMIL 20 MG/1
TABLET ORAL
Qty: 90 TABLET | Refills: 2 | Status: SHIPPED | OUTPATIENT
Start: 2019-03-03 | End: 2019-09-24

## 2019-03-03 NOTE — TELEPHONE ENCOUNTER
Prescription approved per [facility] Refill Protocol.  Rosa Elena Ansari RN

## 2019-03-08 ENCOUNTER — THERAPY VISIT (OUTPATIENT)
Dept: PHYSICAL THERAPY | Facility: CLINIC | Age: 69
End: 2019-03-08
Payer: COMMERCIAL

## 2019-03-08 DIAGNOSIS — M54.50 LUMBAGO: ICD-10-CM

## 2019-03-08 PROCEDURE — 97110 THERAPEUTIC EXERCISES: CPT | Mod: GP | Performed by: PHYSICAL THERAPIST

## 2019-03-08 PROCEDURE — 97112 NEUROMUSCULAR REEDUCATION: CPT | Mod: GP | Performed by: PHYSICAL THERAPIST

## 2019-03-25 ENCOUNTER — OFFICE VISIT (OUTPATIENT)
Dept: FAMILY MEDICINE | Facility: CLINIC | Age: 69
End: 2019-03-25
Payer: COMMERCIAL

## 2019-03-25 VITALS
RESPIRATION RATE: 19 BRPM | DIASTOLIC BLOOD PRESSURE: 75 MMHG | WEIGHT: 236 LBS | SYSTOLIC BLOOD PRESSURE: 115 MMHG | OXYGEN SATURATION: 96 % | HEART RATE: 65 BPM | TEMPERATURE: 97.7 F | BODY MASS INDEX: 32.92 KG/M2

## 2019-03-25 DIAGNOSIS — S76.201A INJURY OF ADDUCTOR MUSCLE, FASCIA, AND TENDON OF RIGHT THIGH, INITIAL ENCOUNTER: Primary | ICD-10-CM

## 2019-03-25 PROCEDURE — 99213 OFFICE O/P EST LOW 20 MIN: CPT | Performed by: PHYSICIAN ASSISTANT
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
## 2019-03-25 NOTE — PATIENT INSTRUCTIONS
For acute pain, rest and application of heat and ice intermittently as needed and especially after any offending activity (do not sleep on heating pad).   May use over the counter analgesics (NSAIDS (ibuprofen, advil, aleve type products) 400-600 mg every 6 to 8 hours as needed for pain, please take with food ) and/or acetaminophen (Tylenol - 1000 mg three times a day)  Discussed longer term treatment plan of prn NSAIDS (ibuprofen, advil, aleve type products) and importance at home stretches/exercise program.    Proper lifting with avoidance of heavy lifting discussed. As pain recedes, begin normal activities slowly as tolerated.    Consider Physical Therapy and XRay studies if not improving. Call or return to clinic prn if these symptoms worsen or fail to improve as anticipated with symptomatic care.

## 2019-03-26 ENCOUNTER — TELEPHONE (OUTPATIENT)
Dept: FAMILY MEDICINE | Facility: CLINIC | Age: 69
End: 2019-03-26

## 2019-03-26 NOTE — TELEPHONE ENCOUNTER
Pt was seen by Dr Gomez yesterday, he needs a note for his work not able to return to work until his leg feels better. It is still hurting today. Please call pt when note is ready he will pick it up at .

## 2019-04-25 ENCOUNTER — OFFICE VISIT (OUTPATIENT)
Dept: FAMILY MEDICINE | Facility: CLINIC | Age: 69
End: 2019-04-25
Payer: COMMERCIAL

## 2019-04-25 VITALS
SYSTOLIC BLOOD PRESSURE: 126 MMHG | OXYGEN SATURATION: 98 % | TEMPERATURE: 98.6 F | RESPIRATION RATE: 16 BRPM | DIASTOLIC BLOOD PRESSURE: 75 MMHG | HEART RATE: 78 BPM

## 2019-04-25 DIAGNOSIS — G89.29 CHRONIC RIGHT-SIDED LOW BACK PAIN WITH RIGHT-SIDED SCIATICA: Primary | ICD-10-CM

## 2019-04-25 DIAGNOSIS — M54.41 CHRONIC RIGHT-SIDED LOW BACK PAIN WITH RIGHT-SIDED SCIATICA: Primary | ICD-10-CM

## 2019-04-25 PROCEDURE — 99213 OFFICE O/P EST LOW 20 MIN: CPT | Performed by: FAMILY MEDICINE

## 2019-04-25 RX ORDER — CYCLOBENZAPRINE HCL 10 MG
10 TABLET ORAL
COMMUNITY
Start: 2017-02-08 | End: 2019-09-24

## 2019-04-25 ASSESSMENT — PAIN SCALES - GENERAL: PAINLEVEL: MODERATE PAIN (5)

## 2019-04-25 NOTE — LETTER
Medical Note of Necessity    Date: 4/25/2019      Name: Don Samson                       YOB: 1950    Medical Record Number: [id number]    The patient was seen at: [facility]    Please allow for medically receommended deep tissue massage therapy twice weekly to best manage his chronic low back pain with RLE sciatica.      Seen in clinic today.        _________________________  Susanne Wang MD

## 2019-04-25 NOTE — PROGRESS NOTES
SUBJECTIVE:   Don Samson is a 69 year old male who presents to clinic today for the following health issues:      HPI     Presents today to follow up his chronic low back pain with RLE sciatica.  He feels nothing works as well as massage.  He has been to PT and chiropracty.  He used Flexeril with relief but sometimes makes him too groggy so it is not a good option for him during the day.  Continues to work in  at the [employer].  He is on his feet regularly for work.  He requests a letter today for massage therapy.  States if he has a letter- he does not need to pay the taxes on this service.    Additional history: as documented    Reviewed and updated as needed this visit by clinical staff  Tobacco  Allergies  Meds  Med Hx  Surg Hx  Fam Hx  Soc Hx        Reviewed and updated as needed this visit by Provider             Patient Active Problem List   Diagnosis     Tibialis posterior tendonitis     Fatigue     Right knee pain     Hypertension goal BP (blood pressure) < 140/90     Major depressive disorder, recurrent, moderate (H)     Lumbago     Past Surgical History:   Procedure Laterality Date     HERNIORRHAPHY INGUINAL BILATERAL      [facility] , [facility] 1985     NASAL/SINUS POLYPECTOMY      Nasal-Sinus Polypectomy       Social History     Tobacco Use     Smoking status: Former Smoker     Last attempt to quit: 1970     Years since quittin.3     Smokeless tobacco: Never Used   Substance Use Topics     Alcohol use: Yes     Alcohol/week: 0.0 oz     Family History   Problem Relation Age of Onset     Hypertension Mother          Current Outpatient Medications   Medication Sig Dispense Refill     amLODIPine (NORVASC) 10 MG tablet Take 1 tablet (10 mg) by mouth daily 90 tablet 1     cyclobenzaprine (FLEXERIL) 10 MG tablet Take 10 mg by mouth       olmesartan (BENICAR) 20 MG tablet TAKE 1 TABLET BY MOUTH DAILY WITH BREAKFAST 90 tablet 2     Dextromethorphan HBr (VICKS DAYQUIL COUGH PO)        No Known  Allergies  Recent Labs   Lab Test 11/28/18  1553 08/06/18  1538 07/11/18  0837  09/28/16  1613 09/15/16  1659 06/20/16  1000 01/06/16  1110   LDL  --   --  Cannot estimate LDL when triglyceride exceeds 400 mg/dL  --  52  --   --   --    HDL  --   --  23*  --  30*  --   --   --    TRIG  --   --  466*  --  285*  --   --   --    ALT  --   --   --   --   --  35 41 39   CR 1.11 1.10 0.93   < > 1.07 0.99 0.97 1.11   GFRESTIMATED 66 67 81   < > 69 76 77 67   GFRESTBLACK 80 81 >90   < > 84 >90   GFR Calc   >90   GFR Calc   81   POTASSIUM 4.2 4.3 3.7   < > 3.9 3.9 3.8 4.7   TSH  --   --   --   --   --   --  1.45  --     < > = values in this interval not displayed.      BP Readings from Last 3 Encounters:   04/25/19 126/75   03/25/19 115/75   02/27/19 118/74    Wt Readings from Last 3 Encounters:   03/25/19 107 kg (236 lb)   02/27/19 110.2 kg (243 lb)   02/24/19 112 kg (247 lb)                    ROS:  Constitutional, HEENT, cardiovascular, pulmonary, gi and gu systems are negative, except as otherwise noted.    OBJECTIVE:     /75   Pulse 78   Temp 98.6  F (37  C) (Oral)   Resp 16   SpO2 98%   There is no height or weight on file to calculate BMI.  GENERAL: healthy, alert and no distress  EYES: Eyes grossly normal to inspection, PERRL and conjunctivae and sclerae normal  NECK: no adenopathy, no asymmetry, masses, or scars and thyroid normal to palpation  MS: no gross musculoskeletal defects noted, no edema, notes pain in RIGHT buttock with radiation into RIGHT thigh  SKIN: no suspicious lesions or rashes  NEURO: Normal strength and tone, mentation intact and speech normal  PSYCH: mentation appears normal, affect normal/bright    ASSESSMENT/PLAN:     1. Chronic right-sided low back pain with right-sided sciatica    Letter written for him to get deep tissue massages for his chronic low back pain with RLE sciatica.  Recommend continuing with NSAIDs and Flexeril- advised trying half dose  of the Flexeril to see if side effects lessen.  Follow-up prn.    Susanne Wang MD  [facility]

## 2019-05-03 ENCOUNTER — OFFICE VISIT (OUTPATIENT)
Dept: FAMILY MEDICINE | Facility: CLINIC | Age: 69
End: 2019-05-03
Payer: COMMERCIAL

## 2019-05-03 ENCOUNTER — ANCILLARY PROCEDURE (OUTPATIENT)
Dept: GENERAL RADIOLOGY | Facility: CLINIC | Age: 69
End: 2019-05-03
Attending: NURSE PRACTITIONER
Payer: COMMERCIAL

## 2019-05-03 VITALS
TEMPERATURE: 97.4 F | HEART RATE: 81 BPM | RESPIRATION RATE: 16 BRPM | OXYGEN SATURATION: 95 % | BODY MASS INDEX: 32.08 KG/M2 | SYSTOLIC BLOOD PRESSURE: 127 MMHG | WEIGHT: 230 LBS | DIASTOLIC BLOOD PRESSURE: 71 MMHG

## 2019-05-03 DIAGNOSIS — G89.29 CHRONIC BILATERAL LOW BACK PAIN WITHOUT SCIATICA: ICD-10-CM

## 2019-05-03 DIAGNOSIS — M54.50 CHRONIC BILATERAL LOW BACK PAIN WITHOUT SCIATICA: ICD-10-CM

## 2019-05-03 DIAGNOSIS — Z23 NEED FOR PROPHYLACTIC VACCINATION WITH TETANUS-DIPHTHERIA (TD): ICD-10-CM

## 2019-05-03 DIAGNOSIS — Z23 NEED FOR PROPHYLACTIC VACCINATION AGAINST STREPTOCOCCUS PNEUMONIAE (PNEUMOCOCCUS): ICD-10-CM

## 2019-05-03 DIAGNOSIS — M25.551 HIP PAIN, RIGHT: Primary | ICD-10-CM

## 2019-05-03 DIAGNOSIS — M25.551 HIP PAIN, RIGHT: ICD-10-CM

## 2019-05-03 PROCEDURE — 73502 X-RAY EXAM HIP UNI 2-3 VIEWS: CPT

## 2019-05-03 PROCEDURE — 99213 OFFICE O/P EST LOW 20 MIN: CPT | Performed by: NURSE PRACTITIONER

## 2019-05-03 RX ORDER — NAPROXEN 500 MG/1
500 TABLET ORAL 2 TIMES DAILY WITH MEALS
Qty: 20 TABLET | Refills: 0 | Status: SHIPPED | OUTPATIENT
Start: 2019-05-03 | End: 2019-10-14

## 2019-05-03 NOTE — LETTER
[address] [address]  Phone: [phone]    May 3, 2019        Don Samson  [address]  [facility] [address]          To whom it may concern:    RE: Don CLARKE [place]    Patient was seen and treated today at our clinic and missed work 5/1, 5/2 and 5/3/2019.   Please excuse his absences.  .    Please contact me for questions or concerns.      Sincerely,        CONSTANCE Carias CNP
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
## 2019-06-07 ENCOUNTER — OFFICE VISIT (OUTPATIENT)
Dept: URGENT CARE | Facility: URGENT CARE | Age: 69
End: 2019-06-07
Payer: COMMERCIAL

## 2019-06-07 VITALS
WEIGHT: 230 LBS | BODY MASS INDEX: 31.15 KG/M2 | SYSTOLIC BLOOD PRESSURE: 120 MMHG | DIASTOLIC BLOOD PRESSURE: 80 MMHG | HEART RATE: 70 BPM | HEIGHT: 72 IN | TEMPERATURE: 99.2 F | RESPIRATION RATE: 12 BRPM

## 2019-06-07 DIAGNOSIS — S93.401A SPRAIN OF RIGHT ANKLE, UNSPECIFIED LIGAMENT, INITIAL ENCOUNTER: Primary | ICD-10-CM

## 2019-06-07 PROCEDURE — 99213 OFFICE O/P EST LOW 20 MIN: CPT | Performed by: FAMILY MEDICINE

## 2019-06-07 ASSESSMENT — MIFFLIN-ST. JEOR: SCORE: 1856.27

## 2019-06-07 NOTE — LETTER
[facility]  Emergency Room  [address] [place]   [address]  Tel: [phone]   Fax: [phone]  2019    Don Samson   [address]  [facility] [address] [phone] (home)     : 1950          To Whom it May Concern:    Don Samson was seen in our clinic today, 2019. I expect his condition to improve over the next few days.  He may return to work/school, without restriction, when improved. If not improved during the above expected time period,  then I have encouraged him to be rechecked in his clinic for further evaluation.      Please contact me for questions or concerns.    Sincerely,      Malia Rosas MD

## 2019-06-08 NOTE — PROGRESS NOTES
SUBJECTIVE:   Chief Complaint   Patient presents with     Urgent Care     Musculoskeletal Problem     injury to right ankle last night when he fell in a hole.        Joint/Muscle Pain      Onset: last night    Injury?  Yes Details: Stepped on a pot hole while walking     Description:   Location(s): Right ankle  Character: Sharp    Intensity: moderate    Progression of Symptoms: same    Accompanying Signs & Symptoms:  Other symptoms: none    History:   Previous similar pain: no      Worsened by    Overuse?: no  Morning Stiffness?:Didnt obtain     Alleviating factors:  Improved by: rest/inactivity  Therapies Tried and outcome: Icing, tylenol as needed     He request a work letter because he cant get to work today.     Review of Systems review of system negative except as mentioned in HPI.       Past Medical History:   Diagnosis Date     Ankle pain 2016     Ankle sprain and strain 2010     CARDIOVASCULAR SCREENING; LDL GOAL LESS THAN 130 2016     Depression     declines treatment 16     HTN (hypertension)     declines treatment 16     Left knee pain 2015     Plantar fasciitis 2010     Family History   Problem Relation Age of Onset     Hypertension Mother      Current Outpatient Medications   Medication Sig Dispense Refill     amLODIPine (NORVASC) 10 MG tablet Take 1 tablet (10 mg) by mouth daily 90 tablet 1     olmesartan (BENICAR) 20 MG tablet TAKE 1 TABLET BY MOUTH DAILY WITH BREAKFAST 90 tablet 2     cyclobenzaprine (FLEXERIL) 10 MG tablet Take 10 mg by mouth       Dextromethorphan HBr (VICKS DAYQUIL COUGH PO)        naproxen (NAPROSYN) 500 MG tablet Take 1 tablet (500 mg) by mouth 2 times daily (with meals) 20 tablet 0     Social History     Tobacco Use     Smoking status: Former Smoker     Last attempt to quit: 1970     Years since quittin.4     Smokeless tobacco: Never Used   Substance Use Topics     Alcohol use: Yes     Alcohol/week: 0.0 oz       OBJECTIVE  /80    Pulse 70   Temp 99.2  F (37.3  C) (Oral)   Resp 12   Ht 1.829 m (6')   Wt 104.3 kg (230 lb)   BMI 31.19 kg/m      Physical Exam   Constitutional: He appears well-developed and well-nourished. No distress.   HENT:   Head: Normocephalic and atraumatic.   Eyes: Conjunctivae are normal.   Musculoskeletal:   He declined musculoskeletal exam    Neurological: He is alert.   Skin: Skin is warm. He is not diaphoretic.   Psychiatric: He has a normal mood and affect.       Labs:  No results found for this or any previous visit (from the past 24 hour(s)).    X-Ray was not done.    ASSESSMENT:    Don was seen today for urgent care and musculoskeletal problem.    Diagnoses and all orders for this visit:    Sprain of right ankle, unspecified ligament, initial encounter  - Work letter provided   - He will continue with tylenol/ibuprofen as needed, icing, elevation and compression  - He will follow up as needed     Malia Rosas MD

## 2019-06-10 ENCOUNTER — OFFICE VISIT (OUTPATIENT)
Dept: FAMILY MEDICINE | Facility: CLINIC | Age: 69
End: 2019-06-10
Payer: COMMERCIAL

## 2019-06-10 VITALS
RESPIRATION RATE: 16 BRPM | WEIGHT: 231 LBS | HEART RATE: 74 BPM | DIASTOLIC BLOOD PRESSURE: 79 MMHG | BODY MASS INDEX: 31.33 KG/M2 | SYSTOLIC BLOOD PRESSURE: 125 MMHG | TEMPERATURE: 97.8 F | OXYGEN SATURATION: 96 %

## 2019-06-10 DIAGNOSIS — I10 ESSENTIAL HYPERTENSION: Primary | ICD-10-CM

## 2019-06-10 DIAGNOSIS — F33.1 MAJOR DEPRESSIVE DISORDER, RECURRENT, MODERATE (H): ICD-10-CM

## 2019-06-10 DIAGNOSIS — S93.401D SPRAIN OF RIGHT ANKLE, UNSPECIFIED LIGAMENT, SUBSEQUENT ENCOUNTER: ICD-10-CM

## 2019-06-10 PROCEDURE — 99213 OFFICE O/P EST LOW 20 MIN: CPT | Performed by: FAMILY MEDICINE

## 2019-06-10 NOTE — LETTER
[address] [address]  Phone: [phone]    Eugenia 10, 2019        Don Samson  [address]  [facility] [address]          To Whom It May Concern:    RE: Don Samson    Patient was seen and treated today at our clinic.  Please excuse him from work due to an injury on 6/7/19, 6/8/19, and 6/9/19.      Please contact me for questions or concerns.      Sincerely,        Trina Mora MD

## 2019-06-10 NOTE — PROGRESS NOTES
Subjective     Don Samson is a 69 year old male who presents to clinic today for the following health issues:    HPI   Hypertension Follow-up      Do you check your blood pressure regularly outside of the clinic? No     Are you following a low salt diet? Yes- sometime     Are your blood pressures ever more than 140 on the top number (systolic) OR more   than 90 on the bottom number (diastolic), for example 140/90? No    Amount of exercise or physical activity: None    Problems taking medications regularly: No    Medication side effects: depress and low libido     Diet: low salt- sometimes      Musculoskeletal problem/pain      Duration: 5 day    Description  Location: right ankle     Intensity:  mild    Accompanying signs and symptoms: swelling    History  Previous similar problem: YES- twist his ankle in the past  Previous evaluation:  none    Precipitating or alleviating factors:  Trauma or overuse: YES-  Feel on a hole in the grass  Aggravating factors include: walking    Therapies tried and outcome: ice and aleve       The patient requests a note to excuse him from work the past three days due to right ankle sprain x2.  It is getting better.      He does not want to take his blood pressure medication, as he feels it makes him depressed and causes low libido.  He also complains of tightness around both shins, mild swelling.          Patient Active Problem List   Diagnosis     Tibialis posterior tendonitis     Fatigue     Right knee pain     Hypertension goal BP (blood pressure) < 140/90     Major depressive disorder, recurrent, moderate (H)     Lumbago     Past Surgical History:   Procedure Laterality Date     HERNIORRHAPHY INGUINAL BILATERAL      [facility]      NASAL/SINUS POLYPECTOMY      Nasal-Sinus Polypectomy       Social History     Tobacco Use     Smoking status: Former Smoker     Last attempt to quit: 1970     Years since quittin.4     Smokeless tobacco: Never Used   Substance Use  Topics     Alcohol use: Yes     Alcohol/week: 0.0 oz     Family History   Problem Relation Age of Onset     Hypertension Mother          Current Outpatient Medications   Medication Sig Dispense Refill     amLODIPine (NORVASC) 10 MG tablet Take 1 tablet (10 mg) by mouth daily 90 tablet 1     cyclobenzaprine (FLEXERIL) 10 MG tablet Take 10 mg by mouth       Dextromethorphan HBr (VICKS DAYQUIL COUGH PO)        naproxen (NAPROSYN) 500 MG tablet Take 1 tablet (500 mg) by mouth 2 times daily (with meals) 20 tablet 0     olmesartan (BENICAR) 20 MG tablet TAKE 1 TABLET BY MOUTH DAILY WITH BREAKFAST 90 tablet 2     No Known Allergies      Reviewed and updated as needed this visit by Provider         Review of Systems   ROS COMP: Constitutional, HEENT, cardiovascular, pulmonary, gi and gu systems are negative, except as otherwise noted.      Objective    There were no vitals taken for this visit.  There is no height or weight on file to calculate BMI.  Physical Exam   GENERAL APPEARANCE: healthy, alert and no distress  RESP: lungs clear to auscultation - no rales, rhonchi or wheezes  CV: regular rates and rhythm, normal S1 S2, no S3 or S4 and no murmur, click or rub  MS: extremities normal- no gross deformities noted  PSYCH: flat affect, poor eye contact, mood is depressed            Assessment & Plan     1. Essential hypertension  BP is well controlled, but he feels he is having s/e.  Advised trial of stopping amlodipine, which may be causing the tightness in his legs.  Continue benicar.  Recheck BP within one week . If pressure is uncontrolled, could try adding back 5mg amlodipine.  He apparently did not tolerate hydrochlorothiazide and lisinopril in the past.     2. Major depressive disorder, recurrent, moderate (H)  He was not interested in addressing, other than to stop BP meds.      3. Sprain of right ankle, unspecified ligament, subsequent encounter  Letter provided.         BMI:   Estimated body mass index is 31.33  kg/m  as calculated from the following:    Height as of 6/7/19: 1.829 m (6').    Weight as of this encounter: 104.8 kg (231 lb).             No follow-ups on file.    Trina Mora MD  [facility]

## 2019-07-15 ENCOUNTER — OFFICE VISIT (OUTPATIENT)
Dept: URGENT CARE | Facility: URGENT CARE | Age: 69
End: 2019-07-15
Payer: COMMERCIAL

## 2019-07-15 VITALS
SYSTOLIC BLOOD PRESSURE: 160 MMHG | TEMPERATURE: 97.6 F | HEART RATE: 63 BPM | WEIGHT: 237 LBS | OXYGEN SATURATION: 96 % | DIASTOLIC BLOOD PRESSURE: 90 MMHG | BODY MASS INDEX: 32.14 KG/M2

## 2019-07-15 DIAGNOSIS — I10 HYPERTENSION GOAL BP (BLOOD PRESSURE) < 140/90: ICD-10-CM

## 2019-07-15 DIAGNOSIS — I10 UNCONTROLLED HYPERTENSION: Primary | ICD-10-CM

## 2019-07-15 DIAGNOSIS — T67.5XXA HEAT EXHAUSTION, INITIAL ENCOUNTER: ICD-10-CM

## 2019-07-15 PROCEDURE — 99214 OFFICE O/P EST MOD 30 MIN: CPT | Performed by: INTERNAL MEDICINE

## 2019-07-15 RX ORDER — AMLODIPINE BESYLATE 10 MG/1
10 TABLET ORAL DAILY
Qty: 14 TABLET | Refills: 0 | Status: SHIPPED | OUTPATIENT
Start: 2019-07-15 | End: 2019-07-16

## 2019-07-15 RX ORDER — ONDANSETRON 4 MG/1
4 TABLET, ORALLY DISINTEGRATING ORAL EVERY 8 HOURS PRN
Qty: 10 TABLET | Refills: 0 | Status: SHIPPED | OUTPATIENT
Start: 2019-07-15 | End: 2019-09-24

## 2019-07-15 ASSESSMENT — ENCOUNTER SYMPTOMS
NAUSEA: 1
ABDOMINAL PAIN: 0
MYALGIAS: 1
DIZZINESS: 0
VOMITING: 0
ADENOPATHY: 0
SORE THROAT: 0
FEVER: 0
RHINORRHEA: 0
SHORTNESS OF BREATH: 0
PALPITATIONS: 0
COUGH: 0

## 2019-07-15 NOTE — PROGRESS NOTES
SUBJECTIVE:   Don Samson is a 69 year old male presenting with a chief complaint of   Chief Complaint   Patient presents with     Urgent Care     Pt in clinic c/o nausea and heat exhaustion..     Nausea       He is an established patient of [facility].    complains of being overheated to weather/work.  Got hot over the weekend.  Works at [employer] /hot in [place]  Feels sick & not well to go to work.  Would like work note    Also only taking 1 blood pressure pill.    Review of Systems   Constitutional: Negative for fever.        Felt warm, sweating  Slight fatigue   HENT: Negative for rhinorrhea and sore throat.    Respiratory: Negative for cough and shortness of breath.    Cardiovascular: Negative for chest pain and palpitations.   Gastrointestinal: Positive for nausea. Negative for abdominal pain and vomiting.   Genitourinary: Negative for decreased urine volume.   Musculoskeletal: Positive for myalgias.   Skin: Negative for rash.   Neurological: Negative for dizziness.        Slight headache    Hematological: Negative for adenopathy.       Past Medical History:   Diagnosis Date     Ankle pain 5/21/2016     Ankle sprain and strain 9/16/2010     CARDIOVASCULAR SCREENING; LDL GOAL LESS THAN 130 9/6/2016     Depression     declines treatment 4/1/16     HTN (hypertension)     declines treatment 4/1/16     Left knee pain 6/18/2015     Plantar fasciitis 9/16/2010     Family History   Problem Relation Age of Onset     Hypertension Mother      Current Outpatient Medications   Medication Sig Dispense Refill     amLODIPine (NORVASC) 10 MG tablet Take 1 tablet (10 mg) by mouth daily for 14 days 14 tablet 0     cyclobenzaprine (FLEXERIL) 10 MG tablet Take 10 mg by mouth       Dextromethorphan HBr (VICKS DAYQUIL COUGH PO)        naproxen (NAPROSYN) 500 MG tablet Take 1 tablet (500 mg) by mouth 2 times daily (with meals) 20 tablet 0     olmesartan (BENICAR) 20 MG tablet TAKE 1 TABLET BY MOUTH DAILY WITH BREAKFAST 90  tablet 2     ondansetron (ZOFRAN-ODT) 4 MG ODT tab Take 1 tablet (4 mg) by mouth every 8 hours as needed for nausea 10 tablet 0     Social History     Tobacco Use     Smoking status: Former Smoker     Last attempt to quit: 1970     Years since quittin.5     Smokeless tobacco: Never Used   Substance Use Topics     Alcohol use: Yes     Alcohol/week: 0.0 oz       OBJECTIVE  /90   Pulse 63   Temp 97.6  F (36.4  C) (Tympanic)   Wt 107.5 kg (237 lb)   SpO2 96%   BMI 32.14 kg/m      Physical Exam   Constitutional: He appears well-developed and well-nourished.   HENT:   Mouth/Throat: Oropharynx is clear and moist.   Tm nl b   Cardiovascular: Normal rate, regular rhythm, normal heart sounds and intact distal pulses.   Pulmonary/Chest: Effort normal and breath sounds normal.   Abdominal: Soft. There is no tenderness.   Umbilical hernia   Musculoskeletal: He exhibits no edema.   Vitals reviewed.      Labs:  No results found for this or any previous visit (from the past 24 hour(s)).        ASSESSMENT:      ICD-10-CM    1. Uncontrolled hypertension I10 amLODIPine (NORVASC) 10 MG tablet   2. Hypertension goal BP (blood pressure) < 140/90 I10 amLODIPine (NORVASC) 10 MG tablet   3. Heat exhaustion, initial encounter T67.5XXA ondansetron (ZOFRAN-ODT) 4 MG ODT tab        PLAN:  Well appearing  Will encourage fluidds  Observation  Call or return to clinic if symptoms worsen or fail to improve as anticipated.  otherwise recheck 10 days      Followup:    2 wk refill blood pressure medication    Patient Instructions       symptoms may be related to heat   Fluids  Rest.  zofran for nausea    And also being off blood pressure medication    Restart amlodipine 10 mg.  Monitor blood pressure   If concerned swelling of ankles, could try 5 mg for 1 week instead and recheck clinic 7-10 days  Bring in blood pressure machine to learn how to use.  Nurse appointment

## 2019-07-15 NOTE — LETTER
[facility]  [address] [address]  Phone: [phone]    July 15, 2019        Don Samson  [address]  [facility] [address]          To whom it may concern:    RE: Don CLARKE [place]    Patient was seen and treated today at our clinic and missed work.  Please excuse work absence today.    Please contact me for questions or concerns.      Sincerely,        Miladis Malhotra MD

## 2019-07-16 ENCOUNTER — OFFICE VISIT (OUTPATIENT)
Dept: FAMILY MEDICINE | Facility: CLINIC | Age: 69
End: 2019-07-16
Payer: COMMERCIAL

## 2019-07-16 VITALS
DIASTOLIC BLOOD PRESSURE: 82 MMHG | BODY MASS INDEX: 32.41 KG/M2 | HEART RATE: 72 BPM | OXYGEN SATURATION: 96 % | TEMPERATURE: 97.9 F | WEIGHT: 239 LBS | SYSTOLIC BLOOD PRESSURE: 140 MMHG

## 2019-07-16 DIAGNOSIS — I10 HYPERTENSION GOAL BP (BLOOD PRESSURE) < 140/90: ICD-10-CM

## 2019-07-16 DIAGNOSIS — G43.809 OTHER MIGRAINE WITHOUT STATUS MIGRAINOSUS, NOT INTRACTABLE: ICD-10-CM

## 2019-07-16 DIAGNOSIS — K59.00 CONSTIPATION, UNSPECIFIED CONSTIPATION TYPE: Primary | ICD-10-CM

## 2019-07-16 DIAGNOSIS — I10 UNCONTROLLED HYPERTENSION: ICD-10-CM

## 2019-07-16 PROCEDURE — 99214 OFFICE O/P EST MOD 30 MIN: CPT | Performed by: FAMILY MEDICINE

## 2019-07-16 RX ORDER — POLYETHYLENE GLYCOL 3350 17 G/17G
1-2 POWDER, FOR SOLUTION ORAL 2 TIMES DAILY
Qty: 578 G | Refills: 0 | Status: SHIPPED | OUTPATIENT
Start: 2019-07-16 | End: 2019-09-24

## 2019-07-16 RX ORDER — SUMATRIPTAN 50 MG/1
50 TABLET, FILM COATED ORAL
Qty: 2 TABLET | Refills: 0 | Status: SHIPPED | OUTPATIENT
Start: 2019-07-16 | End: 2019-10-14

## 2019-07-16 RX ORDER — SENNA AND DOCUSATE SODIUM 50; 8.6 MG/1; MG/1
2 TABLET, FILM COATED ORAL AT BEDTIME
Qty: 20 TABLET | Refills: 0 | Status: SHIPPED | OUTPATIENT
Start: 2019-07-16 | End: 2019-09-24

## 2019-07-16 RX ORDER — AMLODIPINE BESYLATE 5 MG/1
5 TABLET ORAL DAILY
Qty: 90 TABLET | Refills: 1 | Status: SHIPPED | OUTPATIENT
Start: 2019-07-16 | End: 2019-09-24

## 2019-07-16 NOTE — PROGRESS NOTES
"Subjective     Don Samson is a 69 year old male who presents to clinic today for the following health issues:    HPI   ED/UC Followup:    Facility:  [facility]  Date of visit: 7-15-19  Reason for visit: over heated  Current Status: nausea, constipated, migraine     69 year old with:    1. Migraine.  Has had before.  Is not 'worse HA of life\".  No neuro symptoms.  Has had migraine medications in past but is out now.  Could we refill?    2. Constipation.  Bad.  Has had before.  Wonders about medications for this?  Is not up to date on colon cancer screen - due for FIT.    3. Note for work.      4. blood pressure.  Was quite high in urgent care yesterday, better today after taking 10 mg amlodipine last night.  Doesn't like this med as causes swelling in his feet.  Hasn't been taking olmesartan but will.    Reviewed and updated as needed this visit by Provider  Tobacco  Allergies  Meds  Problems  Med Hx  Surg Hx  Fam Hx         Review of Systems   ROS COMP: Constitutional, HEENT, cardiovascular, pulmonary, gi and gu systems are negative, except as otherwise noted.      Objective    /82 (Cuff Size: Adult Large)   Pulse 72   Temp 97.9  F (36.6  C) (Tympanic)   Wt 108.4 kg (239 lb)   SpO2 96%   BMI 32.41 kg/m    Body mass index is 32.41 kg/m .  Physical Exam   GENERAL: healthy, alert and no distress  EYES: Eyes grossly normal to inspection, PERRL and conjunctivae and sclerae normal  HENT: ear canals and TM's normal, nose and mouth without ulcers or lesions  NECK: no adenopathy, no asymmetry, masses, or scars and thyroid normal to palpation  RESP: lungs clear to auscultation - no rales, rhonchi or wheezes  CV: regular rate and rhythm, normal S1 S2, no S3 or S4, no murmur, click or rub, no peripheral edema and peripheral pulses strong  ABDOMEN: soft, nontender, no hepatosplenomegaly, no masses and bowel sounds normal  MS: no gross musculoskeletal defects noted, no edema  NEURO: Normal " strength and tone, sensory exam grossly normal, mentation intact, cranial nerves 2-12 intact, DTR's normal and symmetric. No focal findings, gait normal including heel/toe/tandem walking, Romberg normal, rapid alternating movements normal    Diagnostic Test Results:  Labs reviewed in Epic        Assessment & Plan       ICD-10-CM    1. Constipation, unspecified constipation type K59.00 polyethylene glycol (MIRALAX/GLYCOLAX) powder     SENNA-docusate sodium (SENNA S) 8.6-50 MG tablet   2. Other migraine without status migrainosus, not intractable G43.809 SUMAtriptan (IMITREX) 50 MG tablet   3. Hypertension goal BP (blood pressure) < 140/90 I10 amLODIPine (NORVASC) 5 MG tablet   4. Uncontrolled hypertension I10 amLODIPine (NORVASC) 5 MG tablet     Patient Instructions   For your blood pressure:  1. Take 5 mg of norvasc (amlodipine) daily.  I refilled this.  2. Keep taking your olmesartan (benicar) daily.    For your migraine:  1. Imitrex (sumatriptan) as needed today for HA.    For your constipation:  1. Miralax 2 capfuls in AM, 2 capfuls in PM until bowel movement.  2. Senna-s 2 tabs at night until BM.    Follow up with your doctor in 2 weeks for all of the above, sooner if you're not feeling better.  You should pick one primary care doctor!  Dr. Wegener at [facility] might be a good fit for you.        Discussed with patient, all questions answered, in agreement with this plan, will return or seek further care if not improving or worsening.      BMI:   Estimated body mass index is 32.41 kg/m  as calculated from the following:    Height as of 6/7/19: 1.829 m (6').    Weight as of this encounter: 108.4 kg (239 lb).     Return in about 2 weeks (around 7/30/2019) for Medicare Annual Wellness Exam, Blood Pressure Check.    Krystyna Lopez MD  [facility]

## 2019-07-16 NOTE — PATIENT INSTRUCTIONS
For your blood pressure:  1. Take 5 mg of norvasc (amlodipine) daily.  I refilled this.  2. Keep taking your olmesartan (benicar) daily.    For your migraine:  1. Imitrex (sumatriptan) as needed today for HA.    For your constipation:  1. Miralax 2 capfuls in AM, 2 capfuls in PM until bowel movement.  2. Senna-s 2 tabs at night until BM.    Follow up with your doctor in 2 weeks for all of the above, sooner if you're not feeling better.  You should pick one primary care doctor!  Dr. Wegener at [facility] might be a good fit for you.

## 2019-07-16 NOTE — LETTER
July 16, 2019      Don Samson  [address]  [facility] [address]        To Whom It May Concern:    Don Samson  was seen on 07/16/19.  Please excuse him  until 7/18/19 due to illness.        Sincerely,        Krystyna Loepz MD

## 2019-07-31 PROBLEM — M54.50 LUMBAGO: Status: RESOLVED | Noted: 2019-03-01 | Resolved: 2019-07-31

## 2019-07-31 NOTE — PROGRESS NOTES
Discharge Note    Progress reporting period is from initial evaluation date (please see noted date below) to Mar 8, 2019.  No linked episodes      Don failed to follow up and current status is unknown.  Please see information below for last relevant information on current status.  Patient seen for 2 visits.    SUBJECTIVE  Subjective changes noted by patient:  Esau reports he was lifting this week and he had a flare up of pain. He couldn't find a good surface to lean back on for the standing extension stretch.  .  Current pain level is 4/10(decreases to 2-3/10 following).     Previous pain level was  4/10.   Changes in function:  Yes (See Goal flowsheet attached for changes in current functional level)  Adverse reaction to treatment or activity: None    OBJECTIVE  Changes noted in objective findings: Demos min lumbar movement with slouch over correct. All strengthening exercise were well tolerated with min decrease in pain.     ASSESSMENT/PLAN  Diagnosis: low back pain   Updated problem list and treatment plan:   Pain - HEP  Decreased ROM/flexibility - HEP  Decreased function - HEP  Decreased strength - HEP  STG/LTGs have been met or progress has been made towards goals:  Yes, please see goal flowsheet for most current information  Assessment of Progress: current status is unknown.    Last current status: Pt is progressing as expected   Self Management Plans:  HEP  I have re-evaluated this patient and find that the nature, scope, duration and intensity of the therapy is appropriate for the medical condition of the patient.  Don continues to require the following intervention to meet STG and LTG's:  HEP.    Recommendations:  Discharge with current home program.  Patient to follow up with MD as needed.    Please refer to the daily flowsheet for treatment today, total treatment time and time spent performing 1:1 timed codes.

## 2019-07-31 NOTE — MR AVS SNAPSHOT
"              After Visit Summary   11/18/2017    Don Samson    MRN: [id number]           Patient Information     Date Of Birth          1950        Visit Information        Provider Department      11/18/2017 3:00 PM Miladis Malhotra MD [facility]        Today's Diagnoses     Eye injury, non-penetrating, right, initial encounter    -  1      Care Instructions    Cool compress for discomfort  Tylenol or aleve      See Ophthalmology if not improved   Call or return to clinic if symptoms worsen or fail to improve as anticipated.           Follow-ups after your visit        Who to contact     If you have questions or need follow up information about today's clinic visit or your schedule please contact [facility] directly at [phone].  Normal or non-critical lab and imaging results will be communicated to you by PeopleAdminhart, letter or phone within 4 business days after the clinic has received the results. If you do not hear from us within 7 days, please contact the clinic through MyChart or phone. If you have a critical or abnormal lab result, we will notify you by phone as soon as possible.  Submit refill requests through [email or web address] or call your pharmacy and they will forward the refill request to us. Please allow 3 business days for your refill to be completed.          Additional Information About Your Visit        [employer] Information     [email or web address] lets you send messages to your doctor, view your test results, renew your prescriptions, schedule appointments and more. To sign up, go to [email or web address] . Click on \"Log in\" on the left side of the screen, which will take you to the Welcome page. Then click on \"Sign up Now\" on the right side of the page.     You will be asked to enter the access code listed below, as well as some personal information. Please follow the directions to create your username and password.     Your access code is: " Cricket Clark is a 64 y.o. female  presents for nausea palpitations. She has no LOC. She has seen cardiology and was told heart is normal.  No ideas of suicide or homicide. No Known Allergies  Outpatient Medications Marked as Taking for the 7/31/19 encounter (Office Visit) with Sulma Bradley MD   Medication Sig Dispense Refill    levothyroxine (SYNTHROID) 137 mcg tablet TAKE ONE TABLET BY MOUTH DAILY (BEFORE BREAKFAST). 30 Tab 2    varenicline (CHANTIX) 1 mg tablet Take 1 Tab by mouth two (2) times a day for 90 days. 180 Tab 0    midodrine (PROAMITINE) 10 mg tablet TAKE 1 TABLET BY MOUTH THREE (3) TIMES DAILY (WITH MEALS) FOR 30 DAYS. (Patient taking differently: 5 mg.) 90 Tab 0    comprs. stocking,thigh,long,med (COMP. STOCKING,THIGH,LONG,MED.) misc 2 Packages by Does Not Apply route daily as needed (edema). Remove when lying down 2 Package 0    thiamine HCl (VITAMIN B-1 PO) Take  by mouth.  rivaroxaban (XARELTO) 20 mg tab tablet Take 1 Tab by mouth daily. To follow up with Dr. Boyce Post regarding when to stop medication and start Lovenox. 30 Tab 1    cyanocobalamin (VITAMIN B-12) 500 mcg tablet Take 500 mcg by mouth daily.  multivitamin (ONE A DAY) tablet Take 1 Tab by mouth two (2) times a day.  calcium citrate-vitamin D3 (UPCAL D) 500 mg-500 unit /5 gram powd Take 500 mg by mouth three (3) times daily.  BIOTIN PO Take  by mouth.  Cholecalciferol, Vitamin D3, (VITAMIN D3) 1,000 unit chew Take 2,000 Units by mouth daily.        Patient Active Problem List   Diagnosis Code    Hypothyroidism E03.9    DJD (degenerative joint disease) M19.90    DVT (deep venous thrombosis) ([facility]) I82.409    Morbid obesity ([address] E66.01    HTN (hypertension) I10    FAINA (stress urinary incontinence, female) N39.3    GERD (gastroesophageal reflux disease) K21.9    CAMDEN on CPAP G47.33, Z99.89    History of pulmonary embolism Z86.711    History of DVT of lower extremity Z86.718    Bradycardia "[id number]  Expires: 2018  3:30 PM     Your access code will  in 90 days. If you need help or a new code, please call your [facility] clinic or [phone].        Care EveryWhere ID     This is your Care EveryWhere ID. This could be used by other organizations to access your [facility] medical records  [id number]        Your Vitals Were     Pulse Temperature Height Pulse Oximetry BMI (Body Mass Index)       67 97.4  F (36.3  C) (Tympanic) 5' 11\" (1.803 m) 95% 32.78 kg/m2        Blood Pressure from Last 3 Encounters:   17 (!) 144/94   10/27/17 167/90   17 137/87    Weight from Last 3 Encounters:   17 235 lb (106.6 kg)   17 238 lb (108 kg)   17 238 lb (108 kg)              Today, you had the following     No orders found for display       Primary Care Provider Office Phone # Fax #    William Blanco [phone]       [address]  [facility] [address]        Equal Access to Services     [facility] Center[facility] AH: Hadii aad ku hadasho Soomaali, waaxda luqadaha, qaybta kaalmada adeegyada, ruddy rosario hayzeinabn ines bach ah. So [facility] [phone].    ATENCIÓN: Si habla español, tiene a torres disposición servicios gratuitos de asistencia lingüística. Llame al [phone].    We comply with applicable federal civil rights laws and [place] laws. We do not discriminate on the basis of race, color, national origin, age, disability, sex, sexual orientation, or gender identity.            Thank you!     Thank you for choosing [facility]  for your care. Our goal is always to provide you with excellent care. Hearing back from our patients is one way we can continue to improve our services. Please take a few minutes to complete the written survey that you may receive in the mail after your visit with us. Thank you!             Your Updated Medication List - Protect others around you: Learn how to safely use, store and throw away your medicines at " R00.1    History of gastric bypass Z98.84    Palpitations R00.2    History of hypertension Z86.79     Past Medical History:   Diagnosis Date    Arthritis     Chronic obstructive pulmonary disease (HCC)     hx pe    Coagulation disorder (HCC)     clotting disorder    Hx of colonic polyps     HX OTHER MEDICAL     pulmonary embolism x 2    HX OTHER MEDICAL     PCOS    HX OTHER MEDICAL     Heart monitor 2015     Hypertension     no longer on meds since gastric bypass    Long term (current) use of anticoagulants     Osteopenia     PE (pulmonary thromboembolism) ([address]     Plantar fasciitis of left foot     Tendinopathy Oct 2013    Right insertional Achilles tendinopathy    Thromboembolus ([address]     blood clot in left leg then lung in , then clot behind right knee     Thromboembolus ([address]     also PE    Thyroid disease      Social History     Socioeconomic History    Marital status:      Spouse name: Not on file    Number of children: Not on file    Years of education: Not on file    Highest education level: Not on file   Tobacco Use    Smoking status: Current Every Day Smoker     Packs/day: 1.50     Years: 10.00     Pack years: 15.00     Types: Cigarettes     Last attempt to quit: 2013     Years since quittin.0    Smokeless tobacco: Never Used    Tobacco comment: vapor pen   Substance and Sexual Activity    Alcohol use: No     Alcohol/week: 3.3 standard drinks     Types: 2 Glasses of wine, 2 Standard drinks or equivalent per week     Comment: once weekly; quit     Drug use: No     Family History   Problem Relation Age of Onset    Diabetes Other     Hypertension Other     Heart Disease Other     Arthritis-osteo Other     Stroke Mother 66    Stroke Father 36    Heart Attack Neg Hx         Review of Systems   Constitutional: Negative for chills, fever, malaise/fatigue and weight loss. Eyes: Negative for blurred vision.    Respiratory: Negative for shortness of breath and wheezing. Cardiovascular: Negative for chest pain. Gastrointestinal: Negative for nausea and vomiting. Musculoskeletal: Negative for myalgias. Skin: Negative for rash. Neurological: Positive for dizziness. Negative for weakness. Vitals:    07/31/19 1631   BP: 108/78   Pulse: 62   Resp: 14   Temp: 98.1 °F (36.7 °C)   SpO2: 99%   Weight: 213 lb (96.6 kg)   Height: 5' 10\" (1.778 m)   LMP: 12/31/2013       Physical Exam   Constitutional: She is oriented to person, place, and time and well-developed, well-nourished, and in no distress. Neck: Normal range of motion. Neck supple. No thyromegaly present. Cardiovascular: Normal rate, regular rhythm and normal heart sounds. Pulmonary/Chest: Effort normal and breath sounds normal.   Musculoskeletal: Normal range of motion. Neurological: She is alert and oriented to person, place, and time. Skin: Skin is warm and dry. Psychiatric: Mood, memory, affect and judgment normal.   Nursing note and vitals reviewed. Assessment/Plan      ICD-10-CM ICD-9-CM    1. Palpitations R00.2 785.1    2. Dizziness R42 780.4    3. Current severe episode of major depressive disorder without psychotic features without prior episode ([facility]) F32.2 296.23 sertraline (ZOLOFT) 50 mg tablet     She has seen neurology also. She has MRI scheduled. I have discussed the diagnosis with the patient and the intended plan of care as seen in the above orders. The patient has received an after-visit summary and questions were answered concerning future plans. I have discussed medication, side effects, and warnings with the patient in detail. The patient verbalized understanding and is in agreement with the plan of care. The patient will contact the office with any additional concerns.     lab results and schedule of future lab studies reviewed with patient    Isabelle Chan MD [email or web address].          This list is accurate as of: 11/18/17  3:31 PM.  Always use your most recent med list.                   Brand Name Dispense Instructions for use Diagnosis    acetaminophen-caffeine 500-65 MG Tabs    EXCEDRIN TENSION HEADACHE     Take 1 tablet by mouth 2 times daily as needed for mild pain Reported on 3/30/2017        acetaminophen-codeine 300-30 MG per tablet    TYLENOL #3    18 tablet    Take 1-2 tablets by mouth nightly as needed for pain maximum 2 tablet(s) per day    Acute pain of right knee       ALEVE PO      Reported on 3/30/2017        azithromycin 250 MG tablet    ZITHROMAX    6 tablet    Two tablets first day, then one tablet daily for four days.    Acute bronchitis with symptoms > 10 days       baclofen 10 MG tablet    LIORESAL    90 tablet    Take 1 tablet (10 mg) by mouth 3 times daily    Midline low back pain without sciatica, unspecified chronicity       COLD MEDICINE PLUS PO      Take 325 mg by mouth as needed        * diclofenac 1 % Gel topical gel    VOLTAREN    100 g    Apply 2 times daily to feet/foot.    Chronic pain of left ankle       * diclofenac 1 % Gel topical gel    VOLTAREN    100 g    Apply 4 grams to knees or 2 grams to hands four times daily using enclosed dosing card.    Acute pain of right knee       HYDROcodone-acetaminophen 5-325 MG per tablet    NORCO    30 tablet    Take 1 tablet by mouth every 4 hours as needed for pain maximum 2 tablet(s) per day    Chronic pain of right knee       naproxen 500 MG tablet    NAPROSYN    60 tablet    Take 1 tablet (500 mg) by mouth 2 times daily as needed for moderate pain    Acute pain of right knee       * Notice:  This list has 2 medication(s) that are the same as other medications prescribed for you. Read the directions carefully, and ask your doctor or other care provider to review them with you.

## 2019-08-14 ENCOUNTER — OFFICE VISIT (OUTPATIENT)
Dept: URGENT CARE | Facility: URGENT CARE | Age: 69
End: 2019-08-14
Payer: COMMERCIAL

## 2019-08-14 VITALS
HEART RATE: 74 BPM | SYSTOLIC BLOOD PRESSURE: 100 MMHG | BODY MASS INDEX: 32.41 KG/M2 | DIASTOLIC BLOOD PRESSURE: 60 MMHG | TEMPERATURE: 97.9 F | OXYGEN SATURATION: 96 % | WEIGHT: 239 LBS

## 2019-08-14 DIAGNOSIS — T88.7XXA MEDICATION SIDE EFFECTS: ICD-10-CM

## 2019-08-14 DIAGNOSIS — R21 RASH AND NONSPECIFIC SKIN ERUPTION: ICD-10-CM

## 2019-08-14 DIAGNOSIS — R60.0 BILATERAL LEG EDEMA: ICD-10-CM

## 2019-08-14 DIAGNOSIS — I10 BENIGN ESSENTIAL HYPERTENSION: Primary | ICD-10-CM

## 2019-08-14 PROCEDURE — 99213 OFFICE O/P EST LOW 20 MIN: CPT | Performed by: INTERNAL MEDICINE

## 2019-08-14 NOTE — PROGRESS NOTES
SUBJECTIVE:   Don Samson is a 69 year old male presenting with a chief complaint of   Chief Complaint   Patient presents with     Urgent Care     Pt in clinic to have eval for leg rash and HTN.     Derm Problem     Hypertension       He is an established patient of [facility].    Interested in decreasing blood pressure medication    complains of side effects of medication swelling of ankles.  He states he have to his nighttime dose of amlodipine and continues his morning dose of Benicar.      He does not have a primary care provider.  He recalls seeing Bob the pharmacist he was upset that he showed up to clinic today and did not have a scheduled appointment          BP Readings from Last 6 Encounters:   08/14/19 100/60   07/16/19 140/82   07/15/19 160/90   06/10/19 125/79   06/07/19 120/80   05/03/19 127/71     Current Outpatient Medications   Medication Sig Dispense Refill     amLODIPine (NORVASC) 5 MG tablet Take 1 tablet (5 mg) by mouth daily 90 tablet 1     cyclobenzaprine (FLEXERIL) 10 MG tablet Take 10 mg by mouth       naproxen (NAPROSYN) 500 MG tablet Take 1 tablet (500 mg) by mouth 2 times daily (with meals) 20 tablet 0     olmesartan (BENICAR) 20 MG tablet TAKE 1 TABLET BY MOUTH DAILY WITH BREAKFAST 90 tablet 2     Dextromethorphan HBr (VICKS DAYQUIL COUGH PO)        ondansetron (ZOFRAN-ODT) 4 MG ODT tab Take 1 tablet (4 mg) by mouth every 8 hours as needed for nausea (Patient not taking: Reported on 8/14/2019) 10 tablet 0     polyethylene glycol (MIRALAX/GLYCOLAX) powder Take 17-34 g (1-2 capfuls) by mouth 2 times daily Until you have a bowel movement, then daily as needed. (Patient not taking: Reported on 8/14/2019) 578 g 0     SENNA-docusate sodium (SENNA S) 8.6-50 MG tablet Take 2 tablets by mouth At Bedtime Until you have bowel movement, then as needed (Patient not taking: Reported on 8/14/2019) 20 tablet 0     SUMAtriptan (IMITREX) 50 MG tablet Take 1 tablet (50 mg) by mouth at onset of headache  for migraine May repeat in 2 hours. Max 4 tablets/24 hours. 2 tablet 0     Review of Systems    Past Medical History:   Diagnosis Date     Ankle pain 2016     Ankle sprain and strain 2010     CARDIOVASCULAR SCREENING; LDL GOAL LESS THAN 130 2016     Depression     declines treatment 16     HTN (hypertension)     declines treatment 16     Left knee pain 2015     Plantar fasciitis 2010     Family History   Problem Relation Age of Onset     Hypertension Mother      Current Outpatient Medications   Medication Sig Dispense Refill     amLODIPine (NORVASC) 5 MG tablet Take 1 tablet (5 mg) by mouth daily 90 tablet 1     cyclobenzaprine (FLEXERIL) 10 MG tablet Take 10 mg by mouth       naproxen (NAPROSYN) 500 MG tablet Take 1 tablet (500 mg) by mouth 2 times daily (with meals) 20 tablet 0     olmesartan (BENICAR) 20 MG tablet TAKE 1 TABLET BY MOUTH DAILY WITH BREAKFAST 90 tablet 2     Dextromethorphan HBr (VICKS DAYQUIL COUGH PO)        ondansetron (ZOFRAN-ODT) 4 MG ODT tab Take 1 tablet (4 mg) by mouth every 8 hours as needed for nausea (Patient not taking: Reported on 2019) 10 tablet 0     polyethylene glycol (MIRALAX/GLYCOLAX) powder Take 17-34 g (1-2 capfuls) by mouth 2 times daily Until you have a bowel movement, then daily as needed. (Patient not taking: Reported on 2019) 578 g 0     SENNA-docusate sodium (SENNA S) 8.6-50 MG tablet Take 2 tablets by mouth At Bedtime Until you have bowel movement, then as needed (Patient not taking: Reported on 2019) 20 tablet 0     SUMAtriptan (IMITREX) 50 MG tablet Take 1 tablet (50 mg) by mouth at onset of headache for migraine May repeat in 2 hours. Max 4 tablets/24 hours. 2 tablet 0     Social History     Tobacco Use     Smoking status: Former Smoker     Last attempt to quit: 1970     Years since quittin.6     Smokeless tobacco: Never Used   Substance Use Topics     Alcohol use: Yes     Alcohol/week: 0.0 oz       OBJECTIVE  BP  100/60   Pulse 74   Temp 97.9  F (36.6  C) (Oral)   Wt 108.4 kg (239 lb)   SpO2 96%   BMI 32.41 kg/m      Physical Exam   Constitutional: He appears well-developed and well-nourished.   Musculoskeletal:   1+ ankle leg / edema    right lower extremity  Serpiginous deep red rash.  Non-blanching.  Rash is just above sock line indentations     Psychiatric:   Flat affect.       Vitals reviewed.    Patient was irritable and cursing during the visit.        Labs:  No results found for this or any previous visit (from the past 24 hour(s)).        ASSESSMENT:      ICD-10-CM    1. Benign essential hypertension I10    2. Rash and nonspecific skin eruption R21    3. Bilateral leg edema R60.0    4. Medication side effects T88.7XXA         Medical Decision Making:      Discussed potentially switching to hydrochlorothiazide medication to help control blood pressure and also reduce swelling while stopping amlodipine.    Amlodipine is known to cause lower extremity edema  Potentially that edema could be related to the skin rash    I had suggested hydrocortisone cream for the rash and was going to take a picture to document it    Patient was irritable during the appointment and cursing.  I informed him that I would not be able to care for him during this visit due to irritability and cursing.    Patient left upset and medication changes were not made.    I had encouraged him to establish care and to follow-up with a primary

## 2019-09-09 ENCOUNTER — OFFICE VISIT (OUTPATIENT)
Dept: FAMILY MEDICINE | Facility: CLINIC | Age: 69
End: 2019-09-09
Payer: COMMERCIAL

## 2019-09-09 VITALS
OXYGEN SATURATION: 97 % | BODY MASS INDEX: 33.77 KG/M2 | RESPIRATION RATE: 16 BRPM | HEART RATE: 67 BPM | SYSTOLIC BLOOD PRESSURE: 132 MMHG | DIASTOLIC BLOOD PRESSURE: 80 MMHG | WEIGHT: 249 LBS

## 2019-09-09 DIAGNOSIS — S39.012A STRAIN OF LUMBAR REGION, INITIAL ENCOUNTER: Primary | ICD-10-CM

## 2019-09-09 PROCEDURE — 99213 OFFICE O/P EST LOW 20 MIN: CPT | Performed by: PHYSICIAN ASSISTANT

## 2019-09-09 RX ORDER — TIZANIDINE HYDROCHLORIDE 4 MG/1
4 CAPSULE, GELATIN COATED ORAL 3 TIMES DAILY PRN
Qty: 10 CAPSULE | Refills: 0 | Status: SHIPPED | OUTPATIENT
Start: 2019-09-09 | End: 2019-09-24

## 2019-09-09 NOTE — LETTER
September 9, 2019      Don Samson  [address]  [facility] [address]        To Whom It May Concern:    Don Samson was seen on 9/9/2019.  Please excuse him for any absences related to clinic visit.        Sincerely,        An Rubio PA-C
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
## 2019-09-09 NOTE — PATIENT INSTRUCTIONS
Finding a position that is comfortable:    When your back pain is new, certain positions will ease your pain. Gently try each of the positions below until you find one that is helpful. Once you find a position of comfort, use it as often as you like when you are resting. You will recover faster if you combine rest with activity.      * Lie on your back with your legs bent. You can do this by placing a pillow under your knees or lie on the floor and rest your lower legs on the seat of a chair.  * Lie on your side with your knees bent and place a pillow between your knees.    Lie on your stomach over pillows.     Exercises To Strengthen Your Lower Back  Strong lower-back and abdominal muscles work together to support your spine. These exercises will help strengthen the muscles of the lower back. It is important that you begin exercising slowly and increase levels gradually.    Low Back Stretch  Lie on your back with your knees bent and both feet on the ground.  Slowly raise your left knee to your chest as you flatten your lower back against the floor. Hold for 5 seconds.  Relax and repeat the exercise with your right knee.  Do 10 of these exercises for each leg.  Repeat hugging both knees to your chest at the same time.  Building Lower Back Strength  1. Kneeling Lumbar Extension: Begin on your hands and knees. Simultaneously raise and straighten your right arm and left leg until they are parallel to the ground. Hold for 2 seconds and come back slowly to a starting position. Repeat with left arm and right leg, alternating 10 times.  2. Prone Lumbar Extension: Lie face down, arms extended overhead, palms on the floor. Simultaneously raise your right arm and left leg as high as comfortably possible. Hold for 10 seconds and slowly return to start. Repeat with left arm and right leg, alternating 10 times. Gradually build up to 20 times. (Advanced: Repeat this exercise raising both arms and both legs a few inches off the  floor at the same time. Hold for 5 seconds and release.)  3. Pelvic Tilt: Lie on the floor on your back with your knees bent at 90 degrees. Your feet should be flat on the floor. Inhale, exhale, then slowly contract your abdominal muscles bringing your navel toward your spine. Let your pelvis rock back until your lower back is flat on the floor. Hold for 10 seconds while breathing smoothly.  4. Abdominal Crunch: Lay on your back, flattening your lower back against the floor. Holding the tension in your abdominal muscles, take another breath and raise your shoulder blades off the ground (this is not a full sit-up). Keep your head in line with your body (don t bend your neck forward). Hold for 2 seconds, then slowly lower.    When should I call my doctor?      You have a sudden change in your ability to control your bladder or bowels.    You begin to feel tingling in your groin or legs.    The pain spreads down your leg and into your foot.    Your toes, feet or leg muscles begin to feel weak.

## 2019-09-24 ENCOUNTER — OFFICE VISIT (OUTPATIENT)
Dept: FAMILY MEDICINE | Facility: CLINIC | Age: 69
End: 2019-09-24
Payer: COMMERCIAL

## 2019-09-24 VITALS
RESPIRATION RATE: 16 BRPM | DIASTOLIC BLOOD PRESSURE: 70 MMHG | WEIGHT: 241 LBS | BODY MASS INDEX: 32.69 KG/M2 | SYSTOLIC BLOOD PRESSURE: 124 MMHG | HEART RATE: 74 BPM | OXYGEN SATURATION: 96 %

## 2019-09-24 DIAGNOSIS — I10 UNCONTROLLED HYPERTENSION: ICD-10-CM

## 2019-09-24 DIAGNOSIS — I10 HYPERTENSION GOAL BP (BLOOD PRESSURE) < 140/90: ICD-10-CM

## 2019-09-24 DIAGNOSIS — S39.012A STRAIN OF LUMBAR REGION, INITIAL ENCOUNTER: Primary | ICD-10-CM

## 2019-09-24 DIAGNOSIS — Z23 NEED FOR PROPHYLACTIC VACCINATION AND INOCULATION AGAINST INFLUENZA: ICD-10-CM

## 2019-09-24 PROCEDURE — 90662 IIV NO PRSV INCREASED AG IM: CPT | Performed by: FAMILY MEDICINE

## 2019-09-24 PROCEDURE — 90471 IMMUNIZATION ADMIN: CPT | Performed by: FAMILY MEDICINE

## 2019-09-24 PROCEDURE — 99214 OFFICE O/P EST MOD 30 MIN: CPT | Mod: 25 | Performed by: FAMILY MEDICINE

## 2019-09-24 RX ORDER — AMLODIPINE BESYLATE 5 MG/1
5 TABLET ORAL DAILY
Qty: 90 TABLET | Refills: 3 | Status: SHIPPED | OUTPATIENT
Start: 2019-09-24 | End: 2020-09-14

## 2019-09-24 RX ORDER — OLMESARTAN MEDOXOMIL 20 MG/1
TABLET ORAL
Qty: 90 TABLET | Refills: 2 | Status: SHIPPED | OUTPATIENT
Start: 2019-09-24 | End: 2020-09-14

## 2019-09-24 RX ORDER — AMLODIPINE BESYLATE 5 MG/1
5 TABLET ORAL DAILY
Qty: 90 TABLET | Refills: 3 | Status: SHIPPED | OUTPATIENT
Start: 2019-09-24 | End: 2019-09-24

## 2019-09-24 RX ORDER — TIZANIDINE HYDROCHLORIDE 4 MG/1
4 CAPSULE, GELATIN COATED ORAL 3 TIMES DAILY PRN
Qty: 20 CAPSULE | Refills: 0 | Status: SHIPPED | OUTPATIENT
Start: 2019-09-24 | End: 2019-11-12

## 2019-09-24 RX ORDER — OLMESARTAN MEDOXOMIL 20 MG/1
TABLET ORAL
Qty: 90 TABLET | Refills: 2 | Status: SHIPPED | OUTPATIENT
Start: 2019-09-24 | End: 2019-09-24

## 2019-09-24 NOTE — PATIENT INSTRUCTIONS
New information from outside sources is available for reconciliation    06/22/2016  DTAP/TDAP/TD IMMUNIZATION (2 - Td)     02/15/2017  MEDICARE ANNUAL WELLNESS VISIT     02/15/2017  PNEUMOCOCCAL IMMUNIZATION 65+ LOW/MEDIUM RISK (1 of 2 - PCV13)     04/27/2018  PHQ-9     10/27/2018  ANNE ASSESSMENT     05/01/2019  FALL RISK ASSESSMENT     08/15/2019  FIT     09/01/2019  INFLUENZA VACCINE (1)      (S39.012A) Strain of lumbar region, initial encounter  Comment:    Plan: tiZANidine (ZANAFLEX) 4 MG capsule             (I10) Hypertension goal BP (blood pressure) < 140/90  Comment:    Plan: olmesartan (BENICAR) 20 MG tablet, olmesartan         (BENICAR) 20 MG tablet, amLODIPine (NORVASC) 5         MG tablet, DISCONTINUED: amLODIPine (NORVASC) 5        MG tablet             Assessment: Lower back pain    Plan: do these exercises at least twice daily:  Standing or sitting exercise can be done every two hours    Polo' Flexion Versus Rachell   Extension Exercises For   Low Back Pain   Examples of Polo' Flexion Exercises  1. Pelvic tilt.  Please press the small of your back against the floor.  Start with 5-10  and increase to 100 count over one month   2. Single Knee to chest. Lie on your back with legs in bent position. Alternate one leg and the other very slowly bringing the knee to chest.  Start with a count of 5-10   Over one month work up to 100  3. Double knee to chest.  Lie on your back with knees in bent position.  Bring both knees to the chest slowly hold for a count of 5-to 10. Over one month work to 100  4. Partial sit-up or crunch.  Lie on your back in bent leg position.  Please bring your body with arms crossed in front  To 30 degrees of flexion. Start with 5-10 over one month work up to 100 or more  5. Sit back.  Please sit on the side of the bed or a stair landing  And lie backwards until the abdominal muscles start to quiver.  Hold for a count of 5-10 and over a month work up to 100.  5. Hamstring  stretch.  Please extend your legs while sitting on the floor as tolerated for 5-10 count.  Gradually increase to count of 30 over one month      Alternately find a stair landing or sturdy chair and place heel  In a comfortable level of extension  And stretch one hamstring at a time for 5-10 seconds.  Increase to count of 30 over one month                         Squat.  Stand with legs comfortably apart and lower the body slowly by flexing the knees  for count of 5-10 over one month increase to 30.  Useful for anterior disc protrusion, facette joint arthritis spond-10ylolysis, spondylolisthesis and spinal stenosis    Sherin method or extension exercises  Useful disc bulging posteriorly  1. Prone pressups  Please lie on your abdomen.   Please do a push with the upper half of your body only.  This should not cause the pain to shoot down your buttock thigh leg or foot  Start with 10 and repeat x 3 one minute apart.  Repeat x 10 every 1-2 hours  Pain tends to increase in the center of back  And leaves buttock thighs legs and foot over time  You may shift your pelvis in opposite direction of buttock and leg pain to achieve even better results  2. Superman with arms extended  Or at the side Simultaneously lift your arms and legs off the floor. Start with 5-10 over one month increase to 100.  3. Cat stretch or cobra Start  As if in extended position of prone pressup but hold the stretch for 5 or 10 count. Over one moth to count of 30.  4.  Extensions can be done in standing position  As well if prone pressup is inconvenient.  Shift your pelvis in opposite direction of limb pain.  Put your hands  Flat on your buttocks and lean backwards without loss of balance.  Count 10 x 3 times then 10 extensions hourly       (I10) Uncontrolled hypertension  Comment:    Plan: amLODIPine (NORVASC) 5 MG tablet, DISCONTINUED:        amLODIPine (NORVASC) 5 MG tablet
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
## 2019-09-24 NOTE — LETTER
[place] [address] [facility]  [address]  [place] [address]  [phone]                                                                                                           Don Samson   [address]  [facility] [facility] [place] [address]    September 24, 2019     Don      MECHANICAL BACK INJURY    7/15/2019     SEEN TODAY IN FOLLOW UP      RETURN TO WORK 09/25/2019       Thank you for choosing [facility].  We appreciate the opportunity to serve you and look forward to supporting your healthcare needs in the future.    If you have any questions or concerns, please call me or my staff at [phone].      Sincerely,    Ezra Gold Jr MD

## 2019-10-14 ENCOUNTER — OFFICE VISIT (OUTPATIENT)
Dept: FAMILY MEDICINE | Facility: CLINIC | Age: 69
End: 2019-10-14
Payer: COMMERCIAL

## 2019-10-14 VITALS
HEART RATE: 85 BPM | DIASTOLIC BLOOD PRESSURE: 80 MMHG | OXYGEN SATURATION: 95 % | RESPIRATION RATE: 16 BRPM | BODY MASS INDEX: 32.01 KG/M2 | SYSTOLIC BLOOD PRESSURE: 138 MMHG | WEIGHT: 236 LBS | TEMPERATURE: 97 F

## 2019-10-14 DIAGNOSIS — R11.0 NAUSEA: Primary | ICD-10-CM

## 2019-10-14 PROCEDURE — 99213 OFFICE O/P EST LOW 20 MIN: CPT | Performed by: PHYSICIAN ASSISTANT

## 2019-10-14 RX ORDER — ONDANSETRON 4 MG/1
4 TABLET, FILM COATED ORAL EVERY 6 HOURS PRN
Qty: 20 TABLET | Refills: 0 | Status: SHIPPED | OUTPATIENT
Start: 2019-10-14 | End: 2019-11-12

## 2019-10-14 NOTE — LETTER
October 14, 2019      Don Samson  [address]  [facility] [place] [address]        To Whom It May Concern:    Don Samson was seen on 10/14/2019.  Please excuse him from 10/13/2019-10/14/2019 due to illness.        Sincerely,        An Rubio PA-C

## 2019-10-14 NOTE — PROGRESS NOTES
"Subjective     Don Samson is a 69 year old male who presents to clinic today for the following health issues:    HPI   Nausea      Duration: a couple days    Description (location/character/radiation): pt has been having nausea and fatigue    Intensity:  moderate    Accompanying signs and symptoms: none    History (similar episodes/previous evaluation): None    Precipitating or alleviating factors: None    Therapies tried and outcome: None     - Odd \"cleaning product\" scent at work 2 days ago, patient has felt nauseous ever since.  - Missed work yesterday because of nausea  - No vomiting. Denies f/c/s, abd pain, constipation or diarrhea.    Patient Active Problem List   Diagnosis     Tibialis posterior tendonitis     Fatigue     Right knee pain     Hypertension goal BP (blood pressure) < 140/90     Major depressive disorder, recurrent, moderate (H)     Past Surgical History:   Procedure Laterality Date     HERNIORRHAPHY INGUINAL BILATERAL      [facility]      NASAL/SINUS POLYPECTOMY      Nasal-Sinus Polypectomy       Social History     Tobacco Use     Smoking status: Former Smoker     Last attempt to quit: 1970     Years since quittin.8     Smokeless tobacco: Never Used   Substance Use Topics     Alcohol use: Yes     Alcohol/week: 0.0 standard drinks     Family History   Problem Relation Age of Onset     Hypertension Mother          Current Outpatient Medications   Medication Sig Dispense Refill     amLODIPine (NORVASC) 5 MG tablet Take 1 tablet (5 mg) by mouth daily 90 tablet 3     olmesartan (BENICAR) 20 MG tablet TAKE 1 TABLET BY MOUTH DAILY WITH BREAKFAST 90 tablet 2     ondansetron (ZOFRAN) 4 MG tablet Take 1 tablet (4 mg) by mouth every 6 hours as needed for nausea 20 tablet 0     tiZANidine (ZANAFLEX) 4 MG capsule Take 1 capsule (4 mg) by mouth 3 times daily as needed for muscle spasms 20 capsule 0       Reviewed and updated as needed this visit by Provider  Tobacco  Allergies  Meds  " Problems  Med Hx  Surg Hx  Fam Hx         Review of Systems   ROS COMP: Constitutional, HEENT, cardiovascular, pulmonary, GI, , musculoskeletal, neuro, skin, endocrine and psych systems are negative, except as otherwise noted.      Objective    /80   Pulse 85   Temp 97  F (36.1  C)   Resp 16   Wt 107 kg (236 lb)   SpO2 95%   BMI 32.01 kg/m    Body mass index is 32.01 kg/m .  Physical Exam   GENERAL:  WDWN, no acute distress  PSYCH: pleasant, cooperative, flat affect  EYES: no discharge, no injection  HENT:  Normocephalic. Moist mucus membranes.  NECK:  Supple, symmetric  ABDOMEN:  Soft, non-tender, non-distended. Bowel sounds are present.  EXTREMITIES:  No gross deformities, moves all 4 limbs spontaneously  SKIN:  Warm and dry, no rash or suspicious lesions    NEUROLOGIC: alert, sensation grossly intact.    Diagnostic Test Results:  none         Assessment & Plan       ICD-10-CM    1. Nausea R11.0 ondansetron (ZOFRAN) 4 MG tablet     - Zofran prn nausea  - Get plenty of rest and fluids    Return in about 1 week (around 10/21/2019), or if symptoms worsen or fail to improve.    An Rubio PA-C  [facility]

## 2019-11-12 ENCOUNTER — OFFICE VISIT (OUTPATIENT)
Dept: FAMILY MEDICINE | Facility: CLINIC | Age: 69
End: 2019-11-12
Payer: COMMERCIAL

## 2019-11-12 VITALS
DIASTOLIC BLOOD PRESSURE: 68 MMHG | SYSTOLIC BLOOD PRESSURE: 110 MMHG | BODY MASS INDEX: 33.33 KG/M2 | HEART RATE: 71 BPM | TEMPERATURE: 95.6 F | WEIGHT: 245.75 LBS | RESPIRATION RATE: 15 BRPM | OXYGEN SATURATION: 97 %

## 2019-11-12 DIAGNOSIS — G89.29 CHRONIC BILATERAL LOW BACK PAIN WITHOUT SCIATICA: Primary | ICD-10-CM

## 2019-11-12 DIAGNOSIS — F33.1 MAJOR DEPRESSIVE DISORDER, RECURRENT, MODERATE (H): ICD-10-CM

## 2019-11-12 DIAGNOSIS — I10 HYPERTENSION GOAL BP (BLOOD PRESSURE) < 140/90: ICD-10-CM

## 2019-11-12 DIAGNOSIS — M54.50 CHRONIC BILATERAL LOW BACK PAIN WITHOUT SCIATICA: Primary | ICD-10-CM

## 2019-11-12 PROCEDURE — 99214 OFFICE O/P EST MOD 30 MIN: CPT | Performed by: FAMILY MEDICINE

## 2019-11-12 RX ORDER — NAPROXEN 500 MG/1
500 TABLET ORAL 2 TIMES DAILY WITH MEALS
Qty: 10 TABLET | Refills: 0 | Status: SHIPPED | OUTPATIENT
Start: 2019-11-12 | End: 2020-01-16

## 2019-11-12 ASSESSMENT — ANXIETY QUESTIONNAIRES
7. FEELING AFRAID AS IF SOMETHING AWFUL MIGHT HAPPEN: NOT AT ALL
6. BECOMING EASILY ANNOYED OR IRRITABLE: NOT AT ALL
5. BEING SO RESTLESS THAT IT IS HARD TO SIT STILL: NOT AT ALL
IF YOU CHECKED OFF ANY PROBLEMS ON THIS QUESTIONNAIRE, HOW DIFFICULT HAVE THESE PROBLEMS MADE IT FOR YOU TO DO YOUR WORK, TAKE CARE OF THINGS AT HOME, OR GET ALONG WITH OTHER PEOPLE: NOT DIFFICULT AT ALL
2. NOT BEING ABLE TO STOP OR CONTROL WORRYING: NOT AT ALL
3. WORRYING TOO MUCH ABOUT DIFFERENT THINGS: NOT AT ALL
GAD7 TOTAL SCORE: 0
1. FEELING NERVOUS, ANXIOUS, OR ON EDGE: NOT AT ALL

## 2019-11-12 ASSESSMENT — PATIENT HEALTH QUESTIONNAIRE - PHQ9
5. POOR APPETITE OR OVEREATING: NOT AT ALL
SUM OF ALL RESPONSES TO PHQ QUESTIONS 1-9: 0
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
## 2019-11-12 NOTE — PATIENT INSTRUCTIONS
Here with chronic intermittent low back pain.    X-ray previously has not shown anything wrong.    Weight loss will help and strengthening abdominal muscles will help protect the back in the future.  Physical therapy referral declined.   Looks like you have been infrequently almost every 2 weeks with some kind of back pain.  Recommend seeing orthopedics to evaluate and treat further   For now can try tizanidine at bedtime 5 days.  Avoid driving or operating machinery or drinking alcohol while on this medication.  Naproxen 500 mg twice a day with food 5 days  Can only give a letter that was seen today but due to frequent visits for back pain recommend to be evaluated by orthopedics and they can recommend long-term with the plan is and duration of being out of work.  Continue with being active  and modify activity that causes more pain   Ice then heat 20 min twice a day   Tylenol as needed  Over the counter pain creams to area  Follow up with primary if symptoms persist  If get worse such that loose control of bladder , bowels or difficulty walking go to Urgent care/ ER         Patient Education     Back Care Tips    Caring for your back  These are things you can do to prevent a recurrence of acute back pain and to reduce symptoms from chronic back pain:    Maintain a healthy weight. If you are overweight, losing weight will help most types of back pain.    Exercise is an important part of recovery from most types of back pain. The muscles behind and in front of the spine support the back. This means strengthening both the back muscles and the abdominal muscles will provide better support for your spine.     Swimming and brisk walking are good overall exercises to improve your fitness level.    Practice safe lifting methods (below).    Practice good posture when sitting, standing and walking. Avoid prolonged sitting. This puts more stress on the lower back than standing or walking.    Wear quality shoes with  sufficient arch support. Foot and ankle alignment can affect back symptoms. Women should avoid wearing high heels.    Therapeutic massage can help relax the back muscles without stretching them.    During the first 24 to 72 hours after an acute injury or flare-up of chronic back pain, apply an ice pack to the painful area for 20 minutes and then remove it for 20 minutes, over a period of 60 to 90 minutes, or several times a day. As a safety precaution, do not use a heating pad at bedtime. Sleeping on a heating pad can lead to skin burns or tissue damage.    You can alternate ice and heat therapies.  Medicines  Talk to your healthcare provider before using medicines, especially if you have other medical problems or are taking other medicines.    You may use acetaminophen or ibuprofen to control pain, unless your healthcare provider prescribed other pain medicine. If you have chronic conditions like diabetes, liver or kidney disease, stomach ulcers, or gastrointestinal bleeding, or are taking blood thinners, talk with your healthcare provider before taking any medicines.    Be careful if you are given prescription pain medicines, narcotics, or medicine for muscle spasm. They can cause drowsiness, affect your coordination, reflexes, and judgment. Do not drive or operate heavy machinery while taking these types of medicines. Take prescription pain medicine only as prescribed by your healthcare provider.  Lumbar stretch  Here is a simple stretching exercise that will help relax muscle spasm and keep your back more limber. If exercise makes your back pain worse, don t do it.    Lie on your back with your knees bent and both feet on the ground.    Slowly raise your left knee to your chest as you flatten your lower back against the floor. Hold for 5 seconds.    Relax and repeat the exercise with your right knee.    Do 10 of these exercises for each leg.  Safe lifting method    Don t bend over at the waist to lift an object  off the floor.  Instead, bend your knees and hips in a squat.     Keep your back and head upright    Hold the object close to your body, directly in front of you.    Straighten your legs to lift the object.     Lower the object to the floor in the reverse fashion.    If you must slide something across the floor, push it.  Posture tips  Sitting  Sit in chairs with straight backs or low-back support. Keep your knees lower than your hips, with your feet flat on the floor.  When driving, sit up straight. Adjust the seat forward so you are not leaning toward the steering wheel.  A small pillow or rolled towel behind your lower back may help if you are driving long distances.   Standing  When standing for long periods, shift most of your weight to one leg at a time. Alternate legs every few minutes.   Sleeping  The best way to sleep is on your side with your knees bent. Put a low pillow under your head to support your neck in a neutral spine position. Avoid thick pillows that bend your neck to one side. Put a pillow between your legs to further relax your lower back. If you sleep on your back, put pillows under your knees to support your legs in a slightly flexed position. Use a firm mattress. If your mattress sags, replace it, or use a 1/2-inch plywood board under the mattress to add support.  Follow-up care  Follow up with your healthcare provider, or as advised.  If X-rays, a CT scan or an MRI scan were taken, they will be reviewed by a radiologist. You will be notified of any new findings that may affect your care.  Call 911  Call 911 if any of the following occur:    Trouble breathing    Confusion    Very drowsy    Fainting or loss of consciousness    Rapid or very slow heart rate    Loss of  bowel or bladder control  When to seek medical advice  Call your healthcare provider right away if any of the following occur:    Pain becomes worse or spreads to your arms or legs    Weakness or numbness in one or both arms or  legs    Numbness in the groin area  Date Last Reviewed: 6/1/2016 2000-2018 [employer]. [address] [place] [address], [place] [address]. All rights reserved. This information is not intended as a substitute for professional medical care. Always follow your healthcare professional's instructions.

## 2019-11-12 NOTE — LETTER
[place] [id number] [place] [id number]  Phone: [phone]    November 12, 2019        Don CLARKE Samson  [address]  [facility] [id number]          To whom it may concern:    RE: Don Samson    Patient was seen and treated today at our clinic and missed work.    Please contact me for questions or concerns.      Sincerely,        Yumi Rojas MD

## 2019-11-13 ASSESSMENT — ANXIETY QUESTIONNAIRES: GAD7 TOTAL SCORE: 0

## 2019-11-23 ENCOUNTER — NURSE TRIAGE (OUTPATIENT)
Dept: NURSING | Facility: CLINIC | Age: 69
End: 2019-11-23

## 2019-11-23 NOTE — TELEPHONE ENCOUNTER
"Pt calls in with concern of   Nausea  Headache  Diarrhea    Pt says he ate Alejandro lettuce on Thursday   Symptoms started yesterday     Pt has had at least 6 episodes of diarrhea today     Per protocol pt advised to go to ED now - pt says \" well Im Not going to do that \"    Then asked if pt would at least be willing to go to an Urgent Care ?    Pt says YES - he will \"probably\" go to [facility] today     Protocol and care advice reviewed  Caller states understanding of the recommended disposition    Advised to call back if further questions or concerns    Kike Gillespie , RN / [facility]    Reason for Disposition    [1] MODERATE diarrhea (e.g., 4-6 times / day more than normal) AND [2] present > 48 hours (2 days)    Additional Information    Negative: Shock suspected (e.g., cold/pale/clammy skin, too weak to stand, low BP, rapid pulse)    Negative: Difficult to awaken or acting confused (e.g., disoriented, slurred speech)    Negative: Sounds like a life-threatening emergency to the triager    Negative: [1] SEVERE abdominal pain (e.g., excruciating) AND [2] present > 1 hour    Negative: [1] SEVERE abdominal pain AND [2] age > 60    Negative: [1] Blood in the stool AND [2] moderate or large amount of blood    Negative: Black or tarry bowel movements  (Exception: chronic-unchanged  black-grey bowel movements AND is taking iron pills or Pepto-bismol)    Negative: [1] Drinking very little AND [2] dehydration suspected (e.g., no urine > 12 hours, very dry mouth, very lightheaded)    Negative: Patient sounds very sick or weak to the triager    Negative: [1] SEVERE diarrhea (e.g., 7 or more times / day more than normal) AND [2]  age > 60 years    Negative: [1] Constant abdominal pain AND [2] present > 2 hours    Negative: [1] Fever > 103 F (39.4 C) AND [2] not able to get the fever down using Fever Care Advice    Negative: [1] SEVERE diarrhea (e.g., 7 or more times / day more than normal) AND [2] present > 24 " hours (1 day)    Protocols used: DIARRHEA-A-AH

## 2019-11-24 ENCOUNTER — OFFICE VISIT (OUTPATIENT)
Dept: URGENT CARE | Facility: URGENT CARE | Age: 69
End: 2019-11-24
Payer: COMMERCIAL

## 2019-11-24 VITALS
RESPIRATION RATE: 12 BRPM | SYSTOLIC BLOOD PRESSURE: 120 MMHG | DIASTOLIC BLOOD PRESSURE: 82 MMHG | BODY MASS INDEX: 33.6 KG/M2 | TEMPERATURE: 98.1 F | HEART RATE: 70 BPM | WEIGHT: 240 LBS | HEIGHT: 71 IN

## 2019-11-24 DIAGNOSIS — K52.9 GASTROENTERITIS: Primary | ICD-10-CM

## 2019-11-24 DIAGNOSIS — G43.001 MIGRAINE WITHOUT AURA AND WITH STATUS MIGRAINOSUS, NOT INTRACTABLE: ICD-10-CM

## 2019-11-24 PROCEDURE — 99213 OFFICE O/P EST LOW 20 MIN: CPT | Performed by: FAMILY MEDICINE

## 2019-11-24 RX ORDER — SUMATRIPTAN 50 MG/1
50 TABLET, FILM COATED ORAL
Qty: 30 TABLET | Refills: 1 | Status: SHIPPED | OUTPATIENT
Start: 2019-11-24 | End: 2020-12-09

## 2019-11-24 RX ORDER — ONDANSETRON 4 MG/1
TABLET, FILM COATED ORAL EVERY 8 HOURS PRN
COMMUNITY
End: 2020-12-09

## 2019-11-24 ASSESSMENT — MIFFLIN-ST. JEOR: SCORE: 1885.76

## 2019-11-24 NOTE — LETTER
To Whom It MayConcern:       Don Samson  was seen in our clinic on 11/24/2019        Patient may return to work on   Uncertain-when recovered .                            Restrictions:     Comments:          Provider Signature:         KETURAH Barnett MD

## 2019-11-24 NOTE — PROGRESS NOTES
Subjective: Patient says he was eating em lettuce all week from the [place] area of [place] where there has been some concern about E. coli.  2 days ago he developed diarrhea and vomiting and had a really bad migraine-like headache.  He says he has had these migraines before but it is been present for a day at least at this point.  He said he got some migraine medicine in the past which helped but they did not give him refills.  He also had some Zofran which seems to have helped and the diarrhea seems to be stopping.  No fevers.  He felt too sick to go to work.    Objective: Vitals are stable.  He is somewhat uncooperative with our approach.  Neurologic exam is normal.  Fundi are normal.  Slightly blunted affect.  Heart is regular without murmurs.  Abdomen is benign.    Assessment and plan: Possibility of E. coli toxin, but patient is unwilling to collect a stool sample to test.  He says it would make any difference which is true.  Mostly he wants something for the headache.  I explained about how migraine medicines worked best but he can certainly try Imitrex and see if he can get some relief.  Note for work.

## 2019-12-11 ENCOUNTER — OFFICE VISIT (OUTPATIENT)
Dept: FAMILY MEDICINE | Facility: CLINIC | Age: 69
End: 2019-12-11
Payer: COMMERCIAL

## 2019-12-11 VITALS
HEART RATE: 65 BPM | OXYGEN SATURATION: 97 % | TEMPERATURE: 97.5 F | SYSTOLIC BLOOD PRESSURE: 110 MMHG | DIASTOLIC BLOOD PRESSURE: 70 MMHG

## 2019-12-11 DIAGNOSIS — A08.4 VIRAL GASTROENTERITIS: ICD-10-CM

## 2019-12-11 PROCEDURE — 99213 OFFICE O/P EST LOW 20 MIN: CPT | Performed by: FAMILY MEDICINE

## 2019-12-11 NOTE — LETTER
December 11, 2019      Don Samson  [address]  [facility] [address]        To Whom It May Concern:    Don Samson  was seen on Wed Dec 11, 2019.  Please excuse him from Monday 12/9 thru Thursday  due to illness.        Sincerely,        Paulo Tapia MD

## 2019-12-11 NOTE — PROGRESS NOTES
Subjective     Don Samson is a 69 year old male who presents to clinic today for the following health issues:    HPI   Gastroenteroloy SYMPTOMS      Duration: 6 days    Description  headache, nausea and low appetite    Severity: moderate    Accompanying signs and symptoms: stomach not right    History (predisposing factors):  none    Precipitating or alleviating factors: None    Therapies tried and outcome:  none    Pt missed work since Monday        BP Readings from Last 3 Encounters:   12/11/19 110/70   11/24/19 120/82   11/12/19 110/68    Wt Readings from Last 3 Encounters:   11/24/19 108.9 kg (240 lb)   11/12/19 111.5 kg (245 lb 12 oz)   10/14/19 107 kg (236 lb)                    Reviewed and updated as needed this visit by Provider         Review of Systems   ROS COMP: Constitutional, HEENT, cardiovascular, pulmonary, gi and gu systems are negative, except as otherwise noted.      Objective    /70 (Cuff Size: Adult Large)   Pulse 65   Temp 97.5  F (36.4  C) (Tympanic)   SpO2 97%   There is no height or weight on file to calculate BMI.  Physical Exam   GENERAL: healthy, alert and no distress  NECK: no adenopathy, no asymmetry, masses, or scars and thyroid normal to palpation  RESP: lungs clear to auscultation - no rales, rhonchi or wheezes  CV: regular rate and rhythm, normal S1 S2, no S3 or S4, no murmur, click or rub, no peripheral edema and peripheral pulses strong  ABDOMEN: tenderness epigastric and umbilical, no organomegaly or masses and bowel sounds normal    Diagnostic Test Results:  Labs reviewed in Epic  none         Assessment & Plan     Don was seen today for uri.    Diagnoses and all orders for this visit:    Viral gastroenteritis           FUTURE APPOINTMENTS:       - Follow-up visit in 1 week if not resolving  Patient Instructions   Eat light, push fluids      Return in about 1 week (around 12/18/2019).    Paulo Tapia MD  [facility]

## 2020-01-16 ENCOUNTER — OFFICE VISIT (OUTPATIENT)
Dept: FAMILY MEDICINE | Facility: CLINIC | Age: 70
End: 2020-01-16
Payer: COMMERCIAL

## 2020-01-16 VITALS
BODY MASS INDEX: 34.44 KG/M2 | WEIGHT: 246 LBS | HEIGHT: 71 IN | SYSTOLIC BLOOD PRESSURE: 128 MMHG | DIASTOLIC BLOOD PRESSURE: 70 MMHG | HEART RATE: 65 BPM | RESPIRATION RATE: 18 BRPM | TEMPERATURE: 98.1 F | OXYGEN SATURATION: 97 %

## 2020-01-16 DIAGNOSIS — M77.8 TENDINITIS OF RIGHT WRIST: Primary | ICD-10-CM

## 2020-01-16 DIAGNOSIS — B35.1 ONYCHOMYCOSIS: ICD-10-CM

## 2020-01-16 PROCEDURE — 99213 OFFICE O/P EST LOW 20 MIN: CPT | Performed by: FAMILY MEDICINE

## 2020-01-16 RX ORDER — NAPROXEN 500 MG/1
500 TABLET ORAL 2 TIMES DAILY WITH MEALS
Qty: 10 TABLET | Refills: 0 | Status: SHIPPED | OUTPATIENT
Start: 2020-01-16 | End: 2020-02-24

## 2020-01-16 ASSESSMENT — MIFFLIN-ST. JEOR: SCORE: 1912.98

## 2020-01-16 NOTE — LETTER
[place] [id number] [place] [id number]  Phone: [phone]    January 16, 2020        Don TRICIA Mali  [address]  [facility] [address]          To whom it may concern:    RE: Don TRICIA Mali    Patient was seen and treated today at our clinic and missed work.  Patient may return to work when symptoms have improved with the following:  No working or lifting restrictions    Please contact me for questions or concerns.      Sincerely,        Deirdre Olivas MD

## 2020-01-16 NOTE — PATIENT INSTRUCTIONS
Health Maintenance Due   Topic Date Due     DTAP/TDAP/TD IMMUNIZATION (2 - Td) 06/22/2016     MEDICARE ANNUAL WELLNESS VISIT  02/15/2017     PNEUMOCOCCAL IMMUNIZATION 65+ LOW/MEDIUM RISK (1 of 2 - PCV13) 02/15/2017     FIT  08/15/2019      Vaseline jelly, aquaphor for your legs.

## 2020-02-03 DIAGNOSIS — R19.5 POSITIVE FIT (FECAL IMMUNOCHEMICAL TEST): Primary | ICD-10-CM

## 2020-02-24 ENCOUNTER — OFFICE VISIT (OUTPATIENT)
Dept: URGENT CARE | Facility: URGENT CARE | Age: 70
End: 2020-02-24
Payer: COMMERCIAL

## 2020-02-24 VITALS
HEART RATE: 88 BPM | WEIGHT: 234 LBS | HEIGHT: 72 IN | SYSTOLIC BLOOD PRESSURE: 138 MMHG | BODY MASS INDEX: 31.69 KG/M2 | OXYGEN SATURATION: 97 % | TEMPERATURE: 97.5 F | DIASTOLIC BLOOD PRESSURE: 89 MMHG

## 2020-02-24 DIAGNOSIS — M54.50 CHRONIC BILATERAL LOW BACK PAIN WITHOUT SCIATICA: Primary | ICD-10-CM

## 2020-02-24 DIAGNOSIS — G89.29 CHRONIC BILATERAL LOW BACK PAIN WITHOUT SCIATICA: Primary | ICD-10-CM

## 2020-02-24 PROCEDURE — 99213 OFFICE O/P EST LOW 20 MIN: CPT | Performed by: PHYSICIAN ASSISTANT

## 2020-02-24 RX ORDER — NAPROXEN 500 MG/1
500 TABLET ORAL 2 TIMES DAILY WITH MEALS
Qty: 10 TABLET | Refills: 0 | Status: SHIPPED | OUTPATIENT
Start: 2020-02-24 | End: 2021-04-05

## 2020-02-24 ASSESSMENT — ENCOUNTER SYMPTOMS
NUMBNESS: 0
RESPIRATORY NEGATIVE: 1
CHILLS: 0
DIFFICULTY URINATING: 0
BACK PAIN: 1
NECK STIFFNESS: 0
MYALGIAS: 0
FEVER: 0
CARDIOVASCULAR NEGATIVE: 1
WEAKNESS: 0
ACTIVITY CHANGE: 0
NECK PAIN: 0

## 2020-02-24 ASSESSMENT — MIFFLIN-ST. JEOR: SCORE: 1861.48

## 2020-02-24 NOTE — PATIENT INSTRUCTIONS
Patient Education     Caring for Your Back Throughout the Day    Take care of your back throughout the day. You will likely have fewer back problems if you do. Try to warm up before you move. Shift positions often. Also do your best to form healthy habits.  Warm up for the day  Do a few slow, catlike stretches before starting your day. This simple warmup can soften your disks, stretch your back muscles, and help prevent injuries.  Shift positions often  At work and at home, change positions often. This helps keep your body from getting stiff. Stand up or lean back while you sit. If you can, get up and move every 1/2 hour.  Form healthy habits  Here are some suggestions:     Keep a healthy weight. When you weigh too much, your back is under excess strain. But losing just a few extra pounds can help a lot.    Try not to overeat. Learn about serving sizes. The size of a serving depends on the food and the food group. Many foods list serving sizes on the labels.    Handle minor aches with cold and heat. Apply cold the first 24 to 48 hours. Use heat after that. Always place a thin cloth between your skin and the source of cold or heat.    Take medicines as directed. This helps keep pain under control. Always read labels, and call your healthcare provider or pharmacist if you have any questions.  Walk each day  A daily walk keeps your back and thigh muscles stretched and strong. This gives your back better support. Be sure to walk with your spine s three curves aligned, by keeping your head, hips, and toes connected by a vertical line.  Date Last Reviewed: 5/1/2018 2000-2019 [employer]. [address] [facility], [place] [address]. All rights reserved. This information is not intended as a substitute for professional medical care. Always follow your healthcare professional's instructions.

## 2020-02-24 NOTE — PROGRESS NOTES
SUBJECTIVE:   Don Samson is a 70 year old male presenting with a chief complaint of   Chief Complaint   Patient presents with     Urgent Care     Pt in clinic c/o low back pain.     Back Pain       He is an established patient of [facility].    Back Pain  Recently on vacation and had increased low back pain. Mid lower back (Rt > Lt), especially when bending over and bringing self back up. At rest less pain. No known injury, twist, fall or heavy lifting. No difficulty walking.   Missed work the last three days.   Naproxen or ibuprofen on occasion with temporary improvement. Flexeril once in a great while.  Onset of symptoms was 1 week(s) ago.  Location: bilateral (occasionally Rt > Lt) low back  Radiation: does not radiate into buttocks, upper back, or legs  Context: no known injury  Severity moderate  Current and Associated symptoms: pain  Denies: fecal incontinence, urinary incontinence, lower extremity numbness, lower extremity weakness and paresthesia    Aggravating Factors: bending  Therapies to improve symptoms include: ibuprofen. Physical therapy.   Past history: similar symptoms in the past      Review of Systems   Constitutional: Negative for activity change, chills and fever.   HENT: Negative.    Respiratory: Negative.    Cardiovascular: Negative.    Genitourinary: Negative for difficulty urinating.   Musculoskeletal: Positive for back pain. Negative for gait problem, myalgias, neck pain and neck stiffness.   Skin: Negative for rash.   Neurological: Negative for weakness and numbness.       Past Medical History:   Diagnosis Date     Ankle pain 5/21/2016     Ankle sprain and strain 9/16/2010     CARDIOVASCULAR SCREENING; LDL GOAL LESS THAN 130 9/6/2016     Depression     declines treatment 4/1/16     HTN (hypertension)     declines treatment 4/1/16     Left knee pain 6/18/2015     Plantar fasciitis 9/16/2010     Right knee pain 12/12/2016     Tibialis posterior tendonitis 5/21/2016     Family History  "  Problem Relation Age of Onset     Hypertension Mother      Current Outpatient Medications   Medication Sig Dispense Refill     amLODIPine (NORVASC) 5 MG tablet Take 1 tablet (5 mg) by mouth daily 90 tablet 3     naproxen (NAPROSYN) 500 MG tablet Take 1 tablet (500 mg) by mouth 2 times daily (with meals) 10 tablet 0     olmesartan (BENICAR) 20 MG tablet TAKE 1 TABLET BY MOUTH DAILY WITH BREAKFAST 90 tablet 2     ondansetron (ZOFRAN) 4 MG tablet Take by mouth every 8 hours as needed for nausea       SUMAtriptan (IMITREX) 50 MG tablet Take 1 tablet (50 mg) by mouth at onset of headache for migraine May repeat in 2 hours. Max 4 tablets/24 hours. 30 tablet 1     Social History     Tobacco Use     Smoking status: Former Smoker     Last attempt to quit: 1970     Years since quittin.1     Smokeless tobacco: Never Used   Substance Use Topics     Alcohol use: Yes     Alcohol/week: 0.0 standard drinks       OBJECTIVE  /89   Pulse 88   Temp 97.5  F (36.4  C) (Tympanic)   Ht 1.816 m (5' 11.5\")   Wt 106.1 kg (234 lb)   SpO2 97%   BMI 32.18 kg/m      Physical Exam  Vitals signs and nursing note reviewed.   Constitutional:       Appearance: Normal appearance. He is normal weight.   HENT:      Head: Normocephalic.   Eyes:      Conjunctiva/sclera: Conjunctivae normal.   Pulmonary:      Effort: Pulmonary effort is normal.   Musculoskeletal: Normal range of motion.         General: No swelling or deformity.      Lumbar back: He exhibits no bony tenderness.        Back:    Skin:     General: Skin is warm.   Neurological:      General: No focal deficit present.      Mental Status: He is alert and oriented to person, place, and time.   Psychiatric:         Mood and Affect: Mood normal.         Behavior: Behavior normal.       Labs:  No results found for this or any previous visit (from the past 24 hour(s)).    X-Ray was not done.    ASSESSMENT:      ICD-10-CM    1. Chronic bilateral low back pain without sciatica " M54.5 PHYSICAL THERAPY REFERRAL    G89.29 naproxen (NAPROSYN) 500 MG tablet        Medical Decision Making:    Differential Diagnosis:  Back Pain: myofascial low back strain, lumbosacral strain, degenerative disc disease and chronic low back pain    Serious Comorbid Conditions:  Adult:  hypertension    PLAN:    Back Pain: ice, heat, rest, stretching, Physical Therapy, Ibuprofen and Rx: naproxen. Patient does not need flexeril at this time.     Followup:    If not improving or if condition worsens, follow up with your Primary Care Provider    Patient Instructions     Patient Education     Caring for Your Back Throughout the Day    Take care of your back throughout the day. You will likely have fewer back problems if you do. Try to warm up before you move. Shift positions often. Also do your best to form healthy habits.  Warm up for the day  Do a few slow, catlike stretches before starting your day. This simple warmup can soften your disks, stretch your back muscles, and help prevent injuries.  Shift positions often  At work and at home, change positions often. This helps keep your body from getting stiff. Stand up or lean back while you sit. If you can, get up and move every 1/2 hour.  Form healthy habits  Here are some suggestions:     Keep a healthy weight. When you weigh too much, your back is under excess strain. But losing just a few extra pounds can help a lot.    Try not to overeat. Learn about serving sizes. The size of a serving depends on the food and the food group. Many foods list serving sizes on the labels.    Handle minor aches with cold and heat. Apply cold the first 24 to 48 hours. Use heat after that. Always place a thin cloth between your skin and the source of cold or heat.    Take medicines as directed. This helps keep pain under control. Always read labels, and call your healthcare provider or pharmacist if you have any questions.  Walk each day  A daily walk keeps your back and thigh muscles  stretched and strong. This gives your back better support. Be sure to walk with your spine s three curves aligned, by keeping your head, hips, and toes connected by a vertical line.  Date Last Reviewed: 5/1/2018 2000-2019 [employer]. [address] [place] [address], [place] [address]. All rights reserved. This information is not intended as a substitute for professional medical care. Always follow your healthcare professional's instructions.

## 2020-02-24 NOTE — LETTER
[facility]  [address] [place] [address]  Phone: [phone]    02/24/20    Don Samson  [address]  [facility] [address]      To whom it may concern:     Don Samson was seen in our clinic today and is able to return to work tomorrow without restrictions. Please call us with any questions.     Sincerely,      Nasra Hernandez PA-C

## 2020-03-05 ENCOUNTER — OFFICE VISIT (OUTPATIENT)
Dept: URGENT CARE | Facility: URGENT CARE | Age: 70
End: 2020-03-05
Payer: COMMERCIAL

## 2020-03-05 VITALS
OXYGEN SATURATION: 97 % | DIASTOLIC BLOOD PRESSURE: 84 MMHG | TEMPERATURE: 97.6 F | BODY MASS INDEX: 31.69 KG/M2 | HEART RATE: 57 BPM | SYSTOLIC BLOOD PRESSURE: 148 MMHG | WEIGHT: 234 LBS | HEIGHT: 72 IN

## 2020-03-05 DIAGNOSIS — R30.0 DYSURIA: Primary | ICD-10-CM

## 2020-03-05 DIAGNOSIS — I10 HYPERTENSION GOAL BP (BLOOD PRESSURE) < 140/90: ICD-10-CM

## 2020-03-05 LAB
ALBUMIN UR-MCNC: NEGATIVE MG/DL
APPEARANCE UR: CLEAR
BILIRUB UR QL STRIP: NEGATIVE
COLOR UR AUTO: YELLOW
GLUCOSE UR STRIP-MCNC: NEGATIVE MG/DL
HGB UR QL STRIP: NEGATIVE
KETONES UR STRIP-MCNC: NEGATIVE MG/DL
LEUKOCYTE ESTERASE UR QL STRIP: NEGATIVE
NITRATE UR QL: NEGATIVE
PH UR STRIP: 5.5 PH (ref 5–7)
SOURCE: NORMAL
SP GR UR STRIP: 1.02 (ref 1–1.03)
UROBILINOGEN UR STRIP-ACNC: 0.2 EU/DL (ref 0.2–1)

## 2020-03-05 PROCEDURE — 99213 OFFICE O/P EST LOW 20 MIN: CPT | Performed by: NURSE PRACTITIONER

## 2020-03-05 PROCEDURE — 81003 URINALYSIS AUTO W/O SCOPE: CPT | Performed by: NURSE PRACTITIONER

## 2020-03-05 ASSESSMENT — MIFFLIN-ST. JEOR: SCORE: 1861.48

## 2020-03-05 NOTE — PROGRESS NOTES
"SUBJECTIVE:   Don Samson is a 70 year old male presenting with a chief complaint of frequency of urination that is new.  He denies any dysuria, penile drainage, testicular pain, back pain, N/V, or fevers.    Onset of symptoms was 7 day(s) ago.  Course of illness is same.    Severity moderate  Previous Treatment none      Past Medical History:   Diagnosis Date     Ankle pain 2016     Ankle sprain and strain 2010     CARDIOVASCULAR SCREENING; LDL GOAL LESS THAN 130 2016     Depression     declines treatment 16     HTN (hypertension)     declines treatment 16     Left knee pain 2015     Plantar fasciitis 2010     Right knee pain 2016     Tibialis posterior tendonitis 2016     Current Outpatient Medications   Medication Sig Dispense Refill     amLODIPine (NORVASC) 5 MG tablet Take 1 tablet (5 mg) by mouth daily 90 tablet 3     naproxen (NAPROSYN) 500 MG tablet Take 1 tablet (500 mg) by mouth 2 times daily (with meals) 10 tablet 0     olmesartan (BENICAR) 20 MG tablet TAKE 1 TABLET BY MOUTH DAILY WITH BREAKFAST 90 tablet 2     ondansetron (ZOFRAN) 4 MG tablet Take by mouth every 8 hours as needed for nausea       SUMAtriptan (IMITREX) 50 MG tablet Take 1 tablet (50 mg) by mouth at onset of headache for migraine May repeat in 2 hours. Max 4 tablets/24 hours. 30 tablet 1     Social History     Tobacco Use     Smoking status: Former Smoker     Last attempt to quit: 1970     Years since quittin.2     Smokeless tobacco: Never Used   Substance Use Topics     Alcohol use: Yes     Alcohol/week: 0.0 standard drinks     Family History   Problem Relation Age of Onset     Hypertension Mother         ROS: 10 point ROS neg other than the symptoms noted above in the HPI.     OBJECTIVE  BP (!) 148/84   Pulse 57   Temp 97.6  F (36.4  C) (Oral)   Ht 1.816 m (5' 11.5\")   Wt 106.1 kg (234 lb)   SpO2 97%   BMI 32.18 kg/m        GENERAL APPEARANCE: healthy appearing, alert     " EYES: PERRL, EOMI, sclera non-icteric     HENT: oral exam benign, mucus membranes intact, without ulcers or lesions     NECK: no adenopathy or asymmetry, thyroid normal to palpation     RESP: lungs clear to auscultation - no rales, rhonchi or wheezes     CV: regular rates and rhythm, no murmurs, rubs, or gallop     ABDOMEN:  soft, nontender, no HSM or masses and bowel sounds normal     Rectal exam: prostate symmetric w/o nodularity, no masses palpated     GU_male: testicles normal without atrophy or masses     MS: extremities normal- no gross deformities noted; normal muscle tone.     SKIN: no suspicious lesions or rashes    Labs:  Results for orders placed or performed in visit on 03/05/20 (from the past 24 hour(s))   *UA reflex to Microscopic and Culture ([facility] (except [place] and [place])   Result Value Ref Range    Color Urine Yellow     Appearance Urine Clear     Glucose Urine Negative NEG^Negative mg/dL    Bilirubin Urine Negative NEG^Negative    Ketones Urine Negative NEG^Negative mg/dL    Specific Gravity Urine 1.020 1.003 - 1.035    Blood Urine Negative NEG^Negative    pH Urine 5.5 5.0 - 7.0 pH    Protein Albumin Urine Negative NEG^Negative mg/dL    Urobilinogen Urine 0.2 0.2 - 1.0 EU/dL    Nitrite Urine Negative NEG^Negative    Leukocyte Esterase Urine Negative NEG^Negative    Source Midstream Urine          ASSESSMENT/PLAN:      ICD-10-CM    1. Dysuria R30.0 *UA reflex to Microscopic and Culture ([facility] (except [place] and [place])   2. Hypertension goal BP (blood pressure) < 140/90 I10 See primary care provider to discuss BP, take meds every day.        Follow Up:  [place] to follow up with Primary Care provider regarding elevated blood pressure.      Patient Instructions   Follow up with primary doctor about the possibility of Flomax or similar.      Lily Henderson APRN, CNP  [facility] Provider

## 2020-03-20 ENCOUNTER — VIRTUAL VISIT (OUTPATIENT)
Dept: FAMILY MEDICINE | Facility: CLINIC | Age: 70
End: 2020-03-20
Payer: COMMERCIAL

## 2020-03-20 DIAGNOSIS — N40.1 BENIGN PROSTATIC HYPERPLASIA WITH LOWER URINARY TRACT SYMPTOMS, SYMPTOM DETAILS UNSPECIFIED: Primary | ICD-10-CM

## 2020-03-20 PROCEDURE — 99441 ZZC PHYSICIAN TELEPHONE EVALUATION 5-10 MIN: CPT | Performed by: FAMILY MEDICINE

## 2020-03-20 RX ORDER — TAMSULOSIN HYDROCHLORIDE 0.4 MG/1
CAPSULE ORAL
Qty: 28 CAPSULE | Refills: 1 | Status: SHIPPED | OUTPATIENT
Start: 2020-03-20 | End: 2020-03-20

## 2020-03-20 RX ORDER — TAMSULOSIN HYDROCHLORIDE 0.4 MG/1
CAPSULE ORAL
Qty: 49 CAPSULE | Refills: 1 | Status: SHIPPED | OUTPATIENT
Start: 2020-03-20 | End: 2020-05-13

## 2020-03-20 NOTE — PROGRESS NOTES
"Subjective     Don Samson is a 70 year old male who presents to clinic today for the following health issues:    HPI       Don Samson is a 70 year old male who is being evaluated via a billable telephone visit.      The patient has been notified of following:     \"This telephone visit will be conducted via a call between you and your physician/provider. We have found that certain health care needs can be provided without the need for a physical exam.  This service lets us provide the care you need with a short phone conversation.  If a prescription is necessary we can send it directly to your pharmacy.  If lab work is needed we can place an order for that and you can then stop by our lab to have the test done at a later time.    If during the course of the call the physician/provider feels a telephone visit is not appropriate, you will not be charged for this service.\"       Don Samson complains of    Chief Complaint   Patient presents with     Urinary Problem       I have reviewed and updated the patient's Past Medical History, Social History, Family History and Medication List.    ALLERGIES  Patient has no known allergies.   Marlyn Hanson on 3/20/2020 at 9:40 AM    He has been frequent urination for couple of months.   Symptoms are more pronounced during the day.   Sometimes it is hard to start and stop urination.   No pain, polyuria, polydipsia, abdominal pain, new flank pain, testicular pain or hernia.    Assessment/Plan:    1. Benign prostatic hyperplasia with lower urinary tract symptoms, symptom details unspecified  American Urological Association Symptom Score (AUASS) score 18  - Discussed with patient that  providers note - prostate was not enlarged  - Based on symptoms and AUASS score it appears that patient has BPH, we discussed starting medication. Due to COVID19 risk, I discussed that we would be treating empirically as I have not examined patient or done lab work. If symptoms are " not improving after 14 days then doing lab work and possible clinic visit for further evaluation. Pt agreeable with plan.   - tamsulosin (FLOMAX) 0.4 MG capsule; Take 1 capsule (0.4 mg) by mouth daily for 7 days, THEN 2 capsules (0.8 mg) daily for 21 days.  Dispense: 49 capsule; Refill: 1    Phone call duration:  10:11 am minutes - 10:19 am     Kiya Darnell MD

## 2020-03-23 ENCOUNTER — NURSE TRIAGE (OUTPATIENT)
Dept: NURSING | Facility: CLINIC | Age: 70
End: 2020-03-23

## 2020-03-24 NOTE — TELEPHONE ENCOUNTER
Caller has questions regarding recent RX; states pharmacist was unable  To fill and reported confusion in that they received 2 RX and were not sure which wasvalid and were  waiting to contact provider   Review of EMR reveals only on  RX     Date/Time  Action Taken  User  Additional Information    03/20/20 1035  Sign  Kiya Darnell MD  Reorder from Order: [id number]    03/20/20 1035  Taking Flag Checked  Kiya Darnell MD     Outpatient Medication Detail      Disp  Refills  Start  End  ADELINA    tamsulosin (FLOMAX) 0.4 MG capsule  49 capsule  1  3/20/2020  4/17/2020  No    Sig - Route: Take 1 capsule (0.4 mg) by mouth daily for 7 days, THEN 2 capsules (0.8 mg) daily for 21 days. - Oral    Sent to pharmacy as: tamsulosin (FLOMAX) 0.4 MG capsule    Class: E-Prescribe    Order: [id number]    E-Prescribing Status: Receipt confirmed by pharmacy (3/20/2020 10:35 AM CDT)    Printout Tracking     External Result Report    Pharmacy     [id number] - [address] AT [address]    Associated Diagnoses     Benign prostatic hyperplasia with lower urinary tract symptoms, symptom details unspecified [N40.1]  - Primary        Source Order Set       Pharmacy advised to fill only the later  RX   understands and will comply   Patient notified   Gillian Nunez RN  FNA           [phone]  [phone]    Reason for Disposition    Caller has medication question only, adult not sick, and triager answers question    Protocols used: MEDICATION QUESTION CALL-A-

## 2020-05-09 DIAGNOSIS — N40.1 BENIGN PROSTATIC HYPERPLASIA WITH LOWER URINARY TRACT SYMPTOMS, SYMPTOM DETAILS UNSPECIFIED: ICD-10-CM

## 2020-05-12 NOTE — TELEPHONE ENCOUNTER
Tamsulosin started by Dr Darnell on 3/20/20.  To titer up to 0.8 daily    Refill request to MD/primary care provider.    Directions would need update    Sheila Mallory RN

## 2020-05-13 RX ORDER — TAMSULOSIN HYDROCHLORIDE 0.4 MG/1
0.8 CAPSULE ORAL DAILY
Qty: 60 CAPSULE | Refills: 2 | Status: SHIPPED | OUTPATIENT
Start: 2020-05-13 | End: 2020-08-11

## 2020-05-15 DIAGNOSIS — I10 UNCONTROLLED HYPERTENSION: ICD-10-CM

## 2020-05-15 DIAGNOSIS — I10 HYPERTENSION GOAL BP (BLOOD PRESSURE) < 140/90: ICD-10-CM

## 2020-05-15 RX ORDER — AMLODIPINE BESYLATE 5 MG/1
TABLET ORAL
Qty: 90 TABLET | Refills: 3 | OUTPATIENT
Start: 2020-05-15

## 2020-06-26 ENCOUNTER — VIRTUAL VISIT (OUTPATIENT)
Dept: FAMILY MEDICINE | Facility: CLINIC | Age: 70
End: 2020-06-26
Payer: COMMERCIAL

## 2020-06-26 DIAGNOSIS — G89.29 CHRONIC BILATERAL LOW BACK PAIN WITHOUT SCIATICA: Primary | ICD-10-CM

## 2020-06-26 DIAGNOSIS — M54.50 CHRONIC BILATERAL LOW BACK PAIN WITHOUT SCIATICA: Primary | ICD-10-CM

## 2020-06-26 PROCEDURE — 99213 OFFICE O/P EST LOW 20 MIN: CPT | Mod: 95 | Performed by: PHYSICIAN ASSISTANT

## 2020-06-26 RX ORDER — CYCLOBENZAPRINE HCL 10 MG
10 TABLET ORAL 3 TIMES DAILY PRN
Qty: 30 TABLET | Refills: 0 | Status: SHIPPED | OUTPATIENT
Start: 2020-06-26 | End: 2023-01-31

## 2020-06-26 NOTE — LETTER
June 26, 2020      Don Samson  [address]  [facility] [address]        To Whom It May Concern,     The above named patient is able to able to return to work on Saturday, 6/27/20, with NO restrictions.      Sincerely,        Alysia Gomez PA-C
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
## 2020-06-26 NOTE — LETTER
June 26, 2020      Don Samson  [address]  [facility] [address]        To Whom It May Concern,     The above named patient is able to able to return to work on Saturday, 6/27/20, with NO restrictions.      Sincerely,        Alysia Gomez PA-C

## 2020-07-07 ENCOUNTER — TELEPHONE (OUTPATIENT)
Dept: FAMILY MEDICINE | Facility: CLINIC | Age: 70
End: 2020-07-07

## 2020-07-07 NOTE — TELEPHONE ENCOUNTER
"Patient contacted clinic reception line stating he was having side effect(Erectile dysfunction) from BP medication and that he \"couldn't get an appointment.\" Reception offered to schedule an appointment.  Patient refused to give his name.  Patient refused appointment stating her was so frustrated that \"I just give up.\"    Reception gave message and phone number to [facility] who attempted to contact patient.    Left VM informing the patient we had received his call and cared about his care and would like to schedule a follow-up visit.  Instructed him to call the clinic to schedulel or ask for a Triage RN.      Brittnee Pierce, MSN, RN  Patient Care Supervisor  [facility] and  [facility]    "

## 2020-07-10 ENCOUNTER — NURSE TRIAGE (OUTPATIENT)
Dept: NURSING | Facility: CLINIC | Age: 70
End: 2020-07-10

## 2020-07-10 NOTE — TELEPHONE ENCOUNTER
"Patient says he wants a note to be excused from work because he is overheated.  Patient says he has felt this way since Wednesday.  Patient says temp is \"98 something.\"  Patient reports feeling nausea but have not vomited.  Patient reports he has not drank much.  He says he had drank about 1 liter today.  FNA advised he would have to be evaluated by a doctor for a note.  Patient asked for [facility] tomorrow.  FNA advised of hours and that he did not need an appointment.  Patient hung up after that so was only able to tell him to continue to drink fluids.    Additional Information    Negative: Fever > 104 F (40.0 C)    Negative: Unconscious or difficult to awaken    Negative: Acting confused (e.g., disoriented, slurred speech)    Negative: Seizure has occurred    Negative: Very weak (e.g., can't stand)    Negative: Has fainted (passed out)    Negative: Sounds like a life-threatening emergency to the triager    Negative: [1] Swelling of both ankles (i.e., pedal edema) AND [2] caused by hot weather    Negative: Unable to walk, or can only walk with assistance (e.g., requires support)    Negative: Fever > 103 F (39.4 C)    Negative: [1] Dizziness or weakness AND [2] persists after 2 hours of rest and drinking liquids    Negative: [1] Fever AND [2] persists after 2 hours of rest and drinking liquids    Negative: Vomiting interferes with drinking fluids    Negative: Patient sounds very sick or weak to the  triager    Negative: [1] Painful muscle cramps AND [2] not resolved after 4 hours of rest and drinking liquids    Negative: [1] Fever > 101 F (38.3 C) AND [2] age > 60    Negative: [1] Fever > 100.0 F (37.8 C) AND [2] bedridden (e.g., nursing home patient, CVA, chronic illness, recovering from surgery)    Negative: [1] Fever > 100.0 F (37.8 C) AND [2] diabetes mellitus or weak immune system (e.g., HIV positive, cancer chemo, splenectomy, organ transplant, chronic steroids)    Negative: Normal muscle cramps or " sore muscles from heat exposure    Negative: Normal brief (1 to 2 hours) fever (< 103 F or 39.4 C) from heat exposure    Normal mild dehydration suspected (e.g., dizziness, weakness, nausea) from heat exposure    Negative: Normal hot, flushed (pink) skin from heat exposure    Protocols used: HEAT EXPOSURE (HEAT EXHAUSTION AND HEAT STROKE)-A-AH

## 2020-07-11 ENCOUNTER — OFFICE VISIT (OUTPATIENT)
Dept: URGENT CARE | Facility: URGENT CARE | Age: 70
End: 2020-07-11
Payer: COMMERCIAL

## 2020-07-11 VITALS
TEMPERATURE: 98.5 F | HEIGHT: 71 IN | DIASTOLIC BLOOD PRESSURE: 72 MMHG | SYSTOLIC BLOOD PRESSURE: 112 MMHG | OXYGEN SATURATION: 98 % | HEART RATE: 56 BPM | WEIGHT: 225 LBS | BODY MASS INDEX: 31.5 KG/M2

## 2020-07-11 DIAGNOSIS — R11.0 NAUSEA: Primary | ICD-10-CM

## 2020-07-11 PROCEDURE — 99213 OFFICE O/P EST LOW 20 MIN: CPT | Performed by: FAMILY MEDICINE

## 2020-07-11 RX ORDER — ONDANSETRON 4 MG/1
4 TABLET, FILM COATED ORAL EVERY 8 HOURS PRN
Qty: 30 TABLET | Refills: 0 | Status: SHIPPED | OUTPATIENT
Start: 2020-07-11 | End: 2023-01-31

## 2020-07-11 ASSESSMENT — MIFFLIN-ST. JEOR: SCORE: 1812.72

## 2020-07-11 NOTE — PROGRESS NOTES
Subjective: Patient has a history of having times that he feels nauseous and tired, was seen in the emergency room a few years back where multiple tests came up with no answer.  He is asked people of wondered if it might be partly related to his depression, has not responded to antidepressant medications in the past and too many side effects.  His symptoms started about 3 days ago with nausea.  He is supposed to work today and through the weekend and is partly here because he needs a note for work.  He does not feel much like eating or drinking.  No diarrhea.  No vomiting.  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    Objective: Depressed mood.  NAD.  ENT is normal.  Neck and thyroid are normal.  Lungs are clear.  Heart is regular without murmurs.  Abdomen benign.    Assessment and plan: Episodic nausea and fatigue, work-up has been negative in the past, will try a practical approach with a note for work and an antinausea medication and maybe he can nip this in the blood in the future by taking that and pushing fluids.  If things do not get better he should be seen again.

## 2020-07-11 NOTE — LETTER
To Whom It MayConcern:       Don Samson  was seen in our clinic on 7/11/2020        Patient may return to work on   :uncertain .                            Restrictions: unable to work    Comments: should see tregular doctor Monday if not improving         Provider Signature:         KETURAH Barnett MD

## 2020-07-12 ENCOUNTER — NURSE TRIAGE (OUTPATIENT)
Dept: NURSING | Facility: CLINIC | Age: 70
End: 2020-07-12

## 2020-07-13 NOTE — TELEPHONE ENCOUNTER
"\"I am still not feeling better yet.   Overheated, nausea.\" He states he went to the  yesterday. He was told to be seen again if not better by Monday. He is upset, he called to make an appointment but no appointments were available for Monday or Tuesday. RX:Ondansetron helps \"a little bit.\"   Requested and given [facility] hours, he intends to go to  again tomorrow.     Kimberly Benitez RN Triage Nurse Advisor  "

## 2020-07-14 ENCOUNTER — OFFICE VISIT (OUTPATIENT)
Dept: URGENT CARE | Facility: URGENT CARE | Age: 70
End: 2020-07-14
Payer: COMMERCIAL

## 2020-07-14 ENCOUNTER — ANCILLARY PROCEDURE (OUTPATIENT)
Dept: GENERAL RADIOLOGY | Facility: CLINIC | Age: 70
End: 2020-07-14
Attending: PHYSICIAN ASSISTANT
Payer: COMMERCIAL

## 2020-07-14 VITALS
HEIGHT: 71 IN | OXYGEN SATURATION: 99 % | TEMPERATURE: 98.4 F | BODY MASS INDEX: 31.5 KG/M2 | DIASTOLIC BLOOD PRESSURE: 80 MMHG | HEART RATE: 89 BPM | WEIGHT: 225 LBS | SYSTOLIC BLOOD PRESSURE: 118 MMHG

## 2020-07-14 DIAGNOSIS — R11.0 NAUSEA: ICD-10-CM

## 2020-07-14 DIAGNOSIS — R10.84 ABDOMINAL PAIN, GENERALIZED: Primary | ICD-10-CM

## 2020-07-14 DIAGNOSIS — R53.83 OTHER FATIGUE: ICD-10-CM

## 2020-07-14 LAB
ALBUMIN SERPL-MCNC: 4 G/DL (ref 3.4–5)
ALP SERPL-CCNC: 86 U/L (ref 40–150)
ALT SERPL W P-5'-P-CCNC: 40 U/L (ref 0–70)
ANION GAP SERPL CALCULATED.3IONS-SCNC: 7 MMOL/L (ref 3–14)
AST SERPL W P-5'-P-CCNC: 21 U/L (ref 0–45)
BASOPHILS # BLD AUTO: 0 10E9/L (ref 0–0.2)
BASOPHILS NFR BLD AUTO: 0.6 %
BILIRUB SERPL-MCNC: 0.4 MG/DL (ref 0.2–1.3)
BUN SERPL-MCNC: 15 MG/DL (ref 7–30)
CALCIUM SERPL-MCNC: 8.9 MG/DL (ref 8.5–10.1)
CHLORIDE SERPL-SCNC: 108 MMOL/L (ref 94–109)
CO2 SERPL-SCNC: 24 MMOL/L (ref 20–32)
CREAT SERPL-MCNC: 1.4 MG/DL (ref 0.66–1.25)
DIFFERENTIAL METHOD BLD: NORMAL
EOSINOPHIL # BLD AUTO: 0.2 10E9/L (ref 0–0.7)
EOSINOPHIL NFR BLD AUTO: 3.1 %
ERYTHROCYTE [DISTWIDTH] IN BLOOD BY AUTOMATED COUNT: 12 % (ref 10–15)
GFR SERPL CREATININE-BSD FRML MDRD: 51 ML/MIN/{1.73_M2}
GLUCOSE SERPL-MCNC: 139 MG/DL (ref 70–99)
HCT VFR BLD AUTO: 43 % (ref 40–53)
HGB BLD-MCNC: 14.7 G/DL (ref 13.3–17.7)
LYMPHOCYTES # BLD AUTO: 1.9 10E9/L (ref 0.8–5.3)
LYMPHOCYTES NFR BLD AUTO: 29 %
MCH RBC QN AUTO: 29.7 PG (ref 26.5–33)
MCHC RBC AUTO-ENTMCNC: 34.2 G/DL (ref 31.5–36.5)
MCV RBC AUTO: 87 FL (ref 78–100)
MONOCYTES # BLD AUTO: 0.5 10E9/L (ref 0–1.3)
MONOCYTES NFR BLD AUTO: 7.4 %
NEUTROPHILS # BLD AUTO: 3.9 10E9/L (ref 1.6–8.3)
NEUTROPHILS NFR BLD AUTO: 59.9 %
PLATELET # BLD AUTO: 238 10E9/L (ref 150–450)
POTASSIUM SERPL-SCNC: 3.8 MMOL/L (ref 3.4–5.3)
PROT SERPL-MCNC: 7.7 G/DL (ref 6.8–8.8)
RBC # BLD AUTO: 4.95 10E12/L (ref 4.4–5.9)
SODIUM SERPL-SCNC: 139 MMOL/L (ref 133–144)
TSH SERPL DL<=0.005 MIU/L-ACNC: 1.4 MU/L (ref 0.4–4)
WBC # BLD AUTO: 6.5 10E9/L (ref 4–11)

## 2020-07-14 PROCEDURE — 99214 OFFICE O/P EST MOD 30 MIN: CPT

## 2020-07-14 PROCEDURE — 74019 RADEX ABDOMEN 2 VIEWS: CPT

## 2020-07-14 PROCEDURE — 36415 COLL VENOUS BLD VENIPUNCTURE: CPT | Performed by: PHYSICIAN ASSISTANT

## 2020-07-14 PROCEDURE — 80050 GENERAL HEALTH PANEL: CPT | Performed by: PHYSICIAN ASSISTANT

## 2020-07-14 RX ORDER — OMEPRAZOLE 40 MG/1
40 CAPSULE, DELAYED RELEASE ORAL DAILY
Qty: 20 CAPSULE | Refills: 0 | Status: SHIPPED | OUTPATIENT
Start: 2020-07-14 | End: 2020-12-09

## 2020-07-14 ASSESSMENT — ENCOUNTER SYMPTOMS
NAUSEA: 1
CONSTIPATION: 1
ABDOMINAL PAIN: 1
FATIGUE: 1

## 2020-07-14 ASSESSMENT — MIFFLIN-ST. JEOR: SCORE: 1812.72

## 2020-07-14 NOTE — PROGRESS NOTES
SUBJECTIVE:   Don Samson is a 70 year old male presenting with a chief complaint of   Chief Complaint   Patient presents with     Urgent Care     Pt in clinic to have eval for aches.     Generalized Body Aches       He is an established patient of [facility].  Patient presents with nausea and fatigue 6 days.  Patient here for the same on 7/11.  He was given zofran, and was no help.  Patient states he has had this in the past, but this time prolonged and intensified.  No fevers, no diarrhea, +constipation. No vomiting.  Decreased appetite and drinking.  Last BM today, normal.  Abdominal cramps that come and go, and worse after eating.  5/10.  Patient states no energy.  No URI symptoms.      PMHx:  HTN, BPH, migraines, depression; Medications:  Norvasc, benicar, flomax; Surgeries:  No abdominal surgeries.  Social:  etoch social.  Allergies:  None.      Review of Systems   Constitutional: Positive for fatigue.   Gastrointestinal: Positive for abdominal pain, constipation and nausea.   All other systems reviewed and are negative.      Past Medical History:   Diagnosis Date     Ankle pain 5/21/2016     Ankle sprain and strain 9/16/2010     CARDIOVASCULAR SCREENING; LDL GOAL LESS THAN 130 9/6/2016     Depression     declines treatment 4/1/16     HTN (hypertension)     declines treatment 4/1/16     Left knee pain 6/18/2015     Plantar fasciitis 9/16/2010     Right knee pain 12/12/2016     Tibialis posterior tendonitis 5/21/2016     Family History   Problem Relation Age of Onset     Hypertension Mother      Current Outpatient Medications   Medication Sig Dispense Refill     amLODIPine (NORVASC) 5 MG tablet Take 1 tablet (5 mg) by mouth daily 90 tablet 3     cyclobenzaprine (FLEXERIL) 10 MG tablet Take 1 tablet (10 mg) by mouth 3 times daily as needed for muscle spasms 30 tablet 0     naproxen (NAPROSYN) 500 MG tablet Take 1 tablet (500 mg) by mouth 2 times daily (with meals) 10 tablet 0     olmesartan (BENICAR) 20 MG  "tablet TAKE 1 TABLET BY MOUTH DAILY WITH BREAKFAST 90 tablet 2     omeprazole (PRILOSEC) 40 MG DR capsule Take 1 capsule (40 mg) by mouth daily 20 capsule 0     ondansetron (ZOFRAN) 4 MG tablet Take 1 tablet (4 mg) by mouth every 8 hours as needed for nausea 30 tablet 0     ondansetron (ZOFRAN) 4 MG tablet Take by mouth every 8 hours as needed for nausea       SUMAtriptan (IMITREX) 50 MG tablet Take 1 tablet (50 mg) by mouth at onset of headache for migraine May repeat in 2 hours. Max 4 tablets/24 hours. 30 tablet 1     tamsulosin (FLOMAX) 0.4 MG capsule Take 2 capsules (0.8 mg) by mouth daily 60 capsule 2     Social History     Tobacco Use     Smoking status: Former Smoker     Last attempt to quit: 1970     Years since quittin.5     Smokeless tobacco: Never Used   Substance Use Topics     Alcohol use: Yes     Alcohol/week: 0.0 standard drinks       OBJECTIVE  /80   Pulse 89   Temp 98.4  F (36.9  C) (Oral)   Ht 1.803 m (5' 11\")   Wt 102.1 kg (225 lb)   SpO2 99%   BMI 31.38 kg/m      Physical Exam  Vitals signs and nursing note reviewed.   Constitutional:       Appearance: Normal appearance. He is obese.   Cardiovascular:      Rate and Rhythm: Normal rate and regular rhythm.      Pulses: Normal pulses.      Heart sounds: Normal heart sounds.   Pulmonary:      Effort: Pulmonary effort is normal.      Breath sounds: Normal breath sounds.   Abdominal:      General: Abdomen is flat. Bowel sounds are normal. There is no distension.      Palpations: Abdomen is soft. There is no mass.      Tenderness: There is abdominal tenderness. There is no guarding or rebound.      Hernia: A hernia is present.      Comments: Generalized tenderness to palpation.  Large umbilical hernia (present for years).     Skin:     General: Skin is warm.      Capillary Refill: Capillary refill takes less than 2 seconds.   Neurological:      General: No focal deficit present.      Mental Status: He is alert.   Psychiatric:        "  Mood and Affect: Mood normal.      Comments: Flat affect and poor historian.         Labs:  Results for orders placed or performed in visit on 07/14/20 (from the past 24 hour(s))   CBC with platelets and differential   Result Value Ref Range    WBC 6.5 4.0 - 11.0 10e9/L    RBC Count 4.95 4.4 - 5.9 10e12/L    Hemoglobin 14.7 13.3 - 17.7 g/dL    Hematocrit 43.0 40.0 - 53.0 %    MCV 87 78 - 100 fl    MCH 29.7 26.5 - 33.0 pg    MCHC 34.2 31.5 - 36.5 g/dL    RDW 12.0 10.0 - 15.0 %    Platelet Count 238 150 - 450 10e9/L    % Neutrophils 59.9 %    % Lymphocytes 29.0 %    % Monocytes 7.4 %    % Eosinophils 3.1 %    % Basophils 0.6 %    Absolute Neutrophil 3.9 1.6 - 8.3 10e9/L    Absolute Lymphocytes 1.9 0.8 - 5.3 10e9/L    Absolute Monocytes 0.5 0.0 - 1.3 10e9/L    Absolute Eosinophils 0.2 0.0 - 0.7 10e9/L    Absolute Basophils 0.0 0.0 - 0.2 10e9/L    Diff Method Automated Method        X-Ray was done, my findings are: non specific bowel patters.  Fecal matter  Xrays personally viewed by myself.      ASSESSMENT:      ICD-10-CM    1. Abdominal pain, generalized  R10.84 XR Abdomen 2 Views     omeprazole (PRILOSEC) 40 MG DR capsule   2. Nausea  R11.0 CBC with platelets and differential     Comprehensive metabolic panel (BMP + Alb, Alk Phos, ALT, AST, Total. Bili, TP)     TSH   3. Other fatigue  R53.83         Medical Decision Making:    Differential Diagnosis:  Abdominal Pain: Constipation, GERD/Ulcer and Non Specific    Serious Comorbid Conditions:  Adult:  as above    PLAN:    ABD Pain:  Prilosec.  Highly recommended establishing care with a PCP.  Walked patient to get appointment with PCP.  Note for work.  TSH and CMP pending.      Followup:    If not improving or if condition worsens, follow up with your Primary Care Provider, If not improving or if conditions worsens over the next 12-24 hours, go to the Emergency Department    Patient Instructions     Patient Education     Abdominal Pain    Abdominal pain is pain in the  stomach or belly area. Everyone has this pain from time to time. In many cases it goes away on its own. But abdominal pain can sometimes be due to a serious problem, such as appendicitis. So it s important to know when to get help.  Causes of abdominal pain  There are many possible causes of abdominal pain. Common causes in adults include:    Constipation, diarrhea, or gas    Stomach acid flowing back up into the esophagus (acid reflux or heartburn)    Severe acid reflux, called GERD (gastroesophageal reflux disease)    A sore in the lining of the stomach or small intestine (peptic ulcer)    Inflammation of the gallbladder, liver, or pancreas    Gallstones or kidney stones    Appendicitis     Intestinal blockage     An internal organ pushing through a muscle or other tissue (hernia)    Urinary tract infections    In women, menstrual cramps, fibroids, ovarian cysts, pelvic inflammatory disease, or endometriosis    Inflammation or infection of the intestines, including Crohn's disease and ulcerative colitis    Irritable bowel syndrome  Diagnosing the cause of abdominal pain  Your healthcare provider will give you a physical exam help find the cause of your pain. If needed, you will have tests. Belly pain has many possible causes. So it can be hard to find the reason for your pain. Giving details about your pain can help. Tell your provider where and when you feel the pain, and what makes it better or worse. Also let your provider know if you have other symptoms such as:    Fever    Tiredness    Upset stomach (nausea)    Vomiting    Changes in bathroom habits    Blood in the stool or black, tarry stool    Weight loss that you can't explain (involuntary weight loss?)  Also report any family history of stomach or intestinal problems, or cancers. Tell your provider about all your alcohol use and drug use. Tell your provider about all medicines you use, including herbs, vitamins, and supplements.   Treating abdominal  pain  Some causes of pain need emergency medical treatment right away. These include appendicitis or a bowel blockage. Other problems can be treated with rest, fluids, or medicines. Your healthcare provider can give you specific instructions for treatment or self-care based on what is causing your pain.     If you have vomiting or diarrhea, sip water or other clear fluids. When you are ready to eat solid foods again, start with small amounts of easy-to-digest, low-fat foods. These include apple sauce, toast, or crackers.  When to get medical care  Call 911 or go to the hospital right away if you:    Can t pass stool and are vomiting    Are vomiting blood or have bloody diarrhea or black, tarry diarrhea    Have chest, neck, or shoulder pain    Feel like you might pass out    Have pain in your shoulder blades with nausea    Have sudden, severe belly pain    Have new, severe pain unlike any you have felt before    Have a belly that is rigid, hard, and hurts to touch  Call your healthcare provider if you have:    Pain for more than 5 days    Bloating for more than 2 days    Diarrhea for more than 5 days    A fever of 100.4 F (38 C) or higher, or as directed by your healthcare provider    Pain that gets worse    Weight loss for no reason    Continued lack of appetite    Blood in your stool  How to prevent abdominal pain  Here are some tips to help prevent abdominal pain:    Eat smaller amounts of food at each meal.    Don't eat greasy, fried, or other high-fat foods.    Don't eat foods that give you gas.    Exercise regularly.    Drink plenty of fluids.  To help prevent GERD symptoms:    Quit smoking.    Reduce alcohol and foods that increase stomach acid.    Don't use aspirin or over-the-counter pain and fever medicines, if possible. This includes nonsteroidal anti-inflammatory drugs (NSAIDs).    Lose excess weight.    Finish eating at least 2 hours before you go to bed or lie down.    Raise the head of your bed.  Date  Last Reviewed: 7/1/2016 2000-2019 [employer]. [address] [place] [address], [place] [address]. All rights reserved. This information is not intended as a substitute for professional medical care. Always follow your healthcare professional's instructions.

## 2020-07-14 NOTE — LETTER
July 14, 2020      Don CLARKE Mali  [address]  [address]  [facility] [address]        To Whom It May Concern:    Don CLARKE [place] was seen in our clinic. He may return to work on 7/15/20.      Sincerely,        Maryanne Dailey  Provider

## 2020-07-14 NOTE — PATIENT INSTRUCTIONS
Patient Education     Abdominal Pain    Abdominal pain is pain in the stomach or belly area. Everyone has this pain from time to time. In many cases it goes away on its own. But abdominal pain can sometimes be due to a serious problem, such as appendicitis. So it s important to know when to get help.  Causes of abdominal pain  There are many possible causes of abdominal pain. Common causes in adults include:    Constipation, diarrhea, or gas    Stomach acid flowing back up into the esophagus (acid reflux or heartburn)    Severe acid reflux, called GERD (gastroesophageal reflux disease)    A sore in the lining of the stomach or small intestine (peptic ulcer)    Inflammation of the gallbladder, liver, or pancreas    Gallstones or kidney stones    Appendicitis     Intestinal blockage     An internal organ pushing through a muscle or other tissue (hernia)    Urinary tract infections    In women, menstrual cramps, fibroids, ovarian cysts, pelvic inflammatory disease, or endometriosis    Inflammation or infection of the intestines, including Crohn's disease and ulcerative colitis    Irritable bowel syndrome  Diagnosing the cause of abdominal pain  Your healthcare provider will give you a physical exam help find the cause of your pain. If needed, you will have tests. Belly pain has many possible causes. So it can be hard to find the reason for your pain. Giving details about your pain can help. Tell your provider where and when you feel the pain, and what makes it better or worse. Also let your provider know if you have other symptoms such as:    Fever    Tiredness    Upset stomach (nausea)    Vomiting    Changes in bathroom habits    Blood in the stool or black, tarry stool    Weight loss that you can't explain (involuntary weight loss?)  Also report any family history of stomach or intestinal problems, or cancers. Tell your provider about all your alcohol use and drug use. Tell your provider about all medicines you  use, including herbs, vitamins, and supplements.   Treating abdominal pain  Some causes of pain need emergency medical treatment right away. These include appendicitis or a bowel blockage. Other problems can be treated with rest, fluids, or medicines. Your healthcare provider can give you specific instructions for treatment or self-care based on what is causing your pain.     If you have vomiting or diarrhea, sip water or other clear fluids. When you are ready to eat solid foods again, start with small amounts of easy-to-digest, low-fat foods. These include apple sauce, toast, or crackers.  When to get medical care  Call 911 or go to the hospital right away if you:    Can t pass stool and are vomiting    Are vomiting blood or have bloody diarrhea or black, tarry diarrhea    Have chest, neck, or shoulder pain    Feel like you might pass out    Have pain in your shoulder blades with nausea    Have sudden, severe belly pain    Have new, severe pain unlike any you have felt before    Have a belly that is rigid, hard, and hurts to touch  Call your healthcare provider if you have:    Pain for more than 5 days    Bloating for more than 2 days    Diarrhea for more than 5 days    A fever of 100.4 F (38 C) or higher, or as directed by your healthcare provider    Pain that gets worse    Weight loss for no reason    Continued lack of appetite    Blood in your stool  How to prevent abdominal pain  Here are some tips to help prevent abdominal pain:    Eat smaller amounts of food at each meal.    Don't eat greasy, fried, or other high-fat foods.    Don't eat foods that give you gas.    Exercise regularly.    Drink plenty of fluids.  To help prevent GERD symptoms:    Quit smoking.    Reduce alcohol and foods that increase stomach acid.    Don't use aspirin or over-the-counter pain and fever medicines, if possible. This includes nonsteroidal anti-inflammatory drugs (NSAIDs).    Lose excess weight.    Finish eating at least 2 hours  before you go to bed or lie down.    Raise the head of your bed.  Date Last Reviewed: 7/1/2016 2000-2019 [employer]. [address] [place] [address], [place] [address]. All rights reserved. This information is not intended as a substitute for professional medical care. Always follow your healthcare professional's instructions.

## 2020-07-27 ENCOUNTER — OFFICE VISIT (OUTPATIENT)
Dept: FAMILY MEDICINE | Facility: CLINIC | Age: 70
End: 2020-07-27
Payer: COMMERCIAL

## 2020-07-27 VITALS
DIASTOLIC BLOOD PRESSURE: 68 MMHG | HEIGHT: 71 IN | WEIGHT: 233 LBS | HEART RATE: 64 BPM | RESPIRATION RATE: 16 BRPM | SYSTOLIC BLOOD PRESSURE: 112 MMHG | TEMPERATURE: 98.1 F | OXYGEN SATURATION: 97 % | BODY MASS INDEX: 32.62 KG/M2

## 2020-07-27 DIAGNOSIS — F33.1 MAJOR DEPRESSIVE DISORDER, RECURRENT, MODERATE (H): ICD-10-CM

## 2020-07-27 DIAGNOSIS — N52.9 ERECTILE DYSFUNCTION, UNSPECIFIED ERECTILE DYSFUNCTION TYPE: Primary | ICD-10-CM

## 2020-07-27 DIAGNOSIS — L60.8 NAIL DEFORMITY: ICD-10-CM

## 2020-07-27 PROCEDURE — 99214 OFFICE O/P EST MOD 30 MIN: CPT | Performed by: FAMILY MEDICINE

## 2020-07-27 RX ORDER — SILDENAFIL CITRATE 20 MG/1
TABLET ORAL
Qty: 30 TABLET | Refills: 0 | Status: SHIPPED | OUTPATIENT
Start: 2020-07-27 | End: 2020-08-27

## 2020-07-27 ASSESSMENT — ANXIETY QUESTIONNAIRES
3. WORRYING TOO MUCH ABOUT DIFFERENT THINGS: SEVERAL DAYS
6. BECOMING EASILY ANNOYED OR IRRITABLE: NEARLY EVERY DAY
1. FEELING NERVOUS, ANXIOUS, OR ON EDGE: SEVERAL DAYS
2. NOT BEING ABLE TO STOP OR CONTROL WORRYING: SEVERAL DAYS
IF YOU CHECKED OFF ANY PROBLEMS ON THIS QUESTIONNAIRE, HOW DIFFICULT HAVE THESE PROBLEMS MADE IT FOR YOU TO DO YOUR WORK, TAKE CARE OF THINGS AT HOME, OR GET ALONG WITH OTHER PEOPLE: SOMEWHAT DIFFICULT
GAD7 TOTAL SCORE: 7
5. BEING SO RESTLESS THAT IT IS HARD TO SIT STILL: NOT AT ALL
7. FEELING AFRAID AS IF SOMETHING AWFUL MIGHT HAPPEN: NOT AT ALL

## 2020-07-27 ASSESSMENT — PATIENT HEALTH QUESTIONNAIRE - PHQ9
SUM OF ALL RESPONSES TO PHQ QUESTIONS 1-9: 15
5. POOR APPETITE OR OVEREATING: SEVERAL DAYS

## 2020-07-27 ASSESSMENT — MIFFLIN-ST. JEOR: SCORE: 1849.01
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
## 2020-07-28 ASSESSMENT — ANXIETY QUESTIONNAIRES: GAD7 TOTAL SCORE: 7

## 2020-08-05 NOTE — TELEPHONE ENCOUNTER
DIAGNOSIS: Nail deformity L big toe/Dr Carmona/no images/BC[facility]/ortho con   APPOINTMENT DATE: 8.25.20   NOTES STATUS DETAILS   OFFICE NOTE from referring provider Internal 7.27.20 MANPREET Carmona   OFFICE NOTE from other specialist N/A      DISCHARGE SUMMARY from hospital N/A    DISCHARGE REPORT from the ER N/A    OPERATIVE REPORT N/A    MEDICATION LIST Internal    EMG (for Spine) N/A    IMPLANT RECORD/STICKER N/A    LABS     CBC/DIFF Internal    CULTURES N/A    INJECTIONS DONE IN RADIOLOGY N/A    MRI N/A    CT SCAN N/A    XRAYS (IMAGES & REPORTS) N/A    TUMOR     PATHOLOGY  Slides & report N/A

## 2020-08-09 ENCOUNTER — NURSE TRIAGE (OUTPATIENT)
Dept: NURSING | Facility: CLINIC | Age: 70
End: 2020-08-09

## 2020-08-09 NOTE — TELEPHONE ENCOUNTER
"Call from pt       CC:  Drug information       He is asking how many of the Viagra he can take at a time ?          Reviewed dosing from chart - indicated that he could take up to 5 at a time per dose  Per chart           He reports that he had taken 3 Viagra this am about 6am and has been ineffective thus far       Wondering if he can take more now ?  (6hrs after the first)      I related that drug references indicate that only 1 dose should be taken in a day and if have already taken dose this am not to take another dose today:           \"(Viagra(R)) 25 to 100 mg (50 mg usual dose) ORALLY 1 hour (range 0.5 to 4 hours) prior to sexual activity; MAX frequency of administration once daily\"  ([employer]romedix)     He then indicated that he will still take another dose and \"if that doesn't work, then to hell with it\"      I did ask if he had other questions - the line was active w/ him - no response from him after several queries - I disconnected        Call out to Dr Mccartney - related above - she does not feel that other follow-up with him would be needed; feels I met my obligation to him regarding dosing of medication.         Blade Zarco, RN                 COVID 19 Nurse Triage Plan/Patient Instructions    Please be aware that novel coronavirus (COVID-19) may be circulating in the community. If you develop symptoms such as fever, cough, or SOB or if you have concerns about the presence of another infection including coronavirus (COVID-19), please contact your health care provider or visit [email or web address].     Disposition/Instructions    Home care recommended. Follow home care protocol based instructions.    Thank you for taking steps to prevent the spread of this virus.  o Limit your contact with others.  o Wear a simple mask to cover your cough.  o Wash your hands well and often.    Resources    [facility]: About COVID-19: [email or web address]    CDC: What to Do If You're Sick: " www.cdc.gov/coronavirus/2019-ncov/about/steps-when-sick.html    CDC: Ending Home Isolation: www.cdc.gov/coronavirus/2019-ncov/hcp/disposition-in-home-patients.html     CDC: Caring for Someone: www.cdc.gov/coronavirus/2019-ncov/if-you-are-sick/care-for-someone.html     [facility]: Interim Guidance for Hospital Discharge to Home: www.health.[facility].[place]./diseases/coronavirus/hcp/hospdischarge.pdf    [facility] clinical trials (COVID-19 research studies): [email or web address]-clinical-trials     Below are the COVID-19 hotlines at the [place] [facility] ([facility]). Interpreters are available.   o For health questions: Call 664-507-7749 or 1-774.861.3202 (7 a.m. to 7 p.m.)  o For questions about schools and childcare: Call 411-404-9238 or 1-338.913.1381 (7 a.m. to 7 p.m.)                       Additional Information    Negative: Drug overdose and nurse unable to answer question    Negative: Caller requesting information not related to medicine    Negative: Caller requesting a prescription for Strep throat and has a positive culture result    Negative: Rash while taking a medication or within 3 days of stopping it    Negative: Immunization reaction suspected    Negative: [1] Asthma AND [2] having symptoms of asthma (cough, wheezing, etc)    Negative: MORE THAN A DOUBLE DOSE of a prescription or over-the-counter (OTC) drug    Negative: [1] DOUBLE DOSE (an extra dose or lesser amount) of over-the-counter (OTC) drug AND [2] any symptoms (e.g., dizziness, nausea, pain, sleepiness)    Negative: [1] DOUBLE DOSE (an extra dose or lesser amount) of prescription drug AND [2] any symptoms (e.g., dizziness, nausea, pain, sleepiness)    Negative: Took another person's prescription drug    Negative: [1] DOUBLE DOSE (an extra dose or lesser amount) of prescription drug AND [2] NO symptoms (Exception: a double dose of antibiotics)    Negative: Diabetes drug error or overdose (e.g., insulin or extra dose)    Negative:  "[1] Request for URGENT new prescription or refill of \"essential\" medication (i.e., likelihood of harm to patient if not taken) AND [2] triager unable to fill per unit policy    Negative: [1] Prescription not at pharmacy AND [2] was prescribed today by PCP    Negative: Pharmacy calling with prescription questions and triager unable to answer question    Negative: Caller has urgent medication question about med that PCP prescribed and triager unable to answer question    Negative: Caller has NON-URGENT medication question about med that PCP prescribed and triager unable to answer question    Negative: Caller requesting a NON-URGENT new prescription or refill and triager unable to refill per unit policy    Negative: Caller has medication question about med not prescribed by PCP and triager unable to answer question (e.g., compatibility with other med, storage)    Negative: [1] DOUBLE DOSE (an extra dose or lesser amount) of over-the-counter (OTC) drug AND [2] NO symptoms    Negative: [1] DOUBLE DOSE (an extra dose or lesser amount) of antibiotic drug AND [2] NO symptoms    Caller has medication question only, adult not sick, and triager answers question    Protocols used: MEDICATION QUESTION CALL-A-      "

## 2020-08-10 DIAGNOSIS — N40.1 BENIGN PROSTATIC HYPERPLASIA WITH LOWER URINARY TRACT SYMPTOMS, SYMPTOM DETAILS UNSPECIFIED: ICD-10-CM

## 2020-08-11 RX ORDER — TAMSULOSIN HYDROCHLORIDE 0.4 MG/1
CAPSULE ORAL
Qty: 60 CAPSULE | Refills: 0 | Status: SHIPPED | OUTPATIENT
Start: 2020-08-11 | End: 2020-09-11

## 2020-08-25 ENCOUNTER — PRE VISIT (OUTPATIENT)
Dept: ORTHOPEDICS | Facility: CLINIC | Age: 70
End: 2020-08-25

## 2020-08-26 DIAGNOSIS — N52.9 ERECTILE DYSFUNCTION, UNSPECIFIED ERECTILE DYSFUNCTION TYPE: ICD-10-CM

## 2020-08-27 RX ORDER — SILDENAFIL CITRATE 20 MG/1
TABLET ORAL
Qty: 30 TABLET | Refills: 0 | Status: SHIPPED | OUTPATIENT
Start: 2020-08-27 | End: 2020-12-09

## 2020-08-27 NOTE — TELEPHONE ENCOUNTER
Prescription approved per [facility] Refill Protocol.  Letty Blas RN

## 2020-09-11 ENCOUNTER — OFFICE VISIT (OUTPATIENT)
Dept: URGENT CARE | Facility: URGENT CARE | Age: 70
End: 2020-09-11
Payer: COMMERCIAL

## 2020-09-11 ENCOUNTER — TELEPHONE (OUTPATIENT)
Dept: URGENT CARE | Facility: URGENT CARE | Age: 70
End: 2020-09-11

## 2020-09-11 VITALS
HEART RATE: 70 BPM | DIASTOLIC BLOOD PRESSURE: 80 MMHG | RESPIRATION RATE: 14 BRPM | HEIGHT: 71 IN | TEMPERATURE: 98.3 F | BODY MASS INDEX: 32.2 KG/M2 | WEIGHT: 230 LBS | SYSTOLIC BLOOD PRESSURE: 138 MMHG
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
## 2020-09-11 DIAGNOSIS — I10 UNCONTROLLED HYPERTENSION: ICD-10-CM

## 2020-09-11 DIAGNOSIS — T07.XXXA MULTIPLE ABRASIONS: ICD-10-CM

## 2020-09-11 DIAGNOSIS — S49.91XA INJURY OF RIGHT UPPER EXTREMITY, INITIAL ENCOUNTER: ICD-10-CM

## 2020-09-11 DIAGNOSIS — S00.81XA FACIAL ABRASION, INITIAL ENCOUNTER: ICD-10-CM

## 2020-09-11 DIAGNOSIS — S89.91XA LEG INJURY, RIGHT, INITIAL ENCOUNTER: ICD-10-CM

## 2020-09-11 PROCEDURE — 99214 OFFICE O/P EST MOD 30 MIN: CPT | Performed by: INTERNAL MEDICINE

## 2020-09-11 RX ORDER — NAPROXEN 500 MG/1
500 TABLET ORAL 2 TIMES DAILY WITH MEALS
Qty: 28 TABLET | Refills: 0 | Status: SHIPPED | OUTPATIENT
Start: 2020-09-11 | End: 2020-09-25

## 2020-09-11 ASSESSMENT — MIFFLIN-ST. JEOR: SCORE: 1835.4

## 2020-09-11 NOTE — Clinical Note
Anjana Cifuentes would benefit from a call from  for apology of mix-up or your assistant to encourage him to schedule an appointment.  Miladis
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
## 2020-09-11 NOTE — TELEPHONE ENCOUNTER
Pt was seen in Urgent Care today, concern he may not be continuing with his medical care and taking prescriptions. Patients appt was mixed up. Pt came at time on his card but appt was a different day.   Please reach out to pt to set up care.   Thank you,  arabella fragoso

## 2020-09-11 NOTE — LETTER
[facility]  [address] [place] [address]  Phone: [phone]    September 11, 2020        Don CLARKE [place]  [address]  [facility] [address]          To whom it may concern:    RE: Don Samson    Patient was seen and treated today at our clinic and missed work.  Please excuse up to 3 days work absence for injury.    Please contact me for questions or concerns.      Sincerely,        Miladis Malhotra MD

## 2020-09-14 ENCOUNTER — OFFICE VISIT (OUTPATIENT)
Dept: FAMILY MEDICINE | Facility: CLINIC | Age: 70
End: 2020-09-14
Payer: COMMERCIAL

## 2020-09-14 VITALS
BODY MASS INDEX: 31.04 KG/M2 | SYSTOLIC BLOOD PRESSURE: 125 MMHG | OXYGEN SATURATION: 98 % | HEART RATE: 73 BPM | RESPIRATION RATE: 13 BRPM | DIASTOLIC BLOOD PRESSURE: 77 MMHG | WEIGHT: 229.2 LBS | HEIGHT: 72 IN | TEMPERATURE: 97.5 F

## 2020-09-14 DIAGNOSIS — I10 UNCONTROLLED HYPERTENSION: ICD-10-CM

## 2020-09-14 DIAGNOSIS — I10 HYPERTENSION GOAL BP (BLOOD PRESSURE) < 140/90: ICD-10-CM

## 2020-09-14 DIAGNOSIS — E55.9 VITAMIN D DEFICIENCY: ICD-10-CM

## 2020-09-14 DIAGNOSIS — Z23 NEED FOR PROPHYLACTIC VACCINATION AND INOCULATION AGAINST INFLUENZA: ICD-10-CM

## 2020-09-14 DIAGNOSIS — F33.1 MAJOR DEPRESSIVE DISORDER, RECURRENT EPISODE, MODERATE (H): Primary | ICD-10-CM

## 2020-09-14 PROCEDURE — 84443 ASSAY THYROID STIM HORMONE: CPT | Performed by: NURSE PRACTITIONER

## 2020-09-14 PROCEDURE — 90472 IMMUNIZATION ADMIN EACH ADD: CPT | Performed by: NURSE PRACTITIONER

## 2020-09-14 PROCEDURE — 80061 LIPID PANEL: CPT | Performed by: NURSE PRACTITIONER

## 2020-09-14 PROCEDURE — 99214 OFFICE O/P EST MOD 30 MIN: CPT | Mod: 25 | Performed by: NURSE PRACTITIONER

## 2020-09-14 PROCEDURE — 36415 COLL VENOUS BLD VENIPUNCTURE: CPT | Performed by: NURSE PRACTITIONER

## 2020-09-14 PROCEDURE — 82043 UR ALBUMIN QUANTITATIVE: CPT | Performed by: NURSE PRACTITIONER

## 2020-09-14 PROCEDURE — 90471 IMMUNIZATION ADMIN: CPT | Performed by: NURSE PRACTITIONER

## 2020-09-14 PROCEDURE — 80053 COMPREHEN METABOLIC PANEL: CPT | Performed by: NURSE PRACTITIONER

## 2020-09-14 PROCEDURE — 90662 IIV NO PRSV INCREASED AG IM: CPT | Performed by: NURSE PRACTITIONER

## 2020-09-14 PROCEDURE — 90670 PCV13 VACCINE IM: CPT | Performed by: NURSE PRACTITIONER

## 2020-09-14 PROCEDURE — 82306 VITAMIN D 25 HYDROXY: CPT | Performed by: NURSE PRACTITIONER

## 2020-09-14 RX ORDER — OLMESARTAN MEDOXOMIL 20 MG/1
TABLET ORAL
Qty: 90 TABLET | Refills: 2 | Status: SHIPPED | OUTPATIENT
Start: 2020-09-14 | End: 2021-04-05

## 2020-09-14 RX ORDER — ESCITALOPRAM OXALATE 10 MG/1
10 TABLET ORAL DAILY
Qty: 30 TABLET | Refills: 1 | Status: SHIPPED | OUTPATIENT
Start: 2020-09-14 | End: 2020-10-05

## 2020-09-14 RX ORDER — AMLODIPINE BESYLATE 5 MG/1
5 TABLET ORAL DAILY
Qty: 90 TABLET | Refills: 3 | Status: SHIPPED | OUTPATIENT
Start: 2020-09-14 | End: 2021-04-05

## 2020-09-14 ASSESSMENT — MIFFLIN-ST. JEOR: SCORE: 1847.64

## 2020-09-14 NOTE — LETTER
September 28, 2020      Don Samson  [address]  [address]  [facility] [address]        Don     Your LDL cholesterol and glucose are normal.  Your thyroid, metabolic panel and microalbumin are normal.     Please let me know if you have any questions.     Resulted Orders   Lipid panel reflex to direct LDL Fasting   Result Value Ref Range    Cholesterol 174 <200 mg/dL    Triglycerides 245 (H) <150 mg/dL      Comment:      Borderline high:  150-199 mg/dl  High:             200-499 mg/dl  Very high:       >499 mg/dl  Non Fasting      HDL Cholesterol 26 (L) >39 mg/dL    LDL Cholesterol Calculated 99 <100 mg/dL      Comment:      Desirable:       <100 mg/dl    Non HDL Cholesterol 148 (H) <130 mg/dL      Comment:      Above Desirable:  130-159 mg/dl  Borderline high:  160-189 mg/dl  High:             190-219 mg/dl  Very high:       >219 mg/dl     TSH with free T4 reflex   Result Value Ref Range    TSH 1.69 0.40 - 4.00 mU/L   Comprehensive metabolic panel   Result Value Ref Range    Sodium 141 133 - 144 mmol/L    Potassium 4.2 3.4 - 5.3 mmol/L    Chloride 111 (H) 94 - 109 mmol/L    Carbon Dioxide 24 20 - 32 mmol/L    Anion Gap 6 3 - 14 mmol/L    Glucose 101 (H) 70 - 99 mg/dL      Comment:      Non Fasting    Urea Nitrogen 19 7 - 30 mg/dL    Creatinine 1.19 0.66 - 1.25 mg/dL    GFR Estimate 62 >60 mL/min/[1.73_m2]      Comment:      Non  GFR Calc  Starting 12/18/2018, serum creatinine based estimated GFR (eGFR) will be   calculated using the Chronic Kidney Disease Epidemiology Collaboration   (CKD-EPI) equation.      GFR Estimate If Black 72 >60 mL/min/[1.73_m2]      Comment:       GFR Calc  Starting 12/18/2018, serum creatinine based estimated GFR (eGFR) will be   calculated using the Chronic Kidney Disease Epidemiology Collaboration   (CKD-EPI) equation.      Calcium 9.0 8.5 - 10.1 mg/dL    Bilirubin Total 0.4 0.2 - 1.3 mg/dL    Albumin 3.9 3.4 - 5.0 g/dL    Protein Total  7.5 6.8 - 8.8 g/dL    Alkaline Phosphatase 83 40 - 150 U/L    ALT 42 0 - 70 U/L    AST 25 0 - 45 U/L   Albumin Random Urine Quantitative with Creat Ratio   Result Value Ref Range    Creatinine Urine 256 mg/dL    Albumin Urine mg/L 25 mg/L    Albumin Urine mg/g Cr 9.73 0 - 17 mg/g Cr   Vitamin D Deficiency   Result Value Ref Range    Vitamin D Deficiency screening 48 20 - 75 ug/L      Comment:      Season, race, dietary intake, and treatment affect the concentration of   25-hydroxy-Vitamin D. Values may decrease during winter months and increase   during summer months. Values 20-29 ug/L may indicate Vitamin D insufficiency   and values <20 ug/L may indicate Vitamin D deficiency.  Vitamin D determination is routinely performed by an immunoassay specific for   25 hydroxyvitamin D3.  If an individual is on vitamin D2 (ergocalciferol)   supplementation, please specify 25 OH vitamin D2 and D3 level determination by   LCMSMS test [id number].         If you have any questions or concerns, please call the clinic at the number listed above.       Sincerely,        CONSTANCE Carias CNP

## 2020-09-14 NOTE — LETTER
[facility] [address] [place] [address]  Phone: [phone]    September 14, 2020        Don Samson  [address]  [address]  [facility] [address]          To whom it may concern:    RE: Don CLARKE [place]    Patient was seen and treated today at our clinic and missed work.    Please contact me for questions or concerns.      Sincerely,        CONSTANCE Carias CNP

## 2020-09-14 NOTE — PROGRESS NOTES
Subjective     Don Samson is a 70 year old male who presents to clinic today for the following health issues:    HPI       Musculoskeletal problem/pain    Pt is here concern about a recent fall.      Duration:  5 days     Description  Location:  Right side of the body     Intensity:  moderate    Accompanying signs and symptoms: face, wrist, foot and knee. Pt also mention that he is having some mild headaches     History  Previous similar problem: YES  Previous evaluation:  none    Precipitating or alleviating factors:  Trauma or overuse: YES- fall   Aggravating factors include: none    Therapies tried and outcome: rx naproxen        Hypertension Follow-up      Do you check your blood pressure regularly outside of the clinic? No     Are you following a low salt diet? No    Are your blood pressures ever more than 140 on the top number (systolic) OR more   than 90 on the bottom number (diastolic), for example 140/90? ?    Depression     How are you doing with your depression since your last visit? Worsened     Are you having other symptoms that might be associated with depression? Yes:  no interest, no motivation, sad    Have you had a significant life event?  Job Concerns, Financial Concerns and Housing Concerns     Are you feeling anxious or having panic attacks?   No    Do you have any concerns with your use of alcohol or other drugs? No    Social History     Tobacco Use     Smoking status: Former Smoker     Last attempt to quit: 1970     Years since quittin.7     Smokeless tobacco: Never Used   Substance Use Topics     Alcohol use: Yes     Alcohol/week: 0.0 standard drinks     Comment: rarely      Drug use: No     PHQ 2019   PHQ-9 Total Score 4 0 15   Q9: Thoughts of better off dead/self-harm past 2 weeks Not at all Not at all Not at all     ANNE-7 SCORE 2019   Total Score 10 0 7     Last PHQ-9 2020   1.  Little interest or pleasure in doing  things 3   2.  Feeling down, depressed, or hopeless 3   3.  Trouble falling or staying asleep, or sleeping too much 3   4.  Feeling tired or having little energy 3   5.  Poor appetite or overeating 1   6.  Feeling bad about yourself 1   7.  Trouble concentrating 1   8.  Moving slowly or restless 0   Q9: Thoughts of better off dead/self-harm past 2 weeks 0   PHQ-9 Total Score 15   Difficulty at work, home, or with people Somewhat difficult       Suicide Assessment Five-step Evaluation and Treatment (SAFE-T)      How many servings of fruits and vegetables do you eat daily?  0-1    On average, how many sweetened beverages do you drink each day (Examples: soda, juice, sweet tea, etc.  Do NOT count diet or artificially sweetened beverages)?   4    How many days per week do you exercise enough to make your heart beat faster? 3 or less    How many minutes a day do you exercise enough to make your heart beat faster? 9 or less  How many days per week do you miss taking your medication? 1    What makes it hard for you to take your medications?  remembering to take      Review of Systems   Constitutional, HEENT, cardiovascular, pulmonary, GI, , musculoskeletal, neuro, skin, endocrine and psych systems are negative, except as otherwise noted.      Objective    /77 (BP Location: Left arm, Patient Position: Chair, Cuff Size: Adult Large)   Pulse 73   Temp 97.5  F (36.4  C) (Tympanic)   Resp 13   Ht 1.829 m (6')   Wt 104 kg (229 lb 3.2 oz)   SpO2 98%   BMI 31.09 kg/m    Body mass index is 31.09 kg/m .  Physical Exam   GENERAL: healthy, alert and no distress  EYES: Eyes grossly normal to inspection, PERRL and conjunctivae and sclerae normal  HENT: ear canals and TM's normal, nose and mouth without ulcers or lesions  NECK: no adenopathy, no asymmetry, masses, or scars and thyroid normal to palpation  RESP: lungs clear to auscultation - no rales, rhonchi or wheezes  CV: regular rate and rhythm, normal S1 S2, no S3 or  S4, no murmur, click or rub, no peripheral edema and peripheral pulses strong  ABDOMEN: soft, nontender, no hepatosplenomegaly, no masses and bowel sounds normal  MS: no gross musculoskeletal defects noted, no edema  SKIN: no suspicious lesions or rashes  PSYCH: concentration poor, inattentive and affect flat    Results for orders placed or performed in visit on 09/14/20   Lipid panel reflex to direct LDL Fasting     Status: Abnormal   Result Value Ref Range    Cholesterol 174 <200 mg/dL    Triglycerides 245 (H) <150 mg/dL    HDL Cholesterol 26 (L) >39 mg/dL    LDL Cholesterol Calculated 99 <100 mg/dL    Non HDL Cholesterol 148 (H) <130 mg/dL   TSH with free T4 reflex     Status: None   Result Value Ref Range    TSH 1.69 0.40 - 4.00 mU/L   Comprehensive metabolic panel     Status: Abnormal   Result Value Ref Range    Sodium 141 133 - 144 mmol/L    Potassium 4.2 3.4 - 5.3 mmol/L    Chloride 111 (H) 94 - 109 mmol/L    Carbon Dioxide 24 20 - 32 mmol/L    Anion Gap 6 3 - 14 mmol/L    Glucose 101 (H) 70 - 99 mg/dL    Urea Nitrogen 19 7 - 30 mg/dL    Creatinine 1.19 0.66 - 1.25 mg/dL    GFR Estimate 62 >60 mL/min/[1.73_m2]    GFR Estimate If Black 72 >60 mL/min/[1.73_m2]    Calcium 9.0 8.5 - 10.1 mg/dL    Bilirubin Total 0.4 0.2 - 1.3 mg/dL    Albumin 3.9 3.4 - 5.0 g/dL    Protein Total 7.5 6.8 - 8.8 g/dL    Alkaline Phosphatase 83 40 - 150 U/L    ALT 42 0 - 70 U/L    AST 25 0 - 45 U/L   Albumin Random Urine Quantitative with Creat Ratio     Status: None   Result Value Ref Range    Creatinine Urine 256 mg/dL    Albumin Urine mg/L 25 mg/L    Albumin Urine mg/g Cr 9.73 0 - 17 mg/g Cr   Vitamin D Deficiency     Status: None   Result Value Ref Range    Vitamin D Deficiency screening 48 20 - 75 ug/L           Assessment & Plan     Major depressive disorder, recurrent episode, moderate (H)  advidsed and agreed to start lexapro  Draw vit D, folate, TIBC and TSH today to rule out as a cause for depression and anxiety.    Will  "start lexapro today.  Discussed 1 year commitment on the medication - do not just stop because you are feeling better.    Discussed \"fuzzy head\" in the first week.   This will subside.  KEEP TAKING THE MEDICINE.    No harm contract agreed to.   Increase exercise - yoga and walking helps.    Increase antioxidants in your diet.    Minimize alcohol and caffeine, they make this worse.    F/u in 1 month or sooner if worsening.      - escitalopram (LEXAPRO) 10 MG tablet  Dispense: 30 tablet; Refill: 1  - TSH with free T4 reflex  - Vitamin D Deficiency    Hypertension goal BP (blood pressure) < 140/90  BP at goal and well controlled on current meds  F/u in 6 months or sooner if worsening.    - amLODIPine (NORVASC) 5 MG tablet  Dispense: 90 tablet; Refill: 3  - olmesartan (BENICAR) 20 MG tablet  Dispense: 90 tablet; Refill: 2  - Lipid panel reflex to direct LDL Fasting  - Comprehensive metabolic panel  - Albumin Random Urine Quantitative with Creat Ratio    Uncontrolled hypertension  - amLODIPine (NORVASC) 5 MG tablet  Dispense: 90 tablet; Refill: 3    Need for prophylactic vaccination and inoculation against influenza  - FLUZONE HIGH DOSE 65+  [92979]  - Vaccine Administration, Initial [29278]    Vitamin D deficiency  - Vitamin D Deficiency       BMI:   Estimated body mass index is 31.09 kg/m  as calculated from the following:    Height as of this encounter: 1.829 m (6').    Weight as of this encounter: 104 kg (229 lb 3.2 oz).   Weight management plan: Discussed healthy diet and exercise guidelines        F/u in 1 month for depression and 6 months for HTN.      No follow-ups on file.    CONSTANCE Carias [facility]    "

## 2020-09-14 NOTE — NURSING NOTE

## 2020-09-14 NOTE — NURSING NOTE
Prior to immunization administration, verified patients identity using patient s name and date of birth. Please see Immunization Activity for additional information.     Screening Questionnaire for Adult Immunization    Are you sick today?   No   Do you have allergies to medications, food, a vaccine component or latex?   No   Have you ever had a serious reaction after receiving a vaccination?   No   Do you have a long-term health problem with heart, lung, kidney, or metabolic disease (e.g., diabetes), asthma, a blood disorder, no spleen, complement component deficiency, a cochlear implant, or a spinal fluid leak?  Are you on long-term aspirin therapy?   No   Do you have cancer, leukemia, HIV/AIDS, or any other immune system problem?   No   Do you have a parent, brother, or sister with an immune system problem?   No   In the past 3 months, have you taken medications that affect  your immune system, such as prednisone, other steroids, or anticancer drugs; drugs for the treatment of rheumatoid arthritis, Crohn s disease, or psoriasis; or have you had radiation treatments?   No   Have you had a seizure, or a brain or other nervous system problem?   No   During the past year, have you received a transfusion of blood or blood    products, or been given immune (gamma) globulin or antiviral drug?   No   For women: Are you pregnant or is there a chance you could become       pregnant during the next month?   No   Have you received any vaccinations in the past 4 weeks?   No     Immunization questionnaire answers were all negative.        Per orders of Dr. Stubbs , injection of revnar given by Marlyn Hanson. Patient instructed to remain in clinic for 15 minutes afterwards, and to report any adverse reaction to me immediately.       Screening performed by Marlyn Hanson on 9/14/2020 at 4:04 PM.

## 2020-09-15 LAB
ALBUMIN SERPL-MCNC: 3.9 G/DL (ref 3.4–5)
ALP SERPL-CCNC: 83 U/L (ref 40–150)
ALT SERPL W P-5'-P-CCNC: 42 U/L (ref 0–70)
ANION GAP SERPL CALCULATED.3IONS-SCNC: 6 MMOL/L (ref 3–14)
AST SERPL W P-5'-P-CCNC: 25 U/L (ref 0–45)
BILIRUB SERPL-MCNC: 0.4 MG/DL (ref 0.2–1.3)
BUN SERPL-MCNC: 19 MG/DL (ref 7–30)
CALCIUM SERPL-MCNC: 9 MG/DL (ref 8.5–10.1)
CHLORIDE SERPL-SCNC: 111 MMOL/L (ref 94–109)
CHOLEST SERPL-MCNC: 174 MG/DL
CO2 SERPL-SCNC: 24 MMOL/L (ref 20–32)
CREAT SERPL-MCNC: 1.19 MG/DL (ref 0.66–1.25)
CREAT UR-MCNC: 256 MG/DL
DEPRECATED CALCIDIOL+CALCIFEROL SERPL-MC: 48 UG/L (ref 20–75)
GFR SERPL CREATININE-BSD FRML MDRD: 62 ML/MIN/{1.73_M2}
GLUCOSE SERPL-MCNC: 101 MG/DL (ref 70–99)
HDLC SERPL-MCNC: 26 MG/DL
LDLC SERPL CALC-MCNC: 99 MG/DL
MICROALBUMIN UR-MCNC: 25 MG/L
MICROALBUMIN/CREAT UR: 9.73 MG/G CR (ref 0–17)
NONHDLC SERPL-MCNC: 148 MG/DL
POTASSIUM SERPL-SCNC: 4.2 MMOL/L (ref 3.4–5.3)
PROT SERPL-MCNC: 7.5 G/DL (ref 6.8–8.8)
SODIUM SERPL-SCNC: 141 MMOL/L (ref 133–144)
TRIGL SERPL-MCNC: 245 MG/DL
TSH SERPL DL<=0.005 MIU/L-ACNC: 1.69 MU/L (ref 0.4–4)

## 2020-10-05 ENCOUNTER — OFFICE VISIT (OUTPATIENT)
Dept: FAMILY MEDICINE | Facility: CLINIC | Age: 70
End: 2020-10-05
Payer: COMMERCIAL

## 2020-10-05 VITALS
DIASTOLIC BLOOD PRESSURE: 67 MMHG | BODY MASS INDEX: 30.53 KG/M2 | RESPIRATION RATE: 13 BRPM | OXYGEN SATURATION: 98 % | HEART RATE: 66 BPM | HEIGHT: 72 IN | SYSTOLIC BLOOD PRESSURE: 125 MMHG | WEIGHT: 225.4 LBS | TEMPERATURE: 97.8 F

## 2020-10-05 DIAGNOSIS — F33.1 MAJOR DEPRESSIVE DISORDER, RECURRENT EPISODE, MODERATE (H): Primary | ICD-10-CM

## 2020-10-05 PROCEDURE — 99213 OFFICE O/P EST LOW 20 MIN: CPT | Performed by: NURSE PRACTITIONER

## 2020-10-05 RX ORDER — VENLAFAXINE HYDROCHLORIDE 37.5 MG/1
37.5 CAPSULE, EXTENDED RELEASE ORAL DAILY
Qty: 30 CAPSULE | Refills: 0 | Status: SHIPPED | OUTPATIENT
Start: 2020-10-05 | End: 2020-11-02

## 2020-10-05 ASSESSMENT — MIFFLIN-ST. JEOR: SCORE: 1826.44

## 2020-10-05 NOTE — PROGRESS NOTES
Subjective     Don Samson is a 70 year old male who presents to clinic today for the following health issues:    HPI            Depression Followup    How are you doing with your depression since your last visit? No change    Are you having other symptoms that might be associated with depression? Yes:  angry    Have you had a significant life event?  OTHER:  too many things going on     Are you feeling anxious or having panic attacks?   No    Do you have any concerns with your use of alcohol or other drugs? No    Social History     Tobacco Use     Smoking status: Former Smoker     Quit date: 1970     Years since quittin.8     Smokeless tobacco: Never Used   Substance Use Topics     Alcohol use: Yes     Alcohol/week: 0.0 standard drinks     Comment: rarely      Drug use: No     PHQ 2019   PHQ-9 Total Score 4 0 15   Q9: Thoughts of better off dead/self-harm past 2 weeks Not at all Not at all Not at all     ANNE-7 SCORE 2019   Total Score 10 0 7       Suicide Assessment Five-step Evaluation and Treatment (SAFE-T)      How many servings of fruits and vegetables do you eat daily?  0-1    On average, how many sweetened beverages do you drink each day (Examples: soda, juice, sweet tea, etc.  Do NOT count diet or artificially sweetened beverages)?   3    How many days per week do you exercise enough to make your heart beat faster? 3 or less    How many minutes a day do you exercise enough to make your heart beat faster? 9 or less  How many days per week do you miss taking your medication? 2    What makes it hard for you to take your medications?  remembering to take    Started on lexapro 3 weeks ago and not helping at all.   Wants to switch to a different medication  Not interested in increasing the dose.      Review of Systems   Constitutional, HEENT, cardiovascular, pulmonary, GI, , musculoskeletal, neuro, skin, endocrine and psych systems are negative,  "except as otherwise noted.      Objective    /67 (BP Location: Left arm, Patient Position: Chair, Cuff Size: Adult Large)   Pulse 66   Temp 97.8  F (36.6  C) (Tympanic)   Resp 13   Ht 1.822 m (5' 11.75\")   Wt 102.2 kg (225 lb 6.4 oz)   SpO2 98%   BMI 30.78 kg/m    Body mass index is 30.78 kg/m .  Physical Exam   GENERAL: healthy, alert and no distress  NECK: no adenopathy, no asymmetry, masses, or scars and thyroid normal to palpation  RESP: lungs clear to auscultation - no rales, rhonchi or wheezes  CV: regular rate and rhythm, normal S1 S2, no S3 or S4, no murmur, click or rub, no peripheral edema and peripheral pulses strong  ABDOMEN: soft, nontender, no hepatosplenomegaly, no masses and bowel sounds normal  MS: no gross musculoskeletal defects noted, no edema  SKIN: no suspicious lesions or rashes  PSYCH: inattentive and affect flat          Assessment & Plan     Major depressive disorder, recurrent episode, moderate (H)  Stopped lexapro last week, will start effexor once a day.   Discussed starting with low dose and slowly titratinng up with improvement.    F/u in the clinic in 1 month or sooner if worsening.    - venlafaxine (EFFEXOR-XR) 37.5 MG 24 hr capsule  Dispense: 30 capsule; Refill: 0            No follow-ups on file.    CONSTANCE Carias CNP  [facility]    "

## 2020-10-26 ENCOUNTER — OFFICE VISIT (OUTPATIENT)
Dept: URGENT CARE | Facility: URGENT CARE | Age: 70
End: 2020-10-26
Payer: COMMERCIAL

## 2020-10-26 VITALS
DIASTOLIC BLOOD PRESSURE: 76 MMHG | RESPIRATION RATE: 12 BRPM | TEMPERATURE: 97.6 F | BODY MASS INDEX: 30.48 KG/M2 | HEART RATE: 80 BPM | WEIGHT: 225 LBS | SYSTOLIC BLOOD PRESSURE: 124 MMHG | HEIGHT: 72 IN

## 2020-10-26 DIAGNOSIS — R11.0 NAUSEA: Primary | ICD-10-CM

## 2020-10-26 DIAGNOSIS — R68.83 CHILLS: ICD-10-CM

## 2020-10-26 DIAGNOSIS — K59.01 SLOW TRANSIT CONSTIPATION: ICD-10-CM

## 2020-10-26 PROCEDURE — 99213 OFFICE O/P EST LOW 20 MIN: CPT | Performed by: PHYSICIAN ASSISTANT

## 2020-10-26 ASSESSMENT — MIFFLIN-ST. JEOR: SCORE: 1824.62

## 2020-10-26 NOTE — PROGRESS NOTES
Chief Complaint   Patient presents with     Urgent Care     Nausea     Nausea for awhile. Had some chills while waiting for the bus. Work is concerned about covid       SUBJECTIVE:  Don is a 70 year old male who presents to urgent care with concerns about nausea and chills.  Yesterday patient was waiting for the bus when it got delayed and then he experienced chills.  He was outside waiting for the bus.  He has experienced chills over the last few weeks waiting for the bus or sometimes while working but he thinks the room he works in his somewhat cold.  No nocturnal chills or sweats.  No other consistent times of feeling chilled.  He has felt some nausea intermittently over the last few weeks as well.  He denies any measured fever, feeling feverish, abdominal pain, vomiting, dark tarry stool, blood in stool or diarrhea.  He does state he has had some loose stools and some urgency.  He states he feels like he starts eating backed up and constipated and experiences some loose stools.  He drinks about 2 to 3 glasses of water a day does not eat much fiber in his diet.  He recently started venlafaxine 20 days ago.  He otherwise feels well denying any headaches, fatigue, ear pain, sinus pain or pressure, sore throat, loss of taste or smell, wheeze, chest pain, shortness of breath, cough, myalgias.  He will need a note to return to work.     ROS: 10 point ROS neg other than the symptoms noted above in the HPI.     Current Outpatient Medications   Medication     amLODIPine (NORVASC) 5 MG tablet     cyclobenzaprine (FLEXERIL) 10 MG tablet     naproxen (NAPROSYN) 500 MG tablet     olmesartan (BENICAR) 20 MG tablet     omeprazole (PRILOSEC) 40 MG DR capsule     ondansetron (ZOFRAN) 4 MG tablet     ondansetron (ZOFRAN) 4 MG tablet     sildenafil (REVATIO) 20 MG tablet     SUMAtriptan (IMITREX) 50 MG tablet     venlafaxine (EFFEXOR-XR) 37.5 MG 24 hr capsule     No current facility-administered medications for this visit.      "  Patient Active Problem List   Diagnosis     Fatigue     Hypertension goal BP (blood pressure) < 140/90     Major depressive disorder, recurrent, moderate (H)     Viral gastroenteritis        Past Medical History:   Diagnosis Date     Ankle pain 2016     Ankle sprain and strain 2010     CARDIOVASCULAR SCREENING; LDL GOAL LESS THAN 130 2016     Depression     declines treatment 16     HTN (hypertension)     declines treatment 16     Left knee pain 2015     Plantar fasciitis 2010     Right knee pain 2016     Tibialis posterior tendonitis 2016     Past Surgical History:   Procedure Laterality Date     HERNIORRHAPHY INGUINAL BILATERAL      RIH , LIH 1985     NASAL/SINUS POLYPECTOMY      Nasal-Sinus Polypectomy     Family History   Problem Relation Age of Onset     Hypertension Mother      Social History     Tobacco Use     Smoking status: Former Smoker     Quit date: 1970     Years since quittin.8     Smokeless tobacco: Never Used   Substance Use Topics     Alcohol use: Yes     Alcohol/week: 0.0 standard drinks     Comment: rarely         OBJECTIVE:  /76   Pulse 80   Temp 97.6  F (36.4  C) (Oral)   Resp 12   Ht 1.822 m (5' 11.75\")   Wt 102.1 kg (225 lb)   BMI 30.73 kg/m       GENERAL APPEARANCE: healthy, alert and no distress    HENT: NCAT  EYES: EOMI, Conjunctiva clear     NECK: FROM, supple, no lymphadenopathy, non tender     RESP: No respiratory distress     MSK: No gross deformities noted, FROM in all extremities. Normal strength.     SKIN: no suspicious lesions or rashes appreciated on exposed skin     NEURO: AOx3, Normal strength and tone, sensory exam grossly normal, no focal deficit      PSYCH: mentation appears normal. and affect normal/bright    DIAGNOSTICS  No results found for any visits on 10/26/20.     ASSESSMENT/PLAN:  Don was seen today for urgent care and nausea.    Diagnoses and all orders for this visit:    Nausea    Slow transit " constipation    Chills    Patient is likely experiencing constipation due to lack of fiber and water in his diet.  He is likely developing a stool ball with intermittent loose stools sneaking casted.  We discussed this in detail and why fiber and water is the recommended treatment for this.  Not concerned about chills as this is likely due to environment.  He is either outside or in a cold room when this happens he feels otherwise well.  Nausea seems intermittent and unrelated and chronic.  Could be related to his constipation.  He has no clinical symptoms of Covid.  -Letter written to return to work  -2 weeks of Metamucil supplement and drinking 8 glasses of water daily  -Follow-up with PCP if not improving at that point    The plan of care was discussed with the patient. They understand and agree with the course of treatment prescribed. A printed summary was given including instructions and medications.    Topher Cabral PA-C    The use of Dragon/ServerPilot dictation services may have been used to construct the content in this note; any grammatical or spelling errors are non-intentional. Please contact the author of this note directly if you are in need of any clarification.

## 2020-10-26 NOTE — LETTER
[facility] [address] [place] [address]  [phone]      October 26, 2020    RE:  Don Samson                                                                                                                                                       [address]  [address]  [facility] [address]            To whom it may concern:    Don CLARKE [place] is under my professional care. He has no clinical symptoms of COVID 19 infection. He  may return to work          Sincerely,        Topher Cabral PA-C    [facility]

## 2020-10-26 NOTE — PATIENT INSTRUCTIONS
Start taking metamucil every day for the next 2 weeks. Drink 8 tall glasses of water each day.     Patient Education     Treating Constipation    Constipation is a common and often uncomfortable problem. Constipation means you have bowel movements fewer than 3 times per week, or strain to pass hard, dry stool. It can last a short time. Or it can be a problem that never seems to go away. The good news is that it can often be treated and controlled.  Eat more fiber  One of the best ways to help treat constipation is to increase your fiber intake. You can do this either through diet or by using fiber supplements. Fiber (in whole grains, fruits, and vegetables) adds bulk and absorbs water to soften the stool. This helps the stool pass through the colon more easily. When you increase your fiber intake, do it slowly to avoid side effects such as bloating. Also increase the amount of water that you drink. Eating more of the following foods can add fiber to your diet.    High-fiber cereals    Whole grains, bran, and brown rice    Vegetables such as carrots, broccoli, and greens    Fresh fruits (especially apples, pears, and dried fruits like raisins and apricots)    Nuts and legumes (especially beans such as lentils, kidney beans, and lima beans)  Get physically active  Exercise helps improve the working of your colon which helps ease constipation. Try to get some physical activity every day. If you haven t been active for a while, talk to your healthcare provider before starting again.  Laxatives  Your healthcare provider may suggest an over-the-counter product to help ease your constipation. He or she may suggest the use of bulk-forming agents or laxatives. The use of laxatives, if used as directed, is common and safe. Follow directions carefully when using them. See your healthcare provider for new-onset constipation, or long-term constipation, to rule out other causes such as medicines or thyroid disease.  Date Last  Reviewed: 7/1/2016 2000-2019 [employer]. [address] [place] [address], [place] [address]. All rights reserved. This information is not intended as a substitute for professional medical care. Always follow your healthcare professional's instructions.

## 2020-11-02 ENCOUNTER — OFFICE VISIT (OUTPATIENT)
Dept: FAMILY MEDICINE | Facility: CLINIC | Age: 70
End: 2020-11-02
Payer: COMMERCIAL

## 2020-11-02 VITALS
BODY MASS INDEX: 31 KG/M2 | RESPIRATION RATE: 15 BRPM | SYSTOLIC BLOOD PRESSURE: 120 MMHG | WEIGHT: 227 LBS | TEMPERATURE: 98.3 F | DIASTOLIC BLOOD PRESSURE: 71 MMHG | HEART RATE: 68 BPM | OXYGEN SATURATION: 97 %

## 2020-11-02 DIAGNOSIS — F33.1 MAJOR DEPRESSIVE DISORDER, RECURRENT EPISODE, MODERATE (H): ICD-10-CM

## 2020-11-02 PROCEDURE — 99213 OFFICE O/P EST LOW 20 MIN: CPT | Performed by: NURSE PRACTITIONER

## 2020-11-02 RX ORDER — VENLAFAXINE HYDROCHLORIDE 75 MG/1
75 CAPSULE, EXTENDED RELEASE ORAL DAILY
Qty: 90 CAPSULE | Refills: 0 | Status: SHIPPED | OUTPATIENT
Start: 2020-11-02 | End: 2020-12-09

## 2020-11-02 NOTE — PROGRESS NOTES
Subjective     Don Samson is a 70 year old male who presents to clinic today for the following health issues:    HPI         Depression Followup    How are you doing with your depression since your last visit? Improved     Are you having other symptoms that might be associated with depression? Yes:  some    Have you had a significant life event?  OTHER: life     Are you feeling anxious or having panic attacks?   No    Do you have any concerns with your use of alcohol or other drugs? No     Starting to feel better  Not having any side effects  Wants to increase the dose.  Thinks he has room to improve more.        Social History     Tobacco Use     Smoking status: Former Smoker     Quit date: 1970     Years since quittin.8     Smokeless tobacco: Never Used   Substance Use Topics     Alcohol use: Yes     Alcohol/week: 0.0 standard drinks     Comment: rarely      Drug use: No     PHQ 2019   PHQ-9 Total Score 4 0 15   Q9: Thoughts of better off dead/self-harm past 2 weeks Not at all Not at all Not at all     ANNE-7 SCORE 2019   Total Score 10 0 7     Last PHQ-9 2020   1.  Little interest or pleasure in doing things 3   2.  Feeling down, depressed, or hopeless 3   3.  Trouble falling or staying asleep, or sleeping too much 3   4.  Feeling tired or having little energy 3   5.  Poor appetite or overeating 1   6.  Feeling bad about yourself 1   7.  Trouble concentrating 1   8.  Moving slowly or restless 0   Q9: Thoughts of better off dead/self-harm past 2 weeks 0   PHQ-9 Total Score 15   Difficulty at work, home, or with people Somewhat difficult       Suicide Assessment Five-step Evaluation and Treatment (SAFE-T)      How many servings of fruits and vegetables do you eat daily?  0-1    On average, how many sweetened beverages do you drink each day (Examples: soda, juice, sweet tea, etc.  Do NOT count diet or artificially sweetened beverages)?   2    How  many days per week do you exercise enough to make your heart beat faster? 3 or less    How many minutes a day do you exercise enough to make your heart beat faster? 9 or less    How many days per week do you miss taking your medication? 0- or run out of med      Review of Systems   Constitutional, HEENT, cardiovascular, pulmonary, gi and gu systems are negative, except as otherwise noted.      Objective    /71 (BP Location: Right arm, Patient Position: Chair, Cuff Size: Adult Large)   Pulse 68   Temp 98.3  F (36.8  C) (Oral)   Resp 15   Wt 103 kg (227 lb)   SpO2 97%   BMI 31.00 kg/m    Body mass index is 31 kg/m .  Physical Exam   GENERAL: healthy, alert and no distress  EYES: Eyes grossly normal to inspection, PERRL and conjunctivae and sclerae normal  RESP: lungs clear to auscultation - no rales, rhonchi or wheezes  CV: regular rate and rhythm, normal S1 S2, no S3 or S4, no murmur, click or rub, no peripheral edema and peripheral pulses strong  MS: no gross musculoskeletal defects noted, no edema  SKIN: no suspicious lesions or rashes  PSYCH: mentation appears normal, affect flat and fatigued          Assessment & Plan     Major depressive disorder, recurrent episode, moderate (H)  Increase effexor to 75 mg daily  Increase exercise  Continue vitamin D  Follow up in 3 months or sooner if worsening.    - venlafaxine (EFFEXOR-XR) 75 MG 24 hr capsule  Dispense: 90 capsule; Refill: 0          No follow-ups on file.    CONSTANCE Carias [facility]

## 2020-11-02 NOTE — LETTER
REPORT OF WORK ABILITY    [facility]   [address], [place]  [address]  [phone]    January 19, 2018    To whom it may concern:  Don Samson has been seen and evaluated today for medical reasons.         Sincerely,  Erika Lucero             sinus tach

## 2020-12-09 ENCOUNTER — OFFICE VISIT (OUTPATIENT)
Dept: FAMILY MEDICINE | Facility: CLINIC | Age: 70
End: 2020-12-09
Payer: COMMERCIAL

## 2020-12-09 VITALS
RESPIRATION RATE: 16 BRPM | SYSTOLIC BLOOD PRESSURE: 120 MMHG | WEIGHT: 227.6 LBS | DIASTOLIC BLOOD PRESSURE: 74 MMHG | OXYGEN SATURATION: 98 % | HEIGHT: 72 IN | BODY MASS INDEX: 30.83 KG/M2 | HEART RATE: 78 BPM | TEMPERATURE: 98.3 F

## 2020-12-09 DIAGNOSIS — F33.1 MAJOR DEPRESSIVE DISORDER, RECURRENT EPISODE, MODERATE (H): ICD-10-CM

## 2020-12-09 PROCEDURE — 99213 OFFICE O/P EST LOW 20 MIN: CPT | Performed by: FAMILY MEDICINE

## 2020-12-09 RX ORDER — VENLAFAXINE HYDROCHLORIDE 150 MG/1
150 CAPSULE, EXTENDED RELEASE ORAL DAILY
Qty: 90 CAPSULE | Refills: 0 | Status: SHIPPED | OUTPATIENT
Start: 2020-12-09 | End: 2021-02-02

## 2020-12-09 ASSESSMENT — ANXIETY QUESTIONNAIRES
6. BECOMING EASILY ANNOYED OR IRRITABLE: SEVERAL DAYS
1. FEELING NERVOUS, ANXIOUS, OR ON EDGE: SEVERAL DAYS
5. BEING SO RESTLESS THAT IT IS HARD TO SIT STILL: NOT AT ALL
2. NOT BEING ABLE TO STOP OR CONTROL WORRYING: NEARLY EVERY DAY
IF YOU CHECKED OFF ANY PROBLEMS ON THIS QUESTIONNAIRE, HOW DIFFICULT HAVE THESE PROBLEMS MADE IT FOR YOU TO DO YOUR WORK, TAKE CARE OF THINGS AT HOME, OR GET ALONG WITH OTHER PEOPLE: SOMEWHAT DIFFICULT
GAD7 TOTAL SCORE: 11
3. WORRYING TOO MUCH ABOUT DIFFERENT THINGS: NEARLY EVERY DAY
7. FEELING AFRAID AS IF SOMETHING AWFUL MIGHT HAPPEN: NOT AT ALL

## 2020-12-09 ASSESSMENT — PATIENT HEALTH QUESTIONNAIRE - PHQ9
5. POOR APPETITE OR OVEREATING: NEARLY EVERY DAY
SUM OF ALL RESPONSES TO PHQ QUESTIONS 1-9: 15

## 2020-12-09 ASSESSMENT — MIFFLIN-ST. JEOR: SCORE: 1836.42

## 2020-12-09 NOTE — PROGRESS NOTES
Subjective     Don Samson is a 70 year old male who presents to clinic today for the following health issues:    HPI     Depression and Anxiety Follow-Up    How are you doing with your depression since your last visit? Worsened     How are you doing with your anxiety since your last visit?  Worsened     Are you having other symptoms that might be associated with depression or anxiety? No    Have you had a significant life event? Financial Concerns     Do you have any concerns with your use of alcohol or other drugs? No    Social History     Tobacco Use     Smoking status: Former Smoker     Quit date: 1970     Years since quittin.9     Smokeless tobacco: Never Used   Substance Use Topics     Alcohol use: Yes     Alcohol/week: 0.0 standard drinks     Comment: rarely      Drug use: No     PHQ 2019   PHQ-9 Total Score 4 0 15   Q9: Thoughts of better off dead/self-harm past 2 weeks Not at all Not at all Not at all     ANNE-7 SCORE 2019   Total Score 10 0 7     Last PHQ-9 2020   1.  Little interest or pleasure in doing things 3   2.  Feeling down, depressed, or hopeless 3   3.  Trouble falling or staying asleep, or sleeping too much 3   4.  Feeling tired or having little energy 3   5.  Poor appetite or overeating 0   6.  Feeling bad about yourself 0   7.  Trouble concentrating 3   8.  Moving slowly or restless 0   Q9: Thoughts of better off dead/self-harm past 2 weeks 0   PHQ-9 Total Score 15   Difficulty at work, home, or with people Somewhat difficult     ANNE-7  2020   1. Feeling nervous, anxious, or on edge 1   2. Not being able to stop or control worrying 3   3. Worrying too much about different things 3   4. Trouble relaxing 3   5. Being so restless that it is hard to sit still 0   6. Becoming easily annoyed or irritable 1   7. Feeling afraid, as if something awful might happen 0   NANE-7 Total Score 11   If you checked any problems, how  "difficult have they made it for you to do your work, take care of things at home, or get along with other people? 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   Some obsessive though. Hard at work.   No suicidal or homicidal thoughts.     Social History     Occupational History     Not on file   Tobacco Use     Smoking status: Former Smoker     Quit date: 1970     Years since quittin.9     Smokeless tobacco: Never Used   Substance and Sexual Activity     Alcohol use: Yes     Alcohol/week: 0.0 standard drinks     Comment: rarely      Drug use: No     Sexual activity: Yes     Partners: Female     No Known Allergies  Patient Active Problem List   Diagnosis     Fatigue     Hypertension goal BP (blood pressure) < 140/90     Major depressive disorder, recurrent, moderate (H)     Viral gastroenteritis     Reviewed medications, social history and  past medical and surgical history.    Review of system: for general, respiratory, CVS, GI and psychiatry negative except for noted above.     EXAM:  /74 (BP Location: Right arm, Patient Position: Sitting, Cuff Size: Adult Regular)   Pulse 78   Temp 98.3  F (36.8  C) (Oral)   Resp 16   Ht 1.822 m (5' 11.75\")   Wt 103.2 kg (227 lb 9.6 oz)   SpO2 98%   BMI 31.08 kg/m    Constitutional: healthy, alert and no distress   Psychiatric: Somewhat blunted affect, mentation appears normal    ASSESSMENT / PLAN:  (F33.1) Major depressive disorder, recurrent episode, moderate (H)  Comment: Mood is still not great.  He is hoping to go up on the dose of Effexor which is an appropriate idea.  He is tolerating it very well.  -Wellbutrin would be an alternative agent which has been prescribed to him in remote past but it " according to record he has not used it.  He was also advised to consider gene testing but at this point we both agreed that less try another month of Effexor first and see how he does.  Plan: venlafaxine (EFFEXOR-XR) 150 MG 24 hr capsule     Follow-up in 6 to 8 weeks    The above note was dictated using voice recognition. Although reviewed after completion, some word and grammatical error may remain .

## 2020-12-10 ASSESSMENT — ANXIETY QUESTIONNAIRES: GAD7 TOTAL SCORE: 11

## 2021-01-26 ENCOUNTER — OFFICE VISIT (OUTPATIENT)
Dept: URGENT CARE | Facility: URGENT CARE | Age: 71
End: 2021-01-26
Payer: COMMERCIAL

## 2021-01-26 VITALS
WEIGHT: 265 LBS | OXYGEN SATURATION: 97 % | SYSTOLIC BLOOD PRESSURE: 124 MMHG | DIASTOLIC BLOOD PRESSURE: 74 MMHG | HEIGHT: 72 IN | TEMPERATURE: 97.8 F | HEART RATE: 65 BPM | BODY MASS INDEX: 35.89 KG/M2
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
## 2021-01-26 LAB
ALBUMIN UR-MCNC: NEGATIVE MG/DL
APPEARANCE UR: CLEAR
BILIRUB UR QL STRIP: NEGATIVE
COLOR UR AUTO: YELLOW
GLUCOSE UR STRIP-MCNC: NEGATIVE MG/DL
HGB UR QL STRIP: NEGATIVE
KETONES UR STRIP-MCNC: NEGATIVE MG/DL
LEUKOCYTE ESTERASE UR QL STRIP: NEGATIVE
NITRATE UR QL: NEGATIVE
PH UR STRIP: 5 PH (ref 5–7)
SOURCE: NORMAL
SP GR UR STRIP: >1.03 (ref 1–1.03)
UROBILINOGEN UR STRIP-ACNC: 0.2 EU/DL (ref 0.2–1)

## 2021-01-26 PROCEDURE — 99213 OFFICE O/P EST LOW 20 MIN: CPT | Performed by: NURSE PRACTITIONER

## 2021-01-26 PROCEDURE — 81003 URINALYSIS AUTO W/O SCOPE: CPT | Performed by: NURSE PRACTITIONER

## 2021-01-26 ASSESSMENT — MIFFLIN-ST. JEOR: SCORE: 2002.09

## 2021-01-26 NOTE — PATIENT INSTRUCTIONS
Be cautious on the ice and snow.    Consider cleats for the bottom of boots.    Use Tylenol or Ibuprofen as needed for pain.

## 2021-01-26 NOTE — LETTER
January 26, 2021      Don CLARKE [place]  [address]  [address]  [facility] [address]        To Whom It May Concern:    Don Samson was seen in our clinic. He may return to work without restrictions.      Sincerely,        Lily Henderson, CNP

## 2021-01-26 NOTE — PROGRESS NOTES
"Chief Complaint   Patient presents with     Urgent Care     Pt in clinic to have eval for low back pain     Back Pain         ICD-10-CM    1. Back pain  M54.9 *UA reflex to Microscopic and Culture ([facility] (except [facility])     May return to work without restrictions  Tylenol and ibuprofen as needed for pain    Medical Decision Making    Differential Diagnosis:  Back Pain: myofascial low back strain, lumbosacral strain, degenerative disc disease, possible herniated disc  and compression fracture      Results for orders placed or performed in visit on 01/26/21 (from the past 24 hour(s))   *UA reflex to Microscopic and Culture ([facility] (except [place] [facility])    Specimen: Midstream Urine   Result Value Ref Range    Color Urine Yellow     Appearance Urine Clear     Glucose Urine Negative NEG^Negative mg/dL    Bilirubin Urine Negative NEG^Negative    Ketones Urine Negative NEG^Negative mg/dL    Specific Gravity Urine >1.030 1.003 - 1.035    Blood Urine Negative NEG^Negative    pH Urine 5.0 5.0 - 7.0 pH    Protein Albumin Urine Negative NEG^Negative mg/dL    Urobilinogen Urine 0.2 0.2 - 1.0 EU/dL    Nitrite Urine Negative NEG^Negative    Leukocyte Esterase Urine Negative NEG^Negative    Source Midstream Urine        Subjective     Don Samson is an 70 year oldmale who presents to clinic today for a note that says he can return to work. He fell on the ice while getting off the bus 3 days ago.   He had some low back pain initially, but this has subsided and he would like to return to work.     ROS: 10 point ROS neg other than the symptoms noted above in the HPI.       Objective    /74   Pulse 65   Temp 97.8  F (36.6  C) (Tympanic)   Ht 1.816 m (5' 11.5\")   Wt 120.2 kg (265 lb)   SpO2 97%   BMI 36.44 kg/m      Physical Exam       GENERAL APPEARANCE: healthy appearing, alert     NECK: no adenopathy or asymmetry, thyroid normal to palpation     " RESP: lungs clear to auscultation - no rales, rhonchi or wheezes     CV: regular rates and rhythm, no murmurs, rubs, or gallop     ABDOMEN: Large umbilical hernia noted     MS: extremities normal- no gross deformities noted; normal muscle tone.  No pain with straight leg raises, no pain with bending forward to touch toes or squatting.     SKIN: no suspicious lesions or rashes     NEURO: Normal strength and tone, mentation intact and speech normal     PSYCH: affect flat    Patient Instructions   Be cautious on the ice and snow.    Consider cleats for the bottom of boots.    Use Tylenol or Ibuprofen as needed for pain.      CONSTANCE Hameed, CNP  [facility]

## 2021-01-30 DIAGNOSIS — F33.1 MAJOR DEPRESSIVE DISORDER, RECURRENT EPISODE, MODERATE (H): ICD-10-CM

## 2021-02-02 RX ORDER — VENLAFAXINE HYDROCHLORIDE 150 MG/1
150 CAPSULE, EXTENDED RELEASE ORAL DAILY
Qty: 30 CAPSULE | Refills: 0 | Status: SHIPPED | OUTPATIENT
Start: 2021-02-02 | End: 2021-03-16

## 2021-02-02 NOTE — TELEPHONE ENCOUNTER
Please call and assist with an appointment for depression recheck with Dr Carmona. This can be a virtual appointment. Will fill to cover. Krystyna Roach RN

## 2021-02-05 ENCOUNTER — OFFICE VISIT (OUTPATIENT)
Dept: URGENT CARE | Facility: URGENT CARE | Age: 71
End: 2021-02-05
Payer: COMMERCIAL

## 2021-02-05 VITALS
WEIGHT: 265 LBS | SYSTOLIC BLOOD PRESSURE: 124 MMHG | OXYGEN SATURATION: 97 % | BODY MASS INDEX: 35.89 KG/M2 | TEMPERATURE: 98 F | DIASTOLIC BLOOD PRESSURE: 68 MMHG | HEART RATE: 61 BPM | RESPIRATION RATE: 16 BRPM | HEIGHT: 72 IN

## 2021-02-05 DIAGNOSIS — L03.039 INFECTION OF TOENAIL: Primary | ICD-10-CM

## 2021-02-05 PROCEDURE — 99213 OFFICE O/P EST LOW 20 MIN: CPT | Performed by: FAMILY MEDICINE

## 2021-02-05 ASSESSMENT — MIFFLIN-ST. JEOR: SCORE: 2002.09

## 2021-02-05 NOTE — PROGRESS NOTES
Subjective: Patient has had trouble with his left great toe for many years, thinks he ran over a cart at some point that caused the original injury but it has been thick ever since then and it is hard to keep it trimmed down.  Yesterday he tried trimming it down really short and it started bleeding and he did the same thing with the second toe and now they are hurting more.  The toenail feels like it might come off at any point.  At this point he thinks he did just like to have it removed and he understands the podiatry can do that    Objective: Left great toenail is thick appears to be loose but still attached, only about half of it is present, some dried blood but no active bleeding.  Slightly tender.  The second toe looks fine.    Assessment and plan: Probably a chronic fungal infection and now the toenail seems to be , probably some infection.  We will try to calm it down with antibiotics and he can try to get into see podiatry to consider having it removed permanently.

## 2021-03-08 ENCOUNTER — OFFICE VISIT (OUTPATIENT)
Dept: URGENT CARE | Facility: URGENT CARE | Age: 71
End: 2021-03-08
Payer: COMMERCIAL

## 2021-03-08 VITALS
SYSTOLIC BLOOD PRESSURE: 137 MMHG | DIASTOLIC BLOOD PRESSURE: 90 MMHG | BODY MASS INDEX: 33.6 KG/M2 | WEIGHT: 240 LBS | HEART RATE: 69 BPM | HEIGHT: 71 IN | OXYGEN SATURATION: 98 % | TEMPERATURE: 98.1 F

## 2021-03-08 DIAGNOSIS — M54.50 ACUTE RIGHT-SIDED LOW BACK PAIN WITHOUT SCIATICA: Primary | ICD-10-CM

## 2021-03-08 PROCEDURE — 99213 OFFICE O/P EST LOW 20 MIN: CPT | Performed by: PHYSICIAN ASSISTANT

## 2021-03-08 RX ORDER — CYCLOBENZAPRINE HCL 10 MG
10 TABLET ORAL 3 TIMES DAILY PRN
Qty: 21 TABLET | Refills: 0 | Status: SHIPPED | OUTPATIENT
Start: 2021-03-08 | End: 2021-03-15

## 2021-03-08 RX ORDER — CELECOXIB 100 MG/1
100 CAPSULE ORAL 2 TIMES DAILY
Qty: 14 CAPSULE | Refills: 0 | Status: SHIPPED | OUTPATIENT
Start: 2021-03-08 | End: 2021-04-05

## 2021-03-08 ASSESSMENT — ENCOUNTER SYMPTOMS
EYES NEGATIVE: 1
CARDIOVASCULAR NEGATIVE: 1
PSYCHIATRIC NEGATIVE: 1
GASTROINTESTINAL NEGATIVE: 1
CONSTITUTIONAL NEGATIVE: 1
NEUROLOGICAL NEGATIVE: 1
RESPIRATORY NEGATIVE: 1

## 2021-03-08 ASSESSMENT — MIFFLIN-ST. JEOR: SCORE: 1875.76

## 2021-03-08 NOTE — PATIENT INSTRUCTIONS
Patient Education     RICE     Rest an injury, elevate it, and use ice and compression as directed.   RICE stands for rest, ice, compression, and elevation. These can limit pain and swelling after an injury. RICE may be recommended to help treat breaks (fractures), sprains, strains, and bruises or bumps.   Home care  Here are the details of RICE:    Rest. Limit the use of the injured body part. This helps prevent further damage to the body part and gives it time to heal. In some cases, you may need a sling, brace, splint, or cast to help keep the body part still until it has healed.    Ice. Applying ice right after an injury helps relieve pain and swelling. To make an ice pack, put ice cubes in a plastic bag that seals at the top. Wrap the bag in a clean, thin towel or cloth. Then place it over the injured area. Do this for 10 to 15 minutes every 3 to 4 hours. Continue for the next 1 to 3 days or until your symptoms improve. Never put ice directly on your skin. Don't ice an area longer than 15 minutes at a time.    Compression. Putting pressure on an injury helps reduce swelling and provides support. Wrap the injured area firmly with an elastic bandage or wrap. Make sure not to wrap the bandage too tightly or you will cut off blood flow to the injured area. If your bandage loosens, rewrap it.    Elevation. Keeping an injury raised or elevated above the level of your heart reduces swelling, pain, and throbbing. For instance, if you have a broken leg, it may help to rest your leg on several pillows when sitting or lying down. Try to keep the injured area elevated as often as possible.  Follow-up care  Follow up with your healthcare provider, or as advised.  When to seek medical advice  Call your healthcare provider right away if any of these occur:    Fever of 100.4 F (38 C) or higher, or as directed by your healthcare provider    Chills    Increased pain or swelling in the injured body part    Injured body part  becomes cold, blue, numb, or tingly    Signs of infection. These include warmth in the skin, redness, drainage, or bad smell coming from the injured body part.  [employer] last reviewed this educational content on 6/1/2018 2000-2020 [employer]. All rights reserved. This information is not intended as a substitute for professional medical care. Always follow your healthcare professional's instructions.

## 2021-03-08 NOTE — LETTER
March 8, 2021      To Whom It May Concern:      Don Samson was seen in our urgent care today, 03/08/21.  I expect his condition to improve over the next 1-2 days.  He may return to work when improved. Please excuse his absence today.     Sincerely,        Selvin Plascencia PA-C

## 2021-03-08 NOTE — PROGRESS NOTES
Acute right-sided low back pain without sciatica  - cyclobenzaprine (FLEXERIL) 10 MG tablet; Take 1 tablet (10 mg) by mouth 3 times daily as needed for muscle spasms  - celecoxib (CELEBREX) 100 MG capsule; Take 1 capsule (100 mg) by mouth 2 times daily for 7 days    Rest the affected area as much as possible.  Apply ice for 15-20 minutes intermittently as needed and especially after any offending activity. Hot packs are better for muscle spasms and cramping. Daily stretching as tolerated.  As pain recedes, begin normal activities slowly as tolerated.  Consider Physical Therapy after 6 weeks if symptoms not better with conservative care.      Okay to take acetaminophen 500 mg- 2 tabs (Total of 1000 mg) every 8 hrs   Okay to take ibuprofen 200 mg- 3 tabs (Total of 600 mg) every 6 hours      20 minutes spent on the date of the encounter doing chart review, history and exam, documentation and further activities as noted above     See Patient Instructions  Patient Instructions       Patient Education     RICE     Rest an injury, elevate it, and use ice and compression as directed.   RICE stands for rest, ice, compression, and elevation. These can limit pain and swelling after an injury. RICE may be recommended to help treat breaks (fractures), sprains, strains, and bruises or bumps.   Home care  Here are the details of RICE:    Rest. Limit the use of the injured body part. This helps prevent further damage to the body part and gives it time to heal. In some cases, you may need a sling, brace, splint, or cast to help keep the body part still until it has healed.    Ice. Applying ice right after an injury helps relieve pain and swelling. To make an ice pack, put ice cubes in a plastic bag that seals at the top. Wrap the bag in a clean, thin towel or cloth. Then place it over the injured area. Do this for 10 to 15 minutes every 3 to 4 hours. Continue for the next 1 to 3 days or until your symptoms improve. Never put ice  directly on your skin. Don't ice an area longer than 15 minutes at a time.    Compression. Putting pressure on an injury helps reduce swelling and provides support. Wrap the injured area firmly with an elastic bandage or wrap. Make sure not to wrap the bandage too tightly or you will cut off blood flow to the injured area. If your bandage loosens, rewrap it.    Elevation. Keeping an injury raised or elevated above the level of your heart reduces swelling, pain, and throbbing. For instance, if you have a broken leg, it may help to rest your leg on several pillows when sitting or lying down. Try to keep the injured area elevated as often as possible.  Follow-up care  Follow up with your healthcare provider, or as advised.  When to seek medical advice  Call your healthcare provider right away if any of these occur:    Fever of 100.4 F (38 C) or higher, or as directed by your healthcare provider    Chills    Increased pain or swelling in the injured body part    Injured body part becomes cold, blue, numb, or tingly    Signs of infection. These include warmth in the skin, redness, drainage, or bad smell coming from the injured body part.  [employer] last reviewed this educational content on 6/1/2018 2000-2020 [employer]. All rights reserved. This information is not intended as a substitute for professional medical care. Always follow your healthcare professional's instructions.               MERVIN [facility]    Subjective   71 year old year old who presents to clinic today for the following health issues:    Urgent Care and Fall       HPI     Back Pain  Onset/Duration: Today  Description:   Location of pain: low back right side  Character of pain: sharp, cramping and constant  Pain radiation: none  New numbness or weakness in legs, not attributed to pain: no   Intensity: Currently 6/10, moderate  Progression of Symptoms: waxing and waning  History:   Specific cause:  fall   Pain interferes with job: YES  History of back problems: recurrent self limited episodes of low back pain in the past  Any previous MRI or X-rays: None  Sees a specialist for back pain: No  Alleviating factors:   Improved by: heat, NSAIDs and rest    Precipitating factors:  Worsened by: Lifting and Bending    Review of Systems   Review of Systems   Constitutional: Negative.    HENT: Negative.    Eyes: Negative.    Respiratory: Negative.    Cardiovascular: Negative.    Gastrointestinal: Negative.    Genitourinary: Negative.    Neurological: Negative.    Psychiatric/Behavioral: Negative.         Objective    Temp: 98.1  F (36.7  C) Temp src: Oral BP: (!) 137/90 Pulse: 69     SpO2: 98 %       Physical Exam   Physical Exam  Constitutional:       General: He is not in acute distress.     Appearance: Normal appearance. He is normal weight. He is not ill-appearing, toxic-appearing or diaphoretic.   HENT:      Head: Normocephalic and atraumatic.   Cardiovascular:      Rate and Rhythm: Normal rate and regular rhythm.      Pulses: Normal pulses.      Heart sounds: Normal heart sounds.   Pulmonary:      Effort: Pulmonary effort is normal. No respiratory distress.      Breath sounds: Normal breath sounds.   Musculoskeletal:      Lumbar back: He exhibits tenderness, pain and spasm. He exhibits normal range of motion, no bony tenderness, no swelling, no edema, no deformity, no laceration and normal pulse.        Back:    Neurological:      General: No focal deficit present.      Mental Status: He is alert and oriented to person, place, and time. Mental status is at baseline.      Sensory: No sensory deficit.      Motor: No weakness.      Coordination: Coordination normal.      Gait: Gait normal.   Psychiatric:         Mood and Affect: Mood normal.         Behavior: Behavior normal.         Thought Content: Thought content normal.         Judgment: Judgment normal.

## 2021-03-13 DIAGNOSIS — F33.1 MAJOR DEPRESSIVE DISORDER, RECURRENT EPISODE, MODERATE (H): ICD-10-CM

## 2021-03-16 RX ORDER — VENLAFAXINE HYDROCHLORIDE 150 MG/1
CAPSULE, EXTENDED RELEASE ORAL
Qty: 90 CAPSULE | Refills: 0 | Status: SHIPPED | OUTPATIENT
Start: 2021-03-16 | End: 2021-04-05 | Stop reason: DRUGHIGH

## 2021-03-16 NOTE — TELEPHONE ENCOUNTER
Dr johansen-    Ok to fill for pt?  (I didn't want to just give him #30)  Due for follow up now    Last phq-9=15    Left message on voicemail to call back and schedule appointment with dr johansen    Thanks!     Jany Lopez RN

## 2021-03-24 ENCOUNTER — OFFICE VISIT (OUTPATIENT)
Dept: URGENT CARE | Facility: URGENT CARE | Age: 71
End: 2021-03-24
Payer: COMMERCIAL

## 2021-03-24 VITALS
RESPIRATION RATE: 12 BRPM | SYSTOLIC BLOOD PRESSURE: 120 MMHG | WEIGHT: 240 LBS | OXYGEN SATURATION: 100 % | DIASTOLIC BLOOD PRESSURE: 70 MMHG | HEIGHT: 71 IN | TEMPERATURE: 98.2 F | BODY MASS INDEX: 33.6 KG/M2 | HEART RATE: 84 BPM

## 2021-03-24 DIAGNOSIS — M54.50 ACUTE BILATERAL LOW BACK PAIN WITHOUT SCIATICA: Primary | ICD-10-CM

## 2021-03-24 DIAGNOSIS — M62.830 BACK MUSCLE SPASM: ICD-10-CM

## 2021-03-24 DIAGNOSIS — F33.1 MAJOR DEPRESSIVE DISORDER, RECURRENT, MODERATE (H): ICD-10-CM

## 2021-03-24 PROCEDURE — 99214 OFFICE O/P EST MOD 30 MIN: CPT | Performed by: NURSE PRACTITIONER

## 2021-03-24 RX ORDER — CYCLOBENZAPRINE HCL 10 MG
10 TABLET ORAL 3 TIMES DAILY PRN
Qty: 30 TABLET | Refills: 0 | Status: SHIPPED | OUTPATIENT
Start: 2021-03-24 | End: 2021-04-03

## 2021-03-24 RX ORDER — NAPROXEN 500 MG/1
500 TABLET ORAL 2 TIMES DAILY WITH MEALS
Qty: 14 TABLET | Refills: 0 | Status: SHIPPED | OUTPATIENT
Start: 2021-03-24 | End: 2021-03-31

## 2021-03-24 ASSESSMENT — MIFFLIN-ST. JEOR: SCORE: 1875.76

## 2021-03-24 NOTE — LETTER
March 24, 2021      Don Samson  [address]  [address]  [facility] [address]        To Whom It May Concern:    Don Samson  was seen on 3/24/2021.  Please excuse him  until 3/25/2021 due to injury.        Sincerely,        Lily Henderson, CNP

## 2021-03-24 NOTE — PROGRESS NOTES
"Chief Complaint   Patient presents with     Urgent Care     Back Pain     helped a friend move something and now having low back pain.          ICD-10-CM    1. Acute bilateral low back pain without sciatica  M54.5 cyclobenzaprine (FLEXERIL) 10 MG tablet     naproxen (NAPROSYN) 500 MG tablet   2. Back muscle spasm  M62.830 cyclobenzaprine (FLEXERIL) 10 MG tablet   3. Major depressive disorder, recurrent, moderate (H)  F33.1    Instructed patient to follow-up with Sandei Amor regarding depression which he reports as worsening.  He has appointment scheduled the beginning of April and to go to the emergency room if he develops any thoughts of harming himself or others.  Recommended he use heat and/or ice on his back along with medications.    Medical Decision Making    Differential Diagnosis:  Back Pain: myofascial low back strain, lumbosacral strain, degenerative disc disease, possible herniated disc  and chronic low back pain    Differential diagnosis includes but is not limited to bipolar disorder, psychosis,  auditory hallucinations, visual hallucinations, medication noncompliance, depression, substance abuse, suicidal ideation, homicidal ideation, psychiatric disturbance, acute stress reaction, personality disorder.      Subjective     Don Samson is an 71 year old male who presents to clinic today for low back pain that started 1 day ago.  He was helping a friend lift a refrigerator and rater in the day developed bilateral low back pain.  Pain does not radiate anywhere, he has no numbness or tingling in his perineal area or anal area or in his feet or toes.    He was seen a couple of weeks ago for similar type of pain when he says he \"threw out his back\".  Given muscle relaxants and with good relief of symptoms.. Those symptoms completely resolved prior to this incident.    Patient also asks question about his depression and if there is a blood test that can be completed.  He thought Sandie Amor " "mentioned there was a blood test he might have done if his depression did not improve.  He denies any suicidal or homicidal ideations and is currently taking his Effexor.    ROS: 10 point ROS neg other than the symptoms noted above in the HPI.       Objective    /70   Pulse 84   Temp 98.2  F (36.8  C) (Oral)   Resp 12   Ht 1.803 m (5' 11\")   Wt 108.9 kg (240 lb)   SpO2 100%   BMI 33.47 kg/m      Physical Exam       GENERAL APPEARANCE: alert and mild distress     MS: extremities normal- no gross deformities noted; normal muscle tone.     SKIN: no suspicious lesions or rashes     NEURO: Normal strength and tone, mentation intact and speech normal     PSYCH: Depressed, flat affect    Patient Instructions     Patient Education     Possible Causes of Low Back or Leg Pain    BIG: The symptoms in your back or leg may be due to pressure on a nerve. This pressure may be caused by a damaged disk or by abnormal bone growth. Either way, you may feel pain, burning, tingling, or numbness. If you have pressure on a nerve that connects to the sciatic nerve, pain may shoot down your leg.    Pressure from the disk  Constant wear and tear can weaken a disk over time and cause back pain. The disk can then be damaged by a sudden movement or injury. If its soft center starts to bulge, the disk may press on a nerve. Or the outside of the disk may tear, and the soft center may squeeze through and pinch a nerve.    Pressure from bone  As a disk wears out, the vertebrae right above and below the disk start to touch. This can put pressure on a nerve. Often, abnormal bone (called bone spurs) grows where the vertebrae rub against each other. This can cause the foramen or the spinal canal to narrow (called stenosis) and press against a nerve.  [employer] last reviewed this educational content on 3/1/2018    0810-5380 [employer]. All rights reserved. This information is not intended as a substitute for professional " medical care. Always follow your healthcare professional's instructions.               CONSTANCE Hameed, CNP  [facility] Provider

## 2021-03-24 NOTE — PATIENT INSTRUCTIONS
Patient Education     Possible Causes of Low Back or Leg Pain    BIG: The symptoms in your back or leg may be due to pressure on a nerve. This pressure may be caused by a damaged disk or by abnormal bone growth. Either way, you may feel pain, burning, tingling, or numbness. If you have pressure on a nerve that connects to the sciatic nerve, pain may shoot down your leg.    Pressure from the disk  Constant wear and tear can weaken a disk over time and cause back pain. The disk can then be damaged by a sudden movement or injury. If its soft center starts to bulge, the disk may press on a nerve. Or the outside of the disk may tear, and the soft center may squeeze through and pinch a nerve.    Pressure from bone  As a disk wears out, the vertebrae right above and below the disk start to touch. This can put pressure on a nerve. Often, abnormal bone (called bone spurs) grows where the vertebrae rub against each other. This can cause the foramen or the spinal canal to narrow (called stenosis) and press against a nerve.  [employer] last reviewed this educational content on 3/1/2018    0758-3463 [employer]. All rights reserved. This information is not intended as a substitute for professional medical care. Always follow your healthcare professional's instructions.

## 2021-03-29 ENCOUNTER — NURSE TRIAGE (OUTPATIENT)
Dept: NURSING | Facility: CLINIC | Age: 71
End: 2021-03-29

## 2021-03-30 NOTE — TELEPHONE ENCOUNTER
Triage Call:    -Patient thinks he might have COVID-19.  -Patient states on Sunday night patient felt lightheaded and had a headache  -Patient had diarrhea x1 today.  -Patient is still having a headache today that is mild.   -Patient is not feeling lightheaded currently. Only occurred yesterday.  -No sore throat, cough, chest pain, shortness of breath, fevers, loss taste or smell, nausea, runny nose.   -Per protocol, recommendations are for patient to see PCP with the office is open. Virtual visit recommended. Patient transferred to scheduling and Ayaka () assisted patient with getting scheduled for a virtual telephone visit. RN advised patient if he develops any new or worsening sx to call back nurse line. Patient verbalized understanding and agrees with plan.       Lala Keller RN, BSN Nurse Triage Advisor 8:10 PM 3/29/2021     Reason for Disposition    [1] COVID-19 infection suspected by caller or triager AND [2] mild symptoms (cough, fever, or others) AND [3] no complications or SOB    Additional Information    Negative: SEVERE difficulty breathing (e.g., struggling for each breath, speaks in single words)    Negative: Difficult to awaken or acting confused (e.g., disoriented, slurred speech)    Negative: Bluish (or gray) lips or face now    Negative: Shock suspected (e.g., cold/pale/clammy skin, too weak to stand, low BP, rapid pulse)    Negative: Sounds like a life-threatening emergency to the triager    Negative: [1] COVID-19 exposure AND [2] no symptoms    Negative: [1] Lives with someone known to have influenza (flu test positive) AND [2] flu-like symptoms (e.g., cough, runny nose, sore throat, SOB; with or without fever)    Negative: [1] Adult with possible COVID-19 symptoms AND [2] triager concerned about severity of symptoms or other causes    Negative: COVID-19 vaccine reaction suspected (e.g., fever, headache, muscle aches) occurring during days 1-3 after getting vaccine    Negative:  COVID-19 vaccine, questions about    Negative: COVID-19 and breastfeeding, questions about    Negative: SEVERE or constant chest pain or pressure (Exception: mild central chest pain, present only when coughing)    Negative: MODERATE difficulty breathing (e.g., speaks in phrases, SOB even at rest, pulse 100-120)    Negative: [1] Headache AND [2] stiff neck (can't touch chin to chest)    Negative: MILD difficulty breathing (e.g., minimal/no SOB at rest, SOB with walking, pulse <100)    Negative: Chest pain or pressure    Negative: Patient sounds very sick or weak to the triager    Negative: Fever > 103 F (39.4 C)    Negative: [1] Fever > 101 F (38.3 C) AND [2] age > 60    Negative: [1] Fever > 100.0 F (37.8 C) AND [2] bedridden (e.g., nursing home patient, CVA, chronic illness, recovering from surgery)    Negative: [1] HIGH RISK patient (e.g., age > 64 years, diabetes, heart or lung disease, weak immune system) AND [2] new or worsening symptoms    Negative: [1] HIGH RISK patient AND [2] influenza is widespread in the community AND [3] ONE OR MORE respiratory symptoms: cough, sore throat, runny or stuffy nose    Negative: [1] HIGH RISK patient AND [2] influenza exposure within the last 7 days AND [3] ONE OR MORE respiratory symptoms: cough, sore throat, runny or stuffy nose    Negative: Fever present > 3 days (72 hours)    Negative: [1] Fever returns after gone for over 24 hours AND [2] symptoms worse or not improved    Negative: [1] Continuous (nonstop) coughing interferes with work or school AND [2] no improvement using cough treatment per protocol    Protocols used: CORONAVIRUS (COVID-19) DIAGNOSED OR VZIXYDZZY-I-RW 1.3    COVID 19 Nurse Triage Plan/Patient Instructions    Please be aware that novel coronavirus (COVID-19) may be circulating in the community. If you develop symptoms such as fever, cough, or SOB or if you have concerns about the presence of another infection including coronavirus (COVID-19), please  contact your health care provider or visit [email or web address].     Disposition/Instructions    Virtual Visit with provider recommended. Reference Visit Selection Guide.    Thank you for taking steps to prevent the spread of this virus.  o Limit your contact with others.  o Wear a simple mask to cover your cough.  o Wash your hands well and often.    Resources    [facility]: About COVID-19: [email or web address]    CDC: What to Do If You're Sick: www.cdc.gov/coronavirus/2019-ncov/about/steps-when-sick.html    CDC: Ending Home Isolation: www.cdc.gov/coronavirus/2019-ncov/hcp/disposition-in-home-patients.html     CDC: Caring for Someone: www.cdc.gov/coronavirus/2019-ncov/if-you-are-sick/care-for-someone.html     [facility]: Interim Guidance for Hospital Discharge to Home: [email or web address]    [facility] clinical trials (COVID-19 research studies): [email or web address]     Below are the COVID-19 hotlines at the [place] [facility] ([facility]). Interpreters are available.   o For health questions: Call 195-180-0585 or 1-864.513.7793 (7 a.m. to 7 p.m.)  o For questions about schools and childcare: Call 159-035-8093 or 1-251.707.5163 (7 a.m. to 7 p.m.)

## 2021-03-31 ENCOUNTER — OFFICE VISIT (OUTPATIENT)
Dept: URGENT CARE | Facility: URGENT CARE | Age: 71
End: 2021-03-31
Payer: COMMERCIAL

## 2021-03-31 ENCOUNTER — NURSE TRIAGE (OUTPATIENT)
Dept: FAMILY MEDICINE | Facility: CLINIC | Age: 71
End: 2021-03-31

## 2021-03-31 VITALS
BODY MASS INDEX: 33.47 KG/M2 | OXYGEN SATURATION: 97 % | DIASTOLIC BLOOD PRESSURE: 75 MMHG | SYSTOLIC BLOOD PRESSURE: 127 MMHG | HEART RATE: 68 BPM | WEIGHT: 240 LBS | TEMPERATURE: 97.8 F

## 2021-03-31 DIAGNOSIS — A08.4 VIRAL GASTROENTERITIS: Primary | ICD-10-CM

## 2021-03-31 DIAGNOSIS — G44.209 TENSION HEADACHE: ICD-10-CM

## 2021-03-31 PROCEDURE — 99213 OFFICE O/P EST LOW 20 MIN: CPT | Performed by: FAMILY MEDICINE

## 2021-03-31 RX ORDER — DICYCLOMINE HYDROCHLORIDE 10 MG/1
10 CAPSULE ORAL
Qty: 20 CAPSULE | Refills: 0 | Status: SHIPPED | OUTPATIENT
Start: 2021-03-31 | End: 2021-04-05

## 2021-03-31 NOTE — PATIENT INSTRUCTIONS
Bentyl every 4-6 hours for any stomach cramping        For your headache tylenol 325-650mg and/or ibuprofen 400-600mg  -- the other option is to use excedrin by itself      If there are bloody stools, fever, or diarrhea present for more than 7 days return to be re-evaluated

## 2021-03-31 NOTE — TELEPHONE ENCOUNTER
"Pt called into clinic  He c/o h/a since sat night  Worse than his usual  He does have a h/o headaches like this- usually gets them about every 6 months.  Comes and goes  Has also had some Diarrhea and Nausea  Light headed and dizzy after I left the coffee shop sat night  Tested for covid and influenza- both were negative  [facility]    Moderate to severe today  Stiff neck- neck has been stiff for awhile though  He is able to touch his chin to his chest.  Advised he try a 600mg ibuprofen with food- as he has not tried that yet. only taking tylenol  Advised pt to go to the UC if no improvement - he is aware that the  UC is open until 9pm  Pt in agreement with plan and verbalized understanding.  Thanks!  Jany Lopez RN  Triage Nurse        Reason for Disposition    [1] MODERATE headache (e.g., interferes with normal activities) AND [2] present > 24 hours AND [3] unexplained  (Exceptions: analgesics not tried, typical migraine, or headache part of viral illness)    Additional Information    Negative: Difficult to awaken or acting confused (e.g., disoriented, slurred speech)    Negative: [1] Weakness of the face, arm or leg on one side of the body AND [2] new onset    Negative: [1] Numbness of the face, arm or leg on one side of the body AND [2] new onset    Negative: [1] Loss of speech or garbled speech AND [2] new onset    Negative: Passed out (i.e., lost consciousness, collapsed and was not responding)    Negative: Sounds like a life-threatening emergency to the triager    Negative: Stiff neck (can't touch chin to chest)    Negative: Severe pain in one eye    Negative: [1] Other family members (or roommates) with headaches AND [2] possibility of carbon monoxide exposure    Negative: Unable to walk, or can only walk with assistance (e.g., requires support)    Negative: [1] SEVERE headache (e.g., excruciating) AND [2] \"worst headache\" of life    Negative: [1] SEVERE " headache AND [2] sudden-onset (i.e., reaching maximum intensity within seconds)    Negative: [1] SEVERE headache AND [2] fever    Negative: Loss of vision or double vision (Exception: same as prior migraines)    Negative: [1] Fever > 100.0 F (37.8 C) AND [2] diabetes mellitus or weak immune system (e.g., HIV positive, cancer chemo, splenectomy, organ transplant, chronic steroids)    Negative: Patient sounds very sick or weak to the triager    Negative: [1] SEVERE headache (e.g., excruciating) AND [2] not improved after 2 hours of pain medicine    Negative: [1] Vomiting AND [2] 2 or more times (Exception: similar to previous migraines)    Negative: Fever > 104 F (40 C)    Negative: [1] New headache AND [2] age > 50    Negative: [1] Sinus pain of forehead AND [2] yellow or green nasal discharge    Negative: [1] New headache AND [2] weak immune system (e.g., HIV positive, cancer chemo, splenectomy, organ transplant, chronic steroids)    Negative: Fever present > 3 days (72 hours)    [1] MILD-MODERATE headache AND [2] present > 72 hours    Protocols used: HEADACHE-A-AH

## 2021-03-31 NOTE — PROGRESS NOTES
Assessment & Plan     Viral gastroenteritis  Mild symptoms present for about 4-5 days -- no active diarrhea at this time. Occasional cramps. Appears to be of viral origin as he is absent of fever/blood. Recommend PRN bentyl for stomach cramps. F/u as needed if symptoms worsen.   - dicyclomine (BENTYL) 10 MG capsule  Dispense: 20 capsule; Refill: 0    Tension headache  Mild headache may be related to current viral illness. Symptoms not consistent with migraine. PRN OTC analgesics recommended and ongoing oral hydration. Does not appear dehydrated on exam       Jim Graham MD   [place] [facility]    Negro Cifuentes is a 71 year old male who presents to clinic today for the following health issues:  Chief Complaint   Patient presents with     Urgent Care     Headache     c/o HA for 3 4 days     HPI    Reports onset of diarrhea after consuming a mocha at [employer] a few nights ago he had onset of neck ache and a headache that wrapped around his head    No hx of chronic migraines or headaches.     Yesterday he felt his appetite may have been improving but is reduced today.     Patient had a COVID swab and flu swab done and this was negative (At the [facility])     He reports no fevers.     Loose stool last present yesterday afternoon  but not present today    He feels he has been able to stay hydrated with gatorade and vitamin water.     No visual difficulties  Or auras.   Patient Active Problem List    Diagnosis Date Noted     Viral gastroenteritis 12/11/2019     Priority: Medium     Hypertension goal BP (blood pressure) < 140/90 03/15/2018     Priority: Medium     Major depressive disorder, recurrent, moderate (H) 03/15/2018     Priority: Medium     Fatigue 09/15/2016     Priority: Medium       Current Outpatient Medications   Medication     amLODIPine (NORVASC) 5 MG tablet     cyclobenzaprine (FLEXERIL) 10 MG tablet     cyclobenzaprine (FLEXERIL) 10 MG tablet     dicyclomine (BENTYL) 10 MG capsule      naproxen (NAPROSYN) 500 MG tablet     naproxen (NAPROSYN) 500 MG tablet     olmesartan (BENICAR) 20 MG tablet     ondansetron (ZOFRAN) 4 MG tablet     venlafaxine (EFFEXOR-XR) 150 MG 24 hr capsule     celecoxib (CELEBREX) 100 MG capsule     No current facility-administered medications for this visit.           Objective    /75   Pulse 68   Temp 97.8  F (36.6  C) (Tympanic)   Wt 108.9 kg (240 lb)   SpO2 97%   BMI 33.47 kg/m    Physical Exam     GEN: NAD, normal mentation  ABD: large umblical hernia that is reducible, no guarding, no palpable masses      No results found for any visits on 03/31/21.                  The use of Dragon[employer] dictation services may have been used to construct the content in this note; any grammatical or spelling errors are non-intentional. Please contact the author of this note directly if you are in need of any clarification.

## 2021-03-31 NOTE — LETTER
March 31, 2021      Don Samson  [address]  [address]  [place] [address]        To Whom It May Concern:    Don Samson was seen in our clinic please excuse him from missed work this week Monday- Thursday so he can recover from an episode of stomach flu. So long as he is not having active loose stools or fever he is safe to return later this week.     Sincerely,        Jim Graham MD

## 2021-04-05 ENCOUNTER — OFFICE VISIT (OUTPATIENT)
Dept: FAMILY MEDICINE | Facility: CLINIC | Age: 71
End: 2021-04-05
Payer: COMMERCIAL

## 2021-04-05 VITALS
OXYGEN SATURATION: 98 % | HEIGHT: 71 IN | RESPIRATION RATE: 16 BRPM | BODY MASS INDEX: 31.92 KG/M2 | DIASTOLIC BLOOD PRESSURE: 72 MMHG | HEART RATE: 76 BPM | SYSTOLIC BLOOD PRESSURE: 134 MMHG | TEMPERATURE: 97.6 F | WEIGHT: 228 LBS

## 2021-04-05 DIAGNOSIS — R73.09 ELEVATED GLUCOSE LEVEL: ICD-10-CM

## 2021-04-05 DIAGNOSIS — I10 UNCONTROLLED HYPERTENSION: ICD-10-CM

## 2021-04-05 DIAGNOSIS — I10 HYPERTENSION GOAL BP (BLOOD PRESSURE) < 140/90: Primary | ICD-10-CM

## 2021-04-05 DIAGNOSIS — F33.1 MAJOR DEPRESSIVE DISORDER, RECURRENT EPISODE, MODERATE (H): ICD-10-CM

## 2021-04-05 LAB
ALBUMIN SERPL-MCNC: 3.9 G/DL (ref 3.4–5)
ALP SERPL-CCNC: 110 U/L (ref 40–150)
ALT SERPL W P-5'-P-CCNC: 27 U/L (ref 0–70)
ANION GAP SERPL CALCULATED.3IONS-SCNC: 8 MMOL/L (ref 3–14)
AST SERPL W P-5'-P-CCNC: 15 U/L (ref 0–45)
BILIRUB SERPL-MCNC: 0.4 MG/DL (ref 0.2–1.3)
BUN SERPL-MCNC: 14 MG/DL (ref 7–30)
CALCIUM SERPL-MCNC: 8.8 MG/DL (ref 8.5–10.1)
CHLORIDE SERPL-SCNC: 108 MMOL/L (ref 94–109)
CHOLEST SERPL-MCNC: 150 MG/DL
CO2 SERPL-SCNC: 26 MMOL/L (ref 20–32)
CREAT SERPL-MCNC: 1.17 MG/DL (ref 0.66–1.25)
CREAT UR-MCNC: 191 MG/DL
GFR SERPL CREATININE-BSD FRML MDRD: 63 ML/MIN/{1.73_M2}
GLUCOSE SERPL-MCNC: 86 MG/DL (ref 70–99)
HBA1C MFR BLD: 5.6 % (ref 0–5.6)
HDLC SERPL-MCNC: 24 MG/DL
LDLC SERPL CALC-MCNC: 53 MG/DL
MICROALBUMIN UR-MCNC: 26 MG/L
MICROALBUMIN/CREAT UR: 13.72 MG/G CR (ref 0–17)
NONHDLC SERPL-MCNC: 126 MG/DL
POTASSIUM SERPL-SCNC: 4.7 MMOL/L (ref 3.4–5.3)
PROT SERPL-MCNC: 7.1 G/DL (ref 6.8–8.8)
SODIUM SERPL-SCNC: 142 MMOL/L (ref 133–144)
TRIGL SERPL-MCNC: 363 MG/DL
TSH SERPL DL<=0.005 MIU/L-ACNC: 1.39 MU/L (ref 0.4–4)

## 2021-04-05 PROCEDURE — 84443 ASSAY THYROID STIM HORMONE: CPT | Performed by: NURSE PRACTITIONER

## 2021-04-05 PROCEDURE — 80061 LIPID PANEL: CPT | Performed by: NURSE PRACTITIONER

## 2021-04-05 PROCEDURE — 83036 HEMOGLOBIN GLYCOSYLATED A1C: CPT | Performed by: NURSE PRACTITIONER

## 2021-04-05 PROCEDURE — 80053 COMPREHEN METABOLIC PANEL: CPT | Performed by: NURSE PRACTITIONER

## 2021-04-05 PROCEDURE — 99214 OFFICE O/P EST MOD 30 MIN: CPT | Performed by: NURSE PRACTITIONER

## 2021-04-05 PROCEDURE — 82043 UR ALBUMIN QUANTITATIVE: CPT | Performed by: NURSE PRACTITIONER

## 2021-04-05 PROCEDURE — 36415 COLL VENOUS BLD VENIPUNCTURE: CPT | Performed by: NURSE PRACTITIONER

## 2021-04-05 RX ORDER — VENLAFAXINE HYDROCHLORIDE 75 MG/1
225 CAPSULE, EXTENDED RELEASE ORAL DAILY
Qty: 270 CAPSULE | Refills: 0 | Status: SHIPPED | OUTPATIENT
Start: 2021-04-05 | End: 2022-04-06

## 2021-04-05 RX ORDER — AMLODIPINE BESYLATE 5 MG/1
5 TABLET ORAL DAILY
Qty: 90 TABLET | Refills: 0 | Status: SHIPPED | OUTPATIENT
Start: 2021-04-05 | End: 2021-08-26

## 2021-04-05 RX ORDER — OLMESARTAN MEDOXOMIL 20 MG/1
TABLET ORAL
Qty: 90 TABLET | Refills: 0 | Status: SHIPPED | OUTPATIENT
Start: 2021-04-05 | End: 2021-09-18

## 2021-04-05 ASSESSMENT — MIFFLIN-ST. JEOR: SCORE: 1821.33

## 2021-04-05 NOTE — LETTER
April 12, 2021      Don CLARKE Mali  [address]  [address]  [place] [facility] [address]        Dear ,    We are writing to inform you of your test results.    Your triglycerides are quite high.  All of the rest of your labs look good.     We may change your cholesterol medication when I see you back next month.     Please let me know if you have any questions    Resulted Orders   Lipid panel reflex to direct LDL Fasting   Result Value Ref Range    Cholesterol 150 <200 mg/dL    Triglycerides 363 (H) <150 mg/dL      Comment:      Borderline high:  150-199 mg/dl  High:             200-499 mg/dl  Very high:       >499 mg/dl  Non Fasting      HDL Cholesterol 24 (L) >39 mg/dL    LDL Cholesterol Calculated 53 <100 mg/dL      Comment:      Desirable:       <100 mg/dl    Non HDL Cholesterol 126 <130 mg/dL   Hemoglobin A1c   Result Value Ref Range    Hemoglobin A1C 5.6 0 - 5.6 %      Comment:      Normal <5.7% Prediabetes 5.7-6.4%  Diabetes 6.5% or higher - adopted from ADA   consensus guidelines.     TSH with free T4 reflex   Result Value Ref Range    TSH 1.39 0.40 - 4.00 mU/L   Comprehensive metabolic panel   Result Value Ref Range    Sodium 142 133 - 144 mmol/L    Potassium 4.7 3.4 - 5.3 mmol/L    Chloride 108 94 - 109 mmol/L    Carbon Dioxide 26 20 - 32 mmol/L    Anion Gap 8 3 - 14 mmol/L    Glucose 86 70 - 99 mg/dL      Comment:      Non Fasting    Urea Nitrogen 14 7 - 30 mg/dL    Creatinine 1.17 0.66 - 1.25 mg/dL    GFR Estimate 63 >60 mL/min/[1.73_m2]      Comment:      Non  GFR Calc  Starting 12/18/2018, serum creatinine based estimated GFR (eGFR) will be   calculated using the Chronic Kidney Disease Epidemiology Collaboration   (CKD-EPI) equation.      GFR Estimate If Black 73 >60 mL/min/[1.73_m2]      Comment:       GFR Calc  Starting 12/18/2018, serum creatinine based estimated GFR (eGFR) will be   calculated using the Chronic Kidney Disease Epidemiology Collaboration    (CKD-EPI) equation.      Calcium 8.8 8.5 - 10.1 mg/dL    Bilirubin Total 0.4 0.2 - 1.3 mg/dL    Albumin 3.9 3.4 - 5.0 g/dL    Protein Total 7.1 6.8 - 8.8 g/dL    Alkaline Phosphatase 110 40 - 150 U/L    ALT 27 0 - 70 U/L    AST 15 0 - 45 U/L   Albumin Random Urine Quantitative with Creat Ratio   Result Value Ref Range    Creatinine Urine 191 mg/dL    Albumin Urine mg/L 26 mg/L    Albumin Urine mg/g Cr 13.72 0 - 17 mg/g Cr       If you have any questions or concerns, please call the clinic at the number listed above.       Sincerely,      Sandie Amor, APRN CNP/nr

## 2021-04-05 NOTE — PROGRESS NOTES
"Assessment & Plan     Hypertension goal BP (blood pressure) < 140/90  BP at goal  Labs pending today  meds refilled at current doses.   F/u in 3 months or sooner if labs not at goal.    - Lipid panel reflex to direct LDL Fasting  - Comprehensive metabolic panel  - Albumin Random Urine Quantitative with Creat Ratio  - olmesartan (BENICAR) 20 MG tablet; TAKE 1 TABLET BY MOUTH DAILY WITH BREAKFAST  - amLODIPine (NORVASC) 5 MG tablet; Take 1 tablet (5 mg) by mouth daily    Uncontrolled hypertension    - amLODIPine (NORVASC) 5 MG tablet; Take 1 tablet (5 mg) by mouth daily    Major depressive disorder, recurrent episode, moderate (H)  Increase effexor to 225 mg daily.    F/u in 2-3 months or sooner if worsening.    - venlafaxine (EFFEXOR-XR) 75 MG 24 hr capsule; Take 3 capsules (225 mg) by mouth daily    Elevated glucose level  Last fasting glucose elevated  A1c peding   - Hemoglobin A1c  - TSH with free T4 reflex           BMI:   Estimated body mass index is 31.8 kg/m  as calculated from the following:    Height as of this encounter: 1.803 m (5' 11\").    Weight as of this encounter: 103.4 kg (228 lb).         Return in about 3 months (around 7/5/2021) for Follow up, with me.    CONSTANCE Carias CNP  [facility]        Negro Cifuentes is a 71 year old who presents for the following health issues     HPI     Diabetes Follow-up      How often are you checking your blood sugar? Not at all    What concerns do you have today about your diabetes? None     Do you have any of these symptoms? (Select all that apply)  No numbness or tingling in feet.  No redness, sores or blisters on feet.  No complaints of excessive thirst.  No reports of blurry vision.  No significant changes to weight.      BP Readings from Last 2 Encounters:   04/05/21 134/72   03/31/21 127/75     Hemoglobin A1C (%)   Date Value   04/05/2021 5.6     LDL Cholesterol Calculated (mg/dL)   Date Value   04/05/2021 53   09/14/2020 " 99         Hypertension Follow-up      Do you check your blood pressure regularly outside of the clinic? No     Are you following a low salt diet? No    Are your blood pressures ever more than 140 on the top number (systolic) OR more   than 90 on the bottom number (diastolic), for example 140/90? No      How many servings of fruits and vegetables do you eat daily?  2-3    On average, how many sweetened beverages do you drink each day (Examples: soda, juice, sweet tea, etc.  Do NOT count diet or artificially sweetened beverages)?   Occasionally     How many days per week do you exercise enough to make your heart beat faster? 3 or less    How many minutes a day do you exercise enough to make your heart beat faster? 9 or less  How many days per week do you miss taking your medication? 1    What makes it hard for you to take your medications?  remembering to take    Depression Followup    How are you doing with your depression since your last visit? No change    Are you having other symptoms that might be associated with depression? No    Have you had a significant life event?  OTHER: COVID. pandemic, social unrest, financial     Are you feeling anxious or having panic attacks?   No    Do you have any concerns with your use of alcohol or other drugs? No     Does not think the effexor is working very well, was on higher doses in the past.    Maybe Interested in going up in the dose???    Social History     Tobacco Use     Smoking status: Former Smoker     Quit date: 1970     Years since quittin.3     Smokeless tobacco: Never Used   Substance Use Topics     Alcohol use: Yes     Alcohol/week: 0.0 standard drinks     Comment: rarely      Drug use: No     PHQ 2019   PHQ-9 Total Score 0 15 15   Q9: Thoughts of better off dead/self-harm past 2 weeks Not at all Not at all Not at all     ANNE-7 SCORE 2019   Total Score 0 7 11       Suicide Assessment Five-step  "Evaluation and Treatment (SAFE-T)      How many servings of fruits and vegetables do you eat daily?  2-3    On average, how many sweetened beverages do you drink each day (Examples: soda, juice, sweet tea, etc.  Do NOT count diet or artificially sweetened beverages)?   1    How many days per week do you exercise enough to make your heart beat faster? 3 or less    How many minutes a day do you exercise enough to make your heart beat faster? 9 or less    How many days per week do you miss taking your medication? 0      Review of Systems   Constitutional, HEENT, cardiovascular, pulmonary, GI, , musculoskeletal, neuro, skin, endocrine and psych systems are negative, except as otherwise noted.      Objective    /72 (BP Location: Right arm, Patient Position: Sitting, Cuff Size: Adult Regular)   Pulse 76   Temp 97.6  F (36.4  C) (Oral)   Resp 16   Ht 1.803 m (5' 11\")   Wt 103.4 kg (228 lb)   SpO2 98%   BMI 31.80 kg/m    Body mass index is 31.8 kg/m .  Physical Exam   GENERAL: healthy, alert and no distress  EYES: Eyes grossly normal to inspection, PERRL and conjunctivae and sclerae normal  HENT: ear canals and TM's normal, nose and mouth without ulcers or lesions  NECK: no adenopathy, no asymmetry, masses, or scars and thyroid normal to palpation  RESP: lungs clear to auscultation - no rales, rhonchi or wheezes  CV: regular rate and rhythm, normal S1 S2, no S3 or S4, no murmur, click or rub, no peripheral edema and peripheral pulses strong  ABDOMEN: soft, nontender, no hepatosplenomegaly, no masses and bowel sounds normal  MS: no gross musculoskeletal defects noted, no edema  SKIN: no suspicious lesions or rashes  NEURO: Normal strength and tone, mentation intact and speech normal  PSYCH: inattentive and affect flat  PHQ 11/12/2019 7/27/2020 12/9/2020   PHQ-9 Total Score 0 15 15   Q9: Thoughts of better off dead/self-harm past 2 weeks Not at all Not at all Not at all                     "

## 2021-04-21 ENCOUNTER — OFFICE VISIT (OUTPATIENT)
Dept: URGENT CARE | Facility: URGENT CARE | Age: 71
End: 2021-04-21
Payer: COMMERCIAL

## 2021-04-21 ENCOUNTER — ANCILLARY PROCEDURE (OUTPATIENT)
Dept: GENERAL RADIOLOGY | Facility: CLINIC | Age: 71
End: 2021-04-21
Attending: FAMILY MEDICINE
Payer: COMMERCIAL

## 2021-04-21 VITALS
BODY MASS INDEX: 31.92 KG/M2 | HEIGHT: 71 IN | HEART RATE: 63 BPM | OXYGEN SATURATION: 97 % | WEIGHT: 228 LBS | RESPIRATION RATE: 16 BRPM | TEMPERATURE: 97.6 F | SYSTOLIC BLOOD PRESSURE: 124 MMHG | DIASTOLIC BLOOD PRESSURE: 70 MMHG

## 2021-04-21 DIAGNOSIS — M25.551 HIP PAIN, RIGHT: ICD-10-CM

## 2021-04-21 DIAGNOSIS — M25.551 HIP PAIN, RIGHT: Primary | ICD-10-CM

## 2021-04-21 PROCEDURE — 99214 OFFICE O/P EST MOD 30 MIN: CPT | Performed by: FAMILY MEDICINE

## 2021-04-21 PROCEDURE — 73502 X-RAY EXAM HIP UNI 2-3 VIEWS: CPT | Mod: RT | Performed by: RADIOLOGY

## 2021-04-21 RX ORDER — LIDOCAINE 4 G/G
1 PATCH TOPICAL EVERY 24 HOURS
Qty: 10 PATCH | Refills: 0 | Status: SHIPPED | OUTPATIENT
Start: 2021-04-21 | End: 2023-01-31

## 2021-04-21 ASSESSMENT — MIFFLIN-ST. JEOR: SCORE: 1821.33

## 2021-04-21 NOTE — PATIENT INSTRUCTIONS
Tylenol (325-650mg every 4-6 hours) by mouth and topical Lidocaine patch (apply just 1 daily in the morning and take off in the evening)  To help with the pain     If this isn't better in the next week make an appointment to see the sports medicine doctors  Call [phone] to schedule an appointment

## 2021-04-21 NOTE — LETTER
April 21, 2021      Don Samson  [address]  [place] [address]        To Whom It May Concern:    Don Samson was seen in our clinic today for an acute medical evaluation. Please excuse him from missed work duties      Sincerely,        Jim Graham MD

## 2021-04-21 NOTE — PROGRESS NOTES
Assessment & Plan     Hip pain, right  - XR Hip Right 2-3 Views     Per my read no acute fractures or significant osteoarthrosis of this R hip    Pain present at this time along gluteus muscle adjacent to lateral hip- pain not clearly defined over the trochanteric bursa.     We discussed lidocaine patch may not be covered by insurance    Oral tylenol as needed for pain. F/u with ortho in one week if pain not resolved    Jim Graham MD   [facility]    Subjective     Esau is a 71 year old male who presents to clinic today for the following health issues:  Chief Complaint   Patient presents with     Urgent Care     Musculoskeletal Problem     right hip pain for a couple days.      HPI    Reporting pain present for a few days    Esau did not fall down or have an injury.   Lives by himself. Has no friends or family in the area. Some acquaintances at the coffee shop.     Hx of what he thinks is arthritis uncertain what knees may be affected.     He rates his discomfort at 4/10 -- tylenol occasionally helps.     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 No heavy lifting or repetitive bending required at his position.     Patient Active Problem List    Diagnosis Date Noted     Viral gastroenteritis 12/11/2019     Priority: Medium     Hypertension goal BP (blood pressure) < 140/90 03/15/2018     Priority: Medium     Major depressive disorder, recurrent, moderate (H) 03/15/2018     Priority: Medium     Fatigue 09/15/2016     Priority: Medium       Current Outpatient Medications   Medication     amLODIPine (NORVASC) 5 MG tablet     olmesartan (BENICAR) 20 MG tablet     venlafaxine (EFFEXOR-XR) 75 MG 24 hr capsule     cyclobenzaprine (FLEXERIL) 10 MG tablet     ondansetron (ZOFRAN) 4 MG tablet     No current facility-administered medications for this visit.            Objective    /70   Pulse 63   Temp 97.6  F (36.4  C) (Oral)   Resp 16    "Ht 1.803 m (5' 11\")   Wt 103.4 kg (228 lb)   SpO2 97%   BMI 31.80 kg/m    Physical Exam     R hip: focal tenderness along lateral glueal area (glut medius?)   R/L hip: can externally rotate > 45 degrees bilaterally, hip flexion strength 5/5 bilaterally  Gait: straight, stable    Results for orders placed or performed in visit on 04/21/21   XR Hip Right 2-3 Views     Status: None    Narrative    XR HIP RIGHT 2-3 VIEWS 4/21/2021 1:22 PM     HISTORY: right hip /gluteal pain several days. assess for possible  arthritis; Hip pain, right      Impression    IMPRESSION: The joint space width is within normal limits. No apparent  fracture. Degenerative changes at the pubis symphysis.    VIKAS BENOIT MD                  The use of Dragon/Portero dictation services may have been used to construct the content in this note; any grammatical or spelling errors are non-intentional. Please contact the author of this note directly if you are in need of any clarification.   "

## 2021-04-22 ENCOUNTER — OFFICE VISIT (OUTPATIENT)
Dept: URGENT CARE | Facility: URGENT CARE | Age: 71
End: 2021-04-22
Payer: COMMERCIAL

## 2021-04-22 VITALS
HEART RATE: 71 BPM | WEIGHT: 228 LBS | OXYGEN SATURATION: 97 % | TEMPERATURE: 98.2 F | BODY MASS INDEX: 31.8 KG/M2 | DIASTOLIC BLOOD PRESSURE: 84 MMHG | SYSTOLIC BLOOD PRESSURE: 130 MMHG

## 2021-04-22 DIAGNOSIS — M54.41 BILATERAL LOW BACK PAIN WITH RIGHT-SIDED SCIATICA, UNSPECIFIED CHRONICITY: Primary | ICD-10-CM

## 2021-04-22 PROCEDURE — 99213 OFFICE O/P EST LOW 20 MIN: CPT | Performed by: FAMILY MEDICINE

## 2021-04-22 NOTE — LETTER
[facility] [place]  [address] [place] [address]  Phone: [phone]    April 22, 2021        Don Samson  [address]  [address]  [facility] [address]          To whom it may concern:    RE: Don Samson    Patient was seen and treated today at our clinic and missed work.    Please contact me for questions or concerns.      Sincerely,        [facility] Provider

## 2021-04-22 NOTE — PROGRESS NOTES
SUBJECTIVE:  Don Samson is a 71 year old male who presents for evaluation of back pain.  Symptoms began 3 day(s) ago, have been onset gradual and are stable.      Pain is located in the low back bilateral and gluteus right region, with radiation to radiates into the right buttocks, and are at worst a 4 on a scale of 1-10.  Recent injury:none recalled by the patient    Personal hx of back pain is recurrent self limited episodes of low back pain in the past.  Pain is exacerbated by: standing and sitting.  Pain is relieved by: heat, ice, rest and lying down.Associated sx include: none.  Red flag symptoms: negative.    Past Medical History:   Diagnosis Date     Ankle pain 5/21/2016     Ankle sprain and strain 9/16/2010     CARDIOVASCULAR SCREENING; LDL GOAL LESS THAN 130 9/6/2016     Depression     declines treatment 4/1/16     HTN (hypertension)     declines treatment 4/1/16     Left knee pain 6/18/2015     Plantar fasciitis 9/16/2010     Right knee pain 12/12/2016     Tibialis posterior tendonitis 5/21/2016     Current Outpatient Medications   Medication Sig Dispense Refill     amLODIPine (NORVASC) 5 MG tablet Take 1 tablet (5 mg) by mouth daily 90 tablet 0     cyclobenzaprine (FLEXERIL) 10 MG tablet Take 1 tablet (10 mg) by mouth 3 times daily as needed for muscle spasms 30 tablet 0     Lidocaine (LIDOCARE) 4 % Patch Place 1 patch onto the skin every 24 hours To prevent lidocaine toxicity, patient should be patch free for 12 hrs daily. 10 patch 0     olmesartan (BENICAR) 20 MG tablet TAKE 1 TABLET BY MOUTH DAILY WITH BREAKFAST 90 tablet 0     ondansetron (ZOFRAN) 4 MG tablet Take 1 tablet (4 mg) by mouth every 8 hours as needed for nausea 30 tablet 0     venlafaxine (EFFEXOR-XR) 75 MG 24 hr capsule Take 3 capsules (225 mg) by mouth daily 270 capsule 0     History   Smoking Status     Former Smoker     Quit date: 1/1/1970   Smokeless Tobacco     Never Used         ROS:  Review of systems negative except as stated  above.    OBJECTIVE:  /84   Pulse 71   Temp 98.2  F (36.8  C) (Oral)   Wt 103.4 kg (228 lb)   SpO2 97%   BMI 31.80 kg/m    Back examination: Back symmetric, no curvature. ROM normal. No CVA tenderness.  Straight leg raise test: positive  GENERAL APPEARANCE: healthy, alert and no distress  RESP: lungs clear to auscultation - no rales, rhonchi or wheezes  CV: regular rates and rhythm, normal S1 S2, no murmur noted  ABDOMEN:  soft, nontender, no HSM or masses and bowel sounds normal  NEURO: Normal strength and tone with no weakness or sensory deficit noted, reflexes normal   SKIN: no suspicious lesions or rashes    ASSESSMENT/IMPRESSION:  acute sciatica    PLAN:  No orders of the defined types were placed in this encounter.  Letter for off work today.  Avoid prolonged standing or sitting.  Continue stretching and strengthening exercises. Continue prn heat or ice application.  Follow up with primary care provider in no improvement.

## 2021-04-26 ENCOUNTER — OFFICE VISIT (OUTPATIENT)
Dept: FAMILY MEDICINE | Facility: CLINIC | Age: 71
End: 2021-04-26
Payer: COMMERCIAL

## 2021-04-26 VITALS
TEMPERATURE: 96.9 F | BODY MASS INDEX: 31.94 KG/M2 | DIASTOLIC BLOOD PRESSURE: 66 MMHG | OXYGEN SATURATION: 98 % | SYSTOLIC BLOOD PRESSURE: 122 MMHG | RESPIRATION RATE: 14 BRPM | WEIGHT: 229 LBS | HEART RATE: 60 BPM

## 2021-04-26 DIAGNOSIS — F33.1 MAJOR DEPRESSIVE DISORDER, RECURRENT EPISODE, MODERATE (H): Primary | ICD-10-CM

## 2021-04-26 PROCEDURE — 99214 OFFICE O/P EST MOD 30 MIN: CPT | Performed by: NURSE PRACTITIONER

## 2021-04-26 ASSESSMENT — ANXIETY QUESTIONNAIRES
5. BEING SO RESTLESS THAT IT IS HARD TO SIT STILL: SEVERAL DAYS
3. WORRYING TOO MUCH ABOUT DIFFERENT THINGS: SEVERAL DAYS
6. BECOMING EASILY ANNOYED OR IRRITABLE: SEVERAL DAYS
IF YOU CHECKED OFF ANY PROBLEMS ON THIS QUESTIONNAIRE, HOW DIFFICULT HAVE THESE PROBLEMS MADE IT FOR YOU TO DO YOUR WORK, TAKE CARE OF THINGS AT HOME, OR GET ALONG WITH OTHER PEOPLE: SOMEWHAT DIFFICULT
GAD7 TOTAL SCORE: 6
2. NOT BEING ABLE TO STOP OR CONTROL WORRYING: SEVERAL DAYS
1. FEELING NERVOUS, ANXIOUS, OR ON EDGE: SEVERAL DAYS
7. FEELING AFRAID AS IF SOMETHING AWFUL MIGHT HAPPEN: NOT AT ALL

## 2021-04-26 ASSESSMENT — PATIENT HEALTH QUESTIONNAIRE - PHQ9
5. POOR APPETITE OR OVEREATING: SEVERAL DAYS
SUM OF ALL RESPONSES TO PHQ QUESTIONS 1-9: 4

## 2021-04-26 NOTE — PROGRESS NOTES
"    Assessment & Plan     Major depressive disorder, recurrent episode, moderate (H)  Start effexor wean.   Refer to psychiatry  Recommend counseling  - MENTAL HEALTH REFERRAL  - Adult; Psychiatry; Psychiatry; G: Collaborative Care Psychiatry Service/Bridge to Long-Term Psychiatry as indicated ([phone]); No - Patient must be evaluated prior to placing referral OR document reason for refer...         BMI:   Estimated body mass index is 31.94 kg/m  as calculated from the following:    Height as of 21: 1.803 m (5' 11\").    Weight as of this encounter: 103.9 kg (229 lb).       Return in about 1 week (around 5/3/2021) for depression with psychiatry.    CONSTANCE Carias CNP  [facility]    Negro Cifuentes is a 71 year old who presents for the following health issues     HPI     Depression Followup    How are you doing with your depression since your last visit? No change    Are you having other symptoms that might be associated with depression? Yes:  not sure if the increase medication made him tired    Have you had a significant life event?  Job Concerns     Are you feeling anxious or having panic attacks?   No    Do you have any concerns with your use of alcohol or other drugs? No    Social History     Tobacco Use     Smoking status: Former Smoker     Quit date: 1970     Years since quittin.3     Smokeless tobacco: Never Used   Substance Use Topics     Alcohol use: Yes     Alcohol/week: 0.0 standard drinks     Comment: rarely      Drug use: No     PHQ 2020   PHQ-9 Total Score 15 15 4   Q9: Thoughts of better off dead/self-harm past 2 weeks Not at all Not at all Not at all     ANNE-7 SCORE 2020   Total Score 7 11 6     Last PHQ-9 2021   1.  Little interest or pleasure in doing things 1   2.  Feeling down, depressed, or hopeless 1   3.  Trouble falling or staying asleep, or sleeping too much 0   4.  Feeling " "tired or having little energy 1   5.  Poor appetite or overeating 1   6.  Feeling bad about yourself 0   7.  Trouble concentrating 0   8.  Moving slowly or restless 0   Q9: Thoughts of better off dead/self-harm past 2 weeks 0   PHQ-9 Total Score 4   Difficulty at work, home, or with people Somewhat difficult       Suicide Assessment Five-step Evaluation and Treatment (SAFE-T)      How many servings of fruits and vegetables do you eat daily?  2-3    On average, how many sweetened beverages do you drink each day (Examples: soda, juice, sweet tea, etc.  Do NOT count diet or artificially sweetened beverages)?   0    How many days per week do you exercise enough to make your heart beat faster? 7    How many minutes a day do you exercise enough to make your heart beat faster? 30 - 60    How many days per week do you miss taking your medication? 0    effexor not helping  Mood same  More tired now.    Taking it in the morning and wants to sleep all day  Wants to stop this.    Not sure what he could take  Saw a man murdered while working in the [employer] in 2014 and every time he goes by there he \"sees\" it.  Wondering if this is PTSD?    Review of Systems   Constitutional, HEENT, cardiovascular, pulmonary, GI, , musculoskeletal, neuro, skin, endocrine and psych systems are negative, except as otherwise noted.      Objective    /66 (BP Location: Left arm, Patient Position: Chair, Cuff Size: Adult Large)   Pulse 60   Temp 96.9  F (36.1  C) (Tympanic)   Resp 14   Wt 103.9 kg (229 lb)   SpO2 98%   BMI 31.94 kg/m    Body mass index is 31.94 kg/m .  Physical Exam   GENERAL: healthy, alert and no distress  EYES: Eyes grossly normal to inspection, PERRL and conjunctivae and sclerae normal  HENT: ear canals and TM's normal, nose and mouth without ulcers or lesions  NECK: no adenopathy, no asymmetry, masses, or scars and thyroid normal to palpation  RESP: lungs clear to auscultation - no rales, rhonchi or " wheezes  CV: regular rate and rhythm, normal S1 S2, no S3 or S4, no murmur, click or rub, no peripheral edema and peripheral pulses strong  MS: no gross musculoskeletal defects noted, no edema  SKIN: no suspicious lesions or rashes  NEURO: Normal strength and tone, mentation intact and speech normal  PSYCH: mentation appears normal, affect normal/bright

## 2021-04-27 ASSESSMENT — ANXIETY QUESTIONNAIRES: GAD7 TOTAL SCORE: 6

## 2021-05-17 ENCOUNTER — OFFICE VISIT (OUTPATIENT)
Dept: URGENT CARE | Facility: URGENT CARE | Age: 71
End: 2021-05-17
Payer: COMMERCIAL

## 2021-05-17 VITALS
BODY MASS INDEX: 32.06 KG/M2 | SYSTOLIC BLOOD PRESSURE: 126 MMHG | WEIGHT: 229 LBS | HEART RATE: 80 BPM | DIASTOLIC BLOOD PRESSURE: 70 MMHG | HEIGHT: 71 IN | RESPIRATION RATE: 16 BRPM | OXYGEN SATURATION: 99 % | TEMPERATURE: 97.5 F

## 2021-05-17 DIAGNOSIS — M54.50 ACUTE BILATERAL LOW BACK PAIN WITHOUT SCIATICA: Primary | ICD-10-CM

## 2021-05-17 PROCEDURE — 99213 OFFICE O/P EST LOW 20 MIN: CPT | Performed by: PHYSICIAN ASSISTANT

## 2021-05-17 RX ORDER — NAPROXEN 500 MG/1
500 TABLET ORAL 2 TIMES DAILY WITH MEALS
Qty: 40 TABLET | Refills: 0 | Status: SHIPPED | OUTPATIENT
Start: 2021-05-17 | End: 2021-08-31

## 2021-05-17 RX ORDER — CYCLOBENZAPRINE HCL 10 MG
10 TABLET ORAL 3 TIMES DAILY PRN
Qty: 30 TABLET | Refills: 0 | Status: SHIPPED | OUTPATIENT
Start: 2021-05-17 | End: 2022-03-25

## 2021-05-17 ASSESSMENT — ENCOUNTER SYMPTOMS
CONSTIPATION: 0
FEVER: 0
BACK PAIN: 1
DYSURIA: 0

## 2021-05-17 ASSESSMENT — MIFFLIN-ST. JEOR: SCORE: 1825.87

## 2021-05-17 NOTE — PATIENT INSTRUCTIONS
Patient Education     Back Sprain or Strain     Injury to the muscles (strain) or ligaments (sprain) around the spine can be troubling. Injury may occur after a sudden forceful twisting or bending such as in a car accident, after a simple awkward movement, or after lifting something heavy with poor body positioning. In any case, muscle spasm is often present and adds to the pain.  Thankfully, most people feel better in 1 to 2 weeks. Most of the rest feel better in 1 to 2 months. Most people can remain active. Unless you had a forceful or traumatic physical injury such as a car accident or fall, X-rays may not be done for the first assessment of a back sprain or strain. If pain continues and doesn't respond to medical treatment, your healthcare provider may then do X-rays and other tests.  Home care  These guidelines will help you care for your injury at home:    When in bed, try to find a comfortable position. A firm mattress is best. Try lying flat on your back with pillows under your knees. You can also try lying on your side with your knees bent up toward your chest and a pillow between your knees.    Don't sit for long periods. Try not to take long car rides or take other trips that have you sitting for a long time. This puts more stress on the lower back than standing or walking.    During the first 24 to 72 hours after an injury or flare-up, put an ice pack on the painful area for 20 minutes. Then remove it for 20 minutes. Do this for 60 to 90 minutes, or several times a day. This will reduce swelling and pain. Always wrap the ice pack in a thin towel or plastic to protect your skin.    You can start with ice, then switch to heat. Heat from a hot shower, hot bath, or heating pad reduces pain and works well for muscle spasms. Put heat on the painful area for 20 minutes, then remove for 20 minutes. Do this for 60 to 90 minutes, or several times a day. Don't use a heating pad while sleeping. It can burn the  skin.    You can alternate the ice and heat. Talk with your healthcare provider to find out the best treatment or therapy for your back pain.    Therapeutic massage can help relax the back muscles without stretching them.    Be aware of safe lifting methods. Don't lift anything over 15 pounds until all of the pain is gone.  Medicines  Talk with your healthcare provider before using medicines, especially if you have other health problems or are taking other medicines.    You may use over-the-counter medicines such as acetaminophen, ibuprofen, or naproxen to control pain, unless another pain medicine was prescribed. Talk with your healthcare provider before taking any medicines if you have a chronic condition such as diabetes, liver or kidney disease, stomach ulcers, or digestive bleeding, or are taking blood-thinner medicines.    Be careful if you are given prescription medicines, opioids, or medicine for muscle spasm. They can cause drowsiness, and affect your coordination, reflexes, and judgment. Don't drive or operate heavy machinery when taking these types of medicines. Only take pain medicine as prescribed by your healthcare provider.  Follow-up care  Follow up with your healthcare provider, or as advised. You may need physical therapy or more tests if your symptoms get worse.  If you had X-rays, your healthcare provider may be checking for any broken bones, breaks, or fractures. Bruises and sprains can sometimes hurt as much as a fracture. These injuries can take time to heal fully. If your symptoms don t get better or they get worse, talk with your healthcare provider. You may need a repeat X-ray or other tests.  Call 911  Call 911 if any of the following occur:    Trouble breathing    Confused    Very drowsy or trouble awakening    Fainting or loss of consciousness    Rapid or very slow heart rate    Loss of bowel or bladder control  When to seek medical advice  Call your healthcare provider right away if any  of the following occur:    Pain gets worse or spreads to your arms or legs    Weakness or numbness in one or both arms or legs    Numbness in the groin or genital area  [employer] last reviewed this educational content on 11/1/2019 2000-2021 [employer]. All rights reserved. This information is not intended as a substitute for professional medical care. Always follow your healthcare professional's instructions.

## 2021-05-17 NOTE — PROGRESS NOTES
SUBJECTIVE:   Don Samson is a 71 year old male presenting with a chief complaint of   Chief Complaint   Patient presents with     Urgent Care     Back Pain     low back pain x 4 days. no injury       He is an established patient of [facility].  Patient presents with 4 days of back pain.  States has had on and off for years.  No fevers.  No trauma.  No dysuria or problems with BM.  No radiation of pain.  No PT and no ortho in past for back problems.   Sitting makes worse.  Patient requesting note off work today and RF on flexeril and naproxyn.  No trauma.      Treatment:  1 tylenol and 1 flexeril daily x 4 days.      Review of Systems   Constitutional: Negative for fever.   Gastrointestinal: Negative for constipation.   Genitourinary: Negative for dysuria.   Musculoskeletal: Positive for back pain.   All other systems reviewed and are negative.      Past Medical History:   Diagnosis Date     Ankle pain 5/21/2016     Ankle sprain and strain 9/16/2010     CARDIOVASCULAR SCREENING; LDL GOAL LESS THAN 130 9/6/2016     Depression     declines treatment 4/1/16     HTN (hypertension)     declines treatment 4/1/16     Left knee pain 6/18/2015     Plantar fasciitis 9/16/2010     Right knee pain 12/12/2016     Tibialis posterior tendonitis 5/21/2016     Family History   Problem Relation Age of Onset     Hypertension Mother      Current Outpatient Medications   Medication Sig Dispense Refill     amLODIPine (NORVASC) 5 MG tablet Take 1 tablet (5 mg) by mouth daily 90 tablet 0     cyclobenzaprine (FLEXERIL) 10 MG tablet Take 1 tablet (10 mg) by mouth 3 times daily as needed for muscle spasms 30 tablet 0     naproxen (NAPROSYN) 500 MG tablet Take 1 tablet (500 mg) by mouth 2 times daily (with meals) 40 tablet 0     olmesartan (BENICAR) 20 MG tablet TAKE 1 TABLET BY MOUTH DAILY WITH BREAKFAST 90 tablet 0     venlafaxine (EFFEXOR-XR) 75 MG 24 hr capsule Take 3 capsules (225 mg) by mouth daily 270 capsule 0     cyclobenzaprine  "(FLEXERIL) 10 MG tablet Take 1 tablet (10 mg) by mouth 3 times daily as needed for muscle spasms 30 tablet 0     Lidocaine (LIDOCARE) 4 % Patch Place 1 patch onto the skin every 24 hours To prevent lidocaine toxicity, patient should be patch free for 12 hrs daily. 10 patch 0     ondansetron (ZOFRAN) 4 MG tablet Take 1 tablet (4 mg) by mouth every 8 hours as needed for nausea 30 tablet 0     Social History     Tobacco Use     Smoking status: Former Smoker     Quit date: 1970     Years since quittin.4     Smokeless tobacco: Never Used   Substance Use Topics     Alcohol use: Yes     Alcohol/week: 0.0 standard drinks     Comment: rarely        OBJECTIVE  /70   Pulse 80   Temp 97.5  F (36.4  C) (Tympanic)   Resp 16   Ht 1.803 m (5' 11\")   Wt 103.9 kg (229 lb)   SpO2 99%   BMI 31.94 kg/m      Physical Exam  Vitals signs and nursing note reviewed.   Constitutional:       Appearance: Normal appearance. He is obese.   Cardiovascular:      Rate and Rhythm: Normal rate.   Musculoskeletal:      Comments: Poor ROM due to inflexibility at lumbar spine.  Generalized tenderness to palpation of soft tissue in low spine.  Strength in LE intact and negative SLR.     Skin:     General: Skin is warm and dry.      Findings: No rash.   Neurological:      General: No focal deficit present.      Mental Status: He is alert.   Psychiatric:         Mood and Affect: Mood normal.         Labs:  No results found for this or any previous visit (from the past 24 hour(s)).    X-Ray was not done.  Xrays of 2018 reviewed.    ASSESSMENT:      ICD-10-CM    1. Acute bilateral low back pain without sciatica  M54.5 cyclobenzaprine (FLEXERIL) 10 MG tablet     PHYSICAL THERAPY REFERRAL     naproxen (NAPROSYN) 500 MG tablet        Medical Decision Making:    Differential Diagnosis:  Lumbar DJD.      Serious Comorbid Conditions:  Adult:  as above    PLAN:    Back Pain: Physical Therapy and flexeril and naproxyn.  Patient declined " ortho referral.  Increase fluid intake, activity as tolerated.      Followup:    If not improving or if condition worsens, follow up with your Primary Care Provider, If not improving or if conditions worsens over the next 12-24 hours, go to the Emergency Department    Patient Instructions     Patient Education     Back Sprain or Strain     Injury to the muscles (strain) or ligaments (sprain) around the spine can be troubling. Injury may occur after a sudden forceful twisting or bending such as in a car accident, after a simple awkward movement, or after lifting something heavy with poor body positioning. In any case, muscle spasm is often present and adds to the pain.  Thankfully, most people feel better in 1 to 2 weeks. Most of the rest feel better in 1 to 2 months. Most people can remain active. Unless you had a forceful or traumatic physical injury such as a car accident or fall, X-rays may not be done for the first assessment of a back sprain or strain. If pain continues and doesn't respond to medical treatment, your healthcare provider may then do X-rays and other tests.  Home care  These guidelines will help you care for your injury at home:    When in bed, try to find a comfortable position. A firm mattress is best. Try lying flat on your back with pillows under your knees. You can also try lying on your side with your knees bent up toward your chest and a pillow between your knees.    Don't sit for long periods. Try not to take long car rides or take other trips that have you sitting for a long time. This puts more stress on the lower back than standing or walking.    During the first 24 to 72 hours after an injury or flare-up, put an ice pack on the painful area for 20 minutes. Then remove it for 20 minutes. Do this for 60 to 90 minutes, or several times a day. This will reduce swelling and pain. Always wrap the ice pack in a thin towel or plastic to protect your skin.    You can start with ice, then switch  to heat. Heat from a hot shower, hot bath, or heating pad reduces pain and works well for muscle spasms. Put heat on the painful area for 20 minutes, then remove for 20 minutes. Do this for 60 to 90 minutes, or several times a day. Don't use a heating pad while sleeping. It can burn the skin.    You can alternate the ice and heat. Talk with your healthcare provider to find out the best treatment or therapy for your back pain.    Therapeutic massage can help relax the back muscles without stretching them.    Be aware of safe lifting methods. Don't lift anything over 15 pounds until all of the pain is gone.  Medicines  Talk with your healthcare provider before using medicines, especially if you have other health problems or are taking other medicines.    You may use over-the-counter medicines such as acetaminophen, ibuprofen, or naproxen to control pain, unless another pain medicine was prescribed. Talk with your healthcare provider before taking any medicines if you have a chronic condition such as diabetes, liver or kidney disease, stomach ulcers, or digestive bleeding, or are taking blood-thinner medicines.    Be careful if you are given prescription medicines, opioids, or medicine for muscle spasm. They can cause drowsiness, and affect your coordination, reflexes, and judgment. Don't drive or operate heavy machinery when taking these types of medicines. Only take pain medicine as prescribed by your healthcare provider.  Follow-up care  Follow up with your healthcare provider, or as advised. You may need physical therapy or more tests if your symptoms get worse.  If you had X-rays, your healthcare provider may be checking for any broken bones, breaks, or fractures. Bruises and sprains can sometimes hurt as much as a fracture. These injuries can take time to heal fully. If your symptoms don t get better or they get worse, talk with your healthcare provider. You may need a repeat X-ray or other tests.  Call 911  Call  911 if any of the following occur:    Trouble breathing    Confused    Very drowsy or trouble awakening    Fainting or loss of consciousness    Rapid or very slow heart rate    Loss of bowel or bladder control  When to seek medical advice  Call your healthcare provider right away if any of the following occur:    Pain gets worse or spreads to your arms or legs    Weakness or numbness in one or both arms or legs    Numbness in the groin or genital area  [employer] last reviewed this educational content on 11/1/2019 2000-2021 [employer]. All rights reserved. This information is not intended as a substitute for professional medical care. Always follow your healthcare professional's instructions.

## 2021-05-20 ENCOUNTER — OFFICE VISIT (OUTPATIENT)
Dept: URGENT CARE | Facility: URGENT CARE | Age: 71
End: 2021-05-20
Payer: COMMERCIAL

## 2021-05-20 VITALS
DIASTOLIC BLOOD PRESSURE: 80 MMHG | WEIGHT: 229 LBS | TEMPERATURE: 98.3 F | SYSTOLIC BLOOD PRESSURE: 118 MMHG | HEART RATE: 73 BPM | RESPIRATION RATE: 14 BRPM | HEIGHT: 71 IN | BODY MASS INDEX: 32.06 KG/M2 | OXYGEN SATURATION: 99 %

## 2021-05-20 DIAGNOSIS — S39.92XA INJURY OF BACK, INITIAL ENCOUNTER: Primary | ICD-10-CM

## 2021-05-20 PROCEDURE — 99213 OFFICE O/P EST LOW 20 MIN: CPT | Performed by: FAMILY MEDICINE

## 2021-05-20 ASSESSMENT — MIFFLIN-ST. JEOR: SCORE: 1825.87
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
## 2021-05-20 NOTE — PATIENT INSTRUCTIONS
Keep taking the flexeril if the cramps/muscle soreness returns      Continue naproxen every 12 hours as needed

## 2021-05-20 NOTE — LETTER
May 20, 2021      Don Samson  [address]  [place] [address]        To Whom It May Concern:    Don Samson was seen in our clinic please excuse him from missed work duties on 5/17-5/19/21 as he was recovering from an injury. He is okay to return to work full duties at this time. If any concerns/issues come up he should be seen in clinic for follow-up      Sincerely,      Jim Dailey  Provider

## 2021-06-01 ENCOUNTER — OFFICE VISIT (OUTPATIENT)
Dept: URGENT CARE | Facility: URGENT CARE | Age: 71
End: 2021-06-01
Payer: COMMERCIAL

## 2021-06-01 VITALS — SYSTOLIC BLOOD PRESSURE: 152 MMHG | TEMPERATURE: 97.8 F | DIASTOLIC BLOOD PRESSURE: 76 MMHG | HEART RATE: 77 BPM

## 2021-06-01 DIAGNOSIS — M54.50 ACUTE RIGHT-SIDED LOW BACK PAIN WITHOUT SCIATICA: Primary | ICD-10-CM

## 2021-06-01 PROCEDURE — 99213 OFFICE O/P EST LOW 20 MIN: CPT | Performed by: FAMILY MEDICINE

## 2021-06-01 RX ORDER — DICYCLOMINE HYDROCHLORIDE 10 MG/1
CAPSULE ORAL
COMMUNITY
Start: 2021-03-31 | End: 2023-01-31

## 2021-06-01 RX ORDER — BUPROPION HYDROCHLORIDE 150 MG/1
TABLET ORAL
COMMUNITY
Start: 2021-05-26 | End: 2022-12-26

## 2021-06-01 NOTE — LETTER
[facility]  [address] [address]  [phone]      June 1, 2021    RE:  Don Samson                                                                                                                                                       [address]  [address]  [facility] [address]            To whom it may concern:    Don CLARKE Mali is under my professional care for Medical.   He  may return to work with the following: No working or lifting restrictions on or about tomorrow.          Sincerely,        Kevin Looney DO    [facility]

## 2021-06-01 NOTE — PROGRESS NOTES
SUBJECTIVEHPI: Don Samson is a 71 year old male who presents for evaluation of back pain.  Symptoms began day(s) ago, have been onset gradual and are better.  Pain is located in the low back right region, with radiation to does not radiate.  Recent injury:fall/near fall  Personal hx of back pain is recurrent self limited episodes of low back pain in the past.  Pain is exacerbated by: changing position.    Past Medical History:   Diagnosis Date     Ankle pain 2016     Ankle sprain and strain 2010     CARDIOVASCULAR SCREENING; LDL GOAL LESS THAN 130 2016     Depression     declines treatment 16     HTN (hypertension)     declines treatment 16     Left knee pain 2015     Plantar fasciitis 2010     Right knee pain 2016     Tibialis posterior tendonitis 2016     No Known Allergies  Social History     Tobacco Use     Smoking status: Former Smoker     Quit date: 1970     Years since quittin.4     Smokeless tobacco: Never Used   Substance Use Topics     Alcohol use: Yes     Alcohol/week: 0.0 standard drinks     Comment: rarely        ROS:CONSTITUTIONAL:NEGATIVE for fever, chills, change in weight    OBJECTIVE:  BP (!) 152/76   Pulse 77   Temp 97.8  F (36.6  C) (Temporal)   Back examination: Back symmetric, no curvature. ROM normal. No CVA tenderness., positive findings: limitation of motion - flexion: Moderate and extension: Moderate, paraspinal muscle spasm.  Straight leg raise test: negative.  GENERAL APPEARANCE: healthy, alert and no distress  NEURO: Normal strength and tone with no weakness or sensory deficit noted, reflexes normal   SKIN: no suspicious lesions or rashes      ICD-10-CM    1. Acute right-sided low back pain without sciatica  M54.5      Continue prn heat or ice application.    F/U PCP/IM/FP if persists, ED if worse

## 2021-06-07 ENCOUNTER — OFFICE VISIT (OUTPATIENT)
Dept: URGENT CARE | Facility: URGENT CARE | Age: 71
End: 2021-06-07
Payer: COMMERCIAL

## 2021-06-07 ENCOUNTER — ANCILLARY PROCEDURE (OUTPATIENT)
Dept: GENERAL RADIOLOGY | Facility: CLINIC | Age: 71
End: 2021-06-07
Attending: PHYSICIAN ASSISTANT
Payer: COMMERCIAL

## 2021-06-07 VITALS
HEART RATE: 73 BPM | SYSTOLIC BLOOD PRESSURE: 112 MMHG | TEMPERATURE: 97.3 F | BODY MASS INDEX: 31.5 KG/M2 | RESPIRATION RATE: 15 BRPM | WEIGHT: 225 LBS | HEIGHT: 71 IN | DIASTOLIC BLOOD PRESSURE: 60 MMHG | OXYGEN SATURATION: 99 %

## 2021-06-07 DIAGNOSIS — S99.911A INJURY OF RIGHT ANKLE, INITIAL ENCOUNTER: ICD-10-CM

## 2021-06-07 DIAGNOSIS — M77.51 RIGHT ANKLE TENDONITIS: Primary | ICD-10-CM

## 2021-06-07 PROCEDURE — 73610 X-RAY EXAM OF ANKLE: CPT | Mod: RT | Performed by: RADIOLOGY

## 2021-06-07 PROCEDURE — 99214 OFFICE O/P EST MOD 30 MIN: CPT | Performed by: PHYSICIAN ASSISTANT

## 2021-06-07 RX ORDER — LIDOCAINE 50 MG/G
OINTMENT TOPICAL 3 TIMES DAILY
Qty: 240 G | Refills: 0 | Status: SHIPPED | OUTPATIENT
Start: 2021-06-07 | End: 2023-01-31

## 2021-06-07 RX ORDER — CELECOXIB 100 MG/1
100 CAPSULE ORAL 2 TIMES DAILY
Qty: 20 CAPSULE | Refills: 0 | Status: SHIPPED | OUTPATIENT
Start: 2021-06-07 | End: 2021-06-25

## 2021-06-07 ASSESSMENT — MIFFLIN-ST. JEOR: SCORE: 1807.72

## 2021-06-07 NOTE — PATIENT INSTRUCTIONS
Patient Education     Muscle Strain in the Extremities  A muscle strain is a stretching and tearing of muscle fibers. This causes pain, especially when you move that muscle. There may also be some swelling and bruising.  Home care    Keep the hurt area raised above heart level to reduce pain and swelling. This is especially important during the first 48 hours.    Apply an ice pack over the injured area for 15 to 20 minutes every 3 to 6 hours. You should do this for the first 24 to 48 hours. You can make an ice pack by filling a plastic bag that seals at the top with ice cubes and then wrapping it with a thin towel. Be careful not to injure your skin with the ice treatments. Ice should never be applied directly to skin. Continue the use of ice packs for relief of pain and swelling as needed. After 48 hours, apply heat (warm shower or warm bath) for 15 to 20 minutes several times a day, or alternate ice and heat.    You may use over-the-counter pain medicine to control pain, unless another medicine was prescribed. If you have chronic liver or kidney disease or ever had a stomach ulcer or gastrointestinal bleeding, talk with your healthcare provider before using these medicines.    For leg strains: If crutches have been recommended, don t put full weight on the hurt leg until you can do so without pain. You can return to sports when you are able to hop and run on the injured leg without pain.  Follow-up care  Follow up with your healthcare provider, or as advised.  When to seek medical advice  Call your healthcare provider right away if any of these occur:    The toes of the injured leg become swollen, cold, blue, numb, or tingly    Pain or swelling increases  [employer] last reviewed this educational content on 5/1/2018 2000-2021 [employer]. All rights reserved. This information is not intended as a substitute for professional medical care. Always follow your healthcare professional's  instructions.

## 2021-06-07 NOTE — PROGRESS NOTES
Injury of right ankle, initial encounter  - XR Ankle Right G/E 3 Views  - celecoxib (CELEBREX) 100 MG capsule; Take 1 capsule (100 mg) by mouth 2 times daily for 10 days  - lidocaine (XYLOCAINE) 5 % external ointment; Apply topically 3 times daily    Right ankle tendonitis  - celecoxib (CELEBREX) 100 MG capsule; Take 1 capsule (100 mg) by mouth 2 times daily for 10 days  - lidocaine (XYLOCAINE) 5 % external ointment; Apply topically 3 times daily    30 minutes spent on the date of the encounter doing chart review, history and exam, documentation and further activities per the note     See Patient Instructions  Patient Instructions     Patient Education     Muscle Strain in the Extremities  A muscle strain is a stretching and tearing of muscle fibers. This causes pain, especially when you move that muscle. There may also be some swelling and bruising.  Home care    Keep the hurt area raised above heart level to reduce pain and swelling. This is especially important during the first 48 hours.    Apply an ice pack over the injured area for 15 to 20 minutes every 3 to 6 hours. You should do this for the first 24 to 48 hours. You can make an ice pack by filling a plastic bag that seals at the top with ice cubes and then wrapping it with a thin towel. Be careful not to injure your skin with the ice treatments. Ice should never be applied directly to skin. Continue the use of ice packs for relief of pain and swelling as needed. After 48 hours, apply heat (warm shower or warm bath) for 15 to 20 minutes several times a day, or alternate ice and heat.    You may use over-the-counter pain medicine to control pain, unless another medicine was prescribed. If you have chronic liver or kidney disease or ever had a stomach ulcer or gastrointestinal bleeding, talk with your healthcare provider before using these medicines.    For leg strains: If crutches have been recommended, don t put full weight on the hurt leg until you can do  so without pain. You can return to sports when you are able to hop and run on the injured leg without pain.  Follow-up care  Follow up with your healthcare provider, or as advised.  When to seek medical advice  Call your healthcare provider right away if any of these occur:    The toes of the injured leg become swollen, cold, blue, numb, or tingly    Pain or swelling increases  [employer] last reviewed this educational content on 5/1/2018 2000-2021 [employer]. All rights reserved. This information is not intended as a substitute for professional medical care. Always follow your healthcare professional's instructions.               MERVIN [facility]    Subjective   71 year old who presents to clinic today for the following health issues:    Urgent Care and Musculoskeletal Problem       HPI     Musculoskeletal problem/pain  Onset/Duration: Saturday   Description  Location: right ankle  Joint Swelling: YES  Redness: no  Pain: YES  Warmth: no  Intensity:  moderate  Progression of Symptoms:  same  Accompanying signs and symptoms:   Fevers: no  Numbness/tingling/weakness: no  History  Trauma to the area: YES, Patient may have twisted ankle on Saturday. This is unclear,   Recent illness:  no  Previous similar problem: no  Previous evaluation:  no  Precipitating or alleviating factors:  Aggravating factors include: standing for long period of time  Therapies tried and outcome: rest/inactivity and ice      Review of Systems   Review of Systems   See HPI    Objective    Temp: 97.3  F (36.3  C) Temp src: Oral BP: 112/60 Pulse: 73   Resp: 15 SpO2: 99 %       Physical Exam   Physical Exam  Constitutional:       General: He is not in acute distress.     Appearance: Normal appearance. He is normal weight. He is not ill-appearing, toxic-appearing or diaphoretic.   HENT:      Head: Normocephalic and atraumatic.   Cardiovascular:      Rate and Rhythm: Normal rate and regular rhythm.       Pulses: Normal pulses.      Heart sounds: Normal heart sounds. No murmur. No friction rub. No gallop.    Musculoskeletal:      Right ankle: He exhibits swelling. He exhibits normal range of motion, no ecchymosis, no deformity, no laceration and normal pulse. No tenderness. No lateral malleolus, no medial malleolus, no AITFL, no CF ligament, no posterior TFL, no head of 5th metatarsal and no proximal fibula tenderness found.   Neurological:      General: No focal deficit present.      Mental Status: He is alert and oriented to person, place, and time. Mental status is at baseline.      Gait: Gait normal.   Psychiatric:         Mood and Affect: Mood normal.         Behavior: Behavior normal.         Thought Content: Thought content normal.         Judgment: Judgment normal.          Xray - Reviewed and interpreted by me.  No evidence of fracture or dislocation.

## 2021-06-09 ENCOUNTER — OFFICE VISIT (OUTPATIENT)
Dept: FAMILY MEDICINE | Facility: CLINIC | Age: 71
End: 2021-06-09
Payer: COMMERCIAL

## 2021-06-09 VITALS
DIASTOLIC BLOOD PRESSURE: 79 MMHG | SYSTOLIC BLOOD PRESSURE: 150 MMHG | BODY MASS INDEX: 32.5 KG/M2 | WEIGHT: 233 LBS | RESPIRATION RATE: 18 BRPM | OXYGEN SATURATION: 96 % | TEMPERATURE: 98.8 F | HEART RATE: 64 BPM

## 2021-06-09 DIAGNOSIS — S93.401D SPRAIN OF RIGHT ANKLE, UNSPECIFIED LIGAMENT, SUBSEQUENT ENCOUNTER: Primary | ICD-10-CM

## 2021-06-09 PROCEDURE — 99212 OFFICE O/P EST SF 10 MIN: CPT | Performed by: FAMILY MEDICINE

## 2021-06-09 NOTE — LETTER
Don Mali was seen and treated in our clinic on 6/9/2021.  He may return to work on 6/12/21 without any restrictions.     If you have any questions or concerns, please don't hesitate to call.        Curtis Jacobo, DO

## 2021-06-09 NOTE — PROGRESS NOTES
Assessment & Plan     Sprain of right ankle, unspecified ligament, subsequent encounter  -Improved, can go back to work  -Provided with note back to work on 6/12 as requested      DO KETURAH [facility]    Negro Cifuentes is a 71 year old who presents for the following health issues:    HPI     Twisted right ankle at work on night of 6/5-6/6. States he was carrying a large box of pickles loss this balance, the box went one way and patient twisted his right ankle and fell. Was seen in UC the next day, had xrays that were negative for fracture, diagnosed with ankle sprain. Taking NSAID for pain with good relief. Pain in ankle joint improved, ambulating well. Would like a note to go back to work this weekend. Denies swelling, numbness, tingling, weakness.       Review of Systems   INTEGUMENTARY/SKIN: NEGATIVE for worrisome rashes, moles or lesions  MUSCULOSKELETAL: NEGATIVE for significant arthralgias or myalgia  NEURO: NEGATIVE for weakness, dizziness or paresthesias      Objective    BP (!) 150/79 (BP Location: Left arm, Patient Position: Sitting, Cuff Size: Adult Regular)   Pulse 64   Temp 98.8  F (37.1  C) (Tympanic)   Resp 18   Wt 105.7 kg (233 lb)   SpO2 96%   BMI 32.50 kg/m    Body mass index is 32.5 kg/m .  Physical Exam   GENERAL: healthy, alert and no distress  RESP: lungs clear to auscultation - no rales, rhonchi or wheezes  MS: no gross musculoskeletal defects noted, no edema. Right ankle: normal ROM without discomfort, pulses intact, strength intact.

## 2021-06-16 ENCOUNTER — OFFICE VISIT (OUTPATIENT)
Dept: URGENT CARE | Facility: URGENT CARE | Age: 71
End: 2021-06-16
Payer: COMMERCIAL

## 2021-06-16 VITALS
SYSTOLIC BLOOD PRESSURE: 110 MMHG | RESPIRATION RATE: 16 BRPM | WEIGHT: 233 LBS | DIASTOLIC BLOOD PRESSURE: 70 MMHG | OXYGEN SATURATION: 96 % | BODY MASS INDEX: 32.62 KG/M2 | TEMPERATURE: 98.7 F | HEIGHT: 71 IN | HEART RATE: 74 BPM
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
## 2021-06-16 DIAGNOSIS — R52 GENERALIZED BODY ACHES: ICD-10-CM

## 2021-06-16 PROCEDURE — 99213 OFFICE O/P EST LOW 20 MIN: CPT | Performed by: INTERNAL MEDICINE

## 2021-06-16 ASSESSMENT — MIFFLIN-ST. JEOR: SCORE: 1844.01
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
## 2021-06-16 NOTE — LETTER
[facility] [place]  [address] [place] [address]  Phone: [phone]    June 16, 2021        Don Samson  [address]  [facility] [address]          To whom it may concern:    RE: Don Samson    Patient was seen and treated today at our clinic.  Please excuse work absence today.    Please contact me for questions or concerns.      Sincerely,        [facility] Provider

## 2021-06-21 DIAGNOSIS — S99.911A INJURY OF RIGHT ANKLE, INITIAL ENCOUNTER: ICD-10-CM

## 2021-06-21 DIAGNOSIS — M77.51 RIGHT ANKLE TENDONITIS: ICD-10-CM

## 2021-06-25 RX ORDER — CELECOXIB 100 MG/1
100 CAPSULE ORAL 2 TIMES DAILY
Qty: 20 CAPSULE | Refills: 0 | Status: SHIPPED | OUTPATIENT
Start: 2021-06-25 | End: 2021-07-05

## 2021-06-25 NOTE — TELEPHONE ENCOUNTER
Routing refill request to provider for review/approval because:  --celecoxib has been ordered by urgent care only so far.  --Do you want this to go to urgent care providers?  --Does not meet age requirement for RN to authorize.    --Last visit:  6/9/2021 [place].    --Future Visit: NONE

## 2021-07-02 DIAGNOSIS — S99.911A INJURY OF RIGHT ANKLE, INITIAL ENCOUNTER: ICD-10-CM

## 2021-07-02 DIAGNOSIS — M77.51 RIGHT ANKLE TENDONITIS: ICD-10-CM

## 2021-07-05 RX ORDER — CELECOXIB 100 MG/1
CAPSULE ORAL
Qty: 10 CAPSULE | Refills: 0 | Status: SHIPPED | OUTPATIENT
Start: 2021-07-05 | End: 2021-08-03

## 2021-07-05 NOTE — TELEPHONE ENCOUNTER
Provider-Please review and sign if agree.  1. Patient does not meet age criteria for this medication and kaya refill already given.  #10 pended.  2. Last refill: 6/25/21, #20 0 refills  3. Last visit: 6/9/21office visit with Dr. Jacobo     Team coordinators-Please contact patient to schedule follow up office visit for ankle sprain.    Thank you!  CLARICE MclaughlinN, RN  [facility]

## 2021-07-15 DIAGNOSIS — M77.51 RIGHT ANKLE TENDONITIS: ICD-10-CM

## 2021-07-15 DIAGNOSIS — S99.911A INJURY OF RIGHT ANKLE, INITIAL ENCOUNTER: ICD-10-CM

## 2021-07-18 DIAGNOSIS — M77.51 RIGHT ANKLE TENDONITIS: ICD-10-CM

## 2021-07-18 DIAGNOSIS — S99.911A INJURY OF RIGHT ANKLE, INITIAL ENCOUNTER: ICD-10-CM

## 2021-07-19 RX ORDER — CELECOXIB 100 MG/1
CAPSULE ORAL
Qty: 10 CAPSULE | Refills: 0 | OUTPATIENT
Start: 2021-07-19

## 2021-07-19 NOTE — TELEPHONE ENCOUNTER
,  --Please contact patient and ask to schedule a visit for refill for celecoxib..      --This is duplicate request.    --Message sent to pharmacy - Refusal reason: Patient needs appointment.  Glenda DHALIWAL    --7/13/21 refill encounter -     07/16/21 1306 Sign Sandie Amor APRN CNP Reorder from Order:[id number]   Outpatient Medication Detail     Disp Refills Start End ADELINA   celecoxib (CELEBREX) 100 MG capsule [Pharmacy Med Name: CELECOXIB 100MG CAPSULES] 10 capsule 0 7/16/2021  No   Request refused: Patient needs appointment   Sig: TAKE 1 CAPSULE(100 MG) BY MOUTH TWICE DAILY

## 2021-07-22 DIAGNOSIS — M77.51 RIGHT ANKLE TENDONITIS: ICD-10-CM

## 2021-07-22 DIAGNOSIS — S99.911A INJURY OF RIGHT ANKLE, INITIAL ENCOUNTER: ICD-10-CM

## 2021-07-22 RX ORDER — CELECOXIB 100 MG/1
CAPSULE ORAL
Qty: 20 CAPSULE | Refills: 0 | OUTPATIENT
Start: 2021-07-22

## 2021-07-22 NOTE — TELEPHONE ENCOUNTER
Per 7/5/2021 refill ++OFFICE VISIT NEEDED BEFORE FURTHER REFILLS++ please sign off on refill if ok.      Reception: please call and schedule a visit.    Thank you

## 2021-07-23 RX ORDER — CELECOXIB 100 MG/1
CAPSULE ORAL
Qty: 10 CAPSULE | Refills: 0 | OUTPATIENT
Start: 2021-07-23

## 2021-07-27 DIAGNOSIS — M77.51 RIGHT ANKLE TENDONITIS: ICD-10-CM

## 2021-07-27 DIAGNOSIS — S99.911A INJURY OF RIGHT ANKLE, INITIAL ENCOUNTER: ICD-10-CM

## 2021-07-29 NOTE — TELEPHONE ENCOUNTER
Routing to reception for TC to schedule an appointment.  Joanne Ortiz RN  [facility]    Routing refill request to provider for review/approval because:  Labs not current:  Platelets  Patient is above age parameters for RN protocol.  Last Written Prescription Date:  7/5/21  Last Fill Quantity: 10,  # refills: 0   Last office visit: 6/9/2021 with prescribing provider:     Future Office Visit:    Joanne Ortiz RN  [facility]

## 2021-07-30 DIAGNOSIS — M77.51 RIGHT ANKLE TENDONITIS: ICD-10-CM

## 2021-07-30 DIAGNOSIS — S99.911A INJURY OF RIGHT ANKLE, INITIAL ENCOUNTER: ICD-10-CM

## 2021-08-03 NOTE — TELEPHONE ENCOUNTER
"Routing refill request to provider for review/approval because:  --Voicemail reminders left for patient 7/5/21, 7/6/21, 7/20/21, 7/22/21, 7/29/21.  --10 refill request encounters for this medication since 6/21/21.  --I pended a dispense of 0.0001 and a note asking patient to schedule office visit for refill  -- provider to authorize if ok.    --Per Flakita Pastor in 6/21/21 encounter - \"Refilled x 1, needs follow up with PCP for additional refills.\"  --Last order 7/5/21 included reminder needs office visit for further fills.          --Labs not current:  CBC.  --Does not meet age criteria for RN to authorize.  --Last visit:  6/9/2021  for twisted right ankle.  --Future Visit: none.      "

## 2021-08-04 ENCOUNTER — TELEPHONE (OUTPATIENT)
Dept: FAMILY MEDICINE | Facility: CLINIC | Age: 71
End: 2021-08-04

## 2021-08-04 ENCOUNTER — OFFICE VISIT (OUTPATIENT)
Dept: URGENT CARE | Facility: URGENT CARE | Age: 71
End: 2021-08-04
Payer: COMMERCIAL

## 2021-08-04 VITALS
BODY MASS INDEX: 32.2 KG/M2 | SYSTOLIC BLOOD PRESSURE: 130 MMHG | WEIGHT: 230 LBS | TEMPERATURE: 98.2 F | DIASTOLIC BLOOD PRESSURE: 80 MMHG | HEART RATE: 84 BPM | HEIGHT: 71 IN

## 2021-08-04 DIAGNOSIS — K42.9 UMBILICAL HERNIA WITHOUT OBSTRUCTION AND WITHOUT GANGRENE: ICD-10-CM
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
## 2021-08-04 DIAGNOSIS — F32.A DEPRESSION, UNSPECIFIED DEPRESSION TYPE: Primary | ICD-10-CM

## 2021-08-04 PROCEDURE — 99214 OFFICE O/P EST MOD 30 MIN: CPT | Performed by: NURSE PRACTITIONER

## 2021-08-04 RX ORDER — BUPROPION HYDROCHLORIDE 150 MG/1
150 TABLET, EXTENDED RELEASE ORAL 2 TIMES DAILY
Qty: 60 TABLET | Refills: 0 | Status: SHIPPED | OUTPATIENT
Start: 2021-08-04 | End: 2021-09-18

## 2021-08-04 RX ORDER — CELECOXIB 100 MG/1
CAPSULE ORAL
Qty: 0.001 CAPSULE | Refills: 0 | Status: SHIPPED | OUTPATIENT
Start: 2021-08-04 | End: 2023-01-31

## 2021-08-04 ASSESSMENT — MIFFLIN-ST. JEOR: SCORE: 1830.4
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
## 2021-08-04 NOTE — TELEPHONE ENCOUNTER
Kurt-Please review and may close encounter.  Patient scheduled to see you on 8/13/21.  Patient requested appt with you.    Writer huddled with BRADY Novak CMA:  1. Patient reported increased depression and Wellbutrin not working well for his mood  2. Discontinued Effexor due to side effects  3. Wants to discuss medication alternatives/options    Appt scheduled with OWEN Adames PA-C, on 8/13/21.  Appt information written on AVS by BRADY Novak CMA, and given to patient.    Thank you!  CLARICE MclaughlinN, RN  [facility]
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
## 2021-08-04 NOTE — TELEPHONE ENCOUNTER
Pt came to Urgent Care today and while reviewing medications patient stated that the Wellbutrin is not working. I informed pt I would reach out to provider for him. Please advise  arabella fragoso

## 2021-08-05 ENCOUNTER — TELEPHONE (OUTPATIENT)
Dept: FAMILY MEDICINE | Facility: CLINIC | Age: 71
End: 2021-08-05

## 2021-08-05 RX ORDER — CELECOXIB 100 MG/1
CAPSULE ORAL
Qty: 10 CAPSULE | Refills: 0 | OUTPATIENT
Start: 2021-08-05

## 2021-08-05 NOTE — TELEPHONE ENCOUNTER
Please clarify quantity for celecoxib.  Or are you trying to refuse?  Joanne Ortiz RN  [facility]

## 2021-08-06 ASSESSMENT — ENCOUNTER SYMPTOMS
FEVER: 0
PALPITATIONS: 0
DIZZINESS: 0
CHEST TIGHTNESS: 0
DIAPHORESIS: 0
WEAKNESS: 0
PARESTHESIAS: 0
ADENOPATHY: 0
ACTIVITY CHANGE: 0
ABDOMINAL PAIN: 0
CHILLS: 0
LIGHT-HEADEDNESS: 0
ABDOMINAL DISTENTION: 1
APPETITE CHANGE: 0
WOUND: 1
HEARTBURN: 0
WHEEZING: 0
NAUSEA: 0
NERVOUS/ANXIOUS: 1
HEMATOCHEZIA: 0
FATIGUE: 0
SHORTNESS OF BREATH: 0
CONSTIPATION: 0
VOMITING: 0
ANAL BLEEDING: 0
SLEEP DISTURBANCE: 0
AGITATION: 0
DIARRHEA: 0

## 2021-08-06 NOTE — TELEPHONE ENCOUNTER
Called [facility] to let them know this is a refusal, patient needs to be seen.  Joanne Ortiz RN  [facility]

## 2021-08-22 DIAGNOSIS — I10 HYPERTENSION GOAL BP (BLOOD PRESSURE) < 140/90: ICD-10-CM

## 2021-08-23 RX ORDER — OLMESARTAN MEDOXOMIL 20 MG/1
TABLET ORAL
Qty: 90 TABLET | Refills: 0 | OUTPATIENT
Start: 2021-08-23

## 2021-08-25 DIAGNOSIS — I10 UNCONTROLLED HYPERTENSION: ICD-10-CM

## 2021-08-25 DIAGNOSIS — I10 HYPERTENSION GOAL BP (BLOOD PRESSURE) < 140/90: ICD-10-CM

## 2021-08-26 RX ORDER — AMLODIPINE BESYLATE 5 MG/1
5 TABLET ORAL DAILY
Qty: 90 TABLET | Refills: 0 | Status: SHIPPED | OUTPATIENT
Start: 2021-08-26 | End: 2022-11-07

## 2021-08-31 ENCOUNTER — OFFICE VISIT (OUTPATIENT)
Dept: URGENT CARE | Facility: URGENT CARE | Age: 71
End: 2021-08-31
Payer: COMMERCIAL

## 2021-08-31 VITALS
HEART RATE: 82 BPM | WEIGHT: 230 LBS | OXYGEN SATURATION: 96 % | DIASTOLIC BLOOD PRESSURE: 78 MMHG | SYSTOLIC BLOOD PRESSURE: 131 MMHG | BODY MASS INDEX: 32.08 KG/M2

## 2021-08-31 DIAGNOSIS — M54.9 ACUTE BILATERAL BACK PAIN, UNSPECIFIED BACK LOCATION: Primary | ICD-10-CM

## 2021-08-31 DIAGNOSIS — S39.012A BACK STRAIN, INITIAL ENCOUNTER: ICD-10-CM

## 2021-08-31 PROCEDURE — 99213 OFFICE O/P EST LOW 20 MIN: CPT | Performed by: NURSE PRACTITIONER

## 2021-08-31 RX ORDER — NAPROXEN 500 MG/1
500 TABLET ORAL 2 TIMES DAILY WITH MEALS
Qty: 40 TABLET | Refills: 0 | Status: SHIPPED | OUTPATIENT
Start: 2021-08-31 | End: 2021-09-20

## 2021-08-31 ASSESSMENT — ENCOUNTER SYMPTOMS
ABDOMINAL PAIN: 0
BACK PAIN: 1
FATIGUE: 0
ADENOPATHY: 0
NAUSEA: 0
APPETITE CHANGE: 0
VOMITING: 0
CHEST TIGHTNESS: 0
SLEEP DISTURBANCE: 0
LIGHT-HEADEDNESS: 0
CHILLS: 0
WHEEZING: 0
SHORTNESS OF BREATH: 0
FEVER: 0
DIAPHORESIS: 0
ACTIVITY CHANGE: 0
WEAKNESS: 0
PARESTHESIAS: 0
DIZZINESS: 0
MYALGIAS: 1
PALPITATIONS: 0
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
## 2021-08-31 NOTE — PATIENT INSTRUCTIONS
Likely muscle strain from acute injury  Okay to rotate tylenol and naproxen  Be careful with risk for gi bleeding, stop if upset stomach  Ice, heat, stretch, massage  Follow-up with primary care provider if lingering or worsening

## 2021-09-01 ENCOUNTER — OFFICE VISIT (OUTPATIENT)
Dept: URGENT CARE | Facility: URGENT CARE | Age: 71
End: 2021-09-01
Payer: COMMERCIAL

## 2021-09-01 VITALS
HEART RATE: 77 BPM | OXYGEN SATURATION: 97 % | TEMPERATURE: 98.2 F | SYSTOLIC BLOOD PRESSURE: 124 MMHG | DIASTOLIC BLOOD PRESSURE: 70 MMHG | WEIGHT: 230 LBS | BODY MASS INDEX: 32.08 KG/M2

## 2021-09-01 DIAGNOSIS — S39.012A BACK STRAIN, INITIAL ENCOUNTER: Primary | ICD-10-CM

## 2021-09-01 PROCEDURE — 99213 OFFICE O/P EST LOW 20 MIN: CPT | Performed by: INTERNAL MEDICINE

## 2021-09-01 NOTE — LETTER
[facility] [place] [address] [place] [address]  Phone: [phone]    September 1, 2021        Don CLARKE Samson  [address]  [facility] [address]          To whom it may concern:    RE: Don Samson    Patient was seen and treated today at our clinic and missed work.  Please excuse work absence.    Please contact me for questions or concerns.      Sincerely,        Miladis Malhotra MD
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
## 2021-09-16 DIAGNOSIS — I10 HYPERTENSION GOAL BP (BLOOD PRESSURE) < 140/90: ICD-10-CM

## 2021-09-16 DIAGNOSIS — F32.A DEPRESSION, UNSPECIFIED DEPRESSION TYPE: ICD-10-CM

## 2021-09-16 DIAGNOSIS — I10 UNCONTROLLED HYPERTENSION: ICD-10-CM

## 2021-09-16 RX ORDER — AMLODIPINE BESYLATE 5 MG/1
TABLET ORAL
Qty: 90 TABLET | Refills: 0 | OUTPATIENT
Start: 2021-09-16

## 2021-09-18 ENCOUNTER — NURSE TRIAGE (OUTPATIENT)
Dept: NURSING | Facility: CLINIC | Age: 71
End: 2021-09-18

## 2021-09-18 RX ORDER — BUPROPION HYDROCHLORIDE 150 MG/1
150 TABLET, EXTENDED RELEASE ORAL 2 TIMES DAILY
Qty: 60 TABLET | Refills: 0 | Status: SHIPPED | OUTPATIENT
Start: 2021-09-18 | End: 2021-11-24

## 2021-09-18 RX ORDER — OLMESARTAN MEDOXOMIL 20 MG/1
TABLET ORAL
Qty: 90 TABLET | Refills: 0 | Status: SHIPPED | OUTPATIENT
Start: 2021-09-18 | End: 2021-12-15

## 2021-09-18 NOTE — TELEPHONE ENCOUNTER
Steven at [facility] reports that Olmesartan, that was ordered on 8/23, was never received by pharmacy.  Rx resent  Also,   Pended Rx, (passed protocol) sent to pharmacy per pt request:    buPROPion (WELLBUTRIN SR) 150 MG 12 hr tablet 60 tablet 0 9/18/2021  No   Sig - Route: Take 1 tablet (150 mg) by mouth 2 times daily - Oral   Sent to pharmacy as: buPROPion HCl ER (SR) 150 MG Oral Tablet Extended Release 12 Hour (WELLBUTRIN SR)   Class: E-Prescribe   Order: [id number]   E-Prescribing Status: Receipt confirmed by pharmacy (9/18/2021  4:54 PM CDT)     olmesartan (BENICAR) 20 MG tablet 90 tablet 0 9/18/2021  No   Sig: TAKE 1 TABLET BY MOUTH DAILY WITH BREAKFAST   Sent to pharmacy as: Olmesartan Medoxomil 20 MG Oral Tablet (BENICAR)   Class: E-Prescribe   Order: [id number]   E-Prescribing Status: Receipt confirmed by pharmacy (9/18/2021  5:03 PM CDT)     Printout Tracking    External Result Report     Pharmacy    [facility] #[id number] - [place], [place] - [address] AT [address]

## 2021-09-18 NOTE — TELEPHONE ENCOUNTER
Don is calling re: requested refill of Bupropion. He is agitated and states that Rx has not been received by     Pended order shows protocol passed.     He is also asking about Olmesartan. Spoke to Steven at [facility]. Chart review shows med ordered on 8/23/21 but no electronic receipt. Steven states that pharmacy has no record of Olmesartan sent.    Rx Olmesartan resent; Pended Rx Bupropion sent  buPROPion (WELLBUTRIN SR) 150 MG 12 hr tablet 60 tablet 0 9/18/2021  No   Sig - Route: Take 1 tablet (150 mg) by mouth 2 times daily - Oral   Sent to pharmacy as: buPROPion HCl ER (SR) 150 MG Oral Tablet Extended Release 12 Hour (WELLBUTRIN SR)   Class: E-Prescribe   Order: [id number]   E-Prescribing Status: Receipt confirmed by pharmacy (9/18/2021  4:54 PM CDT)       olmesartan (BENICAR) 20 MG tablet 90 tablet 0 9/18/2021  No   Sig: TAKE 1 TABLET BY MOUTH DAILY WITH BREAKFAST   Sent to pharmacy as: Olmesartan Medoxomil 20 MG Oral Tablet (BENICAR)   Class: E-Prescribe   Order: [id number]   E-Prescribing Status: Receipt confirmed by pharmacy (9/18/2021  5:03 PM CDT)     Printout Tracking    External Result Report     Pharmacy    [facility] #[id number] [address] AT [facility] & [address] [place] [address]     Deisypower Boyer RN  [facility]      Reason for Disposition    Caller requesting a refill, no triage required, and triager able to refill per unit policy    [1] Prescription prescribed recently is not at pharmacy AND [2] triager has access to patient's EMR AND [3] prescription is recorded in the EMR    Protocols used: MEDICATION QUESTION CALL-A-

## 2021-09-22 ENCOUNTER — LAB (OUTPATIENT)
Dept: LAB | Facility: CLINIC | Age: 71
End: 2021-09-22
Payer: COMMERCIAL

## 2021-09-22 DIAGNOSIS — E78.2 MIXED HYPERLIPIDEMIA: Primary | ICD-10-CM

## 2021-09-22 DIAGNOSIS — E78.2 MIXED HYPERLIPIDEMIA: ICD-10-CM

## 2021-09-22 PROCEDURE — 80061 LIPID PANEL: CPT

## 2021-09-22 PROCEDURE — 83721 ASSAY OF BLOOD LIPOPROTEIN: CPT

## 2021-09-22 PROCEDURE — 36415 COLL VENOUS BLD VENIPUNCTURE: CPT

## 2021-09-22 NOTE — PROGRESS NOTES
I have not seen or evaluated patient in the past but have refilled medications as DOD.   Apparently patient presented in clinic this morning for lab appointment but no orders. Unclear what was to be drawn or if just miscommunication. He is upset with situation.  Reviewed recent labs. Appears last lipid panel was abnormal. Will place order for recheck.  To follow up with PCP Zuleyma for further evaluation  Kurt Adames PA-C

## 2021-09-23 LAB
CHOLEST SERPL-MCNC: 178 MG/DL
FASTING STATUS PATIENT QL REPORTED: NO
HDLC SERPL-MCNC: 26 MG/DL
LDLC SERPL CALC-MCNC: 102 MG/DL
LDLC SERPL CALC-MCNC: ABNORMAL MG/DL
NONHDLC SERPL-MCNC: 152 MG/DL
TRIGL SERPL-MCNC: 403 MG/DL

## 2021-11-22 DIAGNOSIS — F32.A DEPRESSION, UNSPECIFIED DEPRESSION TYPE: ICD-10-CM

## 2021-11-24 RX ORDER — BUPROPION HYDROCHLORIDE 150 MG/1
TABLET, EXTENDED RELEASE ORAL
Qty: 60 TABLET | Refills: 0 | Status: SHIPPED | OUTPATIENT
Start: 2021-11-24 | End: 2021-12-23

## 2021-12-15 DIAGNOSIS — I10 HYPERTENSION GOAL BP (BLOOD PRESSURE) < 140/90: ICD-10-CM

## 2021-12-15 RX ORDER — OLMESARTAN MEDOXOMIL 20 MG/1
TABLET ORAL
Qty: 90 TABLET | Refills: 0 | Status: SHIPPED | OUTPATIENT
Start: 2021-12-15 | End: 2022-05-17

## 2021-12-15 NOTE — TELEPHONE ENCOUNTER
Angiotensin-II Receptors Passed 12/15/2021 03:41 AM   Protocol Details  Last blood pressure under 140/90 in past 12 months    Recent (12 mo) or future (30 days) visit within the authorizing provider's specialty    Medication is active on med list    Patient is age 18 or older    Normal serum creatinine on file in past 12 months    Normal serum potassium on file in past 12 months

## 2022-01-11 ENCOUNTER — ANCILLARY PROCEDURE (OUTPATIENT)
Dept: GENERAL RADIOLOGY | Facility: CLINIC | Age: 72
End: 2022-01-11
Attending: NURSE PRACTITIONER
Payer: COMMERCIAL

## 2022-01-11 ENCOUNTER — OFFICE VISIT (OUTPATIENT)
Dept: URGENT CARE | Facility: URGENT CARE | Age: 72
End: 2022-01-11
Payer: COMMERCIAL

## 2022-01-11 VITALS
DIASTOLIC BLOOD PRESSURE: 85 MMHG | WEIGHT: 230 LBS | OXYGEN SATURATION: 99 % | TEMPERATURE: 97.3 F | SYSTOLIC BLOOD PRESSURE: 134 MMHG | BODY MASS INDEX: 32.08 KG/M2 | HEART RATE: 63 BPM
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
## 2022-01-11 DIAGNOSIS — T14.8XXA CONTUSION OF BONE: Primary | ICD-10-CM

## 2022-01-11 DIAGNOSIS — S49.91XA INJURY OF RIGHT SHOULDER, INITIAL ENCOUNTER: ICD-10-CM

## 2022-01-11 PROCEDURE — 99213 OFFICE O/P EST LOW 20 MIN: CPT | Performed by: NURSE PRACTITIONER

## 2022-01-11 PROCEDURE — 73030 X-RAY EXAM OF SHOULDER: CPT | Mod: RT | Performed by: RADIOLOGY

## 2022-01-11 RX ORDER — NAPROXEN 500 MG/1
500 TABLET ORAL 2 TIMES DAILY WITH MEALS
Qty: 20 TABLET | Refills: 0 | Status: SHIPPED | OUTPATIENT
Start: 2022-01-11 | End: 2022-03-25

## 2022-01-11 NOTE — LETTER
January 11, 2022      Don CLARKE Mali  [address]  [facility] [address]        To Whom It May Concern:    Don Samson  was seen on 1/11/22.  Please allow him not to use his right arm due to injury for the next week unless pain resolves.        Sincerely,        ALONZO Webb

## 2022-01-12 NOTE — PATIENT INSTRUCTIONS
Naproxen 500 mg every 12 hours with food for 7-10 days, do not take any other NSAIDs (ibuprofen, aleve, aspirin)      Patient Education     Upper Extremity Bruise   You have a bruise (contusion) on your shoulder. Symptoms include pain, swelling, and skin discoloration. No bones are broken. This injury may take from a few days to a few weeks to heal. During that time, the bruise may change from reddish in color, to purple-blue, to green-yellow, to yellow-brown.   Home care    Unless another medicine was prescribed, you can take acetaminophen, ibuprofen, or naproxen to control pain. Talk with your healthcare provider before using these medicines if you have chronic liver or kidney disease or ever had a stomach ulcer or digestive bleeding.    Elevate the injured area to reduce pain and swelling. As much as possible, sit or lie down with the injured area raised about the level of your heart. This is especially important during the first 48 hours.    Ice the injured area to help reduce pain and swelling. Wrap an ice pack or ice cubes in a plastic bag in a thin towel. Apply to the bruised area for 20 minutes every 1 to 2 hours the first day. Continue this 3 to 4 times a day until the pain and swelling goes away.    If a sling was provided, you may remove it to shower or bathe. To prevent joint stiffness, don't wear it for more than 1 week.    Follow-up care  Follow up with your healthcare provide, or as advised. Call if you are not improving within the next 1 to 2 weeks.   When to seek medical advice   Call your healthcare provider right away if any of these occur:    Increased pain or swelling    Hand or fingers become cold, blue, numb or tingly    Signs of infection: Warmth, drainage, or increased redness or pain around the injury    Inability to move the injured body part, the hand, or individual fingers.    Frequent bruising for unknown reasons  Carlo last reviewed this educational content on 8/1/2019 2000-2021  [employer]. All rights reserved. This information is not intended as a substitute for professional medical care. Always follow your healthcare professional's instructions.

## 2022-01-12 NOTE — PROGRESS NOTES
Assessment & Plan     Contusion of bone    Injury of right shoulder, initial encounter    - naproxen (NAPROSYN) 500 MG tablet  Dispense: 20 tablet; Refill: 0    Fall, initial encounter     Reviewed xray images and results during visit showing no fracture or dislocation, degenerative changes. Discussed likely bone contusion from fall and recommended rest, ice, heat, tylenol as needed, gentle range of motion. Prescription sent to pharmacy for Naproxen 500 mg every 12 hours with food for 7-10 days, do not take any other NSAIDs (ibuprofen, aleve, aspirin). Letter given for work, further restrictions to come from PCP.    Follow-up with PCP if symptoms persist for 7 days, and sooner if symptoms worsen or new symptoms develop.     Discussed red flag symptoms which warrant immediate visit in emergency room    All questions were answered and patient verbalized understanding. AVS reviewed with patient.     Tracey Barnes, DNP, APRN, CNP 1/11/2022 7:23 PM  [facility]          Negro Cifuentes is a 71 year old male who presents to clinic today for the following health issues:  Chief Complaint   Patient presents with     Urgent Care     Shoulder Pain     c/o shoulder pain for 1 day     MS Injury/Pain    Onset of symptoms was earlier today  Location: right shoulder  Context: The injury happened while falling landing on right shoulder. He did not hit his head or lose consciousness  Course of symptoms is worsening.    Severity moderate  Current and Associated symptoms: Pain  Denies  Swelling, Bruising, Warmth, Redness and Decreased range of motion  Aggravating Factors: movement  Therapies to improve symptoms include: none  This is the first time this type of problem has occurred for this patient.   Denies a history of kidney disease.   He would like a prescription for Naproxen. 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     Problem list, Medication list, Allergies, and Medical  history reviewed in EPIC.    ROS:  Review of systems negative except for noted above        Objective    /85   Pulse 63   Temp 97.3  F (36.3  C) (Tympanic)   Wt 104.3 kg (230 lb)   SpO2 99%   BMI 32.08 kg/m    Physical Exam  Constitutional:       General: He is not in acute distress.     Appearance: He is not toxic-appearing or diaphoretic.   Cardiovascular:      Pulses: Normal pulses.   Musculoskeletal:      Right shoulder: Bony tenderness present. No swelling. Normal range of motion. Normal strength.      Left shoulder: Normal.      Right upper arm: Normal.      Right elbow: Normal.      Comments: Tenderness with palpation throughout right shoulder. Negative empty can test. Full ROM    Skin:     General: Skin is warm and dry.      Findings: No bruising or erythema.   Neurological:      Mental Status: He is alert.      Sensory: No sensory deficit.          X-ray right shoulder was performed and reviewed independently by myself showing degenerative changes, no fracture or dislocation  Radiologist impression:   Results for orders placed or performed in visit on 01/11/22   XR Shoulder Right G/E 3 Views     Status: None    Narrative    EXAM: XR SHOULDER RIGHT G/E 3 VIEWS  LOCATION: [facility]  DATE/TIME: 1/11/2022 6:53 PM    INDICATION: Right shoulder pain after a fall.  COMPARISON: None.      Impression    IMPRESSION:  1.  No fracture or joint malalignment.  2.  Mild right glenohumeral degenerative arthrosis.

## 2022-01-14 ENCOUNTER — OFFICE VISIT (OUTPATIENT)
Dept: URGENT CARE | Facility: URGENT CARE | Age: 72
End: 2022-01-14
Payer: COMMERCIAL

## 2022-01-14 VITALS
HEIGHT: 71 IN | BODY MASS INDEX: 32.9 KG/M2 | OXYGEN SATURATION: 94 % | SYSTOLIC BLOOD PRESSURE: 163 MMHG | HEART RATE: 73 BPM | DIASTOLIC BLOOD PRESSURE: 78 MMHG | WEIGHT: 235 LBS | TEMPERATURE: 97.6 F | RESPIRATION RATE: 18 BRPM

## 2022-01-14 DIAGNOSIS — M25.511 RIGHT SHOULDER PAIN, UNSPECIFIED CHRONICITY: Primary | ICD-10-CM

## 2022-01-14 DIAGNOSIS — I10 HYPERTENSION, UNCONTROLLED: ICD-10-CM

## 2022-01-14 PROCEDURE — 99213 OFFICE O/P EST LOW 20 MIN: CPT | Performed by: FAMILY MEDICINE

## 2022-01-14 ASSESSMENT — MIFFLIN-ST. JEOR: SCORE: 1853.08

## 2022-01-14 NOTE — LETTER
January 14, 2022      Don Samson  [address]  [place] [address]        To Whom It May Concern:    Don Samson  was seen on 1/14.  Patient is cleared to return to work 1/15 with no restrictions.        Sincerely,        Osito Oconnell MD

## 2022-01-14 NOTE — PROGRESS NOTES
SUBJECTIVE:  Chief Complaint   Patient presents with     Urgent Care     Fall     last weekend injured R shoulder need a doctor note     Don Samson is a 71 year old male presents with a chief complaint of right shoulder pain.    Sustained fall injury 1/11 - had Xray of shoulder - negative for fracture.  Patient was off work and states that pain is improve, able to move shoulder and wants to go back to work but needs work letter to return.    Patient is planning to go back to work tomorrow.  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    Patient has HTN, is taking BP medications, usually better than today.  Will forget medication intermittently.    Past Medical History:   Diagnosis Date     Ankle pain 5/21/2016     Ankle sprain and strain 9/16/2010     CARDIOVASCULAR SCREENING; LDL GOAL LESS THAN 130 9/6/2016     Depression     declines treatment 4/1/16     HTN (hypertension)     declines treatment 4/1/16     Left knee pain 6/18/2015     Plantar fasciitis 9/16/2010     Right knee pain 12/12/2016     Tibialis posterior tendonitis 5/21/2016     Current Outpatient Medications   Medication Sig Dispense Refill     amLODIPine (NORVASC) 5 MG tablet Take 1 tablet (5 mg) by mouth daily 90 tablet 0     buPROPion (WELLBUTRIN SR) 150 MG 12 hr tablet TAKE 1 TABLET(150 MG) BY MOUTH TWICE DAILY 60 tablet 0     buPROPion (WELLBUTRIN XL) 150 MG 24 hr tablet TAKE 1 TABLET BY MOUTH EVERY DAY. START TAKING ON MONDAY 5/10       celecoxib (CELEBREX) 100 MG capsule TAKE 1 CAPSULE(100 MG) BY MOUTH TWICE DAILY 0.001 capsule 0     cyclobenzaprine (FLEXERIL) 10 MG tablet Take 1 tablet (10 mg) by mouth 3 times daily as needed for muscle spasms 30 tablet 0     cyclobenzaprine (FLEXERIL) 10 MG tablet Take 1 tablet (10 mg) by mouth 3 times daily as needed for muscle spasms 30 tablet 0     dicyclomine (BENTYL) 10 MG capsule        Lidocaine (LIDOCARE) 4 % Patch Place 1 patch onto the skin every 24 hours To  "prevent lidocaine toxicity, patient should be patch free for 12 hrs daily. 10 patch 0     lidocaine (XYLOCAINE) 5 % external ointment Apply topically 3 times daily 240 g 0     naproxen (NAPROSYN) 500 MG tablet Take 1 tablet (500 mg) by mouth 2 times daily (with meals) 20 tablet 0     olmesartan (BENICAR) 20 MG tablet TAKE 1 TABLET BY MOUTH DAILY WITH BREAKFAST 90 tablet 0     ondansetron (ZOFRAN) 4 MG tablet Take 1 tablet (4 mg) by mouth every 8 hours as needed for nausea 30 tablet 0     venlafaxine (EFFEXOR-XR) 75 MG 24 hr capsule Take 3 capsules (225 mg) by mouth daily 270 capsule 0     Social History     Tobacco Use     Smoking status: Former Smoker     Quit date: 1970     Years since quittin.0     Smokeless tobacco: Never Used   Substance Use Topics     Alcohol use: Yes     Alcohol/week: 0.0 standard drinks     Comment: rarely        ROS:  Review of systems negative except as stated above.    EXAM:   BP (!) 163/78   Pulse 73   Temp 97.6  F (36.4  C) (Oral)   Resp 18   Ht 1.803 m (5' 11\")   Wt 106.6 kg (235 lb)   SpO2 94%   BMI 32.78 kg/m    Gen: healthy,alert,no distress  Extremity: right shoulder has FROM.   There is not compromise to the distal circulation.  Pulses are +2 and CRT is brisk  PSYCH: alert, affect bright    ASSESSMENT/PLAN:   (M25.511) Right shoulder pain, unspecified chronicity  (primary encounter diagnosis)  Comment: improved  Plan: monitor, okay to return to work    (I10) Hypertension, uncontrolled  Comment: elevated   Plan: encourage medication adherence, follow up with RN for BP recheck      Reassurance given, discussed right shoulder injury/pain which has improved.  Okay to return to work, discussed that if develops recurrent shoulder pain that may need physical therapy - can follow up with primary provider for referral and further evaluation as warranted    Encourage medication adherence.  Declined BP recheck today.  Encourage to obtain BP check with nurse visit appointment " or thru pharmacy.    Follow up with primary provider if no improvement of symptoms in 1-2 week    Osito Oconnell MD  January 14, 2022 2:39 PM

## 2022-02-21 ENCOUNTER — OFFICE VISIT (OUTPATIENT)
Dept: URGENT CARE | Facility: URGENT CARE | Age: 72
End: 2022-02-21
Payer: COMMERCIAL

## 2022-02-21 VITALS
OXYGEN SATURATION: 96 % | BODY MASS INDEX: 33.6 KG/M2 | HEIGHT: 71 IN | WEIGHT: 240 LBS | SYSTOLIC BLOOD PRESSURE: 155 MMHG | DIASTOLIC BLOOD PRESSURE: 79 MMHG | TEMPERATURE: 97.4 F | HEART RATE: 46 BPM

## 2022-02-21 DIAGNOSIS — M54.50 ACUTE BILATERAL LOW BACK PAIN WITHOUT SCIATICA: Primary | ICD-10-CM

## 2022-02-21 PROCEDURE — 99213 OFFICE O/P EST LOW 20 MIN: CPT | Performed by: PHYSICIAN ASSISTANT

## 2022-02-21 RX ORDER — NAPROXEN 500 MG/1
500 TABLET ORAL 2 TIMES DAILY WITH MEALS
Qty: 30 TABLET | Refills: 0 | Status: SHIPPED | OUTPATIENT
Start: 2022-02-21 | End: 2022-02-26

## 2022-02-21 RX ORDER — CYCLOBENZAPRINE HCL 5 MG
5 TABLET ORAL
Qty: 30 TABLET | Refills: 0 | Status: SHIPPED | OUTPATIENT
Start: 2022-02-21 | End: 2022-02-26

## 2022-02-21 NOTE — PATIENT INSTRUCTIONS
Patient was educated on the natural course of injury which usually resolves within a few weeks.  Take medications as directed. Side effect include drowsiness. Conservative measures to try at home include rest, ice for 48 hours then heat after, stretching and lidocaine patches. See your primary care provider if symptoms worsen or do not improve in 7 days.  Seek emergency care if you develop severe pain, weakness, or changes in bowel/bladder habits.

## 2022-02-21 NOTE — LETTER
[facility]  [address] [place] [address]  [phone]      February 21, 2022    RE:  Don TRICIA Mali                                                                                                                                                       [address]  [address]  [facility] [address]            To whom it may concern:    Don Samson was seen today in clinic. He may return to work tomorrow.         Sincerely,        MERVIN Haskins [facility]

## 2022-02-21 NOTE — PROGRESS NOTES
URGENT CARE VISIT:    SUBJECTIVE:   Don Samson is a 72 year old male who presents for evaluation of back pain  Symptoms began 2 day(s) ago, have been onset acute and are better.  Pain is located in the low back bilateral region, with radiation to does not radiate.  Pain is exacerbated by: changing position.  Pain is relieved by: none. Associated symptoms include: none. Denies any fever, unintentional weight loss,bladder urgency, bladder incontinence and bowel incontinence. Recent injury:recent heavy lifting  Personal hx of back pain is recurrent self limited episodes of low back pain in the past. He requests Naproxen and Flexeril.    PMH:   Past Medical History:   Diagnosis Date     Ankle pain 5/21/2016     Ankle sprain and strain 9/16/2010     CARDIOVASCULAR SCREENING; LDL GOAL LESS THAN 130 9/6/2016     Depression     declines treatment 4/1/16     HTN (hypertension)     declines treatment 4/1/16     Left knee pain 6/18/2015     Plantar fasciitis 9/16/2010     Right knee pain 12/12/2016     Tibialis posterior tendonitis 5/21/2016     Allergies: Patient has no known allergies.  Medications:   Current Outpatient Medications   Medication Sig Dispense Refill     amLODIPine (NORVASC) 5 MG tablet Take 1 tablet (5 mg) by mouth daily 90 tablet 0     buPROPion (WELLBUTRIN SR) 150 MG 12 hr tablet TAKE 1 TABLET(150 MG) BY MOUTH TWICE DAILY 60 tablet 0     buPROPion (WELLBUTRIN XL) 150 MG 24 hr tablet TAKE 1 TABLET BY MOUTH EVERY DAY. START TAKING ON MONDAY 5/10       celecoxib (CELEBREX) 100 MG capsule TAKE 1 CAPSULE(100 MG) BY MOUTH TWICE DAILY 0.001 capsule 0     cyclobenzaprine (FLEXERIL) 10 MG tablet Take 1 tablet (10 mg) by mouth 3 times daily as needed for muscle spasms 30 tablet 0     cyclobenzaprine (FLEXERIL) 10 MG tablet Take 1 tablet (10 mg) by mouth 3 times daily as needed for muscle spasms 30 tablet 0     cyclobenzaprine (FLEXERIL) 5 MG tablet Take 1 tablet (5 mg) by mouth nightly as needed for muscle spasms  "30 tablet 0     dicyclomine (BENTYL) 10 MG capsule        Lidocaine (LIDOCARE) 4 % Patch Place 1 patch onto the skin every 24 hours To prevent lidocaine toxicity, patient should be patch free for 12 hrs daily. 10 patch 0     lidocaine (XYLOCAINE) 5 % external ointment Apply topically 3 times daily 240 g 0     naproxen (NAPROSYN) 500 MG tablet Take 1 tablet (500 mg) by mouth 2 times daily (with meals) for 5 days 30 tablet 0     naproxen (NAPROSYN) 500 MG tablet Take 1 tablet (500 mg) by mouth 2 times daily (with meals) 20 tablet 0     olmesartan (BENICAR) 20 MG tablet TAKE 1 TABLET BY MOUTH DAILY WITH BREAKFAST 90 tablet 0     ondansetron (ZOFRAN) 4 MG tablet Take 1 tablet (4 mg) by mouth every 8 hours as needed for nausea 30 tablet 0     venlafaxine (EFFEXOR-XR) 75 MG 24 hr capsule Take 3 capsules (225 mg) by mouth daily 270 capsule 0     Social History:   Social History     Tobacco Use     Smoking status: Former Smoker     Quit date: 1970     Years since quittin.1     Smokeless tobacco: Never Used   Substance Use Topics     Alcohol use: Yes     Alcohol/week: 0.0 standard drinks     Comment: rarely        ROS: ROS otherwise found to be negative except as noted above.     OBJECTIVE:  BP (!) 155/79   Pulse (!) 46   Temp 97.4  F (36.3  C) (Temporal)   Ht 1.803 m (5' 11\")   Wt 108.9 kg (240 lb)   SpO2 96%   BMI 33.47 kg/m    General: WDWN in NAD.   Cardiac: RRR without murmurs, rubs, or gallops.  Respiratory: LCTAB without adventitious sounds. Non-labored breathing.  Musculoskeletal: Ambulating without difficulty. Back symmetric, no curvature. ROM normal. No CVA tenderness.. Tender to palpation over paralumbar spine. Straight leg raise test: negative  Neurological: Normal strength and tone with no weakness or sensory deficit noted, reflexes normal.     ASSESSMENT:    ICD-10-CM    1. Acute bilateral low back pain without sciatica  M54.50 naproxen (NAPROSYN) 500 MG tablet     cyclobenzaprine (FLEXERIL) 5 MG " tablet         PLAN:  Patient Instructions   Patient was educated on the natural course of injury which usually resolves within a few weeks.  Take medications as directed. Side effect include drowsiness. Conservative measures to try at home include rest, ice for 48 hours then heat after, stretching and lidocaine patches. See your primary care provider if symptoms worsen or do not improve in 7 days.  Seek emergency care if you develop severe pain, weakness, or changes in bowel/bladder habits.     Patient verbalized understanding and is agreeable to plan. The patient was discharged ambulatory and in stable condition.    Amberly Natarajan PA-C ....................  2/21/2022   3:12 PM

## 2022-02-22 ENCOUNTER — OFFICE VISIT (OUTPATIENT)
Dept: URGENT CARE | Facility: URGENT CARE | Age: 72
End: 2022-02-22
Payer: COMMERCIAL

## 2022-02-22 VITALS
DIASTOLIC BLOOD PRESSURE: 79 MMHG | TEMPERATURE: 97.7 F | WEIGHT: 235 LBS | OXYGEN SATURATION: 97 % | SYSTOLIC BLOOD PRESSURE: 128 MMHG | BODY MASS INDEX: 32.9 KG/M2 | HEART RATE: 79 BPM | RESPIRATION RATE: 20 BRPM | HEIGHT: 71 IN

## 2022-02-22 DIAGNOSIS — M62.830 BACK MUSCLE SPASM: Primary | ICD-10-CM

## 2022-02-22 DIAGNOSIS — M54.6 ACUTE MIDLINE THORACIC BACK PAIN: ICD-10-CM

## 2022-02-22 PROCEDURE — 99213 OFFICE O/P EST LOW 20 MIN: CPT | Performed by: NURSE PRACTITIONER

## 2022-03-08 ENCOUNTER — ANCILLARY PROCEDURE (OUTPATIENT)
Dept: GENERAL RADIOLOGY | Facility: CLINIC | Age: 72
End: 2022-03-08
Attending: NURSE PRACTITIONER
Payer: COMMERCIAL

## 2022-03-08 ENCOUNTER — OFFICE VISIT (OUTPATIENT)
Dept: URGENT CARE | Facility: URGENT CARE | Age: 72
End: 2022-03-08
Payer: COMMERCIAL

## 2022-03-08 VITALS
OXYGEN SATURATION: 97 % | TEMPERATURE: 98.2 F | SYSTOLIC BLOOD PRESSURE: 137 MMHG | HEART RATE: 65 BPM | DIASTOLIC BLOOD PRESSURE: 75 MMHG

## 2022-03-08 DIAGNOSIS — M25.552 HIP PAIN, LEFT: ICD-10-CM

## 2022-03-08 DIAGNOSIS — R29.6 FALLS FREQUENTLY: ICD-10-CM

## 2022-03-08 DIAGNOSIS — M25.552 HIP PAIN, LEFT: Primary | ICD-10-CM

## 2022-03-08 PROCEDURE — 73502 X-RAY EXAM HIP UNI 2-3 VIEWS: CPT | Mod: LT | Performed by: RADIOLOGY

## 2022-03-08 PROCEDURE — 99214 OFFICE O/P EST MOD 30 MIN: CPT | Performed by: NURSE PRACTITIONER

## 2022-03-08 RX ORDER — NAPROXEN 500 MG/1
500 TABLET ORAL 2 TIMES DAILY WITH MEALS
Qty: 14 TABLET | Refills: 0 | Status: SHIPPED | OUTPATIENT
Start: 2022-03-08 | End: 2022-03-15

## 2022-03-08 ASSESSMENT — PAIN SCALES - GENERAL: PAINLEVEL: SEVERE PAIN (6)

## 2022-03-08 NOTE — PATIENT INSTRUCTIONS
Patient Education     Treatment for Bone Bruise (Bone Contusion)  A bone bruise is an injury to a bone that is less severe than a bone fracture. Bone bruises are fairly common. They can happen to people of all ages. Any type of bone in your body can get a bone bruise. Other injuries often happen along with a bone bruise, such as damage to nearby ligaments.  Types of treatment  Treatment for a bone bruise may include:    Resting the bone or joint    Putting an ice pack on the area several times a day    Raising the injury above the level of your heart to reduce swelling    Taking medicine to reduce pain and swelling    Wearing a brace or other device to limit movement  Your healthcare provider may give you advice about your diet. This is because eating a diet that is rich in calcium, vitamin D, and protein can help you heal. Your healthcare provider may ask you not to use certain over-the-counter medicines for pain. Some of these may delay normal bone healing. If you smoke, your healthcare provider will advise you to stop smoking. Smoking can also delay bone healing.  Your healthcare provider will tell you how long you should not put weight on your bone. Most bone bruises slowly heal over 2 to 4 months. A larger bone bruise may take longer to heal. You may not be able to return to sports activities for weeks or months. If your symptoms don t go away, your healthcare provider may order an MRI.  Possible complications of a bone bruise  Most bone bruises heal without any problems. If your bone bruise is very large, your body may have trouble getting blood flow back to the area. This can cause avascular necrosis of the bone. This leads to death of that part of the bone.  When to call the healthcare provider  Call your healthcare provider if your symptoms don t start to get better in a few days. Call him or her right away if you have any severe symptoms, such as a high fever.  [employer] last reviewed this educational  content on 5/1/2018 2000-2021 [employer]. All rights reserved. This information is not intended as a substitute for professional medical care. Always follow your healthcare professional's instructions.

## 2022-03-08 NOTE — LETTER
[facility] [place]  [address] [place] [address]  Phone: [phone]        3/8/2022    Don Samson  [address]  [facility] [address] [phone] (home)       To Whom it May Concern:    This patient was seen in clinic today for acute visit. Please excuse medical related absences. May return to work as tolerated.    Please contact me for questions or concerns.    Sincerely,    Josette Ramos, NP

## 2022-03-08 NOTE — PROGRESS NOTES
Chief Complaint   Patient presents with     Urgent Care     Musculoskeletal Problem     left hip pain, getting worse last few days, worse when sitting today and other time when sitting down worse 8/10     SUBJECTIVE:  Don Samson is a 72 year old male presenting with left hip and buttocks pains for days, stiffness, trouble getting up from sitting. Hard to describe the pain, but it is deeply aching. Frequent falls. He takes tylenol and naproxen for the pain. Needs more naproxen and a work note. He declines numbness, tingling, weakness, redness, rash, swelling, bowel, bladder change.    Past Medical History:   Diagnosis Date     Ankle pain 5/21/2016     Ankle sprain and strain 9/16/2010     CARDIOVASCULAR SCREENING; LDL GOAL LESS THAN 130 9/6/2016     Depression     declines treatment 4/1/16     HTN (hypertension)     declines treatment 4/1/16     Left knee pain 6/18/2015     Plantar fasciitis 9/16/2010     Right knee pain 12/12/2016     Tibialis posterior tendonitis 5/21/2016     amLODIPine (NORVASC) 5 MG tablet, Take 1 tablet (5 mg) by mouth daily  buPROPion (WELLBUTRIN SR) 150 MG 12 hr tablet, TAKE 1 TABLET(150 MG) BY MOUTH TWICE DAILY  buPROPion (WELLBUTRIN XL) 150 MG 24 hr tablet, TAKE 1 TABLET BY MOUTH EVERY DAY. START TAKING ON MONDAY 5/10  celecoxib (CELEBREX) 100 MG capsule, TAKE 1 CAPSULE(100 MG) BY MOUTH TWICE DAILY  cyclobenzaprine (FLEXERIL) 10 MG tablet, Take 1 tablet (10 mg) by mouth 3 times daily as needed for muscle spasms  cyclobenzaprine (FLEXERIL) 10 MG tablet, Take 1 tablet (10 mg) by mouth 3 times daily as needed for muscle spasms  dicyclomine (BENTYL) 10 MG capsule,   Lidocaine (LIDOCARE) 4 % Patch, Place 1 patch onto the skin every 24 hours To prevent lidocaine toxicity, patient should be patch free for 12 hrs daily.  lidocaine (XYLOCAINE) 5 % external ointment, Apply topically 3 times daily  naproxen (NAPROSYN) 500 MG tablet, Take 1 tablet (500 mg) by mouth 2 times daily (with  meals)  olmesartan (BENICAR) 20 MG tablet, TAKE 1 TABLET BY MOUTH DAILY WITH BREAKFAST  ondansetron (ZOFRAN) 4 MG tablet, Take 1 tablet (4 mg) by mouth every 8 hours as needed for nausea  venlafaxine (EFFEXOR-XR) 75 MG 24 hr capsule, Take 3 capsules (225 mg) by mouth daily    No current facility-administered medications on file prior to visit.    Social History     Tobacco Use     Smoking status: Former Smoker     Quit date: 1970     Years since quittin.2     Smokeless tobacco: Never Used   Substance Use Topics     Alcohol use: Yes     Alcohol/week: 0.0 standard drinks     Comment: rarely      Allergies   Allergen Reactions     Ibuprofen Nausea     Other reaction(s): Abdominal pain  Other reaction(s): *Unknown  Other reaction(s): Abdominal pain       Review of Systems   All systems negative except for those listed above in HPI.    OBJECTIVE:   /75 (BP Location: Right arm, Patient Position: Chair, Cuff Size: Adult Regular)   Pulse 65   Temp 98.2  F (36.8  C) (Temporal)   SpO2 97%      Physical Exam  Vitals reviewed.   Constitutional:       General: He is not in acute distress.     Appearance: Normal appearance. He is not ill-appearing.   HENT:      Head: Normocephalic and atraumatic.      Nose: Nose normal.      Mouth/Throat:      Mouth: Mucous membranes are moist.      Pharynx: Oropharynx is clear.   Eyes:      Extraocular Movements: Extraocular movements intact.      Conjunctiva/sclera: Conjunctivae normal.      Pupils: Pupils are equal, round, and reactive to light.   Cardiovascular:      Rate and Rhythm: Normal rate.   Pulmonary:      Effort: Pulmonary effort is normal.   Musculoskeletal:         General: Tenderness present. No swelling or deformity. Normal range of motion.      Cervical back: Normal range of motion and neck supple.      Comments: Left hip and buttocks deep pain   Skin:     General: Skin is warm and dry.      Findings: No bruising, erythema or rash.   Neurological:      General:  No focal deficit present.      Mental Status: He is alert and oriented to person, place, and time.   Psychiatric:         Mood and Affect: Mood normal.         Behavior: Behavior normal.       Xray done in clinic read by me as negative for fracture or dislocation.  Results for orders placed or performed in visit on 03/08/22   XR Pelvis w Hip Left G/E 2 Views     Status: None    Narrative    XR PELVIS AND HIP LEFT 2 VIEWS 3/8/2022 2:58 PM    HISTORY: Hip pain, left    COMPARISON: None.    FINDINGS: No fracture. Mild degenerative changes in the left hip.    YASMEEN PINEDA MD         SYSTEM ID:  [id number]     ASSESSMENT:    ICD-10-CM    1. Hip pain, left  M25.552 XR Pelvis w Hip Left G/E 2 Views     Physical Therapy Referral     naproxen (NAPROSYN) 500 MG tablet   2. Falls frequently  R29.6 Physical Therapy Referral     naproxen (NAPROSYN) 500 MG tablet     PLAN:    Likely fall related contusion, muscular pain, wear and tear degenerative changes  Xray negative for fracture  Tylenol,  Naproxen  PT, stretch, warmth, ice, massage    Patient Instructions     Patient Education     Treatment for Bone Bruise (Bone Contusion)  A bone bruise is an injury to a bone that is less severe than a bone fracture. Bone bruises are fairly common. They can happen to people of all ages. Any type of bone in your body can get a bone bruise. Other injuries often happen along with a bone bruise, such as damage to nearby ligaments.  Types of treatment  Treatment for a bone bruise may include:    Resting the bone or joint    Putting an ice pack on the area several times a day    Raising the injury above the level of your heart to reduce swelling    Taking medicine to reduce pain and swelling    Wearing a brace or other device to limit movement  Your healthcare provider may give you advice about your diet. This is because eating a diet that is rich in calcium, vitamin D, and protein can help you heal. Your healthcare provider may ask you not  to use certain over-the-counter medicines for pain. Some of these may delay normal bone healing. If you smoke, your healthcare provider will advise you to stop smoking. Smoking can also delay bone healing.  Your healthcare provider will tell you how long you should not put weight on your bone. Most bone bruises slowly heal over 2 to 4 months. A larger bone bruise may take longer to heal. You may not be able to return to sports activities for weeks or months. If your symptoms don t go away, your healthcare provider may order an MRI.  Possible complications of a bone bruise  Most bone bruises heal without any problems. If your bone bruise is very large, your body may have trouble getting blood flow back to the area. This can cause avascular necrosis of the bone. This leads to death of that part of the bone.  When to call the healthcare provider  Call your healthcare provider if your symptoms don t start to get better in a few days. Call him or her right away if you have any severe symptoms, such as a high fever.  [employer] last reviewed this educational content on 5/1/2018 2000-2021 [employer]. All rights reserved. This information is not intended as a substitute for professional medical care. Always follow your healthcare professional's instructions.             Follow up with primary care provider with any problems, questions or concerns or if symptoms worsen or fail to improve. Patient agreed to plan and verbalized understanding.    MICHELLE [facility]

## 2022-03-25 ENCOUNTER — OFFICE VISIT (OUTPATIENT)
Dept: URGENT CARE | Facility: URGENT CARE | Age: 72
End: 2022-03-25
Payer: COMMERCIAL

## 2022-03-25 VITALS
TEMPERATURE: 97.8 F | DIASTOLIC BLOOD PRESSURE: 78 MMHG | SYSTOLIC BLOOD PRESSURE: 134 MMHG | WEIGHT: 235 LBS | OXYGEN SATURATION: 99 % | HEART RATE: 69 BPM | BODY MASS INDEX: 32.78 KG/M2

## 2022-03-25 DIAGNOSIS — M54.50 ACUTE BILATERAL LOW BACK PAIN WITHOUT SCIATICA: ICD-10-CM

## 2022-03-25 PROCEDURE — 99213 OFFICE O/P EST LOW 20 MIN: CPT | Performed by: FAMILY MEDICINE

## 2022-03-25 RX ORDER — CYCLOBENZAPRINE HCL 10 MG
10 TABLET ORAL 3 TIMES DAILY PRN
Qty: 30 TABLET | Refills: 0 | Status: SHIPPED | OUTPATIENT
Start: 2022-03-25 | End: 2023-01-31

## 2022-03-25 RX ORDER — NAPROXEN 500 MG/1
500 TABLET ORAL 2 TIMES DAILY WITH MEALS
Qty: 60 TABLET | Refills: 0 | Status: SHIPPED | OUTPATIENT
Start: 2022-03-25 | End: 2023-01-31

## 2022-03-25 NOTE — LETTER
To Whom It MayConcern:       Don Samson  was seen in our clinic on 3/25/2022        Patient may return to work on   3/26/22 .                            Restrictions: none    Comments:            Provider Signature:         KETURAH Barnett MD

## 2022-03-25 NOTE — PROGRESS NOTES
Subjective: See recent previous notes.  This is a continuation of the problem and he is scheduled to start physical therapy on Monday, but about a week ago he was sitting in a chair for a while and when he got up he suddenly had some pain in the left low back going into the buttocks and since then it has been more sore.  He works as a  and yesterday had to miss work because it was hurting too much.  He has used muscle relaxers and naproxen in the past with pretty good success but he is out of them.  He does need a note for work    Objective: Low back exam is within normal limits.  DTRs are normal and symmetric.  Muscle strength is normal and symmetric.  Straight leg test is negative.    Assessment and plan: Low back strain going down into the left.  Physical therapy is what can help him the most.  I did refill the muscle relaxer and the naproxen.  Note for work.

## 2022-03-26 ENCOUNTER — OFFICE VISIT (OUTPATIENT)
Dept: URGENT CARE | Facility: URGENT CARE | Age: 72
End: 2022-03-26
Payer: COMMERCIAL

## 2022-03-26 VITALS
SYSTOLIC BLOOD PRESSURE: 139 MMHG | OXYGEN SATURATION: 99 % | HEART RATE: 75 BPM | WEIGHT: 235 LBS | DIASTOLIC BLOOD PRESSURE: 81 MMHG | BODY MASS INDEX: 32.78 KG/M2 | TEMPERATURE: 98.1 F

## 2022-03-26 DIAGNOSIS — M54.50 ACUTE BILATERAL LOW BACK PAIN WITHOUT SCIATICA: Primary | ICD-10-CM

## 2022-03-26 PROCEDURE — 99213 OFFICE O/P EST LOW 20 MIN: CPT | Performed by: PHYSICIAN ASSISTANT

## 2022-03-26 NOTE — LETTER
March 26, 2022      To Whom It May Concern:      Don Samson was seen in our urgent care today, 03/26/22.  I expect his condition to improve over the next 1-7 days.  He may return to work when improved.    Sincerely,        Selvin Plascencia PA-C

## 2022-03-26 NOTE — PROGRESS NOTES
Acute bilateral low back pain without sciatica  Rest the affected area as much as possible.  Apply ice for 15-20 minutes intermittently as needed and especially after any offending activity. Hot packs are better for muscle spasms and cramping. Daily stretching as tolerated.  As pain recedes, begin normal activities slowly as tolerated.  Consider Physical Therapy after 6 weeks if symptoms not better with conservative care.      Okay to take acetaminophen 500 mg- 2 tabs (Total of 1000 mg) every 8 hrs   Okay to take ibuprofen 200 mg- 3 tabs (Total of 600 mg) every 6 hours      Selvin Plascencia PA-C  [facility]    Subjective   72 year old who presents to clinic today for the following health issues:    Urgent Care and Back Pain       HPI    Patient was seen yesterday for bilateral lower back pain and treated with NSAIDs and muscle relaxants. Patient visits today because he would like a doctor note for work for this same issue. Patient states he will be establishing with PT soon.     Review of Systems   Review of Systems   See HPI     Objective    Temp: 98.1  F (36.7  C) Temp src: Tympanic BP: 139/81 Pulse: 75     SpO2: 99 %       Physical Exam   Physical Exam  Constitutional:       General: He is not in acute distress.     Appearance: Normal appearance. He is normal weight. He is not ill-appearing, toxic-appearing or diaphoretic.   HENT:      Head: Normocephalic and atraumatic.   Cardiovascular:      Rate and Rhythm: Normal rate.      Pulses: Normal pulses.   Pulmonary:      Effort: Pulmonary effort is normal. No respiratory distress.   Musculoskeletal:      Lumbar back: Tenderness present. No swelling, deformity or bony tenderness. Normal range of motion. Negative right straight leg raise test and negative left straight leg raise test.        Back:    Neurological:      General: No focal deficit present.      Mental Status: He is alert and oriented to person, place, and time. Mental status is at  baseline.      Gait: Gait normal.   Psychiatric:         Mood and Affect: Mood normal.         Behavior: Behavior normal.         Thought Content: Thought content normal.         Judgment: Judgment normal.          No results found for this or any previous visit (from the past 24 hour(s)).

## 2022-03-28 ENCOUNTER — THERAPY VISIT (OUTPATIENT)
Dept: PHYSICAL THERAPY | Facility: CLINIC | Age: 72
End: 2022-03-28
Attending: NURSE PRACTITIONER
Payer: COMMERCIAL

## 2022-03-28 DIAGNOSIS — R29.6 FALLS FREQUENTLY: ICD-10-CM

## 2022-03-28 DIAGNOSIS — M79.18 LEFT BUTTOCK PAIN: ICD-10-CM

## 2022-03-28 DIAGNOSIS — M25.552 HIP PAIN, LEFT: ICD-10-CM

## 2022-03-28 PROCEDURE — 97161 PT EVAL LOW COMPLEX 20 MIN: CPT | Mod: GP | Performed by: PHYSICAL THERAPIST

## 2022-03-28 PROCEDURE — 97110 THERAPEUTIC EXERCISES: CPT | Mod: GP | Performed by: PHYSICAL THERAPIST

## 2022-03-28 PROCEDURE — 97530 THERAPEUTIC ACTIVITIES: CPT | Mod: GP | Performed by: PHYSICAL THERAPIST

## 2022-03-28 NOTE — PROGRESS NOTES
[facility] Rehabilitation Services Initial Evaluation    Subjective:  Don Samson is a 72 year old male with a left hip condition. Mechanism of injury: Standing up from sitting. Where: (home, work, MVA, community, recreation/sport, unknown, other): home. Onset of symptoms: PT order date: 3/8/22. Location of symptoms: left buttock, left lateral thigh. Pain level on number scale: 3-10/10. Quality of pain: deep ache. Associated symptoms: frequent falls, stiffness, denies numbness/tingling, weakness, bowel, bladder change. Pain frequency (constant/intermittent): intermittent. Symptoms are exacerbated by: sit<>stand, walking, standing, sitting. Symptoms are relieved by: tylenol, naproxen. Progression of symptoms since onset (same/better/worse): same. Special tests (x-ray, MRI, CT scan, EMG, bone scan): x-ray. Previous treatment: massage. Improvement with previous treatment: yes. General health as reported by patient is fair. Pertinent medical history includes:  see Epic. Medical allergies includes: see Epic. Surgical history includes: see Epic. Current medications include: see Epic. Occupation: [employer] . Patient is (working in normal job without restrictions/working in normal job with restrictions/working in an alternate job/not working due to present treatment problem): working in normal job. Primary job tasks: lifting, carrying, prolonged standing, repetitive tasks. Barriers at home/work: None reported by patient. Red flags: None reported by patient.    Objective  Gait:  Flexed posture, short step and stride length    Bilateral hip AROM WFL  Lumbar AROM: FL = min loss, EXT = mod loss  Weak proximal hip musculature    Special Tests Right Left   FADIR - -   ALFONSO - -   Trendelenburg's Sign     Jack Test     Norman's Test       Assessment/Plan:    Patient is a 72 year old male with left side hip complaints.    Patient has the following significant findings with corresponding treatment plan.                 Diagnosis 1:  Left buttock pain  Pain -  manual therapy, self management, education, directional preference exercise and home program  Decreased ROM/flexibility - manual therapy and therapeutic exercise  Decreased joint mobility - manual therapy and therapeutic exercise  Decreased strength - therapeutic exercise and therapeutic activities  Impaired balance - neuro re-education and therapeutic activities  Decreased proprioception - neuro re-education and therapeutic activities  Impaired gait - gait training  Impaired muscle performance - neuro re-education  Decreased function - therapeutic activities  Impaired posture - neuro re-education    Previous and current functional limitations:  (See Goal Flow Sheet for this information)    Short term and Long term goals: (See Goal Flow Sheet for this information)     Communication ability:  Patient appears to be able to clearly communicate and understand verbal and written communication and follow directions correctly.  Treatment Explanation - The following has been discussed with the patient:   RX ordered/plan of care  Anticipated outcomes  Possible risks and side effects  This patient would benefit from PT intervention to resume normal activities.   Rehab potential is good.    Frequency:  1 X week, once daily  Duration:  for 8 weeks  Discharge Plan:  Achieve all LTG.  Independent in home treatment program.  Reach maximal therapeutic benefit.    Please refer to the daily flowsheet for treatment today, total treatment time and time spent performing 1:1 timed codes.

## 2022-03-28 NOTE — PROGRESS NOTES
[facility]    OUTPATIENT Physical Therapy ORTHOPEDIC EVALUATION  PLAN OF TREATMENT FOR OUTPATIENT REHABILITATION  (COMPLETE FOR INITIAL CLAIMS ONLY)  Patient's Last Name, First Name, M.I.  YOB: 1950  Don Samson    Provider s Name:  KETURAH [facility]   Medical Record No.  [id number]   Start of Care Date:  03/28/22   Onset Date:   03/08/22   Type:     _X__PT   ___OT Medical Diagnosis:    Encounter Diagnoses   Name Primary?     Hip pain, left      Falls frequently      Left buttock pain         Treatment Diagnosis:  left buttock/hip pain        Goals:     03/28/22 0500   Body Part   Goals listed below are for left buttock/hip   Goal #1   Goal #1 self cares/transfers/bed mobility   Previous Functional Level No restrictions   Performance Level 10/10 pain sit<>stand   Performance Level 5/10 pain sit<>stand   Rationale for independent self care such as dressing, personal hygiene, bathing   Due Date 05/09/22   Performance level 0/10 pain sit<>stand   Rationale independence in self cares   Due Date 06/20/22       Therapy Frequency:  1x per week  Predicted Duration of Therapy Intervention:  8 weeks    Rik Gonzales PT                 I CERTIFY THE NEED FOR THESE SERVICES FURNISHED UNDER        THIS PLAN OF TREATMENT AND WHILE UNDER MY CARE     (Physician attestation of this document indicates review and certification of the therapy plan).                       Certification Date From:  03/28/22   Certification Date To:  06/25/22    Referring Provider:  Josette Ramos    Initial Assessment        See Epic Evaluation SOC Date: 03/28/22

## 2022-03-29 ENCOUNTER — OFFICE VISIT (OUTPATIENT)
Dept: URGENT CARE | Facility: URGENT CARE | Age: 72
End: 2022-03-29
Payer: COMMERCIAL

## 2022-03-29 VITALS
DIASTOLIC BLOOD PRESSURE: 67 MMHG | HEART RATE: 69 BPM | WEIGHT: 235 LBS | SYSTOLIC BLOOD PRESSURE: 126 MMHG | BODY MASS INDEX: 32.78 KG/M2 | TEMPERATURE: 98.1 F | OXYGEN SATURATION: 97 %

## 2022-03-29 DIAGNOSIS — M54.50 ACUTE BILATERAL LOW BACK PAIN WITHOUT SCIATICA: Primary | ICD-10-CM

## 2022-03-29 DIAGNOSIS — M62.830 BACK MUSCLE SPASM: ICD-10-CM

## 2022-03-29 PROCEDURE — 99213 OFFICE O/P EST LOW 20 MIN: CPT | Performed by: FAMILY MEDICINE

## 2022-03-29 NOTE — PROGRESS NOTES
Assessment & Plan     Acute bilateral low back pain without sciatica  Back muscle spasm    - Spine Referral; Future    Recommend patient establish with new PCP as soon as he can to improve his continuity of care- especially with things like this.  Encouraged him to continue to pursue PT and massage-- is not sure he will return to PT at this time.  May continue with Flexeril and NSAIDS and heat prn.  Would like to be referred to SPINE for better coordination of his care.  Close Follow-up if any new or worsening sx prn.    Susanne Wang MD  [facility] [place]    Negro Cifuentes is a 72 year old who presents for the following health issues     HPI   Patient has presented 10 times to the  since the beginning of this year with back and UE issues.  States back issues began after getting out of a chair-- since then he has had so much back pain and spasms- makes his job as a  at the [employer] difficult-- working 30 hours/weekly.  Is using Flexeril and naproxen and heat.  Just started PT this week and felt worse after it.  Is not sure he will go back.    Recent imaging nremarkable.  Has not had MRI of spine.  He did do some massage recently and was told his pelvis is tilted and piriformis is tight.  Denies any other trauma or injury to the back.    Recently lost his PCP to her changing jobs- has not chosen a new one yet.        Review of Systems   Constitutional, HEENT, cardiovascular, pulmonary, GI, , musculoskeletal, neuro, skin, endocrine and psych systems are negative, except as otherwise noted.      Objective    /67   Pulse 69   Temp 98.1  F (36.7  C) (Tympanic)   Wt 106.6 kg (235 lb)   SpO2 97%   BMI 32.78 kg/m    Body mass index is 32.78 kg/m .  Physical Exam   GENERAL: healthy, alert and no distress  EYES: Eyes grossly normal to inspection, PERRL and conjunctivae and sclerae normal  MS: no gross musculoskeletal defects noted, no edema. Localizes pain to lower  lumbar spine and into LEFT glute today, no radicular sx noted.  SKIN: no suspicious lesions or rashes  NEURO: Normal strength and tone, mentation intact and speech normal  PSYCH: mentation appears normal, affect normal/bright

## 2022-04-06 ENCOUNTER — OFFICE VISIT (OUTPATIENT)
Dept: URGENT CARE | Facility: URGENT CARE | Age: 72
End: 2022-04-06
Payer: COMMERCIAL

## 2022-04-06 ENCOUNTER — ANCILLARY PROCEDURE (OUTPATIENT)
Dept: GENERAL RADIOLOGY | Facility: CLINIC | Age: 72
End: 2022-04-06
Attending: NURSE PRACTITIONER
Payer: COMMERCIAL

## 2022-04-06 VITALS
SYSTOLIC BLOOD PRESSURE: 139 MMHG | WEIGHT: 235 LBS | BODY MASS INDEX: 32.78 KG/M2 | HEART RATE: 67 BPM | TEMPERATURE: 98.1 F | OXYGEN SATURATION: 98 % | DIASTOLIC BLOOD PRESSURE: 83 MMHG

## 2022-04-06 DIAGNOSIS — M25.562 ACUTE PAIN OF LEFT KNEE: Primary | ICD-10-CM

## 2022-04-06 DIAGNOSIS — M17.10 ARTHRITIS OF KNEE: ICD-10-CM

## 2022-04-06 DIAGNOSIS — M25.562 ACUTE PAIN OF LEFT KNEE: ICD-10-CM

## 2022-04-06 PROCEDURE — 73562 X-RAY EXAM OF KNEE 3: CPT | Mod: LT | Performed by: RADIOLOGY

## 2022-04-06 PROCEDURE — 99214 OFFICE O/P EST MOD 30 MIN: CPT | Performed by: NURSE PRACTITIONER

## 2022-04-06 RX ORDER — TRAMADOL HYDROCHLORIDE 50 MG/1
50 TABLET ORAL EVERY 6 HOURS PRN
Qty: 5 TABLET | Refills: 0 | Status: SHIPPED | OUTPATIENT
Start: 2022-04-06 | End: 2022-04-11

## 2022-04-06 NOTE — PROGRESS NOTES
Chief Complaint   Patient presents with     Urgent Care     Knee Pain     c/o knee pain for 2 weeks     SUBJECTIVE:  Don Samson is a 72 year old male who presents to the clinic today with left knee pain for 2 weeks. It is a deep aching, constant, worse with position changes. No redness, warmth, swelling, injury. He would like an xray here today, concerned for arthritis. He sees ortho for back pain 04/15. Says PT made him worse. He is requesting pain management beyond naproxen and something to help him sleep. [place] PDMP checked and no controlled substance within the year.    Past Medical History:   Diagnosis Date     Ankle pain 5/21/2016     Ankle sprain and strain 9/16/2010     CARDIOVASCULAR SCREENING; LDL GOAL LESS THAN 130 9/6/2016     Depression     declines treatment 4/1/16     HTN (hypertension)     declines treatment 4/1/16     Left knee pain 6/18/2015     Plantar fasciitis 9/16/2010     Right knee pain 12/12/2016     Tibialis posterior tendonitis 5/21/2016     amLODIPine (NORVASC) 5 MG tablet, Take 1 tablet (5 mg) by mouth daily  buPROPion (WELLBUTRIN SR) 150 MG 12 hr tablet, TAKE 1 TABLET(150 MG) BY MOUTH TWICE DAILY  buPROPion (WELLBUTRIN XL) 150 MG 24 hr tablet, TAKE 1 TABLET BY MOUTH EVERY DAY. START TAKING ON MONDAY 5/10  celecoxib (CELEBREX) 100 MG capsule, TAKE 1 CAPSULE(100 MG) BY MOUTH TWICE DAILY  cyclobenzaprine (FLEXERIL) 10 MG tablet, Take 1 tablet (10 mg) by mouth 3 times daily as needed for muscle spasms  cyclobenzaprine (FLEXERIL) 10 MG tablet, Take 1 tablet (10 mg) by mouth 3 times daily as needed for muscle spasms  dicyclomine (BENTYL) 10 MG capsule,   Lidocaine (LIDOCARE) 4 % Patch, Place 1 patch onto the skin every 24 hours To prevent lidocaine toxicity, patient should be patch free for 12 hrs daily.  lidocaine (XYLOCAINE) 5 % external ointment, Apply topically 3 times daily  naproxen (NAPROSYN) 500 MG tablet, Take 1 tablet (500 mg) by mouth 2 times daily (with meals)  olmesartan  (BENICAR) 20 MG tablet, TAKE 1 TABLET BY MOUTH DAILY WITH BREAKFAST  ondansetron (ZOFRAN) 4 MG tablet, Take 1 tablet (4 mg) by mouth every 8 hours as needed for nausea    No current facility-administered medications on file prior to visit.    Social History     Tobacco Use     Smoking status: Former Smoker     Quit date: 1970     Years since quittin.2     Smokeless tobacco: Never Used   Substance Use Topics     Alcohol use: Yes     Alcohol/week: 0.0 standard drinks     Comment: rarely      Allergies   Allergen Reactions     Ibuprofen Nausea     Other reaction(s): Abdominal pain  Other reaction(s): *Unknown  Other reaction(s): Abdominal pain     Review of Systems   All systems negative except for those listed above in HPI.    EXAM:   /83   Pulse 67   Temp 98.1  F (36.7  C) (Tympanic)   Wt 106.6 kg (235 lb)   SpO2 98%   BMI 32.78 kg/m      Physical Exam  Vitals reviewed.   Constitutional:       General: He is not in acute distress.     Appearance: Normal appearance. He is not ill-appearing, toxic-appearing or diaphoretic.   HENT:      Head: Normocephalic and atraumatic.      Nose: Nose normal.      Mouth/Throat:      Mouth: Mucous membranes are moist.      Pharynx: Oropharynx is clear.   Eyes:      Extraocular Movements: Extraocular movements intact.      Conjunctiva/sclera: Conjunctivae normal.      Pupils: Pupils are equal, round, and reactive to light.   Cardiovascular:      Rate and Rhythm: Normal rate.   Pulmonary:      Effort: Pulmonary effort is normal.   Musculoskeletal:         General: No swelling, tenderness, deformity or signs of injury. Normal range of motion.      Cervical back: Normal range of motion and neck supple.   Skin:     General: Skin is warm and dry.      Findings: No bruising, erythema or rash.   Neurological:      General: No focal deficit present.      Mental Status: He is alert and oriented to person, place, and time.      Gait: Gait abnormal (antalgic).   Psychiatric:          Mood and Affect: Mood normal.         Behavior: Behavior normal.       Xray done in clinic read by me as positive for some degenerative changes to the patella.    ASSESSMENT:    ICD-10-CM    1. Acute pain of left knee  M25.562 XR Knee Left 3 Views     Knee Supplies Order for DME - ONLY FOR DME     traMADol (ULTRAM) 50 MG tablet   2. Arthritis of knee  M17.10 Knee Supplies Order for DME - ONLY FOR DME     traMADol (ULTRAM) 50 MG tablet     PLAN:    Xray with some degenerative changes since last to patella  Rest, ice, compression with knee sleeve, elevate  Rotate tylenol and naproxen  Tramadol #5 okay per request, not a long term answer to chronic pain, but okay for severe breakthrough  Keep ortho follow-up  Advised that PT may have hurt at first, but could ultimately still benefit him if he gave it more time    Patient Instructions     Patient Education     Osteoarthritis  Osteoarthritis happens when the cartilage in a joint becomes damaged and worn. This may be from age, wear and tear, overuse of the joint, obesity, or other problems. Osteoarthritis can affect any joint. But it's most common in hands, knees, spine, hips, and feet. Symptoms include joint stiffness, and pain. It's also called degenerative joint disease.   Home care    When a joint is more sore than usual, rest it for a day or two.    Heat can help relieve stiffness. Take a hot bath or apply a heating pad for up to 30 minutes at a time. If symptoms are worse in the morning, using heat just after awakening can help relax the muscle and soothe the joints.     Ice helps relieve pain. It's often used after activity. Use a cold pack wrapped in a thin cloth on the joint for 10 to 15 minutes at a time.     Alternating hot and cold can also help relieve pain. Try this for 20 minutes at a time, several times per day.    Exercise helps prevent the muscles and ligaments around the joint from becoming weak. It also helps maintain function in the joint. Be as  active as you can. Talk to your healthcare provider about what activity program is best for you.    Excess weight puts a lot of extra strain on weight-bearing joints of the lower back, hips, knees, feet and ankles. If you are overweight, talk to your healthcare provider about a safe and effective weight loss program.    Use anti-inflammatory medicines as prescribed for pain. This includes acetaminophen or NSAIDs such as ibuprofen or naproxen. Don't take NSAIDs if your healthcare provider has told you that you can't take NSAIDS because of other health problems If needed, topical or injected medicines may be recommended. Talk with your healthcare provider if these options are not enough to manage your pain. Follow the directions on all over-the-counter medicines.    Talk with your healthcare provider about devices that might help improve your function and reduce pain.    Talk with you healthcare provider about physical therapy to help strengthen your joints and the surrounding muscles.    Follow-up care  Follow up with your healthcare provider, or as advised.   When to seek medical advice  Call your healthcare provider right away if any of these occur:    Redness or swelling of a painful joint    Discharge or pus from a painful joint    Fever of 100.4 F (38 C) or higher, or as directed by your healthcare provider    Worsening joint pain    Decreased ability to move the joint or bear weight on the joint  [employer] last reviewed this educational content on 8/1/2019 2000-2021 [employer]. All rights reserved. This information is not intended as a substitute for professional medical care. Always follow your healthcare professional's instructions.             Follow up with primary care provider with any problems, questions or concerns or if symptoms worsen or fail to improve. Patient agreed to plan and verbalized understanding.    GISELLE Duran  [facility]

## 2022-04-06 NOTE — PATIENT INSTRUCTIONS
Patient Education     Osteoarthritis  Osteoarthritis happens when the cartilage in a joint becomes damaged and worn. This may be from age, wear and tear, overuse of the joint, obesity, or other problems. Osteoarthritis can affect any joint. But it's most common in hands, knees, spine, hips, and feet. Symptoms include joint stiffness, and pain. It's also called degenerative joint disease.   Home care    When a joint is more sore than usual, rest it for a day or two.    Heat can help relieve stiffness. Take a hot bath or apply a heating pad for up to 30 minutes at a time. If symptoms are worse in the morning, using heat just after awakening can help relax the muscle and soothe the joints.     Ice helps relieve pain. It's often used after activity. Use a cold pack wrapped in a thin cloth on the joint for 10 to 15 minutes at a time.     Alternating hot and cold can also help relieve pain. Try this for 20 minutes at a time, several times per day.    Exercise helps prevent the muscles and ligaments around the joint from becoming weak. It also helps maintain function in the joint. Be as active as you can. Talk to your healthcare provider about what activity program is best for you.    Excess weight puts a lot of extra strain on weight-bearing joints of the lower back, hips, knees, feet and ankles. If you are overweight, talk to your healthcare provider about a safe and effective weight loss program.    Use anti-inflammatory medicines as prescribed for pain. This includes acetaminophen or NSAIDs such as ibuprofen or naproxen. Don't take NSAIDs if your healthcare provider has told you that you can't take NSAIDS because of other health problems If needed, topical or injected medicines may be recommended. Talk with your healthcare provider if these options are not enough to manage your pain. Follow the directions on all over-the-counter medicines.    Talk with your healthcare provider about devices that might help improve  your function and reduce pain.    Talk with you healthcare provider about physical therapy to help strengthen your joints and the surrounding muscles.    Follow-up care  Follow up with your healthcare provider, or as advised.   When to seek medical advice  Call your healthcare provider right away if any of these occur:    Redness or swelling of a painful joint    Discharge or pus from a painful joint    Fever of 100.4 F (38 C) or higher, or as directed by your healthcare provider    Worsening joint pain    Decreased ability to move the joint or bear weight on the joint  [employer] last reviewed this educational content on 8/1/2019 2000-2021 [employer]. All rights reserved. This information is not intended as a substitute for professional medical care. Always follow your healthcare professional's instructions.

## 2022-04-15 ENCOUNTER — OFFICE VISIT (OUTPATIENT)
Dept: NEUROSURGERY | Facility: CLINIC | Age: 72
End: 2022-04-15
Payer: COMMERCIAL

## 2022-04-15 VITALS — HEART RATE: 92 BPM | SYSTOLIC BLOOD PRESSURE: 145 MMHG | OXYGEN SATURATION: 98 % | DIASTOLIC BLOOD PRESSURE: 100 MMHG

## 2022-04-15 DIAGNOSIS — M62.830 BACK MUSCLE SPASM: ICD-10-CM

## 2022-04-15 DIAGNOSIS — M54.50 ACUTE BILATERAL LOW BACK PAIN WITHOUT SCIATICA: ICD-10-CM

## 2022-04-15 PROCEDURE — 99202 OFFICE O/P NEW SF 15 MIN: CPT | Performed by: PHYSICIAN ASSISTANT

## 2022-04-15 NOTE — PROGRESS NOTES
Neurosurgery Consult    HPI    Mr. Samson is a 72-year-old male who presents to clinic for evaluation of back pain.  He had 1 episode of back pain when he got out of a chair, and has not recurred.  He states currently back pain is not an issue for him.  He denies any radicular symptoms or weakness or numbness in his lower extremities.  He states his main issues with his knees and he will be following up with orthopedics for that.    Medical history  Noncontributory    Social history  Works as a  at the [employer] as well as at [employer]      B/P: 145/100, T: Data Unavailable, P: 92, R: Data Unavailable       Exam    Alert oriented no acute distress  Bilateral lower extremities appropriate strength  Gait is normal    Imaging    Lumbar x-ray from 2018 unremarkable, normal alignment, no acute bony abnormality, no significant degenerative changes.    Assessment    Single episode of back pain    Plan:      No further evaluation of his back pain is needed given this not currently bothering him, he will be following up with orthopedics for evaluating his knees which are currently his main concern.    Total time of 10 minutes spent with the patient today in counseling and coordination of care.

## 2022-04-15 NOTE — TELEPHONE ENCOUNTER
DIAGNOSIS: left knee pain/ self ref   APPOINTMENT DATE: 4.19.22   NOTES STATUS DETAILS   OFFICE NOTE from other specialist Internal 4.6.22 [facility]     MEDICATION LIST Internal    XRAYS (IMAGES & REPORTS) Internal 4.6.22 L knee  11.27.16 B knee standing

## 2022-04-15 NOTE — LETTER
4/15/2022         RE: Don Samson  [address]  [address]  [place] [address]        Dear Colleague,    Thank you for referring your patient, Don Samson, to the [facility] [place]. Please see a copy of my visit note below.    Neurosurgery Consult    HPI    Mr. Samson is a 72-year-old male who presents to clinic for evaluation of back pain.  He had 1 episode of back pain when he got out of a chair, and has not recurred.  He states currently back pain is not an issue for him.  He denies any radicular symptoms or weakness or numbness in his lower extremities.  He states his main issues with his knees and he will be following up with orthopedics for that.    Medical history  Noncontributory    Social history  Works as a  at the [employer] as well as at [employer]      B/P: 145/100, T: Data Unavailable, P: 92, R: Data Unavailable       Exam    Alert oriented no acute distress  Bilateral lower extremities appropriate strength  Gait is normal    Imaging    Lumbar x-ray from 2018 unremarkable, normal alignment, no acute bony abnormality, no significant degenerative changes.    Assessment    Single episode of back pain    Plan:      No further evaluation of his back pain is needed given this not currently bothering him, he will be following up with orthopedics for evaluating his knees which are currently his main concern.    Total time of 10 minutes spent with the patient today in counseling and coordination of care.      Again, thank you for allowing me to participate in the care of your patient.        Sincerely,        Micah Ridley PA-C

## 2022-04-19 ENCOUNTER — OFFICE VISIT (OUTPATIENT)
Dept: ORTHOPEDICS | Facility: CLINIC | Age: 72
End: 2022-04-19
Payer: COMMERCIAL

## 2022-04-19 ENCOUNTER — PRE VISIT (OUTPATIENT)
Dept: ORTHOPEDICS | Facility: CLINIC | Age: 72
End: 2022-04-19

## 2022-04-19 VITALS — WEIGHT: 235 LBS | BODY MASS INDEX: 32.78 KG/M2

## 2022-04-19 DIAGNOSIS — K42.9 UMBILICAL HERNIA WITHOUT OBSTRUCTION AND WITHOUT GANGRENE: ICD-10-CM

## 2022-04-19 DIAGNOSIS — M17.12 PRIMARY OSTEOARTHRITIS OF LEFT KNEE: Primary | ICD-10-CM

## 2022-04-19 DIAGNOSIS — F33.1 MAJOR DEPRESSIVE DISORDER, RECURRENT, MODERATE (H): ICD-10-CM

## 2022-04-19 PROCEDURE — 99203 OFFICE O/P NEW LOW 30 MIN: CPT | Performed by: FAMILY MEDICINE

## 2022-04-19 ASSESSMENT — PATIENT HEALTH QUESTIONNAIRE - PHQ9: SUM OF ALL RESPONSES TO PHQ QUESTIONS 1-9: 15

## 2022-04-19 NOTE — TELEPHONE ENCOUNTER
REFERRAL INFORMATION:    Referring Provider:  Dr. Sarabjit Alaniz    Referring Clinic:  [facility]     Reason for Visit/Diagnosis: Umbilical hernia        FUTURE VISIT INFORMATION:    Appointment Date: 4/21/2022    Appointment Time: 9 AM      NOTES RECORD STATUS  DETAILS   OFFICE NOTE from Referring Provider Internal 4/19/2022 Office visit with Dr. Alaniz      OFFICE NOTE from Other Specialists N/A    HOSPITAL DISCHARGE SUMMARY/ ED VISITS  N/A    OPERATIVE REPORT N/A    ENDOSCOPY (EGD)  N/A    PERTINENT LABS Internal    PATHOLOGY REPORTS (RELATED) N/A    IMAGING (CT, MRI, US, XR)  N/A

## 2022-04-19 NOTE — LETTER
4/19/2022      RE: Don Samson  [address]  [address]  [place] [place] [facility] [address]       ASSESSMENT/PLAN:    (M17.12) Primary osteoarthritis of left knee  (primary encounter diagnosis)  Comment: reviewed exam and imaging findings; discussed tx including meds/PT/ brace/ injection/ surgery; will start w/ dedicated knee PT; f/u in 2 months; if no better, consider injection   Plan: Physical Therapy Referral          (F33.1) Major depressive disorder, recurrent, moderate (H)  Comment:   Plan: he will speak to counselor today    (K42.9) Umbilical hernia without obstruction and without gangrene  Comment: wishes to proceed w/ surgical eval; referral placed   Plan: Adult General Surg Referral          Sarabjit Alaniz MD  April 19, 2022  9:17 AM        Pt is a 72 year old male here today for:     L Knee pain :   Duration? 1 month   Injury/ Inciting activity? None   Pop? None   Swelling? None   Limited motion? None   Locking/ Catching? none   Giving way/ instability? None   Imaging? See below   Treatment? Naproxen, tylenol, voltaren, icy hot; Did PT for back which made it worse    Xray L knee 4/6/22:  Reviewed films w/ patient personally during visit                                                               IMPRESSION: Mild osteoarthrosis of the medial and patellofemoral compartments. No fracture, effusion or calcified intra-articular body    Per urgent care note on 4/6/22:  Don Samson is a 72 year old male who presents to the clinic today with left knee pain for 2 weeks. It is a deep aching, constant, worse with position changes. No redness, warmth, swelling, injury. He would like an xray here today, concerned for arthritis. He sees ortho for back pain 04/15. Says PT made him worse. He is requesting pain management beyond naproxen and something to help him sleep. [place] PDMP checked and no controlled substance within the year.  PLAN:     Xray with some degenerative changes since last to patella  Rest, ice,  compression with knee sleeve, elevate  Rotate tylenol and naproxen  Tramadol #5 okay per request, not a long term answer to chronic pain, but okay for severe breakthrough  Keep ortho follow-up  Advised that PT may have hurt at first, but could ultimately still benefit him if he gave it more time    2) Med hx: has a large umbilical hernia - interested in considering surgery      3) Depression - no SI; poor sleep   +anhedonia   working two jobs  Just kind of a boring life  Not interested in meds  Would be interested in counseling      PHQ 12/9/2020 4/26/2021 4/19/2022   PHQ-9 Total Score 15 4 15   Q9: Thoughts of better off dead/self-harm past 2 weeks Not at all Not at all Not at all           Past Medical History:   Diagnosis Date     Ankle pain 5/21/2016     Ankle sprain and strain 9/16/2010     CARDIOVASCULAR SCREENING; LDL GOAL LESS THAN 130 9/6/2016     Depression     declines treatment 4/1/16     HTN (hypertension)     declines treatment 4/1/16     Left knee pain 6/18/2015     Plantar fasciitis 9/16/2010     Right knee pain 12/12/2016     Tibialis posterior tendonitis 5/21/2016      Past Surgical History:   Procedure Laterality Date     HERNIORRHAPHY INGUINAL BILATERAL      RIH 2004, LIH 1985     NASAL/SINUS POLYPECTOMY      Nasal-Sinus Polypectomy      Current Outpatient Medications   Medication Sig Dispense Refill     amLODIPine (NORVASC) 5 MG tablet Take 1 tablet (5 mg) by mouth daily 90 tablet 0     buPROPion (WELLBUTRIN SR) 150 MG 12 hr tablet TAKE 1 TABLET(150 MG) BY MOUTH TWICE DAILY 60 tablet 0     buPROPion (WELLBUTRIN XL) 150 MG 24 hr tablet TAKE 1 TABLET BY MOUTH EVERY DAY. START TAKING ON MONDAY 5/10       celecoxib (CELEBREX) 100 MG capsule TAKE 1 CAPSULE(100 MG) BY MOUTH TWICE DAILY 0.001 capsule 0     cyclobenzaprine (FLEXERIL) 10 MG tablet Take 1 tablet (10 mg) by mouth 3 times daily as needed for muscle spasms 30 tablet 0     cyclobenzaprine (FLEXERIL) 10 MG tablet Take 1 tablet (10 mg) by mouth  3 times daily as needed for muscle spasms 30 tablet 0     dicyclomine (BENTYL) 10 MG capsule        Lidocaine (LIDOCARE) 4 % Patch Place 1 patch onto the skin every 24 hours To prevent lidocaine toxicity, patient should be patch free for 12 hrs daily. 10 patch 0     lidocaine (XYLOCAINE) 5 % external ointment Apply topically 3 times daily 240 g 0     naproxen (NAPROSYN) 500 MG tablet Take 1 tablet (500 mg) by mouth 2 times daily (with meals) 60 tablet 0     olmesartan (BENICAR) 20 MG tablet TAKE 1 TABLET BY MOUTH DAILY WITH BREAKFAST 90 tablet 0     ondansetron (ZOFRAN) 4 MG tablet Take 1 tablet (4 mg) by mouth every 8 hours as needed for nausea 30 tablet 0        Allergies   Allergen Reactions     Ibuprofen Nausea     Other reaction(s): Abdominal pain  Other reaction(s): *Unknown  Other reaction(s): Abdominal pain      ROS:   Gen- no fevers/chills   Rheum - no morning stiffness   Derm - no rash/ redness   Neuro - no numbness, no tingling   Remainder of ROS negative.     Exam:   Wt 106.6 kg (235 lb)   BMI 32.78 kg/m       L Knee:   ROM: 0-130; Crepitus: YES   Effusion: trace ; Swelling: NO   Strength: Full in flexion/ extension   Tenderness: Patella - NO Medial joint line - mild; Lateral joint line - mild; Quad tendon - no; Patellar tendon- no; Hamstring - no.   Cruciates: anterior drawer - neg/posterior drawer -neg. Lachman - neg   Collaterals: varus -neg/valgus -neg.   Patella: patellar compression - neg; single leg bend- pos   Meniscus: [place] - neg; [place]ss[place] - pos for anterior pain           Sarabjit Alaniz MD

## 2022-04-19 NOTE — NURSING NOTE
"[facility] :  PHQ-9 Screening Note  SITUATION/BACKGROUND                                                    Don Samson is a 72 year old male who completed the PHQ-9 assessment for depression and Score is >9.    Onset of symptoms: gradual  Trigger: Work, monotony of life, pain/medical diagnoses  Recent related events: Knee pain, hernia, COVID  Prior history of suicide attempt or self harm: No  Risk Factors: age, single status and anxiety  History of depression or mental illness: Yes  Medications reviewed: Yes     ASSESSMENT      A. Are any of the following present?      Suicidal thoughts with a plan and means to carry out the plan?    Intent to harm others    Altered mental status: confusion, delusional, psychotic NO - go to B   B. Are any of the following present?      Suicidal thoughts without a plan or means to carry out the plan    New onset of delusional ideas    Past inpatient admission for depression    New onset and recent change or addition of new medication NO - go to C   C. Are any of the following present?      Previous suicide attempts    Depression interfering with ability to work or function    Loss of appetite and eating poorly    Abrupt cessation of drugs (OTC or RX), alcohol or caffeine    Drug or alcohol abuse NO - go to D   D. Are several of the following present?      Difficulty concentrating    Difficulty sleeping    Reduced interest in sexual activity or impotency    Irregular or absent menstruation    No interest in activity    Change in interpersonal relationships    Increased use/abuse of alcohol or drugs    Pregnant or recent child birth    Recent major life change    History of depression YES -  Follow-up with PCP for appointment and follow home care instructions.    Place referral to behavioral health team for \"regular\" follow-up.         PLAN      Home Care Instructions:   Call local crisis interventions  Increase exercise and enjoyable activities  East a well-balanced diet, " drink plenty of fluids and rest as needed  Alcohol and other recreational drugs can worsen depression.  If heavy use of drugs or alcohol, contact counselor or PCP to help reduce consumption.    Report the following to your PCP:   Suicidal thoughts without a plan or means to carry out the plan  Depression interferes with daily activities  Persistent inability to sleep    Seek emergency care immediately if any of the following occur:   Suicidal thoughts and plan and means to carry out the plan  Injury to self or others    BEHAVIORAL HEALTH TEAMS      [facility] - Behavioral Health Team    [facility] Pager: [phone]    [place]  - Behavioral Health Team    Pager number: [phone]    Referral to Behavioral Health    BEHAVIORAL / SPIRITUAL HEALTH SOWQ [00102}    RESOURCES      - 24/7 Crisis Hotlines: National Suicide Prevention Hotline  189-243-VYSH (5244)    Uziel Rocha RN        Copyright 2016 [facility]

## 2022-04-19 NOTE — PROGRESS NOTES
ASSESSMENT/PLAN:    (M17.12) Primary osteoarthritis of left knee  (primary encounter diagnosis)  Comment: reviewed exam and imaging findings; discussed tx including meds/PT/ brace/ injection/ surgery; will start w/ dedicated knee PT; f/u in 2 months; if no better, consider injection   Plan: Physical Therapy Referral          (F33.1) Major depressive disorder, recurrent, moderate (H)  Comment:   Plan: he will speak to counselor today    (K42.9) Umbilical hernia without obstruction and without gangrene  Comment: wishes to proceed w/ surgical eval; referral placed   Plan: Adult General Surg Referral          Sarabjit Alaniz MD  April 19, 2022  9:17 AM        Pt is a 72 year old male here today for:     L Knee pain :   Duration? 1 month   Injury/ Inciting activity? None   Pop? None   Swelling? None   Limited motion? None   Locking/ Catching? none   Giving way/ instability? None   Imaging? See below   Treatment? Naproxen, tylenol, voltaren, icy hot; Did PT for back which made it worse    Xray L knee 4/6/22:  Reviewed films w/ patient personally during visit                                                               IMPRESSION: Mild osteoarthrosis of the medial and patellofemoral compartments. No fracture, effusion or calcified intra-articular body    Per urgent care note on 4/6/22:  Don Samson is a 72 year old male who presents to the clinic today with left knee pain for 2 weeks. It is a deep aching, constant, worse with position changes. No redness, warmth, swelling, injury. He would like an xray here today, concerned for arthritis. He sees ortho for back pain 04/15. Says PT made him worse. He is requesting pain management beyond naproxen and something to help him sleep. [place] PDMP checked and no controlled substance within the year.  PLAN:     Xray with some degenerative changes since last to patella  Rest, ice, compression with knee sleeve, elevate  Rotate tylenol and naproxen  Tramadol #5 okay per request, not a  long term answer to chronic pain, but okay for severe breakthrough  Keep ortho follow-up  Advised that PT may have hurt at first, but could ultimately still benefit him if he gave it more time    2) Med hx: has a large umbilical hernia - interested in considering surgery      3) Depression - no SI; poor sleep   +anhedonia   working two jobs  Just kind of a boring life  Not interested in meds  Would be interested in counseling      PHQ 12/9/2020 4/26/2021 4/19/2022   PHQ-9 Total Score 15 4 15   Q9: Thoughts of better off dead/self-harm past 2 weeks Not at all Not at all Not at all           Past Medical History:   Diagnosis Date     Ankle pain 5/21/2016     Ankle sprain and strain 9/16/2010     CARDIOVASCULAR SCREENING; LDL GOAL LESS THAN 130 9/6/2016     Depression     declines treatment 4/1/16     HTN (hypertension)     declines treatment 4/1/16     Left knee pain 6/18/2015     Plantar fasciitis 9/16/2010     Right knee pain 12/12/2016     Tibialis posterior tendonitis 5/21/2016      Past Surgical History:   Procedure Laterality Date     HERNIORRHAPHY INGUINAL BILATERAL      RIH 2004, LIH 1985     NASAL/SINUS POLYPECTOMY      Nasal-Sinus Polypectomy      Current Outpatient Medications   Medication Sig Dispense Refill     amLODIPine (NORVASC) 5 MG tablet Take 1 tablet (5 mg) by mouth daily 90 tablet 0     buPROPion (WELLBUTRIN SR) 150 MG 12 hr tablet TAKE 1 TABLET(150 MG) BY MOUTH TWICE DAILY 60 tablet 0     buPROPion (WELLBUTRIN XL) 150 MG 24 hr tablet TAKE 1 TABLET BY MOUTH EVERY DAY. START TAKING ON MONDAY 5/10       celecoxib (CELEBREX) 100 MG capsule TAKE 1 CAPSULE(100 MG) BY MOUTH TWICE DAILY 0.001 capsule 0     cyclobenzaprine (FLEXERIL) 10 MG tablet Take 1 tablet (10 mg) by mouth 3 times daily as needed for muscle spasms 30 tablet 0     cyclobenzaprine (FLEXERIL) 10 MG tablet Take 1 tablet (10 mg) by mouth 3 times daily as needed for muscle spasms 30 tablet 0     dicyclomine (BENTYL) 10 MG capsule         Lidocaine (LIDOCARE) 4 % Patch Place 1 patch onto the skin every 24 hours To prevent lidocaine toxicity, patient should be patch free for 12 hrs daily. 10 patch 0     lidocaine (XYLOCAINE) 5 % external ointment Apply topically 3 times daily 240 g 0     naproxen (NAPROSYN) 500 MG tablet Take 1 tablet (500 mg) by mouth 2 times daily (with meals) 60 tablet 0     olmesartan (BENICAR) 20 MG tablet TAKE 1 TABLET BY MOUTH DAILY WITH BREAKFAST 90 tablet 0     ondansetron (ZOFRAN) 4 MG tablet Take 1 tablet (4 mg) by mouth every 8 hours as needed for nausea 30 tablet 0        Allergies   Allergen Reactions     Ibuprofen Nausea     Other reaction(s): Abdominal pain  Other reaction(s): *Unknown  Other reaction(s): Abdominal pain      ROS:   Gen- no fevers/chills   Rheum - no morning stiffness   Derm - no rash/ redness   Neuro - no numbness, no tingling   Remainder of ROS negative.     Exam:   Wt 106.6 kg (235 lb)   BMI 32.78 kg/m       L Knee:   ROM: 0-130; Crepitus: YES   Effusion: trace ; Swelling: NO   Strength: Full in flexion/ extension   Tenderness: Patella - NO Medial joint line - mild; Lateral joint line - mild; Quad tendon - no; Patellar tendon- no; Hamstring - no.   Cruciates: anterior drawer - neg/posterior drawer -neg. Lachman - neg   Collaterals: varus -neg/valgus -neg.   Patella: patellar compression - neg; single leg bend- pos   Meniscus: [place]at - neg; Thessaly - pos for anterior pain

## 2022-04-21 ENCOUNTER — OFFICE VISIT (OUTPATIENT)
Dept: SURGERY | Facility: CLINIC | Age: 72
End: 2022-04-21
Payer: COMMERCIAL

## 2022-04-21 ENCOUNTER — PRE VISIT (OUTPATIENT)
Dept: SURGERY | Facility: CLINIC | Age: 72
End: 2022-04-21
Payer: COMMERCIAL

## 2022-04-21 VITALS
HEIGHT: 72 IN | BODY MASS INDEX: 32.22 KG/M2 | SYSTOLIC BLOOD PRESSURE: 147 MMHG | DIASTOLIC BLOOD PRESSURE: 83 MMHG | HEART RATE: 74 BPM | WEIGHT: 237.9 LBS | OXYGEN SATURATION: 99 %

## 2022-04-21 DIAGNOSIS — K42.9 UMBILICAL HERNIA WITHOUT OBSTRUCTION AND WITHOUT GANGRENE: ICD-10-CM

## 2022-04-21 PROCEDURE — 99207 PR NO BILLABLE SERVICE THIS VISIT: CPT | Performed by: SURGERY

## 2022-04-21 RX ORDER — CEFAZOLIN SODIUM 2 G/50ML
2 SOLUTION INTRAVENOUS
Status: CANCELLED | OUTPATIENT
Start: 2022-04-21

## 2022-04-21 RX ORDER — CEFAZOLIN SODIUM 2 G/50ML
2 SOLUTION INTRAVENOUS SEE ADMIN INSTRUCTIONS
Status: CANCELLED | OUTPATIENT
Start: 2022-04-21

## 2022-04-21 ASSESSMENT — ENCOUNTER SYMPTOMS
NECK PAIN: 1
MUSCLE CRAMPS: 1
JAUNDICE: 0
BACK PAIN: 1
JOINT SWELLING: 0
NAUSEA: 0
CONSTIPATION: 1
VOMITING: 0
ABDOMINAL PAIN: 0
HEARTBURN: 1
BLOOD IN STOOL: 0
BLOATING: 0
RECTAL PAIN: 0
DIARRHEA: 1
MUSCLE WEAKNESS: 0
MYALGIAS: 1
BOWEL INCONTINENCE: 0
ARTHRALGIAS: 1
STIFFNESS: 1

## 2022-04-21 NOTE — LETTER
4/21/2022       RE: Don Samson  [address]  [address]  [place] [address]     Dear Colleague,    Thank you for referring your patient, Don Samson, to the [facility] [place] [facility]. Please see a copy of my visit note below.        New Hernia Consultation Note      Don Samson  [id number]  1950    Requesting Provider: Sarabjit Alaniz        I was asked by Sarabjit Alaniz to see this patient for the following problem:    CHIEF COMPLAINT:  Chief Complaint Reviewed With Patient 4/21/2022   I am here today to be seen for: Umbilical Hernia       HISTORY OF PRESENT ILLNESS:  72 M h/o HTN, depression, chronic back pain managed with flexeril/naproxen, bilateral inguinal hernia repairs (2005 and 1980s, patients thinks these were done open with mesh, he denies any recurrence) who presents with a protuberant umbilical hernia. He reports that this hernia has been present for several years (fat containing umbilical hernia and left inguinal hernia on CT Ap from 2016). This hernia does not cause him pain or discomfort even when lifting. 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 He says that the umbilical hernia sometimes reduces when he lies flat but that he does not try to reduce it manually. He denies any nausea, emesis, constipation or obstructive symptoms.     No reported history of cirrhosis or NUÑEZ. LFTs in 2020 were normal. He drinks ETOH once every six months.      Timing of Hernia Reviewed With Patient 4/21/2022   The onset of my hernia symptoms was: Sudden   My symptoms are: Intermittant (Come and Go)   My symptoms have been: Worsening       Duration Reviewed With Patient 4/21/2022   My symptoms began: 3/2020       Modifying Factor Questions Reviewed With Patient 4/21/2022   My hernia symptoms improve with: nothing   My hernia symptoms worsen with: ligting       Associated Signs and Symptoms  Reviewed With Patient 4/21/2022   I have the following complaints/concerns related to my hernia: Pain, Diarrhea, Constipation, Abdominal Bulge or Protusion       [facility] SURGERY-HERNIA HISTORY 4/21/2022   My hernia has been repaired with mesh in the past? Yes   The mesh is still in place? Yes   In the past I have had this many hernia repairs in this location: 2       Patient Supplied Answers To HerQLes Assessment Questionnaire  No flowsheet data found.  _______________________________________________________________________    NUTRITIONAL STATUS:  Lab Results   Component Value Date    ALBUMIN 3.9 04/05/2021     Body mass index is 32.27 kg/m .    Patient is not immunosuppressed.    Patient is not a current smoker.    Past Medical History:   Diagnosis Date    Ankle pain 5/21/2016    Ankle sprain and strain 9/16/2010    CARDIOVASCULAR SCREENING; LDL GOAL LESS THAN 130 9/6/2016    Depression     declines treatment 4/1/16    HTN (hypertension)     declines treatment 4/1/16    Left knee pain 6/18/2015    Plantar fasciitis 9/16/2010    Right knee pain 12/12/2016    Tibialis posterior tendonitis 5/21/2016       Patient Active Problem List   Diagnosis    Fatigue    Hypertension goal BP (blood pressure) < 140/90    Major depressive disorder, recurrent, moderate (H)    Viral gastroenteritis    Disease of nail    Left buttock pain       Past Surgical History:   Procedure Laterality Date    HERNIORRHAPHY INGUINAL BILATERAL      RIH 2004, LIH 1985    NASAL/SINUS POLYPECTOMY      Nasal-Sinus Polypectomy       MEDICATIONS:  Current Outpatient Medications   Medication    amLODIPine (NORVASC) 5 MG tablet    buPROPion (WELLBUTRIN SR) 150 MG 12 hr tablet    celecoxib (CELEBREX) 100 MG capsule    cyclobenzaprine (FLEXERIL) 10 MG tablet    dicyclomine (BENTYL) 10 MG capsule    Lidocaine (LIDOCARE) 4 % Patch    lidocaine (XYLOCAINE) 5 % external ointment    naproxen (NAPROSYN) 500 MG tablet    olmesartan (BENICAR) 20 MG tablet    ondansetron  (ZOFRAN) 4 MG tablet    buPROPion (WELLBUTRIN XL) 150 MG 24 hr tablet    cyclobenzaprine (FLEXERIL) 10 MG tablet     No current facility-administered medications for this visit.       ALLERGIES:  Allergies   Allergen Reactions    Ibuprofen Nausea     Other reaction(s): Abdominal pain  Other reaction(s): *Unknown  Other reaction(s): Abdominal pain       Social History     Socioeconomic History    Marital status: Single   Tobacco Use    Smoking status: Former Smoker     Quit date: 1970     Years since quittin.3    Smokeless tobacco: Never Used   Substance and Sexual Activity    Alcohol use: Yes     Alcohol/week: 0.0 standard drinks     Comment: rarely     Drug use: No    Sexual activity: Yes     Partners: Female   Other Topics Concern    Parent/sibling w/ CABG, MI or angioplasty before 65F 55M? No   Social History Narrative    2019; lives alone , with family           Family History   Problem Relation Age of Onset    Hypertension Mother        ROS    No orders of the defined types were placed in this encounter.      PHYSICAL EXAMINATION:  Vital Signs: BP (!) 147/83 (BP Location: Left arm, Patient Position: Chair, Cuff Size: Adult Large)   Pulse 74   Ht 1.829 m (6')   Wt 107.9 kg (237 lb 14.4 oz)   SpO2 99%   BMI 32.27 kg/m    HEENT: NCAT; MMM;   Lungs: Breathing unlabored  Abdomen: soft, non-tender  Midline umbilical hernia is protuberant (approx 5 cm superior-inferior, 6 cm laterally and 5 cm out from the abdominal wall), non-reducible, non-tender. There is surrounding erythema with a prominent subcutaneous vein overlying the umbilicus. No areas of petechiae, ecchymosis or creptius.     IMAGIN2016 CT AP: IMPRESSION:   1. No acute process demonstrated in the abdomen and pelvis.  2. Moderate-sized fat-containing periumbilical hernia, tiny  fat-containing left inguinal hernia.  3. Mild diverticulosis without diverticulitis.    ASSESSMENT:  Large protuberant umbilical hernia (>5 cm), non-tender,  not reducible, no obstructive symptoms, overlying erythema    DISCUSSION OF RISKS:  I discussed the alternatives, benefits, risks and possible complications of hernia repair with the patient. The risks of hernia surgery with and without mesh are described below.    Based on FDA s analysis of medical device adverse event reports and of peer-reviewed, scientific literature, the most common adverse events for all surgical repair of hernias--with or without mesh--are pain, infection, hernia recurrence, scar-like tissue that sticks tissues together (adhesion), blockage of the large or small intestine (obstruction), bleeding, abnormal connection between organs, vessels, or intestines (fistula), fluid build-up at the surgical site (seroma), and a hole in neighboring tissues or organs (perforation).  Some other potential adverse events that can occur following hernia repair with mesh are mesh migration and mesh shrinkage (contraction).    http://www.fda.gov/MedicalDevices/ProductsandMedicalProcedures/ImplantsandProsthetics/HerniaSurgicalMesh/default.htm    PLAN:  Robotic assisted laparoscopic umbilical hernia repair    Pre-op anesthesia clinic clearance    Seen and discussed with Dr. Diop    Physician Attestation  I, Leroy Diop, saw and evaluated this patient as part of a shared visit.  I have reviewed and discussed with the advanced practice provider and/or resident their history, physical and plan.    I personally reviewed the vital signs, medications, labs and imaging.    My key history or physical exam findings: umb hernia    Key management decisions made by me: pac and plan [facility]    Leroy Diop MD  Date of Service (when I saw the patient): 4/21/22      Sincerely,    Em Ge MD PGY-4

## 2022-04-21 NOTE — NURSING NOTE
Chief Complaint   Patient presents with     Consult     Consultation Hernia Repair Surgery here to discuss surgery       Vitals:    04/21/22 0820   BP: (!) 147/83   BP Location: Left arm   Patient Position: Chair   Cuff Size: Adult Large   Pulse: 74   SpO2: 99%   Weight: 107.9 kg (237 lb 14.4 oz)   Height: 1.829 m (6')       Body mass index is 32.27 kg/m .

## 2022-04-21 NOTE — PROGRESS NOTES
New Hernia Consultation Note      Don Samson  [id number]  1950    Requesting Provider: Sarabjit Alaniz        I was asked by Sarabjit Alaniz to see this patient for the following problem:    CHIEF COMPLAINT:  Chief Complaint Reviewed With Patient 4/21/2022   I am here today to be seen for: Umbilical Hernia       HISTORY OF PRESENT ILLNESS:  72 M h/o HTN, depression, chronic back pain managed with flexeril/naproxen, bilateral inguinal hernia repairs (2005 and 1980s, patients thinks these were done open with mesh, he denies any recurrence) who presents with a protuberant umbilical hernia. He reports that this hernia has been present for several years (fat containing umbilical hernia and left inguinal hernia on CT Ap from 2016). This hernia does not cause him pain or discomfort even when lifting. 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 He says that the umbilical hernia sometimes reduces when he lies flat but that he does not try to reduce it manually. He denies any nausea, emesis, constipation or obstructive symptoms.     No reported history of cirrhosis or NUÑEZ. LFTs in 2020 were normal. He drinks ETOH once every six months.      Timing of Hernia Reviewed With Patient 4/21/2022   The onset of my hernia symptoms was: Sudden   My symptoms are: Intermittant (Come and Go)   My symptoms have been: Worsening       Duration Reviewed With Patient 4/21/2022   My symptoms began: 3/2020       Modifying Factor Questions Reviewed With Patient 4/21/2022   My hernia symptoms improve with: nothing   My hernia symptoms worsen with: ligting       Associated Signs and Symptoms Reviewed With Patient 4/21/2022   I have the following complaints/concerns related to my hernia: Pain, Diarrhea, Constipation, Abdominal Bulge or Protusion       [facility] SUR[facility]ERY-HERNIA HISTORY 4/21/2022   My hernia has been repaired with mesh in the past? Yes   The mesh is still in place? Yes   In the past I have had this many hernia  repairs in this location: 2       Patient Supplied Answers To HerQLes Assessment Questionnaire  No flowsheet data found.  _______________________________________________________________________    NUTRITIONAL STATUS:  Lab Results   Component Value Date    ALBUMIN 3.9 04/05/2021     Body mass index is 32.27 kg/m .    Patient is not immunosuppressed.    Patient is not a current smoker.    Past Medical History:   Diagnosis Date     Ankle pain 5/21/2016     Ankle sprain and strain 9/16/2010     CARDIOVASCULAR SCREENING; LDL GOAL LESS THAN 130 9/6/2016     Depression     declines treatment 4/1/16     HTN (hypertension)     declines treatment 4/1/16     Left knee pain 6/18/2015     Plantar fasciitis 9/16/2010     Right knee pain 12/12/2016     Tibialis posterior tendonitis 5/21/2016       Patient Active Problem List   Diagnosis     Fatigue     Hypertension goal BP (blood pressure) < 140/90     Major depressive disorder, recurrent, moderate (H)     Viral gastroenteritis     Disease of nail     Left buttock pain       Past Surgical History:   Procedure Laterality Date     HERNIORRHAPHY INGUINAL BILATERAL      RIH 2004, LIH 1985     NASAL/SINUS POLYPECTOMY      Nasal-Sinus Polypectomy       MEDICATIONS:  Current Outpatient Medications   Medication     amLODIPine (NORVASC) 5 MG tablet     buPROPion (WELLBUTRIN SR) 150 MG 12 hr tablet     celecoxib (CELEBREX) 100 MG capsule     cyclobenzaprine (FLEXERIL) 10 MG tablet     dicyclomine (BENTYL) 10 MG capsule     Lidocaine (LIDOCARE) 4 % Patch     lidocaine (XYLOCAINE) 5 % external ointment     naproxen (NAPROSYN) 500 MG tablet     olmesartan (BENICAR) 20 MG tablet     ondansetron (ZOFRAN) 4 MG tablet     buPROPion (WELLBUTRIN XL) 150 MG 24 hr tablet     cyclobenzaprine (FLEXERIL) 10 MG tablet     No current facility-administered medications for this visit.       ALLERGIES:  Allergies   Allergen Reactions     Ibuprofen Nausea     Other reaction(s): Abdominal pain  Other  reaction(s): *Unknown  Other reaction(s): Abdominal pain       Social History     Socioeconomic History     Marital status: Single   Tobacco Use     Smoking status: Former Smoker     Quit date: 1970     Years since quittin.3     Smokeless tobacco: Never Used   Substance and Sexual Activity     Alcohol use: Yes     Alcohol/week: 0.0 standard drinks     Comment: rarely      Drug use: No     Sexual activity: Yes     Partners: Female   Other Topics Concern     Parent/sibling w/ CABG, MI or angioplasty before 65F 55M? No   Social History Narrative    2019; lives alone , with family           Family History   Problem Relation Age of Onset     Hypertension Mother        ROS    No orders of the defined types were placed in this encounter.      PHYSICAL EXAMINATION:  Vital Signs: BP (!) 147/83 (BP Location: Left arm, Patient Position: Chair, Cuff Size: Adult Large)   Pulse 74   Ht 1.829 m (6')   Wt 107.9 kg (237 lb 14.4 oz)   SpO2 99%   BMI 32.27 kg/m    HEENT: NCAT; MMM;   Lungs: Breathing unlabored  Abdomen: soft, non-tender  Midline umbilical hernia is protuberant (approx 5 cm superior-inferior, 6 cm laterally and 5 cm out from the abdominal wall), non-reducible, non-tender. There is surrounding erythema with a prominent subcutaneous vein overlying the umbilicus. No areas of petechiae, ecchymosis or creptius.     IMAGIN2016 CT AP: IMPRESSION:   1. No acute process demonstrated in the abdomen and pelvis.  2. Moderate-sized fat-containing periumbilical hernia, tiny  fat-containing left inguinal hernia.  3. Mild diverticulosis without diverticulitis.    ASSESSMENT:  Large protuberant umbilical hernia (>5 cm), non-tender, not reducible, no obstructive symptoms, overlying erythema    DISCUSSION OF RISKS:  I discussed the alternatives, benefits, risks and possible complications of hernia repair with the patient. The risks of hernia surgery with and without mesh are described below.    Based on FDA s  analysis of medical device adverse event reports and of peer-reviewed, scientific literature, the most common adverse events for all surgical repair of hernias--with or without mesh--are pain, infection, hernia recurrence, scar-like tissue that sticks tissues together (adhesion), blockage of the large or small intestine (obstruction), bleeding, abnormal connection between organs, vessels, or intestines (fistula), fluid build-up at the surgical site (seroma), and a hole in neighboring tissues or organs (perforation).  Some other potential adverse events that can occur following hernia repair with mesh are mesh migration and mesh shrinkage (contraction).    http://www.fda.gov/MedicalDevices/ProductsandMedicalProcedures/ImplantsandProsthetics/HerniaSurgicalMesh/default.htm    PLAN:  Robotic assisted laparoscopic umbilical hernia repair    Pre-op anesthesia clinic clearance    Seen and discussed with Dr. Diop    Physician Attestation  I, Leroy Diop, saw and evaluated this patient as part of a shared visit.  I have reviewed and discussed with the advanced practice provider and/or resident their history, physical and plan.    I personally reviewed the vital signs, medications, labs and imaging.    My key history or physical exam findings: umb hernia    Key management decisions made by me: pac and plan [facility]    Leroy Diop MD  Date of Service (when I saw the patient): 4/21/22      Sincerely,    Em Ge MD PGY-4

## 2022-05-06 ENCOUNTER — TELEPHONE (OUTPATIENT)
Dept: SURGERY | Facility: CLINIC | Age: 72
End: 2022-05-06
Payer: COMMERCIAL

## 2022-05-06 NOTE — TELEPHONE ENCOUNTER
Left VM message asking patient to call me to possible discuss dates for hernia repair. My direct call back number left.

## 2022-05-21 ENCOUNTER — OFFICE VISIT (OUTPATIENT)
Dept: URGENT CARE | Facility: URGENT CARE | Age: 72
End: 2022-05-21
Payer: COMMERCIAL

## 2022-05-21 VITALS
SYSTOLIC BLOOD PRESSURE: 134 MMHG | OXYGEN SATURATION: 97 % | DIASTOLIC BLOOD PRESSURE: 79 MMHG | HEART RATE: 90 BPM | BODY MASS INDEX: 31.83 KG/M2 | TEMPERATURE: 98.3 F | WEIGHT: 235 LBS | HEIGHT: 72 IN

## 2022-05-21 DIAGNOSIS — R53.83 FATIGUE, UNSPECIFIED TYPE: Primary | ICD-10-CM

## 2022-05-21 LAB
BASOPHILS # BLD AUTO: 0.1 10E3/UL (ref 0–0.2)
BASOPHILS NFR BLD AUTO: 1 %
EOSINOPHIL # BLD AUTO: 0.3 10E3/UL (ref 0–0.7)
EOSINOPHIL NFR BLD AUTO: 3 %
ERYTHROCYTE [DISTWIDTH] IN BLOOD BY AUTOMATED COUNT: 12.6 % (ref 10–15)
HCT VFR BLD AUTO: 40.4 % (ref 40–53)
HGB BLD-MCNC: 13.5 G/DL (ref 13.3–17.7)
IMM GRANULOCYTES # BLD: 0 10E3/UL
IMM GRANULOCYTES NFR BLD: 0 %
LYMPHOCYTES # BLD AUTO: 1.6 10E3/UL (ref 0.8–5.3)
LYMPHOCYTES NFR BLD AUTO: 20 %
MCH RBC QN AUTO: 29 PG (ref 26.5–33)
MCHC RBC AUTO-ENTMCNC: 33.4 G/DL (ref 31.5–36.5)
MCV RBC AUTO: 87 FL (ref 78–100)
MONOCYTES # BLD AUTO: 0.6 10E3/UL (ref 0–1.3)
MONOCYTES NFR BLD AUTO: 8 %
NEUTROPHILS # BLD AUTO: 5.4 10E3/UL (ref 1.6–8.3)
NEUTROPHILS NFR BLD AUTO: 68 %
PLATELET # BLD AUTO: 264 10E3/UL (ref 150–450)
RBC # BLD AUTO: 4.65 10E6/UL (ref 4.4–5.9)
WBC # BLD AUTO: 7.9 10E3/UL (ref 4–11)

## 2022-05-21 PROCEDURE — 85025 COMPLETE CBC W/AUTO DIFF WBC: CPT | Performed by: PHYSICIAN ASSISTANT

## 2022-05-21 PROCEDURE — 80048 BASIC METABOLIC PNL TOTAL CA: CPT | Performed by: PHYSICIAN ASSISTANT

## 2022-05-21 PROCEDURE — 99213 OFFICE O/P EST LOW 20 MIN: CPT | Performed by: PHYSICIAN ASSISTANT

## 2022-05-21 PROCEDURE — 82607 VITAMIN B-12: CPT | Performed by: PHYSICIAN ASSISTANT

## 2022-05-21 PROCEDURE — 36415 COLL VENOUS BLD VENIPUNCTURE: CPT | Performed by: PHYSICIAN ASSISTANT

## 2022-05-21 PROCEDURE — 84443 ASSAY THYROID STIM HORMONE: CPT | Performed by: PHYSICIAN ASSISTANT

## 2022-05-21 NOTE — PROGRESS NOTES
Fatigue, unspecified type  - CBC with platelets and differential; Future  - TSH with free T4 reflex; Future  - Vitamin B12; Future  - Basic metabolic panel  (Ca, Cl, CO2, Creat, Gluc, K, Na, BUN); Future  - CBC with platelets and differential  - TSH with free T4 reflex  - Vitamin B12  - Basic metabolic panel  (Ca, Cl, CO2, Creat, Gluc, K, Na, BUN)    Patient was advised to return to clinic if symptoms do not improve in the amount of time specified in the AVS or if symptoms worsen. Patient educated on red flag symptoms and asked to go directly to the ED if symptoms present themselves.     Selvin Plascencia PA-C  [facility]    Subjective   72 year old who presents to clinic today for the following health issues:    Fatigue       HPI     Acute Illness  Acute illness concerns: Pt in clinic to have eval for severe fatigue. Patient states that the fatigue worse in the morning. Patient reports waking up frequently during the night on occasion.    Onset/Duration: Past few months  Symptoms:  Fever: no  Chills/Sweats: no  Headache (location?): no  Sinus Pressure: no  Conjunctivitis:  no  Ear Pain: no  Rhinorrhea: no  Congestion: no  Sore Throat: no  Cough: no  Wheeze: no  Decreased Appetite: no  Nausea: no  Vomiting: no  Diarrhea: no  Dysuria/Freq.: no  Dysuria or Hematuria: no  Achiness: no body aches  Sick/Strep Exposure: no  Therapies tried and outcome: None    Patient does not know if he snores.     Review of Systems   Review of Systems   See HPI     Objective    Temp: 98.3  F (36.8  C) Temp src: Temporal BP: 134/79 Pulse: 90     SpO2: 97 %       Physical Exam   Physical Exam  Constitutional:       General: He is not in acute distress.     Appearance: Normal appearance. He is normal weight. He is not ill-appearing, toxic-appearing or diaphoretic.   HENT:      Head: Normocephalic and atraumatic.   Cardiovascular:      Rate and Rhythm: Normal rate.      Pulses: Normal pulses.   Pulmonary:      Effort:  Pulmonary effort is normal. No respiratory distress.   Neurological:      Mental Status: He is alert.   Psychiatric:         Mood and Affect: Mood normal.         Behavior: Behavior normal.         Thought Content: Thought content normal.         Judgment: Judgment normal.          Results for orders placed or performed in visit on 05/21/22 (from the past 24 hour(s))   CBC with platelets and differential    Narrative    The following orders were created for panel order CBC with platelets and differential.  Procedure                               Abnormality         Status                     ---------                               -----------         ------                     CBC with platelets and d...[[id number]]                      Final result                 Please view results for these tests on the individual orders.   CBC with platelets and differential   Result Value Ref Range    WBC Count 7.9 4.0 - 11.0 10e3/uL    RBC Count 4.65 4.40 - 5.90 10e6/uL    Hemoglobin 13.5 13.3 - 17.7 g/dL    Hematocrit 40.4 40.0 - 53.0 %    MCV 87 78 - 100 fL    MCH 29.0 26.5 - 33.0 pg    MCHC 33.4 31.5 - 36.5 g/dL    RDW 12.6 10.0 - 15.0 %    Platelet Count 264 150 - 450 10e3/uL    % Neutrophils 68 %    % Lymphocytes 20 %    % Monocytes 8 %    % Eosinophils 3 %    % Basophils 1 %    % Immature Granulocytes 0 %    Absolute Neutrophils 5.4 1.6 - 8.3 10e3/uL    Absolute Lymphocytes 1.6 0.8 - 5.3 10e3/uL    Absolute Monocytes 0.6 0.0 - 1.3 10e3/uL    Absolute Eosinophils 0.3 0.0 - 0.7 10e3/uL    Absolute Basophils 0.1 0.0 - 0.2 10e3/uL    Absolute Immature Granulocytes 0.0 <=0.4 10e3/uL

## 2022-05-22 LAB
ANION GAP SERPL CALCULATED.3IONS-SCNC: 3 MMOL/L (ref 3–14)
BUN SERPL-MCNC: 29 MG/DL (ref 7–30)
CALCIUM SERPL-MCNC: 8.8 MG/DL (ref 8.5–10.1)
CHLORIDE BLD-SCNC: 111 MMOL/L (ref 94–109)
CO2 SERPL-SCNC: 27 MMOL/L (ref 20–32)
CREAT SERPL-MCNC: 1.27 MG/DL (ref 0.66–1.25)
GFR SERPL CREATININE-BSD FRML MDRD: 61 ML/MIN/1.73M2
GLUCOSE BLD-MCNC: 121 MG/DL (ref 70–99)
POTASSIUM BLD-SCNC: 4.5 MMOL/L (ref 3.4–5.3)
SODIUM SERPL-SCNC: 141 MMOL/L (ref 133–144)
TSH SERPL DL<=0.005 MIU/L-ACNC: 1.48 MU/L (ref 0.4–4)
VIT B12 SERPL-MCNC: 294 PG/ML (ref 193–986)

## 2022-06-02 PROBLEM — M79.18 LEFT BUTTOCK PAIN: Status: RESOLVED | Noted: 2022-03-28 | Resolved: 2022-06-02

## 2022-07-18 ENCOUNTER — TELEPHONE (OUTPATIENT)
Dept: SURGERY | Facility: CLINIC | Age: 72
End: 2022-07-18

## 2022-07-18 NOTE — TELEPHONE ENCOUNTER
"Patient called asking \"Is this surgery?\" He had a hernia consult with Dr. Diop on 4/21/22. I left a VM message on 5/6 asking him to call me to discuss a date. I asked him what his timeframe was for scheduling and he said \"I'm not going to schedule. I'm just going to let the damn thing burst\" and hung up on me.   "

## 2022-07-26 ENCOUNTER — OFFICE VISIT (OUTPATIENT)
Dept: URGENT CARE | Facility: URGENT CARE | Age: 72
End: 2022-07-26
Payer: COMMERCIAL

## 2022-07-26 VITALS
HEIGHT: 72 IN | BODY MASS INDEX: 31.83 KG/M2 | WEIGHT: 235 LBS | HEART RATE: 80 BPM | SYSTOLIC BLOOD PRESSURE: 110 MMHG | RESPIRATION RATE: 18 BRPM | OXYGEN SATURATION: 96 % | TEMPERATURE: 97.5 F | DIASTOLIC BLOOD PRESSURE: 77 MMHG

## 2022-07-26 DIAGNOSIS — K42.9 UMBILICAL HERNIA WITHOUT OBSTRUCTION AND WITHOUT GANGRENE: ICD-10-CM

## 2022-07-26 DIAGNOSIS — R11.0 NAUSEA: Primary | ICD-10-CM

## 2022-07-26 PROCEDURE — 99213 OFFICE O/P EST LOW 20 MIN: CPT | Performed by: FAMILY MEDICINE

## 2022-07-26 RX ORDER — ONDANSETRON 4 MG/1
4 TABLET, ORALLY DISINTEGRATING ORAL ONCE
Status: COMPLETED | OUTPATIENT
Start: 2022-07-26 | End: 2022-07-26

## 2022-07-26 RX ADMIN — ONDANSETRON 4 MG: 4 TABLET, ORALLY DISINTEGRATING ORAL at 18:32

## 2022-07-26 NOTE — PROGRESS NOTES
Chief Complaint   Patient presents with     Urgent Care     Back Pain     Since Thursday back pain from helping someone. Pt also needs work note.       ASSESMENT AND PLAN   Don was seen today for urgent care and back pain.    Diagnoses and all orders for this visit:    Nausea  -     ondansetron (ZOFRAN ODT) ODT tab 4 mg    Umbilical hernia without obstruction and without gangrene        Was encouraged that if nausea symptoms worsens he should go to ER  Patient refused any treatment recommendation    Initial differential diagnosis included but was not restricted to   Differential Diagnosis:   gastritis from taking too much of ibuprofen, hernia related, gastritis, bowel obstruction  Medical Decision Making:  Discussed with patient about the possibilities of symptoms related to hernia patient refused any treatment related to hernia and refused to follow-up in the ER if symptoms worsen.  Was given a pill of Zofran while in the clinic also given letter for work excusing her from work today and tomorrow.  His vital signs were within normal limits but as his hernia could not be reduced completely I am worried about possible obstruction did have a long discussion with the patient about it but he did not want the umbilical hernia repair at rest and refused treatment related to that.  He is well aware that he needs a surgery for hernia.    Routine discharge counseling was given to the patient and the patient understands that worsening, changing or persistent symptoms should prompt an immediate call or follow up with their primary physician or the emergency department. The importance of appropriate follow up was also discussed with the patient.     I have reviewed the nursing notes.    Time  spent on the date of the encounter doing chart review, patient visit and documentation   see orders in Epic  Pt verbalized and agreed with the plan and is aware of the worsening symptoms for which would need to follow up .  Pt was stable  during time of discharge from the clinic     SUBJECTIVE     Don Samson is a 72 year old male presenting with a chief complaint of    Chief Complaint   Patient presents with     Urgent Care     Back Pain     Since Thursday back pain from helping someone. Pt also needs work note.           Don Samson is a 72 year old male with h/o recent low back pain and also for umbilical hernia patient is aware of the umbilicus hernia and knows that he is due for surgery but patient does not want to talk about it.  Presenting with a chief complaint of noticing nausea for last 4 days and started on that he denies any worsening of his back pain he feels the back pain is better.He is an established patient of [facility].  Onset of symptoms was 5 day(s) ago.  Present nausea symptoms for last 5 days since he has been on ibuprofen for low back pain his back pain has more or less resolved resolved  Course of illness is worsening.    Severity moderate  Current and Associated symptoms: nausea  Treatment measures tried include Tylenol/Ibuprofen.  Predisposing factors include None.    Past Medical History:   Diagnosis Date     Ankle pain 5/21/2016     Ankle sprain and strain 9/16/2010     CARDIOVASCULAR SCREENING; LDL GOAL LESS THAN 130 9/6/2016     Depression     declines treatment 4/1/16     HTN (hypertension)     declines treatment 4/1/16     Left knee pain 6/18/2015     Plantar fasciitis 9/16/2010     Right knee pain 12/12/2016     Tibialis posterior tendonitis 5/21/2016     Current Outpatient Medications   Medication Sig Dispense Refill     amLODIPine (NORVASC) 5 MG tablet Take 1 tablet (5 mg) by mouth daily 90 tablet 0     buPROPion (WELLBUTRIN SR) 150 MG 12 hr tablet TAKE 1 TABLET(150 MG) BY MOUTH TWICE DAILY (Patient not taking: Reported on 5/21/2022) 60 tablet 0     buPROPion (WELLBUTRIN XL) 150 MG 24 hr tablet        celecoxib (CELEBREX) 100 MG capsule TAKE 1 CAPSULE(100 MG) BY MOUTH TWICE DAILY 0.001 capsule 0      cyclobenzaprine (FLEXERIL) 10 MG tablet Take 1 tablet (10 mg) by mouth 3 times daily as needed for muscle spasms 30 tablet 0     cyclobenzaprine (FLEXERIL) 10 MG tablet Take 1 tablet (10 mg) by mouth 3 times daily as needed for muscle spasms 30 tablet 0     dicyclomine (BENTYL) 10 MG capsule        Lidocaine (LIDOCARE) 4 % Patch Place 1 patch onto the skin every 24 hours To prevent lidocaine toxicity, patient should be patch free for 12 hrs daily. (Patient not taking: Reported on 2022) 10 patch 0     lidocaine (XYLOCAINE) 5 % external ointment Apply topically 3 times daily (Patient not taking: Reported on 2022) 240 g 0     naproxen (NAPROSYN) 500 MG tablet Take 1 tablet (500 mg) by mouth 2 times daily (with meals) 60 tablet 0     olmesartan (BENICAR) 20 MG tablet TAKE 1 TABLET BY MOUTH DAILY WITH BREAKFAST (Patient not taking: Reported on 2022) 90 tablet 0     ondansetron (ZOFRAN) 4 MG tablet Take 1 tablet (4 mg) by mouth every 8 hours as needed for nausea (Patient not taking: Reported on 2022) 30 tablet 0     Social History     Tobacco Use     Smoking status: Former Smoker     Quit date: 1970     Years since quittin.6     Smokeless tobacco: Never Used   Substance Use Topics     Alcohol use: Yes     Alcohol/week: 0.0 standard drinks     Comment: rarely      Family History   Problem Relation Age of Onset     Hypertension Mother          ROS:    10 point ROS of systems including Constitutional, Eyes, Respiratory, Cardiovascular,  Genitourinary, Integumentary, Muscularskeletal, Psychiatric ,neurological were all negative except for pertinent positives noted in my HPI         OBJECTIVE:    /77   Pulse 80   Temp 97.5  F (36.4  C) (Temporal)   Resp 18   Ht 1.829 m (6')   Wt 106.6 kg (235 lb)   SpO2 96%   BMI 31.87 kg/m    GENERAL APPEARANCE: healthy, alert and no distress  HENT: ear canals and TM's normal.  Nose and mouth without ulcers, erythema or lesions  RESP: lungs clear to  auscultation - no rales, rhonchi or wheezes  CV: regular rates and rhythm, normal S1 S2, no murmur noted  ABDOMEN:  soft, large umbilical hernia noted which could not be reduced completely  and bowel sounds normal, is non tender   PSYCH: mentation appears normal  Physical Exam      (Note was completed, in part, with NoteWagon voice-recognition software. Documentation reviewed, but some grammatical, spelling, and word errors may remain.)  Yolis Grijalva MD

## 2022-07-26 NOTE — LETTER
[facility] [place]  [address] [place] [address]  Phone: [phone]    07/26/22    Don CLARKE Samson  [address]  [place] [facility] [address]      To whom it may concern:     Don Samson was seen today  in the clinic for current illness  He needs to be off work tomorrow and can get back to work today after he feels better.        Sincerely,      Yolis Grijalva MD

## 2022-07-29 ENCOUNTER — OFFICE VISIT (OUTPATIENT)
Dept: URGENT CARE | Facility: URGENT CARE | Age: 72
End: 2022-07-29
Payer: COMMERCIAL

## 2022-07-29 VITALS
OXYGEN SATURATION: 98 % | BODY MASS INDEX: 31.83 KG/M2 | DIASTOLIC BLOOD PRESSURE: 76 MMHG | WEIGHT: 235 LBS | RESPIRATION RATE: 18 BRPM | SYSTOLIC BLOOD PRESSURE: 129 MMHG | TEMPERATURE: 97.5 F | HEART RATE: 95 BPM | HEIGHT: 72 IN

## 2022-07-29 DIAGNOSIS — K29.00 ACUTE GASTRITIS, PRESENCE OF BLEEDING UNSPECIFIED, UNSPECIFIED GASTRITIS TYPE: Primary | ICD-10-CM

## 2022-07-29 PROCEDURE — 99213 OFFICE O/P EST LOW 20 MIN: CPT | Performed by: FAMILY MEDICINE

## 2022-07-29 NOTE — PROGRESS NOTES
Assessment & Plan     Acute gastritis, presence of bleeding unspecified, unspecified gastritis type    Recommend avoiding excess NSAID use in future to prevent stomach upset.  Work note written for patient to return to work tomorrow without restriction.    Susanne Wang MD  [facility] [place]    Negro Cifuentes is a 72 year old, presenting for the following health issues:  Urgent Care and Work Comp (Pt still has back pain from last time visit, Pt needs work note. )      HPI   Patient presents for work note after being seen 7/26/2022 for back pain.  Is doing better but had some nausea he states from accidentally using ibuprofen with his naproxen.  He is feeling better today and feels he can go back to work tomorrow without restrictions.  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  Denies any vomiting, melena or BRBPR    Review of Systems   Constitutional, HEENT, cardiovascular, pulmonary, GI, , musculoskeletal, neuro, skin, endocrine and psych systems are negative, except as otherwise noted.      Objective    /76   Pulse 95   Temp 97.5  F (36.4  C) (Temporal)   Resp 18   Ht 1.829 m (6')   Wt 106.6 kg (235 lb)   SpO2 98%   BMI 31.87 kg/m    Body mass index is 31.87 kg/m .  Physical Exam   GENERAL: healthy, alert and no distress  EYES: Eyes grossly normal to inspection, PERRL and conjunctivae and sclerae normal  MS: no gross musculoskeletal defects noted, no edema  SKIN: no suspicious lesions or rashes  PSYCH: mentation appears normal, affect normal/bright                    .  ..

## 2022-07-29 NOTE — LETTER
WORK NOTE    Date: 7/29/2022      Name: Don Samson                       YOB: 1950    Medical Record Number: [id number]    The patient was seen at: [facility]    Patient seen today for follow up.  May return to work without restrictions tomorrow.7/30/2022.        _________________________  Susanne Wang MD

## 2022-08-19 ENCOUNTER — OFFICE VISIT (OUTPATIENT)
Dept: URGENT CARE | Facility: URGENT CARE | Age: 72
End: 2022-08-19
Payer: COMMERCIAL

## 2022-08-19 VITALS
OXYGEN SATURATION: 99 % | TEMPERATURE: 97.3 F | DIASTOLIC BLOOD PRESSURE: 79 MMHG | HEART RATE: 97 BPM | SYSTOLIC BLOOD PRESSURE: 117 MMHG

## 2022-08-19 DIAGNOSIS — K52.9 GASTROENTERITIS: Primary | ICD-10-CM

## 2022-08-19 PROCEDURE — 99212 OFFICE O/P EST SF 10 MIN: CPT | Performed by: STUDENT IN AN ORGANIZED HEALTH CARE EDUCATION/TRAINING PROGRAM

## 2022-08-19 NOTE — LETTER
RETURN TO WORK/SCHOOL FORM    8/19/2022    Re: Don Samson  1950      To Whom It May Concern:     Don CLARKE Samson was seen in clinic today. Please excuse him from work that he missed last week due to illness. He may return to work without restrictions on 8/20/22            Juan Luis Reyes MD  8/19/2022 12:30 PM

## 2022-08-19 NOTE — PROGRESS NOTES
SUBJECTIVE:  Don Samson is an 72 year old male who presents for     Stomach pain  - ate potato salad at [facility] last week  - missed 1 week of work due to diarrhea, etc  - needs work note  - symptoms resolved and able to keep food down since yesterday      PMH:   has a past medical history of Ankle pain (2016), Ankle sprain and strain (2010), CARDIOVASCULAR SCREENING; LDL GOAL LESS THAN 130 (2016), Depression, HTN (hypertension), Left knee pain (2015), Plantar fasciitis (2010), Right knee pain (2016), and Tibialis posterior tendonitis (2016).  Patient Active Problem List   Diagnosis     Fatigue     Hypertension goal BP (blood pressure) < 140/90     Major depressive disorder, recurrent, moderate (H)     Viral gastroenteritis     Disease of nail     Social History     Socioeconomic History     Marital status: Single   Tobacco Use     Smoking status: Former Smoker     Quit date: 1970     Years since quittin.6     Smokeless tobacco: Never Used   Substance and Sexual Activity     Alcohol use: Yes     Alcohol/week: 0.0 standard drinks     Comment: rarely      Drug use: No     Sexual activity: Yes     Partners: Female   Other Topics Concern     Parent/sibling w/ CABG, MI or angioplasty before 65F 55M? No   Social History Narrative    2019; lives alone , with family         Family History   Problem Relation Age of Onset     Hypertension Mother        ALLERGIES:  Ibuprofen    Current Outpatient Medications   Medication     amLODIPine (NORVASC) 5 MG tablet     naproxen (NAPROSYN) 500 MG tablet     buPROPion (WELLBUTRIN SR) 150 MG 12 hr tablet     buPROPion (WELLBUTRIN XL) 150 MG 24 hr tablet     celecoxib (CELEBREX) 100 MG capsule     cyclobenzaprine (FLEXERIL) 10 MG tablet     cyclobenzaprine (FLEXERIL) 10 MG tablet     dicyclomine (BENTYL) 10 MG capsule     Lidocaine (LIDOCARE) 4 % Patch     lidocaine (XYLOCAINE) 5 % external ointment     olmesartan (BENICAR) 20 MG tablet      ondansetron (ZOFRAN) 4 MG tablet     No current facility-administered medications for this visit.         ROS:  ROS is done and is negative for general/constitutional, eye, ENT, Respiratory, cardiovascular, GI, , Skin, musculoskeletal except as noted elsewhere.  All other review of systems negative except as noted elsewhere.    OBJECTIVE:  /79 (BP Location: Right arm, Patient Position: Sitting, Cuff Size: Adult Regular)   Pulse 97   Temp 97.3  F (36.3  C) (Temporal)   SpO2 99%   GENERAL APPEARANCE: Alert, in no acute distress.  EYES: Conjunctivae clear.  ABDOMEN: Significant umbilical hernia, soft, nontender, nondistended.  SKIN: No ulcers, lesions or rash.      RESULTS  No results found for any visits on 08/19/22.  No results found for this or any previous visit (from the past 48 hour(s)).    ASSESSMENT/PLAN:    Gastroenteritis  Occurred about 1 week ago after eating potato salad at [facility] [employer]. Missed work since. S/s resolved yesterday and doing much better.  - Work note  - Guidance/Precautions given      PPE worn: Yes    Options for treatment and follow-up care were reviewed with the patient and/or guardian. Don Samson and/or guardian engaged in the decision making process and verbalized understanding of the options discussed and agreed with the final plan.    See [facility] for orders, medications, letters, patient instructions    Abdiel Reyes DO, MBA

## 2022-08-19 NOTE — LETTER
RETURN TO WORK/SCHOOL FORM    8/19/2022    Re: Don Samson  1950      To Whom It May Concern:   Don CLARKE [place] was seen in clinic today. Please excuse him from work that he missed last week due to illness. He may return to work without restrictions on 8/22/22          Juan Luis Reyes MD  8/19/2022 1:37 PM

## 2022-10-23 ENCOUNTER — OFFICE VISIT (OUTPATIENT)
Dept: URGENT CARE | Facility: URGENT CARE | Age: 72
End: 2022-10-23
Payer: COMMERCIAL

## 2022-10-23 VITALS
BODY MASS INDEX: 33.09 KG/M2 | WEIGHT: 244 LBS | OXYGEN SATURATION: 95 % | HEART RATE: 98 BPM | TEMPERATURE: 97.7 F | DIASTOLIC BLOOD PRESSURE: 78 MMHG | SYSTOLIC BLOOD PRESSURE: 134 MMHG

## 2022-10-23 DIAGNOSIS — G89.29 CHRONIC PAIN OF LEFT KNEE: Primary | ICD-10-CM

## 2022-10-23 DIAGNOSIS — M25.562 CHRONIC PAIN OF LEFT KNEE: Primary | ICD-10-CM

## 2022-10-23 PROCEDURE — 99213 OFFICE O/P EST LOW 20 MIN: CPT | Performed by: PHYSICIAN ASSISTANT

## 2022-10-23 NOTE — LETTER
October 23, 2022      To Whom It May Concern:      Don Samson was seen in our urgent care today, 10/23/22.  I expect his condition to improve over the next 1-2 days.  He may return to work when improved.    Sincerely,        Selvin Plascencia PA-C

## 2022-10-23 NOTE — PROGRESS NOTES
Chronic pain of left knee  - Pain Management  Referral; Future    Rest the affected area as much as possible.  Apply ice for 15-20 minutes intermittently as needed and especially after any offending activity. Hot packs are better for muscle spasms and cramping. Daily stretching as tolerated.  As pain recedes, begin normal activities slowly as tolerated.  Consider Physical Therapy after 6 weeks if symptoms not better with conservative care.      Okay to take acetaminophen 500 mg- 2 tabs (Total of 1000 mg) every 8 hrs   Okay to take ibuprofen 200 mg- 3 tabs (Total of 600 mg) every 6 hours      I like the patient to be seen by pain management for consideration of steroid injections.    MERVIN Simon [facility]    Subjective   72 year old who presents to clinic today for the following health issues:    Urgent Care and Knee Pain       HPI     Pain History:  When did you first notice your pain? - Acute Pain   Where in your body do you have pain? Musculoskeletal problem/pain  Onset/Duration: 2 years off and on again. Began to hurt again a couple of weeks ago without injury    Description  Location: left knee  Joint Swelling: No  Redness: No  Pain: YES  Warmth: No  Intensity:  moderate  Progression of Symptoms:  worsening  Accompanying signs and symptoms:   Fevers: No  Numbness/tingling/weakness: No  History  Trauma to the area: No  Recent illness:  No  Previous similar problem: No  Previous evaluation:  No  Precipitating or alleviating factors:  Aggravating factors include: standing, walking and climbing stairs  Therapies tried and outcome: rest/inactivity and ice    Review of Systems   Review of Systems   See HPI    Objective    Temp: 97.7  F (36.5  C) Temp src: Temporal BP: 134/78 Pulse: 98     SpO2: 95 %       Physical Exam   Physical Exam  Constitutional:       General: He is not in acute distress.     Appearance: Normal appearance. He is normal weight. He is not ill-appearing,  toxic-appearing or diaphoretic.   HENT:      Head: Normocephalic and atraumatic.   Cardiovascular:      Rate and Rhythm: Normal rate.      Pulses: Normal pulses.   Pulmonary:      Effort: Pulmonary effort is normal. No respiratory distress.   Musculoskeletal:      Left knee: No swelling, deformity, erythema, lacerations or bony tenderness. Normal range of motion. No tenderness.   Neurological:      Mental Status: He is alert.   Psychiatric:         Mood and Affect: Mood normal.         Behavior: Behavior normal.         Thought Content: Thought content normal.         Judgment: Judgment normal.          No results found for this or any previous visit (from the past 24 hour(s)).

## 2022-10-27 ENCOUNTER — OFFICE VISIT (OUTPATIENT)
Dept: URGENT CARE | Facility: URGENT CARE | Age: 72
End: 2022-10-27
Payer: COMMERCIAL

## 2022-10-27 VITALS
HEART RATE: 72 BPM | WEIGHT: 244 LBS | SYSTOLIC BLOOD PRESSURE: 152 MMHG | OXYGEN SATURATION: 96 % | RESPIRATION RATE: 16 BRPM | TEMPERATURE: 97.8 F | DIASTOLIC BLOOD PRESSURE: 94 MMHG | HEIGHT: 71 IN | BODY MASS INDEX: 34.16 KG/M2

## 2022-10-27 DIAGNOSIS — M25.562 ACUTE PAIN OF LEFT KNEE: Primary | ICD-10-CM

## 2022-10-27 PROCEDURE — 99214 OFFICE O/P EST MOD 30 MIN: CPT | Performed by: PHYSICIAN ASSISTANT

## 2022-10-27 NOTE — PATIENT INSTRUCTIONS
Patient was educated on the natural course of injury. Pain has improved since he was last seen a few days ago. Return to work note given. Conservative measures discussed including rest, ice, compression, elevation, and over-the-counter analgesics (Tylenol) as needed. Apply medication as prescribed. See orthopaedic provider if symptoms worsen or do not improve in 2 weeks. Seek emergency care if you develop severe pain/swelling, inability to move extremity, skin paleness, or weakness.
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
## 2022-10-27 NOTE — LETTER
[facility]  [address] [place] [address]  [phone]      October 27, 2022    RE:  Don CLARKE Mali                                                                                                                                                       [address]  [address]  [facility] [address]            To whom it may concern:    Don Samson was seen in clinic today. He may return to work tomorrow.         Sincerely,        MERVIN Haskins [facility]

## 2022-11-07 ENCOUNTER — OFFICE VISIT (OUTPATIENT)
Dept: URGENT CARE | Facility: URGENT CARE | Age: 72
End: 2022-11-07
Payer: COMMERCIAL

## 2022-11-07 VITALS
BODY MASS INDEX: 34.03 KG/M2 | RESPIRATION RATE: 16 BRPM | SYSTOLIC BLOOD PRESSURE: 144 MMHG | WEIGHT: 244 LBS | HEART RATE: 72 BPM | OXYGEN SATURATION: 97 % | TEMPERATURE: 97.8 F | DIASTOLIC BLOOD PRESSURE: 90 MMHG

## 2022-11-07 DIAGNOSIS — Z76.0 ENCOUNTER FOR MEDICATION REFILL: ICD-10-CM

## 2022-11-07 DIAGNOSIS — J06.9 UPPER RESPIRATORY TRACT INFECTION, UNSPECIFIED TYPE: ICD-10-CM

## 2022-11-07 DIAGNOSIS — M54.2 NECK PAIN: Primary | ICD-10-CM

## 2022-11-07 DIAGNOSIS — I10 UNCONTROLLED HYPERTENSION: ICD-10-CM

## 2022-11-07 DIAGNOSIS — I10 HYPERTENSION GOAL BP (BLOOD PRESSURE) < 140/90: ICD-10-CM

## 2022-11-07 PROCEDURE — 99214 OFFICE O/P EST MOD 30 MIN: CPT | Performed by: PHYSICIAN ASSISTANT

## 2022-11-07 RX ORDER — NAPROXEN 500 MG/1
500 TABLET ORAL 2 TIMES DAILY WITH MEALS
Qty: 30 TABLET | Refills: 0 | Status: SHIPPED | OUTPATIENT
Start: 2022-11-07 | End: 2023-04-28

## 2022-11-07 RX ORDER — AMLODIPINE BESYLATE 5 MG/1
5 TABLET ORAL DAILY
Qty: 30 TABLET | Refills: 0 | Status: SHIPPED | OUTPATIENT
Start: 2022-11-07 | End: 2023-01-31

## 2022-11-07 RX ORDER — OLMESARTAN MEDOXOMIL 20 MG/1
20 TABLET ORAL
Qty: 30 TABLET | Refills: 0 | Status: SHIPPED | OUTPATIENT
Start: 2022-11-07 | End: 2022-12-07

## 2022-11-07 ASSESSMENT — ENCOUNTER SYMPTOMS
FEVER: 0
RHINORRHEA: 1
NAUSEA: 0
SORE THROAT: 0
HEADACHES: 0
COUGH: 1
NECK PAIN: 1
VOMITING: 0
APPETITE CHANGE: 1
MYALGIAS: 0
DIARRHEA: 0

## 2022-11-07 NOTE — LETTER
[facility] [place]  [address] [place] [address]  Phone: [phone]    November 7, 2022        Don Samson  [address]  [facility] [place] [address]          To whom it may concern:    RE: Don CLARKE [place]    Patient was seen and treated today at our clinic and missed work.    Please contact me for questions or concerns.      Sincerely,        Allison Cannon

## 2022-12-07 DIAGNOSIS — I10 HYPERTENSION GOAL BP (BLOOD PRESSURE) < 140/90: ICD-10-CM

## 2022-12-07 RX ORDER — OLMESARTAN MEDOXOMIL 20 MG/1
20 TABLET ORAL
Qty: 30 TABLET | Refills: 0 | Status: SHIPPED | OUTPATIENT
Start: 2022-12-07 | End: 2023-01-31

## 2022-12-07 NOTE — TELEPHONE ENCOUNTER
PT walked into clinic.  Needing refill on this med-  Olmesartan. PT has 6 pills left.    This med was rx by [facility].I actually see this med rx in the past on med history.  Pt has an appt on 12/26/22 with Obdulia Babb.      6/9/21 was last OV with [facility] clinic.    Will send this to the refill pool. Maybe this will pass protocol?    OK to inform pt when this is completed.  MADHURI Aden

## 2022-12-07 NOTE — TELEPHONE ENCOUNTER
Routing refill request to provider for review/approval because:  Labs out of range:    Creatinine   Date Value Ref Range Status   05/21/2022 1.27 (H) 0.66 - 1.25 mg/dL Final   04/05/2021 1.17 0.66 - 1.25 mg/dL Final     BP out of range.    Last Written Prescription Date:  11/7/22  Last Fill Quantity: 30,  # refills: 0   Last office visit: 6/9/2021 with prescribing provider:  Dr. Jacobo.  Future Office Visit:   Next 5 appointments (look out 90 days)    Dec 26, 2022  3:30 PM  (Arrive by 3:10 PM)  Provider Visit with CONSTANCE Zeng CNP  [facility] ([facility] ) [address] [place] [address] [phone]           MADHURI Aden

## 2022-12-26 ENCOUNTER — OFFICE VISIT (OUTPATIENT)
Dept: FAMILY MEDICINE | Facility: CLINIC | Age: 72
End: 2022-12-26
Payer: COMMERCIAL

## 2022-12-26 VITALS
BODY MASS INDEX: 33.6 KG/M2 | DIASTOLIC BLOOD PRESSURE: 91 MMHG | OXYGEN SATURATION: 98 % | TEMPERATURE: 97 F | SYSTOLIC BLOOD PRESSURE: 153 MMHG | RESPIRATION RATE: 15 BRPM | HEIGHT: 71 IN | HEART RATE: 68 BPM | WEIGHT: 240 LBS

## 2022-12-26 DIAGNOSIS — F33.1 MAJOR DEPRESSIVE DISORDER, RECURRENT, MODERATE (H): ICD-10-CM

## 2022-12-26 DIAGNOSIS — I10 HYPERTENSION GOAL BP (BLOOD PRESSURE) < 140/90: Primary | ICD-10-CM

## 2022-12-26 PROCEDURE — 99214 OFFICE O/P EST MOD 30 MIN: CPT | Performed by: NURSE PRACTITIONER

## 2022-12-26 ASSESSMENT — PATIENT HEALTH QUESTIONNAIRE - PHQ9
SUM OF ALL RESPONSES TO PHQ QUESTIONS 1-9: 4
SUM OF ALL RESPONSES TO PHQ QUESTIONS 1-9: 4

## 2022-12-26 ASSESSMENT — PAIN SCALES - GENERAL: PAINLEVEL: NO PAIN (0)

## 2022-12-26 NOTE — PROGRESS NOTES
"  Assessment & Plan     Hypertension goal BP (blood pressure) < 140/90  Medication: continue current medication regimen unchanged   Dietary sodium restriction   Regular aerobic exercise   Recheck in 1 month, sooner should new symptoms or  problems arise.   Labs drawn today none  -enforced scheduling with PCP Dr. Mills or establish new PCP with [facility] for ongoing medication management to avoid issues with renewals  - Provider spoke w/Pharmacy medications are being processed for  today; start medication today  - f/u in 1 month with writer for PCP      Major depressive disorder, recurrent, moderate (H)  - reports not taking bupropion; not effective; denies SI intent or plan  PHQ-9 score:    PHQ 12/26/2022   PHQ-9 Total Score 4   Q9: Thoughts of better off dead/self-harm past 2 weeks Not at all          BMI:   Estimated body mass index is 33.6 kg/m  as calculated from the following:    Height as of this encounter: 1.8 m (5' 10.87\").    Weight as of this encounter: 108.9 kg (240 lb).       FUTURE APPOINTMENTS:       - Follow-up visit in 1 month    Return in about 1 month (around 1/26/2023) for Follow up, in person, Routine preventive.    CONSTANCE Zeng [facility]    Negro Cifuentes is a 72 year old, presenting for the following health issues:  Blood Pressure Check (BP Check)  - HTN: patient reports not taking medication over the past month; states requested refills did not received; per record patient presented to [facility] on 12/7 provider was out of clinic; was offered follow up visit on 12/16 w/his PCP Dr. Mills patient declined per note and left clinic; PCP called BP Rx to [facility] on 12/7 for #90 day recommended follow up visit for next refill;   Ordered Prescriptions  Prescription Sig Dispensed Refills Start Date End Date   amLODIPine (NORVASC) 5 mg tablet    Indications: Hypertension goal BP (blood pressure) < 140/90 Take 1 Tablet (5 mg) by mouth once " "daily. 90 Tablet   0 12/07/2022     olmesartan (BENICAR) 20 mg tablet    Indications: Hypertension goal BP (blood pressure) < 140/90 Take 1 Tablet (20 mg) by mouth once daily. 90 Tablet   0 12/07/2022       - Medication was also renewed by [facility] associated provider on 12/7:    lmesartan (BENICAR) 20 MG tablet  Take 1 tablet (20 mg) by mouth daily (with breakfast), Disp-30 tablet, R-0, E-Prescribe   Dispense: 30 tablet   Refills: 0 ordered   Pharmacy: [id number] - [place], [place] - [address] AT [facility] [address] (Ph: [phone])   Order Details  Ordered on: 12/07/22   Associated Dx: Hypertension goal BP (blood pressure) < 140/90   Authorizing provider: Willam Carmona MD       - provider called [facility] while in clinic with patient; medication was ordered not filled by Pharmacy; staff unsure why medication was not filled.     History of Present Illness       Hypertension: He presents for follow up of hypertension.  He does not check blood pressure  regularly outside of the clinic. Outside blood pressures have been over 140/90. He follows a low salt diet.      Today's PHQ-9         PHQ-9 Total Score: 4    PHQ-9 Q9 Thoughts of better off dead/self-harm past 2 weeks :   Not at all               Review of Systems         Objective    BP (!) 153/91 (BP Location: Left arm, Patient Position: Sitting, Cuff Size: Adult Regular)   Pulse 68   Temp 97  F (36.1  C) (Temporal)   Resp 15   Ht 1.8 m (5' 10.87\")   Wt 108.9 kg (240 lb)   SpO2 98%   BMI 33.60 kg/m    Body mass index is 33.6 kg/m .  Physical Exam   GENERAL: healthy, alert and no distress  CV: regular rate and rhythm, normal S1 S2, no S3 or S4, no murmur, click or rub, no peripheral edema and peripheral pulses strong    No results found for this or any previous visit (from the past 24 hour(s)).                "

## 2023-01-01 NOTE — MR AVS SNAPSHOT
After Visit Summary   7/2/2018    Don Samson    MRN: [id number]           Patient Information     Date Of Birth          1950        Visit Information        Provider Department      7/2/2018 9:00 AM Kiya Darnell MD [facility]        Today's Diagnoses     Nausea    -  1      Care Instructions      - ondansetron (ZOFRAN ODT) 4 MG ODT tab; Take 1 tablet (4 mg) by mouth 3 times daily (before meals)  - lots of fluid and small frequent meals   - follow up in 2 weeks to recheck your blood pressure and kidney functions           Follow-ups after your visit        Who to contact     If you have questions or need follow up information about today's clinic visit or your schedule please contact [facility] directly at [phone].  Normal or non-critical lab and imaging results will be communicated to you by MyChart, letter or phone within 4 business days after the clinic has received the results. If you do not hear from us within 7 days, please contact the clinic through MyChart or phone. If you have a critical or abnormal lab result, we will notify you by phone as soon as possible.  Submit refill requests through [facility] or call your pharmacy and they will forward the refill request to us. Please allow 3 business days for your refill to be completed.          Additional Information About Your Visit        Care EveryWhere ID     This is your Care EveryWhere ID. This could be used by other organizations to access your [facility] medical records  [id number]        Your Vitals Were     Pulse Temperature Respirations Pulse Oximetry BMI (Body Mass Index)       76 98.1  F (36.7  C) (Oral) 16 100% 31.06 kg/m2        Blood Pressure from Last 3 Encounters:   07/02/18 146/85   06/19/18 (!) 150/97   05/01/18 136/71    Weight from Last 3 Encounters:   07/02/18 229 lb (103.9 kg)   06/19/18 231 lb 8 oz (105 kg)   05/01/18 233 lb 8 oz (105.9 kg)              Today, you had the  following     No orders found for display         Today's Medication Changes          These changes are accurate as of 7/2/18  9:16 AM.  If you have any questions, ask your nurse or doctor.               Start taking these medicines.        Dose/Directions    ondansetron 4 MG ODT tab   Commonly known as:  ZOFRAN ODT   Used for:  Nausea   Started by:  Kiya Darnell MD        Dose:  4 mg   Take 1 tablet (4 mg) by mouth 3 times daily (before meals)   Quantity:  20 tablet   Refills:  0         Stop taking these medicines if you haven't already. Please contact your care team if you have questions.     lisinopril 40 MG tablet   Commonly known as:  PRINIVIL/ZESTRIL   Stopped by:  Kiya Darnell MD                Where to get your medicines      These medications were sent to [facility] [address] [place] [address] AT [address] [address]    Hours:  24-hours Phone:  [phone]     ondansetron 4 MG ODT tab                Primary Care Provider Office Phone # Fax #    Flakita Paty Pastor, APRN [facility] [phone] [address] [facility] [address]        Equal Access to Services     ANDRE TAYLOR AH: Hadii tom ku hadasho Soomaali, waaxda luqadaha, qaybta kaalmada adeegyada, waxay melisain hayzeinabn ines rooney. So [facility] [phone].    ATENCIÓN: Si habla español, tiene a torres disposición servicios gratuitos de asistencia lingüística. ame al [phone].    We comply with applicable federal civil rights laws and [place] laws. We do not discriminate on the basis of race, color, national origin, age, disability, sex, sexual orientation, or gender identity.            Thank you!     Thank you for choosing [facility]  for your care. Our goal is always to provide you with excellent care. Hearing back from our patients is one way we can continue to improve our services. Please take a few minutes to  complete the written survey that you may receive in the mail after your visit with us. Thank you!             Your Updated Medication List - Protect others around you: Learn how to safely use, store and throw away your medicines at www.disposemymeds.org.          This list is accurate as of 7/2/18  9:16 AM.  Always use your most recent med list.                   Brand Name Dispense Instructions for use Diagnosis    azithromycin 250 MG tablet    ZITHROMAX    6 tablet    Two tablets first day, then one tablet daily for four days.    Upper respiratory tract infection, unspecified type       cyclobenzaprine 10 MG tablet    FLEXERIL    30 tablet    Take 1 tablet (10 mg) by mouth nightly as needed for muscle spasms    Back muscle spasm, Acute bilateral low back pain without sciatica       losartan 50 MG tablet    COZAAR    30 tablet    Take 1 tablet (50 mg) by mouth daily    Hypertension goal BP (blood pressure) < 140/90       ondansetron 4 MG ODT tab    ZOFRAN ODT    20 tablet    Take 1 tablet (4 mg) by mouth 3 times daily (before meals)    Nausea          2023 02:16

## 2023-01-31 ENCOUNTER — OFFICE VISIT (OUTPATIENT)
Dept: FAMILY MEDICINE | Facility: CLINIC | Age: 73
End: 2023-01-31
Payer: COMMERCIAL

## 2023-01-31 VITALS
OXYGEN SATURATION: 98 % | RESPIRATION RATE: 16 BRPM | TEMPERATURE: 97.7 F | DIASTOLIC BLOOD PRESSURE: 76 MMHG | HEART RATE: 82 BPM | SYSTOLIC BLOOD PRESSURE: 125 MMHG

## 2023-01-31 DIAGNOSIS — I10 HYPERTENSION GOAL BP (BLOOD PRESSURE) < 140/90: Primary | ICD-10-CM

## 2023-01-31 DIAGNOSIS — M54.50 ACUTE MIDLINE LOW BACK PAIN WITHOUT SCIATICA: ICD-10-CM

## 2023-01-31 DIAGNOSIS — F33.1 MAJOR DEPRESSIVE DISORDER, RECURRENT, MODERATE (H): ICD-10-CM

## 2023-01-31 DIAGNOSIS — Z12.11 SCREEN FOR COLON CANCER: ICD-10-CM

## 2023-01-31 PROBLEM — A08.4 VIRAL GASTROENTERITIS: Status: RESOLVED | Noted: 2019-12-11 | Resolved: 2023-01-31

## 2023-01-31 PROCEDURE — 99213 OFFICE O/P EST LOW 20 MIN: CPT | Performed by: PHYSICIAN ASSISTANT

## 2023-01-31 RX ORDER — TADALAFIL 10 MG/1
10 TABLET ORAL
COMMUNITY
Start: 2022-02-18

## 2023-01-31 RX ORDER — AMLODIPINE BESYLATE 5 MG/1
5 TABLET ORAL DAILY
Qty: 90 TABLET | Refills: 3 | Status: SHIPPED | OUTPATIENT
Start: 2023-01-31 | End: 2024-05-03

## 2023-01-31 RX ORDER — AMLODIPINE BESYLATE 5 MG/1
1 TABLET ORAL DAILY
COMMUNITY
Start: 2022-12-07 | End: 2023-01-31

## 2023-01-31 RX ORDER — BUPROPION HYDROCHLORIDE 450 MG/1
450 TABLET, FILM COATED, EXTENDED RELEASE ORAL
COMMUNITY
Start: 2022-02-18 | End: 2023-01-31

## 2023-01-31 RX ORDER — CITALOPRAM HYDROBROMIDE 10 MG/1
1 TABLET ORAL EVERY MORNING
COMMUNITY
Start: 2022-01-21 | End: 2023-01-31

## 2023-01-31 RX ORDER — OLMESARTAN MEDOXOMIL 20 MG/1
20 TABLET ORAL
Qty: 90 TABLET | Refills: 3 | Status: SHIPPED | OUTPATIENT
Start: 2023-01-31 | End: 2024-05-03

## 2023-01-31 RX ORDER — OLMESARTAN MEDOXOMIL 20 MG/1
20 TABLET ORAL
COMMUNITY
Start: 2022-12-07 | End: 2023-01-31

## 2023-01-31 RX ORDER — ACETAMINOPHEN 500 MG
500-1000 TABLET ORAL EVERY 6 HOURS PRN
Qty: 100 TABLET | Refills: 4 | Status: SHIPPED | OUTPATIENT
Start: 2023-01-31

## 2023-01-31 ASSESSMENT — ENCOUNTER SYMPTOMS
ENDOCRINE NEGATIVE: 1
ALLERGIC/IMMUNOLOGIC NEGATIVE: 1
RESPIRATORY NEGATIVE: 1
HEMATOLOGIC/LYMPHATIC NEGATIVE: 1
EYES NEGATIVE: 1
MUSCULOSKELETAL NEGATIVE: 1
PSYCHIATRIC NEGATIVE: 1
NEUROLOGICAL NEGATIVE: 1
GASTROINTESTINAL NEGATIVE: 1
CARDIOVASCULAR NEGATIVE: 1
CONSTITUTIONAL NEGATIVE: 1

## 2023-01-31 NOTE — PATIENT INSTRUCTIONS
Preventive Health Recommendations:     See your health care provider every year to    Review health changes.     Discuss preventive care.      Review your medicines if your doctor has prescribed any.      Talk with your health care provider about whether you should have a test to screen for prostate cancer (PSA).    Every 3 years, have a diabetes test (fasting glucose). If you are at risk for diabetes, you should have this test more often.    Every 5 years, have a cholesterol test. Have this test more often if you are at risk for high cholesterol or heart disease.     Every 10 years, have a colonoscopy. Or, have a yearly FIT test (stool test). These exams will check for colon cancer.    Talk to with your health care provider about screening for Abdominal Aortic Aneurysm if you have a family history of AAA or have a history of smoking.    Shots:     Get a flu shot each year.     Get a tetanus shot every 10 years.     Talk to your doctor about your pneumonia vaccines. There are now two you should receive - Pneumovax (PPSV 23) and Prevnar (PCV 13).     Talk to your pharmacist about a shingles vaccine.     Talk to your doctor about the hepatitis B vaccine.  Nutrition:     Eat at least 5 servings of fruits and vegetables each day.     Eat whole-grain bread, whole-wheat pasta and brown rice instead of white grains and rice.     Get adequate Calcium and Vitamin D.   Lifestyle    Exercise for at least 150 minutes a week (30 minutes a day, 5 days a week). This will help you control your weight and prevent disease.     Limit alcohol to one drink per day.     No smoking.     Wear sunscreen to prevent skin cancer.    See your dentist every six months for an exam and cleaning.    See your eye doctor every 1 to 2 years to screen for conditions such as glaucoma, macular degeneration, cataracts, etc.    Personalized Prevention Plan  You are due for the preventive services outlined below.  Your care team is available to assist you  in scheduling these services.  If you have already completed any of these items, please share that information with your care team to update in your medical record.  Health Maintenance Due   Topic Date Due     ANNUAL REVIEW OF HM ORDERS  Never done     Diptheria Tetanus Pertussis (DTAP/TDAP/TD) Vaccine (2 - Td or Tdap) 06/22/2016     Annual Wellness Visit  Never done     Colorectal Cancer Screening  08/15/2019     COVID-19 Vaccine (3 - Booster for Pfizer series) 05/06/2021     Pneumococcal Vaccine (2 - PPSV23 if available, else PCV20) 09/14/2021

## 2023-01-31 NOTE — PROGRESS NOTES
"SUBJECTIVE:   CC: Esau is an 72 year old who presents for preventative health visit.   Patient has been advised of split billing requirements and indicates understanding: Yes  HPI     Esau here today due for blood pressure medication refills  Appt scheduled for medicare wellness, he declines time or desire to complete this today  Requests help getting \"high dose\" acetaminophen - cannot find OTC anywhere  Woke with backache a few days ago    Today's PHQ-2 Score:   PHQ-2 (  [employer]) 2021   Q1: Little interest or pleasure in doing things 1   Q2: Feeling down, depressed or hopeless 1   PHQ-2 Score 2   PHQ-2 Total Score (12-17 Years)- Positive if 3 or more points; Administer PHQ-A if positive 2     Social History     Tobacco Use     Smoking status: Former     Types: Cigarettes     Quit date: 1970     Years since quittin.1     Smokeless tobacco: Never   Substance Use Topics     Alcohol use: Yes     Alcohol/week: 0.0 standard drinks     Comment: rarely      If you drink alcohol do you typically have >3 drinks per day or >7 drinks per week? No    Alcohol Use 2023   Prescreen: >3 drinks/day or >7 drinks/week? No     Last PSA: No results found for: PSA    Reviewed orders with patient. Reviewed health maintenance and updated orders accordingly - No    Reviewed and updated as needed this visit by clinical staff   Tobacco  Allergies  Meds  Problems             Reviewed and updated as needed this visit by Provider     Meds  Problems              Review of Systems   Constitutional: Negative.    HENT: Negative.    Eyes: Negative.    Respiratory: Negative.    Cardiovascular: Negative.    Gastrointestinal: Negative.    Endocrine: Negative.    Genitourinary: Negative.    Musculoskeletal: Negative.    Skin: Negative.    Allergic/Immunologic: Negative.    Neurological: Negative.    Hematological: Negative.    Psychiatric/Behavioral: Negative.        OBJECTIVE:   /76 (BP Location: Left arm, Patient " Position: Sitting, Cuff Size: Adult Large)   Pulse 82   Temp 97.7  F (36.5  C) (Temporal)   Resp 16   SpO2 98%     Physical Exam  GENERAL: healthy, alert and no distress  PSYCH: mentation appears normal, flat affect      ASSESSMENT/PLAN:   Don was seen today for physical.    Diagnoses and all orders for this visit:    Hypertension goal BP (blood pressure) < 140/90 - BP at goal today, refills provided. Due for monitoring labs. Follow up yearly.  -     olmesartan (BENICAR) 20 MG tablet; Take 1 tablet (20 mg) by mouth daily (with breakfast)  -     amLODIPine (NORVASC) 5 MG tablet; Take 1 tablet (5 mg) by mouth daily  -     Basic metabolic panel  (Ca, Cl, CO2, Creat, Gluc, K, Na, BUN); Future  -     Basic metabolic panel  (Ca, Cl, CO2, Creat, Gluc, K, Na, BUN)    Acute midline low back pain without sciatica - acetaminophen provided  -     acetaminophen (TYLENOL) 500 MG tablet; Take 1-2 tablets (500-1,000 mg) by mouth every 6 hours as needed for mild pain Maximum daily dose 4,000mg.    Major depressive disorder, recurrent, moderate (H) - refuses PHQ9, declines any resources    Screen for colon cancer - declines screening    He reports that he quit smoking about 53 years ago. His smoking use included cigarettes. He has never used smokeless tobacco.    Kassandra Calvin PA-C  [facility]

## 2023-02-19 ENCOUNTER — OFFICE VISIT (OUTPATIENT)
Dept: URGENT CARE | Facility: URGENT CARE | Age: 73
End: 2023-02-19
Payer: COMMERCIAL

## 2023-02-19 VITALS
HEART RATE: 84 BPM | RESPIRATION RATE: 16 BRPM | OXYGEN SATURATION: 98 % | DIASTOLIC BLOOD PRESSURE: 80 MMHG | SYSTOLIC BLOOD PRESSURE: 130 MMHG | TEMPERATURE: 97.5 F

## 2023-02-19 DIAGNOSIS — K42.0 IRREDUCIBLE UMBILICAL HERNIA: Primary | ICD-10-CM

## 2023-02-19 PROCEDURE — 99213 OFFICE O/P EST LOW 20 MIN: CPT | Performed by: INTERNAL MEDICINE

## 2023-02-19 NOTE — LETTER
[facility]  [address] [place] [address]  [phone]      February 19, 2023    RE:  Don CLARKE Samson                                                                                                                                                       [address]  [address]  [facility] [facility] [address]            To whom it may concern:    I have seen Don Samson in the Urgent Care on 2/19/2023 for an acute medical condition.  Please excuse absences from work for the next several days as he recovers.        Sincerely,        William Spencer MD    [facility] Urgent [facility]

## 2023-02-20 NOTE — PROGRESS NOTES
"  Assessment & Plan     Irreducible umbilical hernia  This is relatively large and not reducible so surgical assessment for a possible repair is reasonable.   - Adult General Surg Referral; Future    Onychogryposis  Given information for foot care resources in the community    William Spencer MD  [facility] URGENT CARE [place]    Negro Cifuentes is a 73 year old, presenting for the following health issues:  Urgent Care and Hernia (Recheck on hernia, going on a long time. Seems worse when he has a BM/)      HPI   Chief complaint of a concern with an umbilical hernia. He states that he \"doesn't feel much discomfort\" with the hernia.  The hernia has been present for an extended period of time and is not reducible.  It is not particularly tender but does become uncomfortable when he is experiencing general intestinal bloating or gas.  Also wondering where he can go to have attention for his toenails.         Objective    /80   Pulse 84   Temp 97.5  F (36.4  C) (Temporal)   Resp 16   SpO2 98%   There is no height or weight on file to calculate BMI.  Physical Exam   GENERAL APPEARANCE: alert and no distress  ABDOMEN: there is a moderate-sized umbilical hernia which is not reducible when he is supine but remains soft and non-tender  FEET: the left foot has several thickened, yellow-brown toenails with onychogryposis              "

## 2023-02-21 ENCOUNTER — OFFICE VISIT (OUTPATIENT)
Dept: URGENT CARE | Facility: URGENT CARE | Age: 73
End: 2023-02-21
Payer: COMMERCIAL

## 2023-02-21 VITALS
HEART RATE: 71 BPM | OXYGEN SATURATION: 100 % | TEMPERATURE: 97 F | DIASTOLIC BLOOD PRESSURE: 85 MMHG | SYSTOLIC BLOOD PRESSURE: 149 MMHG | RESPIRATION RATE: 16 BRPM

## 2023-02-21 DIAGNOSIS — M62.830 BACK MUSCLE SPASM: Primary | ICD-10-CM

## 2023-02-21 PROCEDURE — 99213 OFFICE O/P EST LOW 20 MIN: CPT | Performed by: NURSE PRACTITIONER

## 2023-02-21 RX ORDER — CYCLOBENZAPRINE HCL 5 MG
5 TABLET ORAL 3 TIMES DAILY PRN
Qty: 21 TABLET | Refills: 0 | Status: SHIPPED | OUTPATIENT
Start: 2023-02-21 | End: 2023-02-28

## 2023-02-21 NOTE — PROGRESS NOTES
Chief Complaint   Patient presents with     Back Pain     Monday Pt slipped on ice and fell on back     SUBJECTIVE:  Don Samson is a 73 year old male who presents to the clinic today with back pain from a fall yesterday on ice in the street.  He describes having pain throughout his back that feels muscular achy and turning into spasms.  No midline spinal tenderness sciatica bowel bladder change or weakness.  He did not hit his head denies headache vomiting vision change.  No relevant past medical history.  Is using Tylenol.  Has naproxen at home.    Past Medical History:   Diagnosis Date     Ankle pain 2016     Ankle sprain and strain 2010     CARDIOVASCULAR SCREENING; LDL GOAL LESS THAN 130 2016     Depression     declines treatment 16     HTN (hypertension)     declines treatment 16     Left knee pain 2015     Plantar fasciitis 2010     Right knee pain 2016     Tibialis posterior tendonitis 2016     acetaminophen (TYLENOL) 500 MG tablet, Take 1-2 tablets (500-1,000 mg) by mouth every 6 hours as needed for mild pain Maximum daily dose 4,000mg.  amLODIPine (NORVASC) 5 MG tablet, Take 1 tablet (5 mg) by mouth daily  naproxen (NAPROSYN) 500 MG tablet, Take 1 tablet (500 mg) by mouth 2 times daily (with meals)  olmesartan (BENICAR) 20 MG tablet, Take 1 tablet (20 mg) by mouth daily (with breakfast)  tadalafil (CIALIS) 10 MG tablet, Take 10 mg by mouth    No current facility-administered medications on file prior to visit.    Social History     Tobacco Use     Smoking status: Former     Types: Cigarettes     Quit date: 1970     Years since quittin.1     Smokeless tobacco: Never   Substance Use Topics     Alcohol use: Yes     Alcohol/week: 0.0 standard drinks     Comment: rarely      Allergies   Allergen Reactions     Ibuprofen Nausea       Review of Systems   All systems negative except for those listed above in HPI.    EXAM:   BP (!) 149/85 (BP Location: Left  arm, Patient Position: Sitting, Cuff Size: Adult Large)   Pulse 71   Temp 97  F (36.1  C) (Temporal)   Resp 16   SpO2 100%     Physical Exam  Vitals reviewed.   Constitutional:       Appearance: Normal appearance.   HENT:      Head: Normocephalic and atraumatic.      Nose: Nose normal.      Mouth/Throat:      Mouth: Mucous membranes are moist.      Pharynx: Oropharynx is clear.   Eyes:      Extraocular Movements: Extraocular movements intact.      Conjunctiva/sclera: Conjunctivae normal.      Pupils: Pupils are equal, round, and reactive to light.   Cardiovascular:      Rate and Rhythm: Normal rate.      Pulses: Normal pulses.   Pulmonary:      Effort: Pulmonary effort is normal.   Musculoskeletal:         General: Tenderness present. Normal range of motion.      Cervical back: Normal range of motion and neck supple.   Skin:     General: Skin is warm and dry.      Findings: No bruising, erythema or rash.   Neurological:      General: No focal deficit present.      Mental Status: He is alert and oriented to person, place, and time.      Sensory: No sensory deficit.      Motor: No weakness.      Coordination: Coordination normal.      Gait: Gait normal.   Psychiatric:         Mood and Affect: Mood normal.         Behavior: Behavior normal.       ASSESSMENT:    ICD-10-CM    1. Back muscle spasm  M62.830 cyclobenzaprine (FLEXERIL) 5 MG tablet        PLAN:    Flexeril for back muscle spasms  Rotate Tylenol and naproxen  Ice heat massage stretch  Could try Lidoderm patches  Reevaluation with PT or Ortho if lingering or worsening    Follow up with primary care provider with any problems, questions or concerns or if symptoms worsen or fail to improve. Patient agreed to plan and verbalized understanding.    Josette Ramos, MICHELLE-BC  [facility] [place]

## 2023-02-21 NOTE — LETTER
[facility] [place]  [address] [place] [address]  Phone: [phone]        2/21/2023    Don Samson  [address]  [facility] [address] [phone] (home)      To Whom it May Concern:    This patient was seen in clinic today for acute visit. Please excuse medical related absences. He may return to work Wednesday 02/22 as tolerated and improved.    Please contact me for questions or concerns.    Sincerely,    Josette Ramos NP

## 2023-03-23 ENCOUNTER — OFFICE VISIT (OUTPATIENT)
Dept: URGENT CARE | Facility: URGENT CARE | Age: 73
End: 2023-03-23
Payer: COMMERCIAL

## 2023-03-23 VITALS
WEIGHT: 240 LBS | TEMPERATURE: 98.1 F | OXYGEN SATURATION: 97 % | BODY MASS INDEX: 33.6 KG/M2 | DIASTOLIC BLOOD PRESSURE: 89 MMHG | SYSTOLIC BLOOD PRESSURE: 137 MMHG | HEART RATE: 97 BPM

## 2023-03-23 DIAGNOSIS — I10 HYPERTENSION GOAL BP (BLOOD PRESSURE) < 140/90: ICD-10-CM

## 2023-03-23 DIAGNOSIS — S30.0XXA LUMBAR CONTUSION, INITIAL ENCOUNTER: Primary | ICD-10-CM

## 2023-03-23 PROCEDURE — 99213 OFFICE O/P EST LOW 20 MIN: CPT | Performed by: FAMILY MEDICINE

## 2023-03-23 RX ORDER — OLMESARTAN MEDOXOMIL 20 MG/1
20 TABLET ORAL
Qty: 90 TABLET | Refills: 3 | Status: CANCELLED | OUTPATIENT
Start: 2023-03-23

## 2023-03-23 NOTE — PATIENT INSTRUCTIONS
Heat packs if the muscles become tight      Use the naproxen/tylenol to maintain regular activity rather than remaining sedentary      Return if symptoms worsen or showing no improvement in next few days

## 2023-03-23 NOTE — LETTER
March 23, 2023      Don Samson  [address]  [place] [facility] [address]        To Whom It May Concern:    Don Samson was seen in our clinic for an injury sustained on 03/21/23 please excuse from missed work on 3/21/23 thru 03/23/23    Sincerely,        Jim Graham MD

## 2023-03-23 NOTE — PROGRESS NOTES
Assessment & Plan     Lumbar contusion, initial encounter  Patient declined imaging of the lumbar spine.  No symptoms of radiculopathy on exam and taking history.  Heat packs are recommended along with use of naproxen and Tylenol.  A letter excusing him from work was provided for a few days time.      Jim Graham MD   [place] [facility]    Subjective     Esau is a 73 year old male who presents to clinic today for the following health issues:  Chief Complaint   Patient presents with     Urgent Care     Back Pain     C/O back pain for 2 days , no injury     HPI  Patient sustained a fall going backwards did not hit his head or neck per his report but hurt his lower lumbar area.  He has no prior history of fractures or arthritis of his back.  The area is bilateral discomfort and manageable.  Patient denies weakness or numbness of his lower extremities    Of note patient was seen about a month ago and given muscle actions from when he was having back spasms his tightness did return so he did find his prescription started using it which helps.  He is taking Aleve and naproxen for his discomfort which is helpful.  He has no limitations in his movement.  He feels he is ready to go back to work tomorrow      Patient Active Problem List    Diagnosis Date Noted     Hypertension goal BP (blood pressure) < 140/90 03/15/2018     Priority: Medium     Major depressive disorder, recurrent, moderate (H) 03/15/2018     Priority: Medium     Fatigue 09/15/2016     Priority: Medium       Current Outpatient Medications   Medication     acetaminophen (TYLENOL) 500 MG tablet     amLODIPine (NORVASC) 5 MG tablet     naproxen (NAPROSYN) 500 MG tablet     olmesartan (BENICAR) 20 MG tablet     tadalafil (CIALIS) 10 MG tablet     No current facility-administered medications for this visit.           Objective    /89   Pulse 97   Temp 98.1  F (36.7  C) (Tympanic)   Wt 108.9 kg (240 lb)   SpO2 97%   BMI 33.60 kg/m    Physical  Exam     Back: able to flex forward 60 degrees and extend 20 degrees, slight bruising seen above SI Joint midline in the lumbar area  Gait: normal  GEN: ratliff's /racoon's negative, normal speech, no distress    No results found for any visits on 03/23/23.          The use of Dragon/[employer] dictation services may have been used to construct the content in this note; any grammatical or spelling errors are non-intentional. Please contact the author of this note directly if you are in need of any clarification.

## 2023-03-27 NOTE — TELEPHONE ENCOUNTER
Too early.    Rosa Elena Ansari RN    [facility]     Disp Refills Start End ADELINA   olmesartan (BENICAR) 20 MG tablet 90 tablet 3 1/31/2023  No   Sig - Route: Take 1 tablet (20 mg) by mouth daily (with breakfast) -

## 2023-04-02 ENCOUNTER — OFFICE VISIT (OUTPATIENT)
Dept: URGENT CARE | Facility: URGENT CARE | Age: 73
End: 2023-04-02
Payer: COMMERCIAL

## 2023-04-02 VITALS
TEMPERATURE: 98.5 F | DIASTOLIC BLOOD PRESSURE: 68 MMHG | SYSTOLIC BLOOD PRESSURE: 122 MMHG | WEIGHT: 240 LBS | OXYGEN SATURATION: 98 % | HEIGHT: 71 IN | HEART RATE: 99 BPM | BODY MASS INDEX: 33.6 KG/M2 | RESPIRATION RATE: 18 BRPM

## 2023-04-02 DIAGNOSIS — S39.92XA INJURY OF BACK, INITIAL ENCOUNTER: ICD-10-CM
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
## 2023-04-02 PROCEDURE — 99213 OFFICE O/P EST LOW 20 MIN: CPT | Performed by: PHYSICIAN ASSISTANT

## 2023-04-02 RX ORDER — LIDOCAINE 4 G/G
1 PATCH TOPICAL EVERY 24 HOURS
Qty: 10 PATCH | Refills: 0 | Status: SHIPPED | OUTPATIENT
Start: 2023-04-02 | End: 2023-04-12

## 2023-04-02 NOTE — PROGRESS NOTES
"Chief Complaint   Patient presents with     Urgent Care     Back Pain     Back injury this morning when he fell after getting off bus         ASSESSMENT/PLAN:  Don was seen today for urgent care and back pain.    Diagnoses and all orders for this visit:    Fall, initial encounter    Injury of back, initial encounter  -     Lidocaine (LIDOCARE) 4 % Patch; Place 1 patch onto the skin every 24 hours for 10 days To prevent lidocaine toxicity, patient should be patch free for 12 hrs daily.    Blow injury to the back after falling.  Did not hit head.  Neurologic exam normal.  Consistent with soft tissue injury and contusion.  Heat, range of motion, gentle stretching, gentle massage.  Lidocaine patches, Tylenol as needed for pain  Has muscle relaxant discussed appropriate use of this    Topher Cabral PA-C      SUBJECTIVE:  Don is a 73 year old male who presents to urgent care with fall and injury to his back.  He slipped on ice and fell backwards hitting the back.  He does not believe he hit his head.  He has had diffuse pain in the shoulders since then.  Full range of motion in his arms.  Little stiffness with movement    ROS: Pertinent ROS neg other than the symptoms noted above in the HPI.     OBJECTIVE:  /68   Pulse 99   Temp 98.5  F (36.9  C) (Temporal)   Resp 18   Ht 1.803 m (5' 11\")   Wt 108.9 kg (240 lb)   SpO2 98%   BMI 33.47 kg/m     GENERAL: healthy, alert and no distress  EYES: Eyes grossly normal to inspection, PERRL and conjunctivae and sclerae normal  HENT: ear canals and TM's normal, nose and mouth without ulcers or lesions, no hemotympanum, no rhinorrhea, no ratliff signs  RESP: lungs clear to auscultation - no rales, rhonchi or wheezes  CV: regular rate and rhythm, normal S1 S2, no S3 or S4, no murmur, click or rub  MS: no gross musculoskeletal defects noted, no edema, full range of motion at the neck and shoulders.  Pain with terminal extension of the neck.  Diffuse tenderness of the " upper back, no midline tenderness.  SKIN: no suspicious lesions or rashes  NEURO: Normal strength and tone, mentation intact and speech normal, cranial nerves II through XII intact, upper extremity strength 5/5    DIAGNOSTICS    No results found for any visits on 23.     Current Outpatient Medications   Medication     amLODIPine (NORVASC) 5 MG tablet     olmesartan (BENICAR) 20 MG tablet     acetaminophen (TYLENOL) 500 MG tablet     naproxen (NAPROSYN) 500 MG tablet     tadalafil (CIALIS) 10 MG tablet     No current facility-administered medications for this visit.      Patient Active Problem List   Diagnosis     Fatigue     Hypertension goal BP (blood pressure) < 140/90     Major depressive disorder, recurrent, moderate (H)      Past Medical History:   Diagnosis Date     Ankle pain 2016     Ankle sprain and strain 2010     CARDIOVASCULAR SCREENING; LDL GOAL LESS THAN 130 2016     Depression     declines treatment 16     HTN (hypertension)     declines treatment 16     Left knee pain 2015     Plantar fasciitis 2010     Right knee pain 2016     Tibialis posterior tendonitis 2016     Past Surgical History:   Procedure Laterality Date     HERNIORRHAPHY INGUINAL BILATERAL      RI , [facility]      NASAL/SINUS POLYPECTOMY      Nasal-Sinus Polypectomy     Family History   Problem Relation Age of Onset     Hypertension Mother      Social History     Tobacco Use     Smoking status: Former     Types: Cigarettes     Quit date: 1970     Years since quittin.2     Smokeless tobacco: Never   Vaping Use     Vaping status: Not on file   Substance Use Topics     Alcohol use: Yes     Alcohol/week: 0.0 standard drinks of alcohol     Comment: rarely               The plan of care was discussed with the patient. They understand and agree with the course of treatment prescribed. A printed summary was given including instructions and medications.  The use of Dragon/[employer]  dictation services may have been used to construct the content in this note; any grammatical or spelling errors are non-intentional. Please contact the author of this note directly if you are in need of any clarification.

## 2023-04-02 NOTE — PATIENT INSTRUCTIONS
Heat for the next 2-3 days. Gentle range of motion, stretch Modify activity.  Mild pain is okay as long as it resolves after a minute or 2 of discontinuing the activity.  Decreased level activity if pain is moderate to severe or last longer than a few minutes after discontinuing.    Ibuprofen 400-600 mg (2-3 of the 200 mg OTC tablets or 400-600 mg of the children's liquid) up to 4 times daily with food or milk  Tylenol 500-1000 mg every 8 hours as needed

## 2023-04-04 ENCOUNTER — ANCILLARY PROCEDURE (OUTPATIENT)
Dept: GENERAL RADIOLOGY | Facility: CLINIC | Age: 73
End: 2023-04-04
Attending: NURSE PRACTITIONER
Payer: COMMERCIAL

## 2023-04-04 ENCOUNTER — OFFICE VISIT (OUTPATIENT)
Dept: URGENT CARE | Facility: URGENT CARE | Age: 73
End: 2023-04-04
Payer: COMMERCIAL

## 2023-04-04 VITALS
OXYGEN SATURATION: 98 % | HEART RATE: 53 BPM | RESPIRATION RATE: 16 BRPM | TEMPERATURE: 97.2 F | SYSTOLIC BLOOD PRESSURE: 160 MMHG | BODY MASS INDEX: 33.47 KG/M2 | WEIGHT: 240 LBS | DIASTOLIC BLOOD PRESSURE: 100 MMHG

## 2023-04-04 DIAGNOSIS — M25.531 RIGHT WRIST PAIN: ICD-10-CM

## 2023-04-04 DIAGNOSIS — M54.2 NECK PAIN: ICD-10-CM

## 2023-04-04 DIAGNOSIS — M62.838 MUSCLE SPASMS OF NECK: Primary | ICD-10-CM
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
## 2023-04-04 PROCEDURE — 72040 X-RAY EXAM NECK SPINE 2-3 VW: CPT | Mod: TC | Performed by: RADIOLOGY

## 2023-04-04 PROCEDURE — 73110 X-RAY EXAM OF WRIST: CPT | Mod: TC | Performed by: RADIOLOGY

## 2023-04-04 PROCEDURE — 99214 OFFICE O/P EST MOD 30 MIN: CPT | Performed by: NURSE PRACTITIONER

## 2023-04-04 RX ORDER — CYCLOBENZAPRINE HCL 5 MG
5 TABLET ORAL 3 TIMES DAILY PRN
Qty: 21 TABLET | Refills: 0 | Status: SHIPPED | OUTPATIENT
Start: 2023-04-04 | End: 2023-04-11

## 2023-04-04 NOTE — LETTER
April 4, 2023      Don Samson  [address]  [address]  [place] [facility] [address]        To Whom It May Concern:    Don Samson was seen in our clinic for acute injury. He may return to work tomorrow 04/05 without restriction.      Sincerely,        Josette Ramos, NP

## 2023-04-04 NOTE — LETTER
April 4, 2023      Don Samson  [address]  [address]  [facility] [address]        To Whom It May Concern:    Don Samson was seen in our clinic. He may return to work tomorrow as tolerated and improved.  Do not perform any work duties that elicit severe pain.      Sincerely,        Josette Ramos, NP

## 2023-04-04 NOTE — PROGRESS NOTES
Chief Complaint   Patient presents with     Back Pain     Pt was in a little ago for back pain and has improved but not gone      Urgent Care     Pt did noticed abrasions on hand      SUBJECTIVE:  Don Samson is a 73 year old male who presents to the clinic today with cervical spine neck pain and right wrist pain following a fall on concrete 2 days ago.  He says that nothing has changed but the pain has just persisted.  Right wrist bruising redness tenderness.  No new neurodeficits such as severe headache vision change vomiting confusion trouble walking talking.  He has been taking Tylenol at home.  Has naproxen and Lidoderm, but has not used.  He is out of Flexeril and requesting more.  States that the bottle broke and he lost some.  He understands to use with caution sparingly due to sedation.  Needs a work note to return tomorrow.    Past Medical History:   Diagnosis Date     Ankle pain 5/21/2016     Ankle sprain and strain 9/16/2010     CARDIOVASCULAR SCREENING; LDL GOAL LESS THAN 130 9/6/2016     Depression     declines treatment 4/1/16     HTN (hypertension)     declines treatment 4/1/16     Left knee pain 6/18/2015     Plantar fasciitis 9/16/2010     Right knee pain 12/12/2016     Tibialis posterior tendonitis 5/21/2016     acetaminophen (TYLENOL) 500 MG tablet, Take 1-2 tablets (500-1,000 mg) by mouth every 6 hours as needed for mild pain Maximum daily dose 4,000mg.  amLODIPine (NORVASC) 5 MG tablet, Take 1 tablet (5 mg) by mouth daily  Lidocaine (LIDOCARE) 4 % Patch, Place 1 patch onto the skin every 24 hours for 10 days To prevent lidocaine toxicity, patient should be patch free for 12 hrs daily.  naproxen (NAPROSYN) 500 MG tablet, Take 1 tablet (500 mg) by mouth 2 times daily (with meals)  olmesartan (BENICAR) 20 MG tablet, Take 1 tablet (20 mg) by mouth daily (with breakfast)  tadalafil (CIALIS) 10 MG tablet, Take 10 mg by mouth    No current facility-administered medications on file prior to  visit.    Social History     Tobacco Use     Smoking status: Former     Types: Cigarettes     Quit date: 1970     Years since quittin.2     Smokeless tobacco: Never   Vaping Use     Vaping status: Not on file   Substance Use Topics     Alcohol use: Yes     Alcohol/week: 0.0 standard drinks of alcohol     Comment: rarely      Allergies   Allergen Reactions     Ibuprofen Nausea       Review of Systems   All systems negative except for those listed above in HPI.    EXAM:   BP (!) 160/100 (BP Location: Right arm, Patient Position: Sitting, Cuff Size: Adult Large)   Pulse 53   Temp 97.2  F (36.2  C) (Temporal)   Resp 16   Wt 108.9 kg (240 lb)   SpO2 98%   BMI 33.47 kg/m      Physical Exam  Vitals reviewed.   Constitutional:       Appearance: Normal appearance.   HENT:      Head: Normocephalic and atraumatic.      Nose: Nose normal.      Mouth/Throat:      Mouth: Mucous membranes are moist.      Pharynx: Oropharynx is clear.   Eyes:      Extraocular Movements: Extraocular movements intact.      Conjunctiva/sclera: Conjunctivae normal.      Pupils: Pupils are equal, round, and reactive to light.   Cardiovascular:      Rate and Rhythm: Normal rate.   Pulmonary:      Effort: Pulmonary effort is normal.   Musculoskeletal:         General: Tenderness and signs of injury present. No swelling. Normal range of motion.      Cervical back: Normal range of motion and neck supple. Tenderness present. No rigidity.      Comments: No tenderness to the anatomical snuffbox.   Lymphadenopathy:      Cervical: No cervical adenopathy.   Skin:     General: Skin is warm and dry.      Findings: Bruising and erythema present. No rash.   Neurological:      General: No focal deficit present.      Mental Status: He is alert and oriented to person, place, and time.      Cranial Nerves: No cranial nerve deficit.      Sensory: No sensory deficit.      Motor: No weakness.   Psychiatric:         Mood and Affect: Mood normal.          Behavior: Behavior normal.       X-rays done in clinic read by me as negative for obvious fracture dislocation.  There is degenerative changes.  Results for orders placed or performed in visit on 04/04/23   XR Cervical Spine 2/3 Views     Status: None    Narrative    CERVICAL SPINE TWO TO THREE VIEWS April 4, 2023 12:17 PM     HISTORY: Fell backwards on concrete with cervical spine tenderness,  urgent care does not have imaging beyond x-ray. Neck pain.    COMPARISON: None.      Impression    IMPRESSION: Cervical spine fracture not excluded. Collar placement and  cervical spine CT is recommended in the setting of cervical spine  trauma for which imaging is indicated. No grossly displaced cervical  spine fracture is appreciated by x-ray. Moderate degenerative disease  at C4-C5, C5-C6, and C6-C7. Background of mild degenerative disc  disease. Multilevel mild-to-moderate facet arthropathy. Loss of the  normal cervical lordosis. Probable subtle grade 1 retrolisthesis of C5  on C6.    TRISTA JOHN MD         SYSTEM ID:  [id number]     ASSESSMENT:    ICD-10-CM    1. Fall, subsequent encounter  W19.XXXD       2. Neck pain  M54.2 XR Cervical Spine 2/3 Views      3. Right wrist pain  M25.531 XR Wrist Right G/E 3 Views        PLAN:    No obvious fracture visualized on x-rays  Rest ice compression elevate massage stretch  Rotate Tylenol naproxen  Start topical Lidoderm  ER if severe worsening neck pain headache stiffness numbness tingling shooting weakness lost range of motion  Urgent care is limited without advanced imaging beyond x-ray    Follow up with primary care provider with any problems, questions or concerns or if symptoms worsen or fail to improve. Patient agreed to plan and verbalized understanding.    Josette Ramos, MICHELLE-[facility]

## 2023-04-25 ENCOUNTER — OFFICE VISIT (OUTPATIENT)
Dept: URGENT CARE | Facility: URGENT CARE | Age: 73
End: 2023-04-25
Payer: COMMERCIAL

## 2023-04-25 VITALS
SYSTOLIC BLOOD PRESSURE: 155 MMHG | OXYGEN SATURATION: 98 % | DIASTOLIC BLOOD PRESSURE: 98 MMHG | TEMPERATURE: 98.1 F | HEART RATE: 98 BPM | WEIGHT: 240 LBS | BODY MASS INDEX: 33.47 KG/M2

## 2023-04-25 DIAGNOSIS — K59.00 CONSTIPATION, UNSPECIFIED CONSTIPATION TYPE: Primary | ICD-10-CM

## 2023-04-25 DIAGNOSIS — M54.50 ACUTE BILATERAL LOW BACK PAIN WITHOUT SCIATICA: ICD-10-CM

## 2023-04-25 PROCEDURE — 99213 OFFICE O/P EST LOW 20 MIN: CPT | Performed by: NURSE PRACTITIONER

## 2023-04-25 NOTE — LETTER
April 25, 2023      Don CLARKE Mal[place]  [address]  [address]  [facility] [address]        To Whom It May Concern:    Don Samson was seen in our clinic for acute visit. Please excuse medical related absences from 04/24 and 04/25. May return once improved and tolerated.      Sincerely,        Josette Ramos, NP

## 2023-04-25 NOTE — PROGRESS NOTES
Chief Complaint   Patient presents with     Urgent Care     Back Pain     C/O back pain for 3 days, no injury     SUBJECTIVE:  Don Samson is a 73 year old male who presents to the clinic today with bilateral low back aching 5 out of 10 over the last 3 days.  He believes it is due to constipation.  He took stool softeners at home and now has a bit more diarrhea.  No fever sweats chills vomiting blood black dysuria.  He mostly just needs a work note to return tomorrow.  Currently taking occasional Flexeril and Tylenol for baseline muscle spasms.    Past Medical History:   Diagnosis Date     Ankle pain 2016     Ankle sprain and strain 2010     CARDIOVASCULAR SCREENING; LDL GOAL LESS THAN 130 2016     Depression     declines treatment 16     HTN (hypertension)     declines treatment 16     Left knee pain 2015     Plantar fasciitis 2010     Right knee pain 2016     Tibialis posterior tendonitis 2016     acetaminophen (TYLENOL) 500 MG tablet, Take 1-2 tablets (500-1,000 mg) by mouth every 6 hours as needed for mild pain Maximum daily dose 4,000mg.  amLODIPine (NORVASC) 5 MG tablet, Take 1 tablet (5 mg) by mouth daily  naproxen (NAPROSYN) 500 MG tablet, Take 1 tablet (500 mg) by mouth 2 times daily (with meals)  olmesartan (BENICAR) 20 MG tablet, Take 1 tablet (20 mg) by mouth daily (with breakfast)  tadalafil (CIALIS) 10 MG tablet, Take 10 mg by mouth    No current facility-administered medications on file prior to visit.    Social History     Tobacco Use     Smoking status: Former     Types: Cigarettes     Quit date: 1970     Years since quittin.3     Passive exposure: Never     Smokeless tobacco: Never   Vaping Use     Vaping status: Not on file   Substance Use Topics     Alcohol use: Yes     Alcohol/week: 0.0 standard drinks of alcohol     Comment: rarely      Allergies   Allergen Reactions     Ibuprofen Nausea       Review of Systems   All systems negative  except for those listed above in HPI.    EXAM:   BP (!) 155/98   Pulse 98   Temp 98.1  F (36.7  C) (Tympanic)   Wt 108.9 kg (240 lb)   SpO2 98%   BMI 33.47 kg/m      Physical Exam  Vitals reviewed.   Constitutional:       Appearance: Normal appearance.   HENT:      Head: Normocephalic and atraumatic.      Nose: Nose normal.      Mouth/Throat:      Mouth: Mucous membranes are moist.      Pharynx: Oropharynx is clear.   Eyes:      Extraocular Movements: Extraocular movements intact.      Conjunctiva/sclera: Conjunctivae normal.      Pupils: Pupils are equal, round, and reactive to light.   Cardiovascular:      Rate and Rhythm: Normal rate.      Pulses: Normal pulses.   Pulmonary:      Effort: Pulmonary effort is normal.   Musculoskeletal:         General: Tenderness present. Normal range of motion.      Cervical back: Normal range of motion and neck supple.      Comments: Mild low back aching bilaterally.   Skin:     General: Skin is warm and dry.      Findings: No rash.   Neurological:      General: No focal deficit present.      Mental Status: He is alert and oriented to person, place, and time.   Psychiatric:         Mood and Affect: Mood normal.         Behavior: Behavior normal.       ASSESSMENT:    ICD-10-CM    1. Constipation, unspecified constipation type  K59.00       2. Acute bilateral low back pain without sciatica  M54.50         PLAN:    Patient declines further work-up including urinalysis  Likely constipation as well as baseline back pain  No red flags elicited  Reevaluation if anything worsens or lingers    Medications to soften stools-  Mg citrate  Senna  Psyllium (Metamucil) powder, wafer or capsule 1-3 times daily  Methylcelluolse (Citrucel) capsules or powder 1-3 times daily  Polyethylene glycol (MiraLax) powder daily  Docusate sodium (Colace, Dulcolax) liquid or capsule  Drink plenty of water.  Increase fiber in diet- Foods with high fiber content include:  dates, prunes, strawberries, apple  with skin, pear with skin, green beans, broccoli, brussles sprouts, carrots, peas, spinach, zucchini, baked beans, kidney beans, peanuts, bran muffins, oatmeal, oat bran, wheat bran and rolled oats.  Follow up with primary care provider if symptoms worsen, you develop a high fever, increased abdominal pain, patient urinates less often than every 8 hours, develops lethargy, bloody stools or he/she does not have a bowel movement in the next 2-3 days.    Follow up with primary care provider with any problems, questions or concerns or if symptoms worsen or fail to improve. Patient agreed to plan and verbalized understanding.    MICHELLE Duran-[facility] C[facility] [place]

## 2023-04-25 NOTE — PATIENT INSTRUCTIONS
Patient declines further work-up including urinalysis  Likely constipation as well as baseline back pain  No red flags elicited  Reevaluation if anything worsens or lingers    Medications to soften stools-  Mg citrate  Senna  Psyllium (Metamucil) powder, wafer or capsule 1-3 times daily  Methylcelluolse (Citrucel) capsules or powder 1-3 times daily  Polyethylene glycol (MiraLax) powder daily  Docusate sodium (Colace, Dulcolax) liquid or capsule  Drink plenty of water.  Increase fiber in diet- Foods with high fiber content include:  dates, prunes, strawberries, apple with skin, pear with skin, green beans, broccoli, brussles sprouts, carrots, peas, spinach, zucchini, baked beans, kidney beans, peanuts, bran muffins, oatmeal, oat bran, wheat bran and rolled oats.  Follow up with primary care provider if symptoms worsen, you develop a high fever, increased abdominal pain, patient urinates less often than every 8 hours, develops lethargy, bloody stools or he/she does not have a bowel movement in the next 2-3 days.

## 2023-04-28 ENCOUNTER — ANCILLARY PROCEDURE (OUTPATIENT)
Dept: GENERAL RADIOLOGY | Facility: CLINIC | Age: 73
End: 2023-04-28
Attending: PHYSICIAN ASSISTANT
Payer: COMMERCIAL

## 2023-04-28 ENCOUNTER — OFFICE VISIT (OUTPATIENT)
Dept: URGENT CARE | Facility: URGENT CARE | Age: 73
End: 2023-04-28
Payer: COMMERCIAL

## 2023-04-28 VITALS
DIASTOLIC BLOOD PRESSURE: 75 MMHG | TEMPERATURE: 98 F | OXYGEN SATURATION: 98 % | HEART RATE: 78 BPM | SYSTOLIC BLOOD PRESSURE: 128 MMHG

## 2023-04-28 DIAGNOSIS — J06.9 UPPER RESPIRATORY TRACT INFECTION, UNSPECIFIED TYPE: ICD-10-CM

## 2023-04-28 DIAGNOSIS — M54.50 BILATERAL LOW BACK PAIN WITHOUT SCIATICA, UNSPECIFIED CHRONICITY: ICD-10-CM

## 2023-04-28 DIAGNOSIS — R53.83 OTHER FATIGUE: ICD-10-CM

## 2023-04-28 DIAGNOSIS — M54.2 NECK PAIN: ICD-10-CM

## 2023-04-28 DIAGNOSIS — M54.50 BILATERAL LOW BACK PAIN WITHOUT SCIATICA, UNSPECIFIED CHRONICITY: Primary | ICD-10-CM

## 2023-04-28 LAB
ALBUMIN UR-MCNC: NEGATIVE MG/DL
APPEARANCE UR: CLEAR
BACTERIA #/AREA URNS HPF: NORMAL /HPF
BILIRUB UR QL STRIP: ABNORMAL
COLOR UR AUTO: YELLOW
GLUCOSE UR STRIP-MCNC: NEGATIVE MG/DL
HGB UR QL STRIP: NEGATIVE
KETONES UR STRIP-MCNC: ABNORMAL MG/DL
LEUKOCYTE ESTERASE UR QL STRIP: NEGATIVE
NITRATE UR QL: NEGATIVE
PH UR STRIP: 5.5 [PH] (ref 5–7)
RBC #/AREA URNS AUTO: NORMAL /HPF
SP GR UR STRIP: >=1.03 (ref 1–1.03)
SQUAMOUS #/AREA URNS AUTO: NORMAL /LPF
UROBILINOGEN UR STRIP-ACNC: 0.2 E.U./DL
WBC #/AREA URNS AUTO: NORMAL /HPF

## 2023-04-28 PROCEDURE — 99214 OFFICE O/P EST MOD 30 MIN: CPT | Mod: CS | Performed by: PHYSICIAN ASSISTANT

## 2023-04-28 PROCEDURE — 87086 URINE CULTURE/COLONY COUNT: CPT | Performed by: PHYSICIAN ASSISTANT

## 2023-04-28 PROCEDURE — U0003 INFECTIOUS AGENT DETECTION BY NUCLEIC ACID (DNA OR RNA); SEVERE ACUTE RESPIRATORY SYNDROME CORONAVIRUS 2 (SARS-COV-2) (CORONAVIRUS DISEASE [COVID-19]), AMPLIFIED PROBE TECHNIQUE, MAKING USE OF HIGH THROUGHPUT TECHNOLOGIES AS DESCRIBED BY CMS-2020-01-R: HCPCS | Performed by: PHYSICIAN ASSISTANT

## 2023-04-28 PROCEDURE — U0005 INFEC AGEN DETEC AMPLI PROBE: HCPCS | Performed by: PHYSICIAN ASSISTANT

## 2023-04-28 PROCEDURE — 72100 X-RAY EXAM L-S SPINE 2/3 VWS: CPT | Mod: TC | Performed by: RADIOLOGY

## 2023-04-28 PROCEDURE — 81001 URINALYSIS AUTO W/SCOPE: CPT | Performed by: PHYSICIAN ASSISTANT

## 2023-04-28 RX ORDER — LIDOCAINE 4 G/G
1 PATCH TOPICAL EVERY 24 HOURS
Qty: 10 PATCH | Refills: 0 | Status: SHIPPED | OUTPATIENT
Start: 2023-04-28

## 2023-04-28 RX ORDER — NAPROXEN 500 MG/1
500 TABLET ORAL 2 TIMES DAILY WITH MEALS
Qty: 30 TABLET | Refills: 0 | Status: SHIPPED | OUTPATIENT
Start: 2023-04-28

## 2023-04-28 RX ORDER — METHYLPREDNISOLONE 4 MG
TABLET, DOSE PACK ORAL
Qty: 21 TABLET | Refills: 0 | Status: SHIPPED | OUTPATIENT
Start: 2023-04-28 | End: 2023-05-31

## 2023-04-28 NOTE — LETTER
April 28, 2023      Don Samson  [address]  [address]  [facility] [address]        Patient was here today feeling unwell, please excuse patient from missing work all week and would return back to work Monday.          Sincerely,        Selvin Street, [facility], PA-C

## 2023-04-28 NOTE — PROGRESS NOTES
"  Assessment & Plan     Bilateral low back pain without sciatica, unspecified chronicity    Lumbar xray Negative for acute findings, read by Selvin Street PA-C [facility] at time of visit.    UA normal  No signs of infection  Urine culture pending    When your back pain is new, you may find that certain positions will ease your pain. Gently try each of the following positions until you find one that eases your pain. Once you find a position of comfort, use it as often as you like while you recover. Return to your daily routine as soon as possible.  Use walking to help relieve your discomfort. Try taking a short walk every 3 to 4 hours during the day. Walk for a few minutes inside your home or take longer walks outside, on a treadmill or at a mall. Slowly increase the amount of time you walk. Expect discomfort when you begin, but it should lessen as your back starts to recover.  Your back pain should improve over the first couple of weeks. As it improves, you should be able to return to your normal activities.    Trial course of medrol for low back pain  Lidocaine patch for pain  Follow up with PCP    - XR Lumbar Spine 2/3 Views; Future  - UA with Microscopic reflex to Culture  - UA Microscopic with Reflex to Culture  - methylPREDNISolone (MEDROL DOSEPAK) 4 MG tablet therapy pack; Follow package instructions  - Lidocaine (LIDOCARE) 4 % Patch; Place 1 patch onto the skin every 24 hours To prevent lidocaine toxicity, patient should be patch free for 12 hrs daily.    Other fatigue    Covid pending  UA neg, culture pending  - UA with Microscopic reflex to Culture  - Symptomatic COVID-19 Virus (Coronavirus) by PCR Nasopharyngeal  - UA Microscopic with Reflex to Culture    Review of external notes as documented elsewhere in note       BMI:   Estimated body mass index is 33.47 kg/m  as calculated from the following:    Height as of 4/2/23: 1.803 m (5' 11\").    Weight as of 4/25/23: 108.9 kg (240 lb).     At today's visit with Don" TRICIA Samson , we discussed results, diagnosis, medications and formulated a plan.  We also discussed red flags for immediate return to clinic/ER, as well as indications for follow up with PCP if not improved in 3 days. Patient understood and agreed to plan. Don Samson was discharged with stable vitals and has no further questions.       No follow-ups on file.    Selvin Street, REBECCA, MERVIN  [facility]    Negro Cifuentes is a 73 year old, presenting for the following health issues:  Back Pain (Lower back pain x's 1 week not getting better )         View : No data to display.              HPI   Review of Systems   Constitutional, HEENT, cardiovascular, pulmonary, gi and gu systems are negative, except as otherwise noted.      Objective    /75   Pulse 78   Temp 98  F (36.7  C) (Temporal)   SpO2 98%   There is no height or weight on file to calculate BMI.  Physical Exam   GENERAL: healthy, alert and no distress  EYES: Eyes grossly normal to inspection, PERRL and conjunctivae and sclerae normal  HENT: ear canals and TM's normal, nose and mouth without ulcers or lesions  NECK: no adenopathy, no asymmetry, masses, or scars and thyroid normal to palpation  RESP: lungs clear to auscultation - no rales, rhonchi or wheezes  CV: regular rate and rhythm, normal S1 S2, no S3 or S4, no murmur, click or rub, no peripheral edema and peripheral pulses strong  ABDOMEN: soft, nontender, no hepatosplenomegaly, no masses and bowel sounds normal  MS: Positive for lower back tenderness on palpation bilaterally  SKIN: no suspicious lesions or rashes  NEURO: Normal strength and tone, mentation intact and speech normal  PSYCH: mentation appears normal, affect normal/bright    Xray - Reviewed and interpreted by me.  Negative for acute findings, read by Selvin FERNANDEZ at time of visit.

## 2023-04-29 LAB
BACTERIA UR CULT: NORMAL
SARS-COV-2 RNA RESP QL NAA+PROBE: NEGATIVE

## 2023-04-30 ENCOUNTER — TELEPHONE (OUTPATIENT)
Dept: NURSING | Facility: CLINIC | Age: 73
End: 2023-04-30
Payer: COMMERCIAL

## 2023-04-30 NOTE — TELEPHONE ENCOUNTER
"Coronavirus (COVID-19) Notification    SARS CoV2 PCR (no units)   Date Value   04/28/2023 Negative         Your result was negative. This means that we didn't find the virus that causes COVID-19 in your sample. A test may show negative when you do actually have the virus. This can happen when the virus is in the early stages of infection, before you feel illness symptoms.    If you were exposed to COVID-19: Follow the CDC's instructions for quarantine:   www.cdc.gov/coronavirus/2019-ncov/your-health/quarantine-isolation.html     If you have symptoms     Limit contact with others to avoid spreading the illness    Clean \"high-touch\" surfaces often (doorknobs, counters, handles etc.)  o Use household cleaning spray or wipes. You can find a full list on the EPA website: www.epa.gov/pesticide-registration/list-n-disinfectants-use-against-sars-cov-2  o Cover your mouth and nose with a mask or other face covering to avoid spreading germs  o Wash your hands and face often with soap and water  o If symptoms get worse, or you still think you might have COVID, you may want to get tested again in 48 hours    Going back to work, school or   Check with your employer, school or  for any guidelines to follow for returning.  You are sent a letter which will serve as formal document notice that you tested negative for COVID-19, as of the testing date shown above.    Silke Costello    "

## 2023-05-31 ENCOUNTER — OFFICE VISIT (OUTPATIENT)
Dept: URGENT CARE | Facility: URGENT CARE | Age: 73
End: 2023-05-31
Payer: COMMERCIAL

## 2023-05-31 VITALS
OXYGEN SATURATION: 100 % | WEIGHT: 240 LBS | DIASTOLIC BLOOD PRESSURE: 76 MMHG | HEART RATE: 78 BPM | TEMPERATURE: 97.2 F | RESPIRATION RATE: 18 BRPM | SYSTOLIC BLOOD PRESSURE: 118 MMHG | HEIGHT: 72 IN | BODY MASS INDEX: 32.51 KG/M2

## 2023-05-31 DIAGNOSIS — M54.50 ACUTE RIGHT-SIDED LOW BACK PAIN WITHOUT SCIATICA: Primary | ICD-10-CM

## 2023-05-31 PROCEDURE — 96372 THER/PROPH/DIAG INJ SC/IM: CPT | Performed by: NURSE PRACTITIONER

## 2023-05-31 PROCEDURE — 99214 OFFICE O/P EST MOD 30 MIN: CPT | Mod: 25 | Performed by: NURSE PRACTITIONER

## 2023-05-31 RX ORDER — KETOROLAC TROMETHAMINE 30 MG/ML
30 INJECTION, SOLUTION INTRAMUSCULAR; INTRAVENOUS ONCE
Status: COMPLETED | OUTPATIENT
Start: 2023-05-31 | End: 2023-05-31

## 2023-05-31 RX ADMIN — KETOROLAC TROMETHAMINE 30 MG: 30 INJECTION, SOLUTION INTRAMUSCULAR; INTRAVENOUS at 14:50

## 2023-05-31 NOTE — LETTER
May 31, 2023      Don CLARKE Mali  [address]  [address]  [facility] [address]        To Whom It May Concern:    Don CLARKE Samson  was seen on 5/31/2023.  Please excuse him  until 6/1/2023 due to illness.        Sincerely,        Lily Henderson, CNP

## 2023-05-31 NOTE — PATIENT INSTRUCTIONS
Do not take any ibuprofen, naproxen, Aleve or Advil for the next 8 hours.    May take Tylenol 1000 mg every 8 hours as needed for pain.    If you develop any urinary symptoms please return.

## 2023-05-31 NOTE — PROGRESS NOTES
Chief Complaint   Patient presents with     Urgent Care     Back Pain     Low back pain.          ICD-10-CM    1. Acute right-sided low back pain without sciatica  M54.50 ketorolac (TORADOL) injection 30 mg        This is acute on chronic back pain.  He did start to have some relief of his back pain after prolonged was given.  He did not have any hypervigilant reaction to this medication.  Work note was given.    Subjective     Don Samson is a 73 year old male who presents to clinic today for right low back pain.  He has had this pain in the past but it slowly began last week when he was moving boxes.  He denies any radiation of the pain nor does he have any numbness or tingling in his legs.  He is not having any tremor.  He denies any urinary symptoms.       Objective    /76   Pulse 78   Temp 97.2  F (36.2  C) (Temporal)   Resp 18   Ht 1.829 m (6')   Wt 108.9 kg (240 lb)   SpO2 100%   BMI 32.55 kg/m    Nurses notes and VS have been reviewed.    Physical Exam       GENERAL APPEARANCE: alert and mild distress     ABDOMEN:  soft, nontender, no HSM or masses and bowel sounds normal     MS: extremities normal- no gross deformities noted; normal muscle tone.     SKIN: no suspicious lesions or rashes     NEURO: Normal strength and tone, mentation intact and speech normal      CONSTANCE Hameed, CNP  [facility] Provider    The use of Dragon/PerceptiMed dictation services may have been used to construct the content in this note; any grammatical or spelling errors are non-intentional. Please contact the author of this note directly if you are in need of any clarification.

## 2023-05-31 NOTE — LETTER
May 31, 2023      Don CLARKE Samson  [address]  [facility] [address]        To Whom It May Concern:    Don Samson  was seen on 5/31/2023.  Please excuse him  until 6/3/2023 due to illness.        Sincerely,        Lily Henderson, CNP

## 2023-09-03 ENCOUNTER — OFFICE VISIT (OUTPATIENT)
Dept: URGENT CARE | Facility: URGENT CARE | Age: 73
End: 2023-09-03
Payer: COMMERCIAL

## 2023-09-03 VITALS
DIASTOLIC BLOOD PRESSURE: 98 MMHG | OXYGEN SATURATION: 98 % | WEIGHT: 240 LBS | HEART RATE: 86 BPM | BODY MASS INDEX: 32.55 KG/M2 | SYSTOLIC BLOOD PRESSURE: 158 MMHG | TEMPERATURE: 98.5 F

## 2023-09-03 DIAGNOSIS — G57.01 PIRIFORMIS SYNDROME, RIGHT: ICD-10-CM

## 2023-09-03 DIAGNOSIS — M54.50 ACUTE RIGHT-SIDED LOW BACK PAIN WITHOUT SCIATICA: Primary | ICD-10-CM

## 2023-09-03 DIAGNOSIS — M79.18 PAIN IN RIGHT BUTTOCK: ICD-10-CM

## 2023-09-03 PROCEDURE — 99214 OFFICE O/P EST MOD 30 MIN: CPT | Performed by: INTERNAL MEDICINE

## 2023-09-03 NOTE — PROGRESS NOTES
ASSESSMENT AND PLAN:      ICD-10-CM    1. Acute right-sided low back pain without sciatica  M54.50 Physical Therapy Referral      2. Pain in right buttock  M79.18 Physical Therapy Referral      3. Piriformis syndrome, right  G57.01 Physical Therapy Referral        Low back pain education given  Encouraged that lumbar x-ray looks good with minimal DJD.  Discussed he should respond well to physical therapy and continued alternating naproxen and Tylenol and conservative cares.      Patient Instructions       Mild arthritis on your xray    Physical Therapy     Continue naprosyn and tylenol  Ice  Heat.  Back corset            Miladis Malhotra MD  [facility]    Subjective     Don Samson is a 73 year old who presents for Patient presents with:  Urgent Care  Back Pain: C/O back pan for 4 days, no injury     an established patient of [facility].    Back Pain    Onset of symptoms was 4 day(s) ago.  Location: right low back  Radiation: radiates into the right hip  Context: no trigger for back pain flare        Current and Associated symptoms: pain  Denies: fecal incontinence, urinary incontinence, lower extremity numbness, lower extremity weakness, and paresthesia    Aggravating Factors: changing position  Therapies to improve symptoms include: heat, ice, Tylenol, and naprosyn  Past history: recurrent self limited episodes of low back pain in the past    Needs work note    Reviewed xray images with patient from 4/2023    Narrative & Impression   LUMBAR SPINE TWO TO THREE VIEWS  April 28, 2023 10:30 AM      HISTORY: Bilateral low back pain.      COMPARISON: Lumbar spine x-ray 12/17/2018.                                                                       IMPRESSION: Alignment of the lumbar spine is within normal limits. No  loss of vertebral body height. Mild degenerative spurring in the mid  lumbar spine without significant loss of disc height.     NEWTON HEALY MD         SYSTEM ID:  [id number]        Review of Systems        Objective    BP (!) 158/98   Pulse 86   Temp 98.5  F (36.9  C) (Tympanic)   Wt 108.9 kg (240 lb)   SpO2 98%   BMI 32.55 kg/m    Physical Exam  Vitals reviewed.   Constitutional:       Appearance: Normal appearance.   Musculoskeletal:      Comments: BACK: Lumbosacral spine area reveals local tenderness MID right side lumbar area  And also specific area and right buttock.  Minimal painful and reduced LS ROM noted. Straight leg raise is negative at 45 degrees on bilateral.  DTR's, motor strength and sensation normal, including heel and toe gait.     Neurological:      Mental Status: He is alert.      Sensory: No sensory deficit.      Gait: Gait normal.      Deep Tendon Reflexes: Reflexes normal (Knee bilateral normal).

## 2023-09-03 NOTE — PATIENT INSTRUCTIONS
Mild arthritis on your xray    Physical Therapy     Continue naprosyn and tylenol  Ice  Heat.  Back corset

## 2023-09-03 NOTE — LETTER
September 3, 2023      Don Samson  [address]  [address]  [facility] [address]        To Whom It May Concern:    Don CLARKE Mali  was seen on September 3, 2023.  Please excuse him up to 3 days work absence due to illness.        Sincerely,        Miladis Malhotra MD

## 2023-09-06 ENCOUNTER — OFFICE VISIT (OUTPATIENT)
Dept: URGENT CARE | Facility: URGENT CARE | Age: 73
End: 2023-09-06
Payer: COMMERCIAL

## 2023-09-06 VITALS
HEART RATE: 83 BPM | HEIGHT: 72 IN | RESPIRATION RATE: 16 BRPM | DIASTOLIC BLOOD PRESSURE: 85 MMHG | OXYGEN SATURATION: 98 % | WEIGHT: 240 LBS | BODY MASS INDEX: 32.51 KG/M2 | SYSTOLIC BLOOD PRESSURE: 129 MMHG | TEMPERATURE: 97.3 F

## 2023-09-06 DIAGNOSIS — M54.50 ACUTE RIGHT-SIDED LOW BACK PAIN WITHOUT SCIATICA: Primary | ICD-10-CM

## 2023-09-06 PROCEDURE — 99213 OFFICE O/P EST LOW 20 MIN: CPT | Performed by: STUDENT IN AN ORGANIZED HEALTH CARE EDUCATION/TRAINING PROGRAM

## 2023-09-06 NOTE — PROGRESS NOTES
ASSESSMENT & PLAN:   Diagnoses and all orders for this visit:  Acute right-sided low back pain without sciatica    Ongoing low back pain which worsened again yesterday causing him to miss work. No red flag symptoms or findings. Recommend continuing OTC analgesics, rest, stretching. Should follow-up with PT with previous referral.     No follow-ups on file.    There are no Patient Instructions on file for this visit.    At the end of the encounter, I discussed results, diagnosis, medications. Discussed red flags for immediate return to clinic/ER, as well as indications for follow up if no improvement. Patient and/or caregiver understood and agreed to plan. Patient was stable for discharge.    ------------------------------------------------------------------------  SUBJECTIVE  Patient presents with:  Back Pain: X1 week of lower back pain   Was seen on 9/3/23 for the same thing     HPI  Don Samson is a(n) 73 year old male presenting to urgent care for mid low back pain. Sometimes pain goes into right buttocks and hip. This is a chronic issue. Happened again yesterday and he missed work. Feels better today. No leg weakness, numbness, tingling. No saddle anesthesia or incontinence. Seen in urgent care 3 days ago and referred to physical therapy. In the past. Pain improved with naproxen, Tylenol, Tiger balm. Needs work note for yesterday.    Review of Systems    Current Outpatient Medications   Medication Sig Dispense Refill    acetaminophen (TYLENOL) 500 MG tablet Take 1-2 tablets (500-1,000 mg) by mouth every 6 hours as needed for mild pain Maximum daily dose 4,000mg. 100 tablet 4    amLODIPine (NORVASC) 5 MG tablet Take 1 tablet (5 mg) by mouth daily 90 tablet 3    Lidocaine (LIDOCARE) 4 % Patch Place 1 patch onto the skin every 24 hours To prevent lidocaine toxicity, patient should be patch free for 12 hrs daily. 10 patch 0    naproxen (NAPROSYN) 500 MG tablet Take 1 tablet (500 mg) by mouth 2 times daily (with  meals) 30 tablet 0    olmesartan (BENICAR) 20 MG tablet Take 1 tablet (20 mg) by mouth daily (with breakfast) 90 tablet 3    tadalafil (CIALIS) 10 MG tablet Take 10 mg by mouth       Problem List:  2022-03: Left buttock pain  2019-12: Viral gastroenteritis  2019-03: Lumbago  2018-03: Hypertension goal BP (blood pressure) < 140/90  2018-03: Major depressive disorder, recurrent, moderate (H)  2016-12: Right knee pain  2016-11: Benign essential hypertension  2016-09: Fatigue  2016-09: CARDIOVASCULAR SCREENING; LDL GOAL LESS THAN 130  2016-05: Ankle pain  2016-05: Tibialis posterior tendonitis  2015-09: Adjustment disorder with mixed anxiety and depressed mood  2015-06: Left knee pain  2011-02: Ankle pain  2010-11: Low back pain  2010-09: Plantar fasciitis  2010-09: Ankle sprain and strain  2007-09: Degeneration of lumbar or lumbosacral intervertebral disc  2006-09: Disease of nail  HTN (hypertension)  Depression    Allergies   Allergen Reactions    Ibuprofen Nausea and Other (See Comments)     Other reaction(s): Abdominal pain    Prednisone Other (See Comments)         OBJECTIVE  Vitals:    09/06/23 1035   BP: 129/85   Pulse: 83   Resp: 16   Temp: 97.3  F (36.3  C)   TempSrc: Temporal   SpO2: 98%   Weight: 108.9 kg (240 lb)   Height: 1.829 m (6')     Physical Exam   GENERAL: healthy, alert, no acute distress.   PSYCH: mentation appears normal. Normal affect  MSK: no tenderness of thoracic or lumbar spinous process, iliac crest bilaterally, right hip. 5/5 knee flexion and extension bilaterally. Sensation of lower extremities intact. Negative SLR bilaterally.    No results found for any visits on 09/06/23.

## 2023-09-06 NOTE — LETTER
September 6, 2023      Don Samson  [address]  [address]  [facility] [address]        To Whom It May Concern:    Don Samson  was seen on 9/6/23.  Please excuse him on 9/5/23 due to injury.        Sincerely,        Kamilah Carter PA-C

## 2023-10-03 ENCOUNTER — OFFICE VISIT (OUTPATIENT)
Dept: URGENT CARE | Facility: URGENT CARE | Age: 73
End: 2023-10-03
Payer: COMMERCIAL

## 2023-10-03 VITALS
SYSTOLIC BLOOD PRESSURE: 122 MMHG | OXYGEN SATURATION: 99 % | TEMPERATURE: 97.4 F | BODY MASS INDEX: 32.51 KG/M2 | DIASTOLIC BLOOD PRESSURE: 80 MMHG | WEIGHT: 240 LBS | RESPIRATION RATE: 16 BRPM | HEIGHT: 72 IN | HEART RATE: 88 BPM

## 2023-10-03 DIAGNOSIS — M54.6 ACUTE BILATERAL THORACIC BACK PAIN: Primary | ICD-10-CM

## 2023-10-03 PROCEDURE — 99213 OFFICE O/P EST LOW 20 MIN: CPT | Performed by: NURSE PRACTITIONER

## 2023-10-03 RX ORDER — NAPROXEN 500 MG/1
500 TABLET ORAL 2 TIMES DAILY PRN
Qty: 30 TABLET | Refills: 0 | Status: SHIPPED | OUTPATIENT
Start: 2023-10-03 | End: 2024-02-14
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
## 2023-10-03 NOTE — LETTER
October 3, 2023      Don Samson  [address]  [address]  [facility] [address]        To Whom It May Concern:    Don Samson was seen in our clinic. Please excuse medical related absences. He may return to work today as improved and tolerated.      Sincerely,        Josette Ramos, NP
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
## 2023-12-10 ENCOUNTER — OFFICE VISIT (OUTPATIENT)
Dept: URGENT CARE | Facility: URGENT CARE | Age: 73
End: 2023-12-10
Payer: COMMERCIAL

## 2023-12-10 VITALS
WEIGHT: 240 LBS | DIASTOLIC BLOOD PRESSURE: 81 MMHG | OXYGEN SATURATION: 100 % | BODY MASS INDEX: 32.55 KG/M2 | TEMPERATURE: 97.8 F | HEART RATE: 101 BPM | SYSTOLIC BLOOD PRESSURE: 135 MMHG

## 2023-12-10 DIAGNOSIS — J02.9 SORE THROAT: ICD-10-CM

## 2023-12-10 DIAGNOSIS — K42.0 IRREDUCIBLE UMBILICAL HERNIA: Primary | ICD-10-CM

## 2023-12-10 DIAGNOSIS — R19.7 DIARRHEA, UNSPECIFIED TYPE: ICD-10-CM

## 2023-12-10 PROCEDURE — 99213 OFFICE O/P EST LOW 20 MIN: CPT | Performed by: PHYSICIAN ASSISTANT

## 2023-12-10 NOTE — LETTER
December 10, 2023      Don Samson  [address]  [facility] [address]        To Whom It May Concern:    Don Samson was seen in our clinic excuse his recent absences and through 12/12/23      Sincerely,      Topher Cabral

## 2023-12-10 NOTE — PROGRESS NOTES
Chief Complaint   Patient presents with    Urgent Care    Nasal Congestion     C/O cough and nasal congestion for 1 week       ASSESSMENT/PLAN:  Esau was seen today for urgent care and nasal congestion.    Diagnoses and all orders for this visit:    Irreducible umbilical hernia  -     Adult General Surg Referral; Future    Diarrhea, unspecified type    Sore throat    Patient with a large irreducible umbilical hernia.  Referring him back to general surgery.  I do not think that this is causing patient's diarrhea today.  Does not feel hard and is not tender.  More likely has a viral infection causing his mild cough, nasal congestion, sore throat and diarrhea.  Okay to continue using the Kaopectate.  Recommend electrolyte solutions.  Work letter given  Symptomatic cares and expected length of symptoms discussed at length and outlined in AVS  Return precautions also discussed    Topher Cabral PA-C      SUBJECTIVE:  Don is a 73 year old male who presents to urgent care with about 5 days of mild cough, nasal congestion, sore throat and diarrhea.  His symptoms have been improving and he has missed work.  He also has questions about his umbilical hernia.    ROS: Pertinent ROS neg other than the symptoms noted above in the HPI.     OBJECTIVE:  /81   Pulse 101   Temp 97.8  F (36.6  C) (Tympanic)   Wt 108.9 kg (240 lb)   SpO2 100%   BMI 32.55 kg/m     GENERAL: healthy, alert and no distress  EYES: Eyes grossly normal to inspection, PERRL and conjunctivae and sclerae normal  HENT: ear canals and TM's normal, nose and mouth without ulcers or lesions  RESP: lungs clear to auscultation - no rales, rhonchi or wheezes  CV: regular rate and rhythm, normal S1 S2, no S3 or S4, no murmur, click or rub, no peripheral edema and peripheral pulses strong  ABDOMEN: soft, large umbilical hernia that is nontender but irreducible    DIAGNOSTICS    No results found for any visits on 12/10/23.     Current Outpatient Medications    Medication    acetaminophen (TYLENOL) 500 MG tablet    amLODIPine (NORVASC) 5 MG tablet    Lidocaine (LIDOCARE) 4 % Patch    naproxen (NAPROSYN) 500 MG tablet    naproxen (NAPROSYN) 500 MG tablet    olmesartan (BENICAR) 20 MG tablet    tadalafil (CIALIS) 10 MG tablet     No current facility-administered medications for this visit.      Patient Active Problem List   Diagnosis    Fatigue    Hypertension goal BP (blood pressure) < 140/90    Major depressive disorder, recurrent, moderate (H)      Past Medical History:   Diagnosis Date    Ankle pain 2016    Ankle sprain and strain 2010    CARDIOVASCULAR SCREENING; LDL GOAL LESS THAN 130 2016    Depression     declines treatment 16    HTN (hypertension)     declines treatment 16    Left knee pain 2015    Plantar fasciitis 2010    Right knee pain 2016    Tibialis posterior tendonitis 2016     Past Surgical History:   Procedure Laterality Date    HERNIORRHAPHY INGUINAL BILATERAL      RI , [facility]     NASAL/SINUS POLYPECTOMY      Nasal-Sinus Polypectomy     Family History   Problem Relation Age of Onset    Hypertension Mother      Social History     Tobacco Use    Smoking status: Former     Types: Cigarettes     Quit date: 1970     Years since quittin.9     Passive exposure: Never    Smokeless tobacco: Never   Substance Use Topics    Alcohol use: Yes     Alcohol/week: 0.0 standard drinks of alcohol     Comment: rarely               The plan of care was discussed with the patient. They understand and agree with the course of treatment prescribed. A printed summary was given including instructions and medications.  The use of Dragon/Shuttlerock dictation services may have been used to construct the content in this note; any grammatical or spelling errors are non-intentional. Please contact the author of this note directly if you are in need of any clarification.

## 2023-12-13 ENCOUNTER — OFFICE VISIT (OUTPATIENT)
Dept: URGENT CARE | Facility: URGENT CARE | Age: 73
End: 2023-12-13
Payer: COMMERCIAL

## 2023-12-13 VITALS
SYSTOLIC BLOOD PRESSURE: 102 MMHG | DIASTOLIC BLOOD PRESSURE: 73 MMHG | HEIGHT: 72 IN | WEIGHT: 240 LBS | BODY MASS INDEX: 32.51 KG/M2 | TEMPERATURE: 96.8 F | HEART RATE: 91 BPM | OXYGEN SATURATION: 99 %

## 2023-12-13 DIAGNOSIS — K42.9 UMBILICAL HERNIA WITHOUT OBSTRUCTION AND WITHOUT GANGRENE: Primary | ICD-10-CM

## 2023-12-13 DIAGNOSIS — B34.9 VIRAL SYNDROME: ICD-10-CM

## 2023-12-13 PROCEDURE — 99213 OFFICE O/P EST LOW 20 MIN: CPT | Performed by: NURSE PRACTITIONER

## 2023-12-13 NOTE — PROGRESS NOTES
Chief Complaint   Patient presents with    Urgent Care    Diarrhea     X1 week of diarrhea     Nausea     X1 week of nausea, no vomiting      SUBJECTIVE:  Don Samson is a 73 year old male presenting with cough cold congestion sore throat fatigue nausea diarrhea over the last week.  He is mostly improving, but needs a work note.  Last bout of diarrhea was last night and described more as softer stools.  Had a couple bouts of these daily.  No blood black mucus fevers sweats chills abdominal pain.  Declines shortness of breath chest pain bloody sputum.  Had a negative COVID test.  He does have a baseline umbilical hernia that has not changed much over the last couple months.  Working on getting in with a general surgeon.  He has needed surgical repair previously.    Past Medical History:   Diagnosis Date    Ankle pain 5/21/2016    Ankle sprain and strain 9/16/2010    CARDIOVASCULAR SCREENING; LDL GOAL LESS THAN 130 9/6/2016    Depression     declines treatment 4/1/16    HTN (hypertension)     declines treatment 4/1/16    Left knee pain 6/18/2015    Plantar fasciitis 9/16/2010    Right knee pain 12/12/2016    Tibialis posterior tendonitis 5/21/2016     acetaminophen (TYLENOL) 500 MG tablet, Take 1-2 tablets (500-1,000 mg) by mouth every 6 hours as needed for mild pain Maximum daily dose 4,000mg.  amLODIPine (NORVASC) 5 MG tablet, Take 1 tablet (5 mg) by mouth daily  Lidocaine (LIDOCARE) 4 % Patch, Place 1 patch onto the skin every 24 hours To prevent lidocaine toxicity, patient should be patch free for 12 hrs daily.  naproxen (NAPROSYN) 500 MG tablet, Take 1 tablet (500 mg) by mouth 2 times daily as needed for moderate pain  naproxen (NAPROSYN) 500 MG tablet, Take 1 tablet (500 mg) by mouth 2 times daily (with meals)  olmesartan (BENICAR) 20 MG tablet, Take 1 tablet (20 mg) by mouth daily (with breakfast)  tadalafil (CIALIS) 10 MG tablet, Take 10 mg by mouth    No current facility-administered medications on file  "prior to visit.    Social History     Tobacco Use    Smoking status: Former     Types: Cigarettes     Quit date: 1970     Years since quittin.9     Passive exposure: Never    Smokeless tobacco: Never   Substance Use Topics    Alcohol use: Yes     Alcohol/week: 0.0 standard drinks of alcohol     Comment: rarely      Allergies   Allergen Reactions    Ibuprofen Nausea and Other (See Comments)     Other reaction(s): Abdominal pain    Prednisone Other (See Comments)       Review of Systems  All systems negative except for those listed above in HPI.    OBJECTIVE:   /73   Pulse 91   Temp 96.8  F (36  C) (Temporal)   Ht 1.822 m (5' 11.75\")   Wt 108.9 kg (240 lb)   SpO2 99%   BMI 32.78 kg/m      Physical Exam  Vitals reviewed.   Constitutional:       General: He is not in acute distress.     Appearance: Normal appearance. He is not ill-appearing, toxic-appearing or diaphoretic.   HENT:      Head: Normocephalic and atraumatic.      Right Ear: Tympanic membrane and ear canal normal.      Left Ear: Tympanic membrane and ear canal normal.      Nose: Nose normal.      Mouth/Throat:      Mouth: Mucous membranes are moist.      Pharynx: Oropharynx is clear.   Eyes:      Extraocular Movements: Extraocular movements intact.      Conjunctiva/sclera: Conjunctivae normal.      Pupils: Pupils are equal, round, and reactive to light.   Cardiovascular:      Rate and Rhythm: Normal rate.      Pulses: Normal pulses.   Pulmonary:      Effort: Pulmonary effort is normal. No respiratory distress.      Breath sounds: No stridor. No wheezing, rhonchi or rales.   Chest:      Chest wall: No tenderness.   Abdominal:      General: Abdomen is flat. Bowel sounds are normal. There is no distension.      Palpations: Abdomen is soft. There is no mass.      Tenderness: There is no abdominal tenderness. There is no guarding or rebound.      Hernia: A hernia (umbilical soft hernia with slight erythema nontender irreducible) is present. "   Musculoskeletal:         General: Normal range of motion.      Cervical back: Normal range of motion and neck supple.   Skin:     General: Skin is warm and dry.      Coloration: Skin is not jaundiced or pale.      Findings: No rash.   Neurological:      General: No focal deficit present.      Mental Status: He is alert and oriented to person, place, and time.      Motor: No weakness.      Gait: Gait normal.   Psychiatric:         Mood and Affect: Mood normal.         Behavior: Behavior normal.       ASSESSMENT:    ICD-10-CM    1. Umbilical hernia without obstruction and without gangrene  K42.9       2. Viral syndrome  B34.9         PLAN:     Viral syndrome running its course  Reassurance given that symptoms are improving, exam is reassuring, vitals stable  [place] diet probiotics encouraged  Stool culture not needed unless worsening diarrhea past 1 week with fevers blood gi pain  Lungs clear, vitals stable  Keep gen surg follow-up for hernia  ER if severe pain, fevers, blood, black, feeling faint    Follow up with primary care provider with any problems, questions or concerns or if symptoms worsen or fail to improve. Patient agreed to plan and verbalized understanding.    Josette Ramos, MICHELLE-BC  [facility] U[facility] [place]

## 2023-12-13 NOTE — LETTER
December 13, 2023      Don CLARKE Mali  [address]  [facility] [address]        To Whom It May Concern:    Don Samson was seen in our clinic. Please excuse medical related absences. May return to work as tolerated improved and symptom free.      Sincerely,        Josette Ramos, NP

## 2023-12-13 NOTE — PROGRESS NOTES
"ASSESSMENT AND PLAN:    No diagnosis found.  ***  Differential Diagnosis:  {UC Differential Choices:750203}  Serious Comorbid Conditions:  {UC Serious Comorbid Conditions:199111}    PLAN:  {UC Plan Choices:438570}    There are no Patient Instructions on file for this visit.  No follow-ups on file.    {2021 E&M time (Optional):506570}    Miladis Malhotra MD  [facility] URGENT CARE    Subjective     Don Samson is a 73 year old who presents for Patient presents with:  Urgent Care  Diarrhea: X1 week of diarrhea   Nausea: X1 week of nausea, no vomiting     {UC New/Established:143188} patient of [facility].  ***  {UC Conditions (Optional):878089}    Review of Systems  {ROS COMP (Optional):709433}      Objective    /73   Pulse 91   Temp 96.8  F (36  C) (Temporal)   Ht 1.822 m (5' 11.75\")   Wt 108.9 kg (240 lb)   SpO2 99%   BMI 32.78 kg/m    Physical Exam   {Exam List (Optional):588709}    {Diagnostic Test Results (Optional):364374}      "

## 2023-12-15 ENCOUNTER — OFFICE VISIT (OUTPATIENT)
Dept: URGENT CARE | Facility: URGENT CARE | Age: 73
End: 2023-12-15
Payer: COMMERCIAL

## 2023-12-15 VITALS
SYSTOLIC BLOOD PRESSURE: 125 MMHG | WEIGHT: 240 LBS | TEMPERATURE: 96.8 F | BODY MASS INDEX: 32.51 KG/M2 | OXYGEN SATURATION: 99 % | DIASTOLIC BLOOD PRESSURE: 73 MMHG | HEIGHT: 72 IN | HEART RATE: 76 BPM

## 2023-12-15 DIAGNOSIS — R19.7 DIARRHEA, UNSPECIFIED TYPE: Primary | ICD-10-CM

## 2023-12-15 PROBLEM — K42.9 UMBILICAL HERNIA WITHOUT OBSTRUCTION AND WITHOUT GANGRENE: Status: ACTIVE | Noted: 2023-07-05

## 2023-12-15 PROBLEM — I87.2 VENOUS STASIS DERMATITIS OF BOTH LOWER EXTREMITIES: Status: ACTIVE | Noted: 2023-07-05

## 2023-12-15 LAB
ANION GAP SERPL CALCULATED.3IONS-SCNC: 13 MMOL/L (ref 7–15)
BUN SERPL-MCNC: 18.7 MG/DL (ref 8–23)
CALCIUM SERPL-MCNC: 9.7 MG/DL (ref 8.8–10.2)
CHLORIDE SERPL-SCNC: 107 MMOL/L (ref 98–107)
CREAT SERPL-MCNC: 1.68 MG/DL (ref 0.67–1.17)
DEPRECATED HCO3 PLAS-SCNC: 25 MMOL/L (ref 22–29)
EGFRCR SERPLBLD CKD-EPI 2021: 43 ML/MIN/1.73M2
GLUCOSE SERPL-MCNC: 93 MG/DL (ref 70–99)
POTASSIUM SERPL-SCNC: 4.1 MMOL/L (ref 3.4–5.3)
SODIUM SERPL-SCNC: 145 MMOL/L (ref 135–145)

## 2023-12-15 PROCEDURE — 80048 BASIC METABOLIC PNL TOTAL CA: CPT | Performed by: STUDENT IN AN ORGANIZED HEALTH CARE EDUCATION/TRAINING PROGRAM

## 2023-12-15 PROCEDURE — 36415 COLL VENOUS BLD VENIPUNCTURE: CPT | Performed by: STUDENT IN AN ORGANIZED HEALTH CARE EDUCATION/TRAINING PROGRAM

## 2023-12-15 PROCEDURE — 99213 OFFICE O/P EST LOW 20 MIN: CPT | Performed by: STUDENT IN AN ORGANIZED HEALTH CARE EDUCATION/TRAINING PROGRAM

## 2023-12-15 NOTE — PROGRESS NOTES
Addendum: BMP significant for acute JENNIFER.  Called to discuss results with patient and got voicemail.  Left a message recommending that he present to the emergency department for IV fluids or increased fluid intake to 2L a day over the next 2 days and repeat labs on Sunday to ensure JENNIFER has resolved. Reassuringly, he is still making urine. Will route to urgent care staff to try calling patient again tomorrow.    Assessment & Plan     Diarrhea, unspecified type  1 week of nonbloody, watery diarrhea. Recently recovering from a cold.  No red flag symptoms.  Will check stool sample and discussed BRAT diet in the meantime.  Will check BMP today to assess kidney function with decreased p.o. intake.  Main concern would be for STEC infection, other viral or bacterial gastroenteritis.  - Enteric Bacteria and Virus Panel by DARRYL Stool  - Basic metabolic panel  - hold anti-diarrheals, antibiotics until stool test results      No follow-ups on file.    Sandie Ellis, DO  she/her  [facility] URGENT CARE    Subjective     Don Samson is a 73 year old male who presents to clinic today for the following health issues:    HPI  Diarrhea all week, so not eating or drinking much  Had a cold last week  Taking Bismuth subsalicylate which doesn't seem to be helping  No abdominal pain, lightheadedness, vomiting, no blood in stool, fevers  No recent travel, change in diet  Usually when he has diarrhea in the past, it only lasts 1-2 days    Past Medical History:   Diagnosis Date    Ankle pain 5/21/2016    Ankle sprain and strain 9/16/2010    CARDIOVASCULAR SCREENING; LDL GOAL LESS THAN 130 9/6/2016    Depression     declines treatment 4/1/16    HTN (hypertension)     declines treatment 4/1/16    Left knee pain 6/18/2015    Plantar fasciitis 9/16/2010    Right knee pain 12/12/2016    Tibialis posterior tendonitis 5/21/2016     Allergies   Allergen Reactions    Ibuprofen Nausea and Other (See Comments)     Other reaction(s): Abdominal  "pain    Prednisone Other (See Comments)     Current Outpatient Medications   Medication    acetaminophen (TYLENOL) 500 MG tablet    amLODIPine (NORVASC) 5 MG tablet    Lidocaine (LIDOCARE) 4 % Patch    naproxen (NAPROSYN) 500 MG tablet    naproxen (NAPROSYN) 500 MG tablet    olmesartan (BENICAR) 20 MG tablet    tadalafil (CIALIS) 10 MG tablet     No current facility-administered medications for this visit.      Review of Systems  Constitutional, HEENT, cardiovascular, pulmonary, gi and gu systems are negative, except as otherwise noted.      Objective    /73   Pulse 76   Temp 96.8  F (36  C) (Temporal)   Ht 1.822 m (5' 11.75\")   Wt 108.9 kg (240 lb)   SpO2 99%   BMI 32.78 kg/m      Physical Exam   General: Interactive, well-appearing  GI: Abdomen rotund, soft, nontender.  Large protruding reducible umbilical hernia without signs of obstruction      The use of Dragon/Neuropure dictation services may have been used to construct the content in this note; any grammatical or spelling errors are non-intentional. Please contact the author of this note directly if you are in need of any clarification.  "

## 2023-12-15 NOTE — LETTER
December 15, 2023      Don Samson  [address]  [facility] [address]        To Whom It May Concern:    Don Samson was seen on 12/15/23 in Urgent Care.  Please excuse him until 12/18 due to acute illness.        Sincerely,        Sandie Ellis, DO

## 2023-12-19 ENCOUNTER — OFFICE VISIT (OUTPATIENT)
Dept: URGENT CARE | Facility: URGENT CARE | Age: 73
End: 2023-12-19
Payer: COMMERCIAL

## 2023-12-19 VITALS
DIASTOLIC BLOOD PRESSURE: 79 MMHG | BODY MASS INDEX: 32.78 KG/M2 | OXYGEN SATURATION: 99 % | WEIGHT: 240 LBS | SYSTOLIC BLOOD PRESSURE: 132 MMHG | HEART RATE: 70 BPM | TEMPERATURE: 97.8 F

## 2023-12-19 DIAGNOSIS — J98.8 VIRAL RESPIRATORY ILLNESS: Primary | ICD-10-CM

## 2023-12-19 DIAGNOSIS — B97.89 VIRAL RESPIRATORY ILLNESS: Primary | ICD-10-CM

## 2023-12-19 DIAGNOSIS — R19.7 DIARRHEA, UNSPECIFIED TYPE: ICD-10-CM

## 2023-12-19 PROCEDURE — 99213 OFFICE O/P EST LOW 20 MIN: CPT | Performed by: STUDENT IN AN ORGANIZED HEALTH CARE EDUCATION/TRAINING PROGRAM

## 2023-12-19 NOTE — PROGRESS NOTES
Urgent Care - 12/19/2023       Assessment & Plan:      Problem List Items Addressed This Visit    None  Visit Diagnoses       Viral respiratory illness    -  Primary    Diarrhea, unspecified type              Medical Decision Making  Don Samson is a 73 year old male who presented with improving viral symptoms and diarrhea.     Overall symptoms are improving, with resolving diarrhea, and very little viral symptoms.  Vitals are within normal limits, with normal pulse and stable blood pressure.  He has been drinking at least 2 L a day, making excellent urine output.  Had an JENNIFER diagnosed at last visit, therefore recommended repeat BMP to monitor for resolution today.  Patient declined labs.     Recommended he reestablish with his PCP through [facility].  Offered referral for our system, but patient declined.    Strict return precautions given - if patient has concerns for dehydration, increasing diarrhea, blood in the diarrhea, new swelling, decreased urine output, or other symptoms he wants evaluated.     Patient to follow up with PCP as soon as possible.     Discussed the above with the patient, who stated understanding and agreed with the plan.     Kiera Bo MD  Internal Medicine and Pediatrics      Subjective:      Don Samson is a 73 year old male here for evaluation of cold and diarrhea.     Seen in urgent care on 12/15 for cold symptoms and diarrhea. Was found to have an JENNIFER, and was recommended he drink lots of fluid. A stool test was ordered, he did not bring it in.     Has had a cold and diarrhea for about 1 week. Was seen on  Had cough, congestion, both improving. No fevers. Had diarrhea for a few days, now getting better (had 1 stool yesterday, more formed than previously). Has been eating bland foods, drinking Gatorade daily - a few big jugs a day. No lightheadedness. Peeing 4-5 times per day. No other new symptoms. No swelling, no chest pain, no shortness of breath.      The following portions  of the patient's history were reviewed and updated as appropriate: allergies, current medications, and problem list.     Review of Systems  Pertinent items are noted in HPI.    Allergies  Allergies   Allergen Reactions    Ibuprofen Nausea and Other (See Comments)     Other reaction(s): Abdominal pain    Prednisone Other (See Comments)       Outpatient Medications  acetaminophen (TYLENOL) 500 MG tablet, Take 1-2 tablets (500-1,000 mg) by mouth every 6 hours as needed for mild pain Maximum daily dose 4,000mg.  amLODIPine (NORVASC) 5 MG tablet, Take 1 tablet (5 mg) by mouth daily  Lidocaine (LIDOCARE) 4 % Patch, Place 1 patch onto the skin every 24 hours To prevent lidocaine toxicity, patient should be patch free for 12 hrs daily.  naproxen (NAPROSYN) 500 MG tablet, Take 1 tablet (500 mg) by mouth 2 times daily as needed for moderate pain  naproxen (NAPROSYN) 500 MG tablet, Take 1 tablet (500 mg) by mouth 2 times daily (with meals)  olmesartan (BENICAR) 20 MG tablet, Take 1 tablet (20 mg) by mouth daily (with breakfast)  tadalafil (CIALIS) 10 MG tablet, Take 10 mg by mouth    No current facility-administered medications on file prior to visit.      Past Medical History  Patient Active Problem List   Diagnosis    Fatigue    Hypertension goal BP (blood pressure) < 140/90    Major depressive disorder, recurrent, moderate (H)    Umbilical hernia without obstruction and without gangrene    Venous stasis dermatitis of both lower extremities       Family History  Family History   Problem Relation Age of Onset    Hypertension Mother        Social History  Social History     Tobacco Use    Smoking status: Former     Types: Cigarettes     Quit date: 1970     Years since quittin.0     Passive exposure: Never    Smokeless tobacco: Never   Substance Use Topics    Alcohol use: Yes     Alcohol/week: 0.0 standard drinks of alcohol     Comment: rarely         Objective:      /79   Pulse 70   Temp 97.8  F (36.6  C)  (Tympanic)   Wt 108.9 kg (240 lb)   SpO2 99%   BMI 32.78 kg/m    General: well-appearing, in no acute distress.  HEENT: NC/AT, EOMI, PERRL, MMM   CVS: RRR. No murmurs, rubs, or gallops.  Resp: CTAB, no wheezes or crackles   Abd: Normal active bowel sounds. Soft/non-distended, non-tender to palpation. No rebound or guarding.    Musc: no gross joint abnormalities  Extremities: no peripheral edema bilaterally   Skin: no rash  Neuro: alert and oriented x4, no gross neurological deficits     Lab & Imaging Results    No results found for this or any previous visit (from the past 24 hour(s)).    I personally reviewed these results and discussed findings with the patient.

## 2023-12-19 NOTE — PATIENT INSTRUCTIONS
- Continue bland diet as you are, okay to increase more normal foods as you feel better  - Continue to drink 2L of fluid a day  - Return to the clinic if you have abdominal pain, swelling, worsening diarrhea, blood in the stool, or new symptoms you would like evaluated.     - Make an appointment with your primary doctor to discuss kidneys and make sure you are doing your yearly check ups.

## 2023-12-19 NOTE — LETTER
December 19, 2023      Don Samson  [address]  [facility] [address]        To Whom It May Concern:    Don Samson was seen in our clinic. He may return to work on 12/19/23, as long as he continues to feel well.       Sincerely,          Kiera Bo

## 2024-02-14 ENCOUNTER — OFFICE VISIT (OUTPATIENT)
Dept: FAMILY MEDICINE | Facility: CLINIC | Age: 74
End: 2024-02-14
Payer: COMMERCIAL

## 2024-02-14 VITALS
HEART RATE: 76 BPM | OXYGEN SATURATION: 97 % | RESPIRATION RATE: 20 BRPM | SYSTOLIC BLOOD PRESSURE: 120 MMHG | BODY MASS INDEX: 31.82 KG/M2 | WEIGHT: 233 LBS | DIASTOLIC BLOOD PRESSURE: 80 MMHG | TEMPERATURE: 97.1 F

## 2024-02-14 DIAGNOSIS — Z51.81 ENCOUNTER FOR THERAPEUTIC DRUG MONITORING: Primary | ICD-10-CM

## 2024-02-14 LAB
ALBUMIN SERPL BCG-MCNC: 4.2 G/DL (ref 3.5–5.2)
ALP SERPL-CCNC: 112 U/L (ref 40–150)
ALT SERPL W P-5'-P-CCNC: 21 U/L (ref 0–70)
ANION GAP SERPL CALCULATED.3IONS-SCNC: 11 MMOL/L (ref 7–15)
AST SERPL W P-5'-P-CCNC: 25 U/L (ref 0–45)
BILIRUB SERPL-MCNC: 0.2 MG/DL
BUN SERPL-MCNC: 20.6 MG/DL (ref 8–23)
CALCIUM SERPL-MCNC: 9.1 MG/DL (ref 8.8–10.2)
CHLORIDE SERPL-SCNC: 108 MMOL/L (ref 98–107)
CREAT SERPL-MCNC: 1.03 MG/DL (ref 0.67–1.17)
DEPRECATED HCO3 PLAS-SCNC: 21 MMOL/L (ref 22–29)
EGFRCR SERPLBLD CKD-EPI 2021: 78 ML/MIN/1.73M2
GLUCOSE SERPL-MCNC: 99 MG/DL (ref 70–99)
POTASSIUM SERPL-SCNC: 4.2 MMOL/L (ref 3.4–5.3)
PROT SERPL-MCNC: 6.9 G/DL (ref 6.4–8.3)
SODIUM SERPL-SCNC: 140 MMOL/L (ref 135–145)

## 2024-02-14 PROCEDURE — 36415 COLL VENOUS BLD VENIPUNCTURE: CPT | Performed by: NURSE PRACTITIONER

## 2024-02-14 PROCEDURE — 80053 COMPREHEN METABOLIC PANEL: CPT | Performed by: NURSE PRACTITIONER

## 2024-02-14 PROCEDURE — 99213 OFFICE O/P EST LOW 20 MIN: CPT | Performed by: NURSE PRACTITIONER

## 2024-02-14 ASSESSMENT — PATIENT HEALTH QUESTIONNAIRE - PHQ9: SUM OF ALL RESPONSES TO PHQ QUESTIONS 1-9: 8

## 2024-02-14 ASSESSMENT — PAIN SCALES - GENERAL: PAINLEVEL: NO PAIN (0)

## 2024-02-14 NOTE — PROGRESS NOTES
"  Assessment & Plan     Encounter for therapeutic drug monitoring  Check Scr and liver function test   - Comprehensive metabolic panel  - Comprehensive metabolic panel            BMI  Estimated body mass index is 31.82 kg/m  as calculated from the following:    Height as of 12/15/23: 1.822 m (5' 11.75\").    Weight as of this encounter: 105.7 kg (233 lb).             Negro Cifuentes is a 73 year old, presenting for the following health issues:  Blood Draw        2/14/2024     9:10 AM   Additional Questions   Roomed by Alpa   Accompanied by alone         2/14/2024     9:10 AM   Patient Reported Additional Medications   Patient reports taking the following new medications none     In clinic requesting lab testing for liver and kidney test; taking Naproxen 500 mg BID and Tylenol 650 mg BID for chronic back pain.   Lab Results   Component Value Date    CR 1.68 12/15/2023    CR 1.17 04/05/2021             Objective    /80 (BP Location: Right arm, Patient Position: Sitting, Cuff Size: Adult Regular)   Pulse 76   Temp 97.1  F (36.2  C) (Temporal)   Resp 20   Wt 105.7 kg (233 lb)   SpO2 97%   BMI 31.82 kg/m    Body mass index is 31.82 kg/m .                BMI  Estimated body mass index is 31.82 kg/m  as calculated from the following:    Height as of 12/15/23: 1.822 m (5' 11.75\").    Weight as of this encounter: 105.7 kg (233 lb).             Negro Cifuentes is a 73 year old, presenting for the following health issues:  Blood Draw        2/14/2024     9:10 AM   Additional Questions   Roomed by Alpa   Accompanied by alone         2/14/2024     9:10 AM   Patient Reported Additional Medications   Patient reports taking the following new medications none     History of Present Illness       Reason for visit:  Blood draw    He eats 2-3 servings of fruits and vegetables daily.He consumes 3 sweetened beverage(s) daily.He exercises with enough effort to increase his heart rate 9 or less minutes per day.  He exercises " with enough effort to increase his heart rate 3 or less days per week.   He is taking medications regularly.                     Objective    /80 (BP Location: Right arm, Patient Position: Sitting, Cuff Size: Adult Regular)   Pulse 76   Temp 97.1  F (36.2  C) (Temporal)   Resp 20   Wt 105.7 kg (233 lb)   SpO2 97%   BMI 31.82 kg/m    Body mass index is 31.82 kg/m .  Physical Exam               Signed Electronically by: CONSTANCE Zeng CNP

## 2024-02-21 ENCOUNTER — OFFICE VISIT (OUTPATIENT)
Dept: URGENT CARE | Facility: URGENT CARE | Age: 74
End: 2024-02-21
Payer: COMMERCIAL

## 2024-02-21 VITALS
TEMPERATURE: 97.2 F | HEIGHT: 72 IN | RESPIRATION RATE: 18 BRPM | DIASTOLIC BLOOD PRESSURE: 82 MMHG | OXYGEN SATURATION: 100 % | WEIGHT: 233 LBS | HEART RATE: 92 BPM | SYSTOLIC BLOOD PRESSURE: 122 MMHG | BODY MASS INDEX: 31.56 KG/M2

## 2024-02-21 DIAGNOSIS — M54.41 CHRONIC RIGHT-SIDED LOW BACK PAIN WITH RIGHT-SIDED SCIATICA: Primary | ICD-10-CM

## 2024-02-21 DIAGNOSIS — G89.29 CHRONIC RIGHT-SIDED LOW BACK PAIN WITH RIGHT-SIDED SCIATICA: Primary | ICD-10-CM

## 2024-02-21 PROCEDURE — 99213 OFFICE O/P EST LOW 20 MIN: CPT | Performed by: PHYSICIAN ASSISTANT
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
## 2024-02-21 NOTE — PROGRESS NOTES
SUBJECTIVE:  Chief Complaint   Patient presents with    Urgent Care    Back Pain     Low back pain, flaring up recently.      Don Samson is a 74 year old male presents with a chief complaint of right low back pain. COmes and goes.  Has history of episodes.  Has never been seen for it. Is being seen today as he is scheduled to work as a  at the [employer] and is concerned about how he is going to feel. Denies numbness, persisting pain, loss of function.  No GIGU symptoms. Radiates into hip      Past Medical History:   Diagnosis Date    Ankle pain 2016    Ankle sprain and strain 2010    CARDIOVASCULAR SCREENING; LDL GOAL LESS THAN 130 2016    Depression     declines treatment 16    HTN (hypertension)     declines treatment 16    Left knee pain 2015    Plantar fasciitis 2010    Right knee pain 2016    Tibialis posterior tendonitis 2016     Current Outpatient Medications   Medication Sig Dispense Refill    acetaminophen (TYLENOL) 500 MG tablet Take 1-2 tablets (500-1,000 mg) by mouth every 6 hours as needed for mild pain Maximum daily dose 4,000mg. 100 tablet 4    amLODIPine (NORVASC) 5 MG tablet Take 1 tablet (5 mg) by mouth daily 90 tablet 3    naproxen (NAPROSYN) 500 MG tablet Take 1 tablet (500 mg) by mouth 2 times daily (with meals) 30 tablet 0    olmesartan (BENICAR) 20 MG tablet Take 1 tablet (20 mg) by mouth daily (with breakfast) 90 tablet 3    Lidocaine (LIDOCARE) 4 % Patch Place 1 patch onto the skin every 24 hours To prevent lidocaine toxicity, patient should be patch free for 12 hrs daily. 10 patch 0    tadalafil (CIALIS) 10 MG tablet Take 10 mg by mouth (Patient not taking: Reported on 2024)       Social History     Tobacco Use    Smoking status: Former     Types: Cigarettes     Quit date: 1970     Years since quittin.1     Passive exposure: Never    Smokeless tobacco: Never   Substance Use Topics    Alcohol use: Yes     Alcohol/week: 0.0  "standard drinks of alcohol     Comment: rarely        ROS:  Review of systems negative except as stated above.    EXAM:   /82   Pulse 92   Temp 97.2  F (36.2  C) (Temporal)   Resp 18   Ht 1.822 m (5' 11.75\")   Wt 105.7 kg (233 lb)   SpO2 100%   BMI 31.82 kg/m    Gen: healthy,alert,no distress  Extremity: ROM intact no midline tendernss  GENERAL APPEARANCE: healthy, alert and no distress  CHEST: clear to auscultation  NEURO: Normal strength and tone, sensory exam grossly normal, mentation intact and speech normal        ASSESSMENT:   (M54.41,  G89.29) Chronic right-sided low back pain with right-sided sciatica  (primary encounter diagnosis)  Comment: acute flare  Plan: declines ibu, prednisone.  Will ice and rest today.      "

## 2024-02-22 ENCOUNTER — OFFICE VISIT (OUTPATIENT)
Dept: URGENT CARE | Facility: URGENT CARE | Age: 74
End: 2024-02-22
Payer: COMMERCIAL

## 2024-02-22 VITALS
TEMPERATURE: 97.6 F | HEART RATE: 76 BPM | SYSTOLIC BLOOD PRESSURE: 145 MMHG | BODY MASS INDEX: 31.82 KG/M2 | OXYGEN SATURATION: 99 % | DIASTOLIC BLOOD PRESSURE: 87 MMHG | WEIGHT: 233 LBS | RESPIRATION RATE: 12 BRPM

## 2024-02-22 DIAGNOSIS — G89.29 CHRONIC RIGHT-SIDED LOW BACK PAIN WITH RIGHT-SIDED SCIATICA: Primary | ICD-10-CM

## 2024-02-22 DIAGNOSIS — M54.41 CHRONIC RIGHT-SIDED LOW BACK PAIN WITH RIGHT-SIDED SCIATICA: Primary | ICD-10-CM

## 2024-02-22 PROCEDURE — 99213 OFFICE O/P EST LOW 20 MIN: CPT | Performed by: PHYSICIAN ASSISTANT

## 2024-02-22 NOTE — PROGRESS NOTES
Chronic right-sided low back pain with right-sided sciatica  - Pain Management  Referral; Future    A work note was given to the patient. I'd like him to follow up with his PCP and pain management for further workup.     MERVIN [facility]    Subjective   74 year old who presents to clinic today for the following health issues:    Urgent Care and Back Pain       HPI   Where in your body do you have pain? Back Pain  Onset/Duration: Right side pain moves down to right butt/ leg x's 2 days. Has been an ongoing issue for the last several years. Patient does not see a specialist for this.    Description:   Location of pain: low back right  Character of pain: sharp  Pain radiation: radiates into the right buttocks  New numbness or weakness in legs, not attributed to pain: no   Intensity: moderate  Progression of Symptoms: same  History:   Specific cause: none  Pain interferes with job: YES  History of back problems: recurrent self limited episodes of low back pain in the past  Any previous MRI or X-rays: None  Sees a specialist for back pain: No    Patient was seen for this issue yesterday at the urgent care and returns today for a work note.    Review of Systems   Review of Systems   See HPI    Objective    Temp: 97.6  F (36.4  C) Temp src: Temporal BP: (!) 145/87 Pulse: 76   Resp: 12 SpO2: 99 %       Physical Exam   Physical Exam  Constitutional:       General: He is not in acute distress.     Appearance: Normal appearance. He is normal weight. He is not ill-appearing, toxic-appearing or diaphoretic.   HENT:      Head: Normocephalic and atraumatic.   Cardiovascular:      Rate and Rhythm: Normal rate.      Pulses: Normal pulses.   Pulmonary:      Effort: Pulmonary effort is normal. No respiratory distress.   Neurological:      General: No focal deficit present.      Mental Status: He is alert and oriented to person, place, and time. Mental status is at baseline.      Gait:  Gait normal.   Psychiatric:         Mood and Affect: Mood normal.         Behavior: Behavior normal.         Thought Content: Thought content normal.         Judgment: Judgment normal.          No results found for this or any previous visit (from the past 24 hour(s)).

## 2024-02-26 NOTE — RESULT ENCOUNTER NOTE
Carlos Cifuentes,    Your recent results are normal. Any results slightly above or below the normal range have been evaluated as clinically stable.     Let me know if you have any questions or concerns.    Sincerely,  CONSTANCE Zeng CNP

## 2024-03-20 ENCOUNTER — OFFICE VISIT (OUTPATIENT)
Dept: URGENT CARE | Facility: URGENT CARE | Age: 74
End: 2024-03-20
Payer: COMMERCIAL

## 2024-03-20 VITALS
RESPIRATION RATE: 18 BRPM | SYSTOLIC BLOOD PRESSURE: 121 MMHG | BODY MASS INDEX: 31.41 KG/M2 | OXYGEN SATURATION: 98 % | TEMPERATURE: 97.1 F | HEART RATE: 74 BPM | WEIGHT: 230 LBS | DIASTOLIC BLOOD PRESSURE: 74 MMHG

## 2024-03-20 DIAGNOSIS — M54.6 ACUTE BILATERAL THORACIC BACK PAIN: ICD-10-CM
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
## 2024-03-20 PROCEDURE — 99213 OFFICE O/P EST LOW 20 MIN: CPT | Performed by: NURSE PRACTITIONER
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
## 2024-03-23 ENCOUNTER — OFFICE VISIT (OUTPATIENT)
Dept: URGENT CARE | Facility: URGENT CARE | Age: 74
End: 2024-03-23
Payer: COMMERCIAL

## 2024-03-23 VITALS
TEMPERATURE: 97.3 F | SYSTOLIC BLOOD PRESSURE: 137 MMHG | WEIGHT: 240 LBS | HEIGHT: 72 IN | BODY MASS INDEX: 32.51 KG/M2 | OXYGEN SATURATION: 97 % | DIASTOLIC BLOOD PRESSURE: 82 MMHG | HEART RATE: 64 BPM

## 2024-03-23 DIAGNOSIS — M54.6 ACUTE BILATERAL THORACIC BACK PAIN: Primary | ICD-10-CM

## 2024-03-23 PROCEDURE — 99213 OFFICE O/P EST LOW 20 MIN: CPT | Performed by: NURSE PRACTITIONER

## 2024-03-23 RX ORDER — LIDOCAINE 50 MG/G
1 PATCH TOPICAL EVERY 24 HOURS
Qty: 10 PATCH | Refills: 0 | Status: SHIPPED | OUTPATIENT
Start: 2024-03-23 | End: 2024-04-02

## 2024-03-23 NOTE — LETTER
March 23, 2024      Don CLARKE Mali  [address]  [facility] [address]        To Whom It May Concern:    Don CLARKE Samson was seen in our clinic. Please excuse medical related absences from today and tomorrow, 03/23-03/24.      Sincerely,        Josette Ramos, NP

## 2024-03-23 NOTE — PROGRESS NOTES
Chief Complaint   Patient presents with    Urgent Care    Back Injury     Pt in clinic to have eval for back pain due to injury.     SUBJECTIVE:  Don Samson is a 74 year old male who presents to the clinic today with acute on chronic thoracic back pain.  He was injured and fell onto grass last week.  Was up all night with stiff pain.  Wondering about physical therapy referral as well as needs an updated work note.  No new symptoms from our prior visit 3/20/2024.    Past Medical History:   Diagnosis Date    Ankle pain 2016    Ankle sprain and strain 2010    CARDIOVASCULAR SCREENING; LDL GOAL LESS THAN 130 2016    Depression     declines treatment 16    HTN (hypertension)     declines treatment 16    Left knee pain 2015    Plantar fasciitis 2010    Right knee pain 2016    Tibialis posterior tendonitis 2016     acetaminophen (TYLENOL) 500 MG tablet, Take 1-2 tablets (500-1,000 mg) by mouth every 6 hours as needed for mild pain Maximum daily dose 4,000mg.  amLODIPine (NORVASC) 5 MG tablet, Take 1 tablet (5 mg) by mouth daily  Lidocaine (LIDOCARE) 4 % Patch, Place 1 patch onto the skin every 24 hours To prevent lidocaine toxicity, patient should be patch free for 12 hrs daily.  naproxen (NAPROSYN) 500 MG tablet, Take 1 tablet (500 mg) by mouth 2 times daily (with meals)  olmesartan (BENICAR) 20 MG tablet, Take 1 tablet (20 mg) by mouth daily (with breakfast) (Patient not taking: Reported on 3/23/2024)  tadalafil (CIALIS) 10 MG tablet, Take 10 mg by mouth (Patient not taking: Reported on 2024)    No current facility-administered medications on file prior to visit.    Social History     Tobacco Use    Smoking status: Former     Types: Cigarettes     Quit date: 1970     Years since quittin.2     Passive exposure: Never    Smokeless tobacco: Never   Substance Use Topics    Alcohol use: Yes     Alcohol/week: 0.0 standard drinks of alcohol     Comment: rarely       Allergies   Allergen Reactions    Ibuprofen Nausea and Other (See Comments)     Other reaction(s): Abdominal pain    Prednisone Other (See Comments)       Review of Systems  All systems negative except those listed above in HPI.    EXAM:   /82   Pulse 64   Temp 97.3  F (36.3  C) (Temporal)   Ht 1.829 m (6')   Wt 108.9 kg (240 lb)   SpO2 97%   BMI 32.55 kg/m      Physical Exam  Vitals reviewed.   Constitutional:       General: He is not in acute distress.     Appearance: Normal appearance. He is not toxic-appearing or diaphoretic.   HENT:      Head: Normocephalic and atraumatic.      Nose: Nose normal.   Cardiovascular:      Rate and Rhythm: Normal rate.      Pulses: Normal pulses.   Pulmonary:      Effort: Pulmonary effort is normal.   Musculoskeletal:         General: Tenderness present. No swelling, deformity or signs of injury. Normal range of motion.      Cervical back: Normal range of motion and neck supple.   Skin:     General: Skin is warm and dry.      Findings: No bruising, erythema or rash.   Neurological:      General: No focal deficit present.      Mental Status: He is alert and oriented to person, place, and time.      Motor: No weakness.      Gait: Gait normal.   Psychiatric:         Mood and Affect: Mood normal.       ASSESSMENT:    ICD-10-CM    1. Acute bilateral thoracic back pain  M54.6 lidocaine (LIDODERM) 5 % patch     Physical Therapy  Referral        PLAN:    Lidoderm patches  Continue Tylenol and naproxen  Physical therapy referral placed  Updated work note given    Follow up with primary care provider with any problems, questions or concerns or if symptoms worsen or fail to improve. Patient agreed to plan and verbalized understanding.    Josette Ramos, MICHELLE-BC  [facility]

## 2024-04-16 NOTE — PROGRESS NOTES
Subjective     Don Samson is a 69 year old male who presents to clinic today for the following health issues:    HPI   Wrist  Pain    Onset: x 2 weeks, feels like he overused it, works in grocery store stocking shelves    Description:   Location: right wrist  Character: Sharp    Intensity: moderate    Progression of Symptoms: same    Accompanying Signs & Symptoms: hurts when he moves his wrist   Other symptoms: none    History:   Previous similar pain: yes     Precipitating factors:   Trauma or overuse: no     Alleviating factors:  Improved by: nothing    Therapies Tried and outcome: None     GFR Estimate   Date Value Ref Range Status   2018 66 >60 mL/min/1.7m2 Final     Comment:     Non  GFR Calc   2018 67 >60 mL/min/1.7m2 Final     Comment:     Non  GFR Calc   2018 81 >60 mL/min/1.7m2 Final     Comment:     Non  GFR Calc       Injures toenail.     Patient Active Problem List   Diagnosis     Fatigue     Hypertension goal BP (blood pressure) < 140/90     Major depressive disorder, recurrent, moderate (H)     Viral gastroenteritis     Past Surgical History:   Procedure Laterality Date     HERNIORRHAPHY INGUINAL BILATERAL      RIH 2004, [facility] 1985     NASAL/SINUS POLYPECTOMY      Nasal-Sinus Polypectomy       Social History     Tobacco Use     Smoking status: Former Smoker     Last attempt to quit: 1970     Years since quittin.0     Smokeless tobacco: Never Used   Substance Use Topics     Alcohol use: Yes     Alcohol/week: 0.0 standard drinks     Family History   Problem Relation Age of Onset     Hypertension Mother          No Known Allergies  BP Readings from Last 3 Encounters:   20 128/70   19 110/70   19 120/82    Wt Readings from Last 3 Encounters:   20 111.6 kg (246 lb)   19 108.9 kg (240 lb)   19 111.5 kg (245 lb 12 oz)                 Reviewed and updated as needed this visit by Provider      "    Review of Systems   ROS COMP: Constitutional, HEENT, cardiovascular, pulmonary, GI, , musculoskeletal, neuro, skin, endocrine and psych systems are negative, except as otherwise noted.      Objective    /70 (BP Location: Left arm, Patient Position: Sitting, Cuff Size: Adult Regular)   Pulse 65   Temp 98.1  F (36.7  C) (Oral)   Resp 18   Ht 1.803 m (5' 11\")   Wt 111.6 kg (246 lb)   SpO2 97%   BMI 34.31 kg/m    Body mass index is 34.31 kg/m .  Physical Exam   GENERAL: healthy, alert, no distress and flat affect, gruff and abrupt  RESP: no respiratory distress  MS: right wrist: mild tenderness on palpation, perhaps mild generalized swelling without pitting edema, negative Phalen/Tinel's, no snuffbox tenderness, full rom noted.  Wrist exam -left side normal, full range of motion, no swelling, tenderness or deformities. Normal radial pulse. Negative Phalen/Tinel signs.   Capillary refill brisk and equal in all fingertips.   Right great toe thickened, yellow and black         Assessment & Plan     1. Tendinitis of right wrist  Overuse, recommended adjusting activity, alternating ice/heat. He declined work restrictions today. See orders, see AVS for home cares.  Return to clinic if symptoms worsen or progress.     - naproxen (NAPROSYN) 500 MG tablet; Take 1 tablet (500 mg) by mouth 2 times daily (with meals)  Dispense: 10 tablet; Refill: 0    2. Onychomycosis  He requests podiatry referral for toenail removal today.  - PODIATRY/FOOT & ANKLE SURGERY REFERRAL       He declined tdap and pneumonia vaccines. He declined to  FIT test today.    Return in about 4 weeks (around 2/13/2020) for Wellness Exam.    Deirdre Olivas MD  [facility]      " -hx of spinal stenosis takes morphine and percocet at home  -cont  pain regimen  -Pain mgmt following.

## 2024-04-28 DIAGNOSIS — I10 HYPERTENSION GOAL BP (BLOOD PRESSURE) < 140/90: ICD-10-CM

## 2024-04-28 NOTE — TELEPHONE ENCOUNTER
Pt came in to UC today because he is out of his Olmesartan. Patient was see by Obdulia Babb on 2/14/24.   Today's /84.Pt advised I would route this to provider and nurses. Pt also made a follow up appt  Nancy Novak, CMA

## 2024-05-03 ENCOUNTER — OFFICE VISIT (OUTPATIENT)
Dept: FAMILY MEDICINE | Facility: CLINIC | Age: 74
End: 2024-05-03
Payer: COMMERCIAL

## 2024-05-03 VITALS
OXYGEN SATURATION: 97 % | WEIGHT: 230.7 LBS | HEIGHT: 71 IN | HEART RATE: 77 BPM | RESPIRATION RATE: 16 BRPM | SYSTOLIC BLOOD PRESSURE: 136 MMHG | BODY MASS INDEX: 32.3 KG/M2 | DIASTOLIC BLOOD PRESSURE: 79 MMHG | TEMPERATURE: 97.2 F

## 2024-05-03 DIAGNOSIS — F33.1 MAJOR DEPRESSIVE DISORDER, RECURRENT, MODERATE (H): ICD-10-CM

## 2024-05-03 DIAGNOSIS — I10 HYPERTENSION GOAL BP (BLOOD PRESSURE) < 140/90: Primary | ICD-10-CM

## 2024-05-03 DIAGNOSIS — K42.9 UMBILICAL HERNIA WITHOUT OBSTRUCTION AND WITHOUT GANGRENE: ICD-10-CM

## 2024-05-03 PROCEDURE — 99214 OFFICE O/P EST MOD 30 MIN: CPT | Performed by: PHYSICIAN ASSISTANT

## 2024-05-03 RX ORDER — AMLODIPINE BESYLATE 5 MG/1
5 TABLET ORAL DAILY
Qty: 90 TABLET | Refills: 3 | Status: SHIPPED | OUTPATIENT
Start: 2024-05-03

## 2024-05-03 RX ORDER — OLMESARTAN MEDOXOMIL 20 MG/1
20 TABLET ORAL
Qty: 90 TABLET | Refills: 3 | Status: SHIPPED | OUTPATIENT
Start: 2024-05-03

## 2024-05-03 RX ORDER — OLMESARTAN MEDOXOMIL 20 MG/1
TABLET ORAL
Qty: 90 TABLET | Refills: 3 | OUTPATIENT
Start: 2024-05-03

## 2024-05-03 ASSESSMENT — PAIN SCALES - GENERAL: PAINLEVEL: MODERATE PAIN (4)

## 2024-05-03 ASSESSMENT — PATIENT HEALTH QUESTIONNAIRE - PHQ9
10. IF YOU CHECKED OFF ANY PROBLEMS, HOW DIFFICULT HAVE THESE PROBLEMS MADE IT FOR YOU TO DO YOUR WORK, TAKE CARE OF THINGS AT HOME, OR GET ALONG WITH OTHER PEOPLE: SOMEWHAT DIFFICULT
SUM OF ALL RESPONSES TO PHQ QUESTIONS 1-9: 10
SUM OF ALL RESPONSES TO PHQ QUESTIONS 1-9: 10

## 2024-05-03 NOTE — TELEPHONE ENCOUNTER
Please send this request and all other refill request to [facility].    Kindly,   CONSTANCE Zeng CNP

## 2024-05-03 NOTE — PROGRESS NOTES
"  Assessment & Plan     Hypertension goal BP (blood pressure) < 140/90 - labs done in Feb, reviewed and WNL. Refills sent. Follow up yearly.  - amLODIPine (NORVASC) 5 MG tablet  Dispense: 90 tablet; Refill: 3  - olmesartan (BENICAR) 20 MG tablet  Dispense: 90 tablet; Refill: 3    Major depressive disorder, recurrent, moderate (H) - he is not interested in medications but would be open to therapy, referral provided.  - Adult Mental Health  Referral    Umbilical hernia without obstruction and without gangrene - referral provided as requested and FMLA paperwork completed - will need to be updated by surgeon with clear guidelines for how long to be out after surgery, restrictions (lifting) for after surgery, etc.  - Adult General Surg Referral    BMI  Estimated body mass index is 31.94 kg/m  as calculated from the following:    Height as of this encounter: 1.81 m (5' 11.26\").    Weight as of this encounter: 104.6 kg (230 lb 11.2 oz).             Negro Cifuentes is a 74 year old, presenting for the following health issues:  Recheck Medication (Blood pressure medication refill. FMLA forms )        5/3/2024     1:47 PM   Additional Questions   Roomed by Omega COLLAZO   Accompanied by self     Esau here today for med refill  Has been out of olmesartan for a few weeks  When he has meds, BP is well controlled    Also has large umbilical hernia, not painful  Interested in surgical repair and would like FMLA completed to do this    History of Present Illness       Hypertension: He presents for follow up of hypertension.  He does not check blood pressure  regularly outside of the clinic. Outside blood pressures have been over 140/90. He does not follow a low salt diet.     He eats 2-3 servings of fruits and vegetables daily.He consumes 2 sweetened beverage(s) daily.He exercises with enough effort to increase his heart rate 9 or less minutes per day.  He exercises with enough effort to increase his heart rate 3 or less days " "per week. He is missing 3 dose(s) of medications per week.  He is not taking prescribed medications regularly due to remembering to take.         Objective    /79 (BP Location: Right arm, Patient Position: Sitting, Cuff Size: Adult Regular)   Pulse 77   Temp 97.2  F (36.2  C) (Temporal)   Resp 16   Ht 1.81 m (5' 11.26\")   Wt 104.6 kg (230 lb 11.2 oz)   SpO2 97%   BMI 31.94 kg/m    Body mass index is 31.94 kg/m .  Physical Exam   GENERAL: alert and no distress  ABDOMEN: large >5cm umbilical hernia  PSYCH: mentation appears normal, affect normal/bright      Signed Electronically by: Kassandra Calvin PA-C    "

## 2024-05-21 NOTE — TELEPHONE ENCOUNTER
REFERRAL INFORMATION:  Referring Provider: ANNA Beach  Referring Clinic: [facility] - Primary Care  Reason for Visit/Diagnosis: Umbilical Hernia       FUTURE VISIT INFORMATION:  Appointment Date: 5/30/2024  Appointment Time: 1 PM     NOTES RECORD STATUS  DETAILS   OFFICE NOTE from Referring Provider Internal [facility]:  5/3/24 - PCC OV with ANNA Beach   OFFICE NOTE from Other Specialists Internal [facility]:  12/13/23 - UC OV with Josette Rahman NP  12/10/23 - UC OV with ANNA Marks  2/19/23 - UC OV with Dr. Spencer    [facility]:  4/21/22 - GEN SURG OV with Dr. Diop  4/19/22 - SPORT OV with Dr. Alaniz   HOSPITAL DISCHARGE SUMMARY/ ED VISITS  N/A    OPERATIVE REPORT N/A    PERTINENT LABS Care Everywhere / Internal    IMAGING (CT, MRI, US, XR)  Internal [facility]:  7/14/20 - XR Abdomen  8/22/16 - CT Abd/pelvis

## 2024-05-29 ENCOUNTER — PRE VISIT (OUTPATIENT)
Dept: SURGERY | Facility: CLINIC | Age: 74
End: 2024-05-29
Payer: COMMERCIAL

## 2024-05-29 NOTE — TELEPHONE ENCOUNTER
Patient Telephone Reminder Call    Date of call:  05/29/24  Phone numbers:  Cell number on file:    Telephone Information:   Mobile [phone]       Reached patient/confirmed appointment:  No - left message:   on voicemail  Appointment with:   Dr. Shaquille Erickson  Reason for visit:  umbilical hernia  Remind patient that this visit is a consultation only, NO procedure:  Yes  Can this visit be changed to a video visit:  No

## 2024-05-30 ENCOUNTER — OFFICE VISIT (OUTPATIENT)
Dept: SURGERY | Facility: CLINIC | Age: 74
End: 2024-05-30
Attending: PHYSICIAN ASSISTANT
Payer: COMMERCIAL

## 2024-05-30 ENCOUNTER — PRE VISIT (OUTPATIENT)
Dept: SURGERY | Facility: CLINIC | Age: 74
End: 2024-05-30

## 2024-05-30 VITALS
DIASTOLIC BLOOD PRESSURE: 81 MMHG | HEART RATE: 78 BPM | WEIGHT: 227.3 LBS | HEIGHT: 71 IN | OXYGEN SATURATION: 98 % | SYSTOLIC BLOOD PRESSURE: 146 MMHG | BODY MASS INDEX: 31.82 KG/M2

## 2024-05-30 DIAGNOSIS — K42.9 UMBILICAL HERNIA WITHOUT OBSTRUCTION AND WITHOUT GANGRENE: ICD-10-CM

## 2024-05-30 PROCEDURE — 99203 OFFICE O/P NEW LOW 30 MIN: CPT | Performed by: SURGERY

## 2024-05-30 RX ORDER — CEFAZOLIN SODIUM 2 G/50ML
2 SOLUTION INTRAVENOUS
OUTPATIENT
Start: 2024-05-30

## 2024-05-30 RX ORDER — ENOXAPARIN SODIUM 100 MG/ML
40 INJECTION SUBCUTANEOUS
OUTPATIENT
Start: 2024-05-30

## 2024-05-30 RX ORDER — CEFAZOLIN SODIUM 2 G/50ML
2 SOLUTION INTRAVENOUS SEE ADMIN INSTRUCTIONS
OUTPATIENT
Start: 2024-05-30

## 2024-05-30 ASSESSMENT — PAIN SCALES - GENERAL: PAINLEVEL: NO PAIN (0)

## 2024-05-30 NOTE — PROGRESS NOTES
Don Samson is a 74 year old male with a 7 year history of an umbilical hernia with the following symptoms of lump.      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    Past medical history, medications, allergies, family history, and social history were reviewed with the patient.    Past Medical History:   Diagnosis Date    Ankle pain 5/21/2016    Ankle sprain and strain 9/16/2010    CARDIOVASCULAR SCREENING; LDL GOAL LESS THAN 130 9/6/2016    Depression     declines treatment 4/1/16    HTN (hypertension)     declines treatment 4/1/16    Left knee pain 6/18/2015    Plantar fasciitis 9/16/2010    Right knee pain 12/12/2016    Tibialis posterior tendonitis 5/21/2016     Past Surgical History:   Procedure Laterality Date    HERNIORRHAPHY INGUINAL BILATERAL      RIH 2004, [facility] 1985    NASAL/SINUS POLYPECTOMY      Nasal-Sinus Polypectomy       ROS: 10 point review of systems negative except noted in HPI   PHYSICAL EXAM  General appearance-  alert, and in no distress.  Skin- Skin color and turgor normal.  No obvious rashes.  Lungs- Respiratory effort unlabored.  Gait- Normal.  Abdomen - soft non distended, non tender fair sized umbilical hernia.    Impression:  Hernia, umbilical  Recommendation:  laparoscopic surgery, robot aissited repair umbilical hernia    A full discussion regarding the alternatives, risks, goals, and potential complications for this surgery was completed today.  The patient understood that the potential problems included but are not limited to:  Infection, bleeding, hematoma, seroma, and recurrence.    The patient verbally expressed understanding, was given the opportunity for questions, and at this point wants to hold off. Will call to schedule when he decides to proceed.     Today the patient was instructed on the need for a preoperative H&P, NPO status prior to surgery, and  the need to stop anticoagulants prior to surgery.  Additional educational material, soap, and instructions will be mailed out to the patients home.    The total time spent with this patient was 30 minutes.  The total time was spent on the date of encounter doing chart review, history and physical, dressing changes, documentation, patient education, and any further activity as noted above.

## 2024-05-30 NOTE — NURSING NOTE
"Chief Complaint   Patient presents with    New Patient     Umbilical hernia       Vitals:    05/30/24 1244   BP: (!) 146/81   BP Location: Left arm   Patient Position: Sitting   Cuff Size: Adult Large   Pulse: 78   SpO2: 98%   Weight: 103.1 kg (227 lb 4.8 oz)   Height: 1.81 m (5' 11.25\")       Body mass index is 31.48 kg/m .                          Andrea Minor, EMT    "

## 2024-05-30 NOTE — PATIENT INSTRUCTIONS
You met with Dr. Shaquille Erickson. Please call our office if you would like to schedule surgery.     Today's visit instructions:    Reminder:  Surgery Requirements  Your surgery will be at [facility] [place]- 5th Floor.  You will need to arrive 1.5  hours early.  You will need someone to drive you home (over 18 years old) and stay with you for 24 hours after the procedure.  You will need a preop physical with your regular doctor within 30 days of surgery- closer is always better.  Stop any blood thinners, vitamins, minerals, or herbal supplements 5 days before surgery.  If you are taking a prescribed blood thinner or weight loss medication please let us know for specific instructions.  Fasting- a nurse from [facility] will call you 1-2 days before surgery to confirm your procedure and tell you when to stop eating and drinking.   Wash with the soap given/mailed from the clinic the night before surgery and morning of surgery. See instructions in the Surgery Packet.  If you would like a procedure estimate please call Cost of Care at [phone] during the hours of 8 AM - 3 PM (option #2 for on-line request and option #3 for a representative).       If you have questions please contact Radha RN or Kori RN during regular clinic hours, Monday through Friday 7:30 AM - 4:00 PM, or you can contact us via [employer] at anytime.       If you have urgent needs after-hours, weekends, or holidays please call the hospital at [phone] and ask to speak with our on-call General Surgery Team.    Appointment [phone]  Nurse Advice (Radha or Kori): [phone]   Surgery Scheduler (Kylie): [phone]  Fax: [phone]      After Your Laparoscopic Abdominal Hernia Repair       Incision care   You may take a shower the day after surgery. Carefully wash your incision with soap and water. Do not submerge yourself in water (bath, whirlpool, hot tub, pool, lake) for 14 days after surgery.    Remove the bandage 1-2 days after surgery but leave the medical tape (Steri-Strips) or glue in place. These will loosen and fall off on their own 1-2 weeks after surgery.     Always wash your hands before touching your incisions or removing bandages.   It is not unusual to form a collection of fluid or blood under your incision that may feel firm or squishy- it can take several weeks to months for your body to reabsorb it.  At times, it may even drain.  If that should happen keep the area clean with soap, water,  and cover with a clean gauze dressing. You can change this daily or as needed.     Other medicines   Wait to start aspirin or blood thinners until the day after surgery. You can continue your regular medicines at your normal time the day after surgery.    Your pain medicine may cause constipation (hard, dry stools). To help with this, take the stool softener your doctor gave you or an over-the-counter stool softener or laxative. You can stop taking this when you are no longer taking pain medicine and your bowel movements are back to normal.      For pain or discomfort  Take the narcotic pain medicine your doctor gave you as needed and as instructed on the bottle. If you prefer to use over-the-counter medication, use acetaminophen (Tylenol) or ibuprofen (Advil, Motrin) as instructed on the box. Do not take Tylenol if it is in your narcotic pain medication.    Use an ice pack on your abdomen (belly) for 20 minutes at a time as needed for the first 24 hours. Be sure to protect your skin by putting a cloth between the ice pack and your skin.   After 24 hours you can switch to heat for 20 minutes as needed. Be sure to protect your skin by putting a cloth between the heat pack and your skin.    You may experience right shoulder pain after surgery which will go away 1-4 days after your procedure.  This is related to the gas that was used to inflate your abdomen, it gets trapped between your liver and diaphragm.  Please walk frequently and apply a heating pad to the area protecting your skin with a barrier such as a towel.       Activities   No driving until you feel it s safe to do so. Don t drive while taking narcotic pain medicine.   Don t lift anything heavier than 20 pounds for 3 to 4 weeks after surgery.      Special equipment   Wear your abdominal binder for the first 30 days after surgery. You don t have to wear it when you re sleeping. You can wear it longer than 30 days if you wish.        Diet   You can eat your regular meals after surgery.   Preparation day prior to surgery.  Clear liquid diet and Gatorade Miralax bowel prep: YES                       Dental Care                   Please avoid dental care for two weeks prior to hernia repair and for 6 weeks after surgery due to                    the mesh that may have been placed.     When to call the doctor   Call your doctor if you have:   A fever above 101 F (38.3 C) (taken under the tongue), or a fever or chills lasting more than a day.   Redness at the incision site.   Any fluid or blood draining from the incision, especially if it smells bad. Severe pain that doesn t improve with pain medicine.      We will call you 2 to 4 days after surgery to review this handout, answer questions and help arrange after-surgery care. If you have questions or concerns, please call [phone] during regular office hours. If you need to call after business hours, call [phone] and ask to page the surgeon on-call.     IMPORTANT:  Prior to your surgical procedure, a nurse will be contacting you to obtain a health history.   If they do not reach you by noon the day prior to your surgery, your surgery will be cancelled. If you have your Pre-Op Surgical Physical (H&P) with the Anesthesia Team (PAC), you are exempt from this call. Phone:  [phone] ([facility]) or [phone] ([place]).        MiraLAX Bowel Prep and Clear Liquid Diet    MiraLAX (Polyethylene glycol 3350) is  available over the counter (without prescription) at most pharmacies and grocery stores.       The day before surgery:     You may not eat any solid food products the day before your procedure. You will be asked to start a clear liquid diet. Please follow the recommended diet below and do NOT drink any dairy products, orange or grapefruit juice.     At 8 AM the day prior to surgery, mix 7 rounded tablespoons or 7 capfuls of       MiraLAX with 32 ounces of room temperature Gatorade, Powerade, or Propel. Shake the bottle until the MiraLAX dissolves. Once the MiraLAX has dissolved, you can put the mixture in the refrigerator. Many people find it tastes better when chilled.     It is important to choose a sports drink that will help replace electrolytes that you will lose during the bowel cleanse.  If you have diabetes, be sure to choose sugar-free clear liquids and check your blood sugar regularly.      At 1 PM the day prior to surgery, drink 8 ounces of the mixture every 15 minutes until gone. You will drink a total of 4 glasses of the mixture.         Clear Liquid Diet Instructions     In preparation for your procedure, your doctor is prescribing a clear liquid diet along with a laxative to completely clear the colon of stool and fluid.      A diet of clear liquids consists exclusively of products that are liquid and transparent at room temperature. The fluids don't need to be entirely clear, but you should be able to see through them. The following food and drinks are allowed prior to a colon procedure:      Water     Clear broth (beef or chicken)     Fat-free consommé     Juices (apple, prune, white grape, and cider) without pulp     Non-carbonated, powder-based beverages such as Avila, lemonade, and   Mega-Aid     Sodas (Sprite, 7-Up, ginger ale, and seltzer)     Coffee or tea (without milk or cream)     Clear gelatin (without fruit pieces and no red or purple jello)     Popsicles (without fruit pieces or cream)      Fruit ices     Clear, hard candies     Salt, pepper, and sugar       Although they're liquid, the following foods are not translucent enough to be permitted on a clear liquid diet before a colon procedure:      Milk     Cream     Milkshakes     Tomato juice     Orange juice     Grapefruit juice     Cream soups     Any soup other than broth     Oatmeal     [place]     A clear liquid diet, such as the sample one described below, allows more food choices than you might think and can provide about 1,000 calories a day.     Breakfast -- 8 oz of apple juice, 1 cup of gelatin (without fruit), 1 cup of coffee with sugar but no milk or cream     Lunch --1 cup of chicken consommé, 8 oz of ginger ale, 1 cup of fruit ice, hot tea with sugar and lemon (no pulp)     Snack -- 1 or 2 clear hard candies     Dinner -- 1 cup of beef broth, 8 oz of lemonade, 1 cup of gelatin (without fruit), hot tea with sugar and lemon (no pulp)      Snack -- Popsicle

## 2024-05-30 NOTE — NURSING NOTE
Pre and Post op Patient Education/Teaching Flowsheet  Relevant Diagnosis:  Umbilical Hernia (K42.0)  Teaching Topic:  Pre and post op teaching  Person(s) Involved in teaching:  Patient     Motivation Level:  Asks Questions:  Yes  Eager to Learn:  Yes  Cooperative:  Yes  Receptive (willing/able to accept information):  Yes  Any cultural factors/Yarsanism beliefs that may influence understanding or compliance?  No    Patient/caregiver/family demonstrates understanding of the following:  Reason for the appointment, diagnosis, and treatment plan:  Yes  Patient demonstrates understanding of the following:  Pre-op bowel prep:  No  Post-op pain management recommendations (medications, ice compress, binder/athletic supporter (if applicable), etc.:  Yes  Inguinal hernia patients:  Post-op urinary retention- discussed signs/symptoms and visit to ER for Barber catheter placement and to stay in place for at least 48 hours:  NA  Restrictions:  Yes  Medications to take the day of surgery:  Per PCP  Blood thinner medications discussed and when to stop (if applicable):  Yes  Wound care:  Yes  Diabetes medication management (if applicable):  Per PCP  Which situations necessitate calling provider and whom to contact:  Discussed how to contact the hospital, nurse, and clinic scheduling staff if necessary    Infection Prevention: Patient demonstrates understanding of the following:  Patient instructed on hand hygiene:  Yes  Surgical procedure site care will be taught and will be reviewed at the time of discharge  Signs and symptoms of infection taught:  Yes  Wound care reviewed and will be taught at the time of discharge  Central venous catheter care will be taught at the time of discharge (if applicable)    Post-op follow-up:  Instructional materials used/given/mailed:  Surgical logistics, post op teaching sheet, and surgical scrub    Logistics:  Date and time of surgery:  TBD  Location of surgery: [facility]  Surgery Center- 5th Floor  History and Physical and any other testing necessary prior to surgery:  Yes  Required time line for completion of History and Physical and any pre-op testing:  Yes  Discuss need for someone to drive patient home and stay with them for 24 hours:  Yes  Pre-op showering/scrub information with Surgical Scrub:  Yes  NPO Guidelines:  NPO per Anesthesia Guidelines

## 2024-05-30 NOTE — LETTER
5/30/2024       RE: Don Samson  [address]  [place] [address]       Dear Colleague,    Thank you for referring your patient, Don Samson, to the [facility] GENERAL SURGERY CLIN[facility] [place] at [facility]. Please see a copy of my visit note below.    Don Samson is a 74 year old male with a 7 year history of an umbilical hernia with the following symptoms of lump.      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    Past medical history, medications, allergies, family history, and social history were reviewed with the patient.    Past Medical History:   Diagnosis Date    Ankle pain 5/21/2016    Ankle sprain and strain 9/16/2010    CARDIOVASCULAR SCREENING; LDL GOAL LESS THAN 130 9/6/2016    Depression     declines treatment 4/1/16    HTN (hypertension)     declines treatment 4/1/16    Left knee pain 6/18/2015    Plantar fasciitis 9/16/2010    Right knee pain 12/12/2016    Tibialis posterior tendonitis 5/21/2016     Past Surgical History:   Procedure Laterality Date    HERNIORRHAPHY INGUINAL BILATERAL      RIH 2004, LIH 1985    NASAL/SINUS POLYPECTOMY      Nasal-Sinus Polypectomy       ROS: 10 point review of systems negative except noted in HPI   PHYSICAL EXAM  General appearance-  alert, and in no distress.  Skin- Skin color and turgor normal.  No obvious rashes.  Lungs- Respiratory effort unlabored.  Gait- Normal.  Abdomen - soft non distended, non tender fair sized umbilical hernia.    Impression:  Hernia, umbilical  Recommendation:  laparoscopic surgery, robot aissited repair umbilical hernia    A full discussion regarding the alternatives, risks, goals, and potential complications for this surgery was completed today.  The patient understood that the potential problems included but are not limited to:   Infection, bleeding, hematoma, seroma, and recurrence.    The patient verbally expressed understanding, was given the opportunity for questions, and at this point wants to hold off. Will call to schedule when he decides to proceed.     Today the patient was instructed on the need for a preoperative H&P, NPO status prior to surgery, and the need to stop anticoagulants prior to surgery.  Additional educational material, soap, and instructions will be mailed out to the patients home.    The total time spent with this patient was 30 minutes.  The total time was spent on the date of encounter doing chart review, history and physical, dressing changes, documentation, patient education, and any further activity as noted above.         Again, thank you for allowing me to participate in the care of your patient.      Sincerely,    Shaquille Erickson MD     intact

## 2024-06-08 NOTE — PATIENT INSTRUCTIONS
symptoms may be related to heat   Fluids  Rest.  zofran for nausea    And also being off blood pressure medication    Restart amlodipine 10 mg.  Monitor blood pressure   If concerned swelling of ankles, could try 5 mg for 1 week instead and recheck clinic 7-10 days  Bring in blood pressure machine to learn how to use.  Nurse appointment        Jejunitis

## 2024-06-24 ENCOUNTER — OFFICE VISIT (OUTPATIENT)
Dept: URGENT CARE | Facility: URGENT CARE | Age: 74
End: 2024-06-24
Payer: COMMERCIAL

## 2024-06-24 VITALS
TEMPERATURE: 97 F | HEIGHT: 71 IN | HEART RATE: 72 BPM | OXYGEN SATURATION: 99 % | WEIGHT: 225 LBS | BODY MASS INDEX: 31.5 KG/M2 | DIASTOLIC BLOOD PRESSURE: 80 MMHG | SYSTOLIC BLOOD PRESSURE: 136 MMHG | RESPIRATION RATE: 18 BRPM

## 2024-06-24 DIAGNOSIS — M25.562 ACUTE PAIN OF LEFT KNEE: Primary | ICD-10-CM

## 2024-06-24 PROCEDURE — 99213 OFFICE O/P EST LOW 20 MIN: CPT

## 2024-06-24 NOTE — LETTER
June 24, 2024      Don CLARKE Mali  [address]  [place] [address]        To Whom It May Concern:    Don Samson  was seen on 6/24/24.  Please excuse his absence 6/21-6/23 due to illness.        Sincerely,        CONSTANCE Hubbard CNP

## 2024-06-24 NOTE — PATIENT INSTRUCTIONS
I suspect the pain in your left knee is related to arthritis.     Please follow up with physical therapy. I have also placed a referral to sports medicine - if you do not have improvement with PT - you should see sports medicine.     OK to continue Aleve and or Tylenol as needed for discomfort. Take Aleve with food.      You may try a knee compression sleeve - make sure there is a hole over your knee cap and there should not be any metal pieces. Such as Matthew open patellar compression knee sleeve.      As we discussed, monitor for fevers/chills, weight loss, fatigue, rash, if feeling generally unwell, if left knee becomes red/hot/swollen - please follow up urgently.

## 2024-06-24 NOTE — PROGRESS NOTES
Patient presents with:  Urgent Care: Left knee pain since last Thursday. No known injury, just flared up.       Clinical Decision Making:  Exam is unremarkable.  VSS on RA, afebrile.  Patient without systemic symptoms.    Differential diagnosis for knee pain includes but is not limited to: Meniscal tear, ACL tear, PCL tear, MCL tear, LCL tear, patellar dislocation, patellar tendinitis, patellar tendon rupture, septic joint, bursitis, Baker's cyst, femur fracture, tibia fracture, gout, RA, OA, others.    Ligamentous and cartilagenous tests including McMurrays, Lachman's, Posterior Drawer, Varus, and Valgus reveal stable and intact ligaments,and cartilage.  No specific bony tenderness and without specific trauma, do not believe that x-ray is indicated today.  Pt agreeable with this as he has had similar pain in the past, and he has not had any trauma.  No erythema, warmth to the touch, or swelling to suggest septic joint, rheumatoid arthritis, or gout.  No posterior knee tenderness or mass to suggest baker's cyst. No pulsatile masses, less concern for popliteal aneurysm.  No patellar tenderness or abnormality on exam.  Doubt tendon rupture or other patellar abnormality.  I suspect symptoms are secondary to osteoarthritis of left knee.  X-ray of left knee on 4/6/2022 did show mild osteoarthrosis.  Pulses present, no paresthesia, no temperature changes to skin, no pain to light touch, normal color of skin suggestive of compartment syndrome.     Most likely etiology of patients symptoms is osteoarthritis.  Patient is reassured and told to RICE.  Given the below medications for symptoms treatment.  Patient is told to follow up with ortho and PT or return to the clinic/emergency department if symptoms worsen.       At the end of the encounter, I discussed results, diagnosis, medications. Discussed red flags for immediate return to clinic/ER, as well as indications for follow up if no improvement. Patient understood and  agreed to plan. Patient was stable for discharge.  Printed AVS and work note provided.    ICD-10-CM    1. Acute pain of left knee  M25.562 Orthopedic  Referral     Physical Therapy  Referral          Patient Instructions   I suspect the pain in your left knee is related to arthritis.     Please follow up with physical therapy. I have also placed a referral to sports medicine - if you do not have improvement with PT - you should see sports medicine.     OK to continue Aleve and or Tylenol as needed for discomfort. Take Aleve with food.      You may try a knee compression sleeve - make sure there is a hole over your knee cap and there should not be any metal pieces. Such as Matthew open patellar compression knee sleeve.      As we discussed, monitor for fevers/chills, weight loss, fatigue, rash, if feeling generally unwell, if left knee becomes red/hot/swollen - please follow up urgently.      HPI:  Don Samson is a 74 year old male who presents today with left knee pain x 4-5 days. Reports he has had pain like this before - but isn't sure if it has been his right or left knee before. Otherwise has been feeling OK. No fevers or chills. No falls. 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 Has tried Voltaren gel without relief. Has been taking tylenol and/or aleve with good relief. Feels like is has gotten better with rest.     History obtained from the patient.    Problem List:  2023-07: Umbilical hernia without obstruction and without gangrene  2023-07: Venous stasis dermatitis of both lower extremities  2022-03: Left buttock pain  2019-12: Viral gastroenteritis  2019-03: Lumbago  2018-03: Hypertension goal BP (blood pressure) < 140/90  2018-03: Major depressive disorder, recurrent, moderate (H)  2016-12: Right knee pain  2016-11: Benign essential hypertension  2016-09: Fatigue  2016-09:  "CARDIOVASCULAR SCREENING; LDL GOAL LESS THAN 130  2016: Ankle pain  2016: Tibialis posterior tendonitis  2015: Adjustment disorder with mixed anxiety and depressed mood  2015: Left knee pain  2011: Ankle pain  2010: Low back pain  2010: Plantar fasciitis  2010: Ankle sprain and strain  2007: Degeneration of lumbar or lumbosacral intervertebral disc  2006: Disease of nail  HTN (hypertension)  Depression      Past Medical History:   Diagnosis Date    Ankle pain 2016    Ankle sprain and strain 2010    CARDIOVASCULAR SCREENING; LDL GOAL LESS THAN 130 2016    Depression     declines treatment 16    HTN (hypertension)     declines treatment 16    Left knee pain 2015    Plantar fasciitis 2010    Right knee pain 2016    Tibialis posterior tendonitis 2016       Social History     Tobacco Use    Smoking status: Former     Current packs/day: 0.00     Types: Cigarettes     Quit date: 1970     Years since quittin.5     Passive exposure: Never    Smokeless tobacco: Never   Substance Use Topics    Alcohol use: Yes     Alcohol/week: 0.0 standard drinks of alcohol     Comment: rarely        Review of Systems   Musculoskeletal:         Left knee pain       Vitals:    24 1004   BP: 136/80   Pulse: 72   Resp: 18   Temp: 97  F (36.1  C)   TempSrc: Temporal   SpO2: 99%   Weight: 102.1 kg (225 lb)   Height: 1.803 m (5' 11\")       Physical Exam  Constitutional:       General: He is not in acute distress.     Appearance: Normal appearance. He is not ill-appearing, toxic-appearing or diaphoretic.   HENT:      Head: Normocephalic and atraumatic.      Right Ear: External ear normal.      Left Ear: External ear normal.   Eyes:      General: No scleral icterus.     Conjunctiva/sclera: Conjunctivae normal.   Cardiovascular:      Rate and Rhythm: Normal rate and regular rhythm.      Heart sounds: Normal heart sounds. No murmur heard.  Pulmonary:      Effort: " Pulmonary effort is normal. No respiratory distress.      Breath sounds: Normal breath sounds.   Musculoskeletal:         General: No swelling, tenderness, deformity or signs of injury. Normal range of motion.      Left knee: No swelling, deformity, effusion, erythema, ecchymosis, lacerations, bony tenderness or crepitus. Normal range of motion. No tenderness. No LCL laxity, MCL laxity, ACL laxity or PCL laxity.Normal alignment, normal meniscus and normal patellar mobility. Normal pulse.      Instability Tests: Anterior drawer test negative. Posterior drawer test negative. Anterior Lachman test negative. Medial Montserrat test negative and lateral Montserrat test negative.      Right lower leg: Normal. No edema.      Left lower leg: Normal. No edema.   Skin:     General: Skin is warm.      Capillary Refill: Capillary refill takes less than 2 seconds.      Coloration: Skin is not jaundiced.   Neurological:      General: No focal deficit present.      Mental Status: He is alert.   Psychiatric:         Mood and Affect: Mood normal.         Behavior: Behavior normal.         Thought Content: Thought content normal.         Judgment: Judgment normal.

## 2024-06-26 ENCOUNTER — OFFICE VISIT (OUTPATIENT)
Dept: URGENT CARE | Facility: URGENT CARE | Age: 74
End: 2024-06-26
Payer: COMMERCIAL

## 2024-06-26 VITALS
HEIGHT: 71 IN | SYSTOLIC BLOOD PRESSURE: 131 MMHG | TEMPERATURE: 97.3 F | HEART RATE: 83 BPM | WEIGHT: 218 LBS | RESPIRATION RATE: 17 BRPM | BODY MASS INDEX: 30.52 KG/M2 | OXYGEN SATURATION: 99 % | DIASTOLIC BLOOD PRESSURE: 87 MMHG

## 2024-06-26 DIAGNOSIS — M17.12 PRIMARY OSTEOARTHRITIS OF LEFT KNEE: ICD-10-CM

## 2024-06-26 DIAGNOSIS — M25.562 ACUTE PAIN OF LEFT KNEE: Primary | ICD-10-CM

## 2024-06-26 PROCEDURE — 99214 OFFICE O/P EST MOD 30 MIN: CPT | Performed by: INTERNAL MEDICINE

## 2024-06-26 NOTE — PATIENT INSTRUCTIONS
Neoprene hinged sleeve  Ice  Continue Voltaren gel    See Orthopedics    Physical Therapy as ordered    Recheck with primary.    Work note

## 2024-06-26 NOTE — LETTER
June 26, 2024      Don Samson  [address]  [place] [address]        To Whom It May Concern:    Don Samson was seen in our clinic for ongoing knee pain. He will need evaluation by orthopedics as pain affecting return to work.  Please excuse work absence from 6/20 until up to 3 days from today's visit.    Sincerely,      Miladis Malhotra

## 2024-06-26 NOTE — PROGRESS NOTES
ASSESSMENT AND PLAN:      ICD-10-CM    1. Acute pain of left knee  M25.562 Ankle/Knee Bracing Supplies Order Hinged Knee Brace; Left     Orthopedic  Referral     PRIMARY CARE FOLLOW-UP SCHEDULING      2. Primary osteoarthritis of left knee  M17.12 Ankle/Knee Bracing Supplies Order Hinged Knee Brace; Left     Orthopedic  Referral     PRIMARY CARE FOLLOW-UP SCHEDULING        Patient Instructions     Neoprene hinged sleeve  Ice  Continue Voltaren gel    See Orthopedics    Physical Therapy as ordered    Recheck with primary.    Work note      Discussed if not improved with knee brace, may need cortisone IJ        Miladis Malhotra MD  [facility]    Subjective     Don Samson is a 74 year old who presents for Patient presents with:  Leg Pain: X1 week of left leg pain   Urgent Care  Letter for School/Work: Needs an extended work note     an established patient of [facility].    Seen 2 days ago here at urgent care for knee pain.  Given knee sleeve and referral to orthopedics.    Per note  HPI:  Don Samson is a 74 year old male who presents today with left knee pain x 4-5 days. Reports he has had pain like this before - but isn't sure if it has been his right or left knee before. Otherwise has been feeling OK. No fevers or chills. No falls. 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 Has been taking tylenol and/or aleve with good relief. Feels like is has gotten better with rest.        Thinks it is getting better  Difficulty sleeping.    No swelling  Pain all over knee    Would like extended note.  From 6/20      Per Xray 2022  Narrative & Impression   EXAM: XR KNEE LEFT 3 VIEWS  LOCATION: [facility]  DATE/TIME: 4/6/2022 6:31 PM     INDICATION: Deep knee aching for 2 weeks, no known injury.  COMPARISON:  "None.                                                                      IMPRESSION: Mild osteoarthrosis of the medial and patellofemoral compartments. No fracture, effusion or calcified intra-articular body.             Review of Systems        Objective    /87   Pulse 83   Temp 97.3  F (36.3  C) (Temporal)   Resp 17   Ht 1.803 m (5' 11\")   Wt 98.9 kg (218 lb)   SpO2 99%   BMI 30.40 kg/m    Physical Exam  Vitals reviewed.   Constitutional:       Appearance: Normal appearance.   Musculoskeletal:      Comments: left knee  Pain left joint line  without effusion/  Skin color normal    Neurological:      Mental Status: He is alert.                  "

## 2024-08-16 ENCOUNTER — OFFICE VISIT (OUTPATIENT)
Dept: URGENT CARE | Facility: URGENT CARE | Age: 74
End: 2024-08-16
Payer: COMMERCIAL

## 2024-08-16 VITALS
HEART RATE: 65 BPM | OXYGEN SATURATION: 96 % | DIASTOLIC BLOOD PRESSURE: 69 MMHG | TEMPERATURE: 97.3 F | SYSTOLIC BLOOD PRESSURE: 167 MMHG

## 2024-08-16 DIAGNOSIS — R22.41 LOCALIZED SWELLING OF RIGHT LOWER LEG: Primary | ICD-10-CM

## 2024-08-16 LAB
D DIMER PPP FEU-MCNC: 0.36 UG/ML FEU (ref 0–0.5)
ERYTHROCYTE [DISTWIDTH] IN BLOOD BY AUTOMATED COUNT: 12.9 % (ref 10–15)
HCT VFR BLD AUTO: 42.9 % (ref 40–53)
HGB BLD-MCNC: 14.1 G/DL (ref 13.3–17.7)
MCH RBC QN AUTO: 28.3 PG (ref 26.5–33)
MCHC RBC AUTO-ENTMCNC: 32.9 G/DL (ref 31.5–36.5)
MCV RBC AUTO: 86 FL (ref 78–100)
PLATELET # BLD AUTO: 250 10E3/UL (ref 150–450)
RBC # BLD AUTO: 4.98 10E6/UL (ref 4.4–5.9)
WBC # BLD AUTO: 6.5 10E3/UL (ref 4–11)

## 2024-08-16 PROCEDURE — 85379 FIBRIN DEGRADATION QUANT: CPT | Performed by: PHYSICIAN ASSISTANT

## 2024-08-16 PROCEDURE — 99213 OFFICE O/P EST LOW 20 MIN: CPT | Performed by: PHYSICIAN ASSISTANT

## 2024-08-16 PROCEDURE — 85027 COMPLETE CBC AUTOMATED: CPT | Performed by: PHYSICIAN ASSISTANT

## 2024-08-16 PROCEDURE — 36415 COLL VENOUS BLD VENIPUNCTURE: CPT | Performed by: PHYSICIAN ASSISTANT

## 2024-08-16 NOTE — PROGRESS NOTES
SUBJECTIVE:  Don Samson is a 74 year old male who presents to the clinic today for a rash.  Onset of rash was 1 week(s) ago.   Rash is gradual onset.  Location of the rash: lower leg.  Quality/symptoms of rash: red, swollen   Symptoms are moderate and rash seems to be stable.  Previous history of a similar rash? No  Recent exposure history: none known    Associated symptoms include: nothing.    Past Medical History:   Diagnosis Date    Ankle pain 2016    Ankle sprain and strain 2010    CARDIOVASCULAR SCREENING; LDL GOAL LESS THAN 130 2016    Depression     declines treatment 16    HTN (hypertension)     declines treatment 16    Left knee pain 2015    Plantar fasciitis 2010    Right knee pain 2016    Tibialis posterior tendonitis 2016     Current Outpatient Medications   Medication Sig Dispense Refill    acetaminophen (TYLENOL) 500 MG tablet Take 1-2 tablets (500-1,000 mg) by mouth every 6 hours as needed for mild pain Maximum daily dose 4,000mg. 100 tablet 4    amLODIPine (NORVASC) 5 MG tablet Take 1 tablet (5 mg) by mouth daily 90 tablet 3    Lidocaine (LIDOCARE) 4 % Patch Place 1 patch onto the skin every 24 hours To prevent lidocaine toxicity, patient should be patch free for 12 hrs daily. 10 patch 0    naproxen (NAPROSYN) 500 MG tablet Take 1 tablet (500 mg) by mouth 2 times daily (with meals) 30 tablet 0    olmesartan (BENICAR) 20 MG tablet Take 1 tablet (20 mg) by mouth daily (with breakfast) 90 tablet 3    tadalafil (CIALIS) 10 MG tablet Take 10 mg by mouth (Patient not taking: Reported on 2024)       Social History     Tobacco Use    Smoking status: Former     Current packs/day: 0.00     Types: Cigarettes     Quit date: 1970     Years since quittin.6     Passive exposure: Never    Smokeless tobacco: Never   Substance Use Topics    Alcohol use: Yes     Alcohol/week: 0.0 standard drinks of alcohol     Comment: rarely        ROS:  Review of systems  negative except as stated above.    EXAM:   BP (!) 167/69   Pulse 65   Temp 97.3  F (36.3  C) (Tympanic)   SpO2 96%   GENERAL: alert, no acute distress.  SKIN: petechial rash inner right Lower leg  GENERAL APPEARANCE: healthy, alert and no distress  MS:  right lower leg swollen relative to left      Results for orders placed or performed in visit on 08/16/24   CBC with platelets     Status: Normal   Result Value Ref Range    WBC Count 6.5 4.0 - 11.0 10e3/uL    RBC Count 4.98 4.40 - 5.90 10e6/uL    Hemoglobin 14.1 13.3 - 17.7 g/dL    Hematocrit 42.9 40.0 - 53.0 %    MCV 86 78 - 100 fL    MCH 28.3 26.5 - 33.0 pg    MCHC 32.9 31.5 - 36.5 g/dL    RDW 12.9 10.0 - 15.0 %    Platelet Count 250 150 - 450 10e3/uL       ASSESSMENT:  (R22.41) Localized swelling of right lower leg  (primary encounter diagnosis)  Comment: concern for DVT  Plan: Referral to Acute and Diagnostic Services (Day         of diagnostic / First order acute), D dimer         quantitative, CBC with platelets, Orthopedic          Referral    Patient declines advanced imaging at [facility]  Will screen with D DIMER and follow up PRN    In the meantime, ED with any worsening of symptoms

## 2024-08-17 ENCOUNTER — NURSE TRIAGE (OUTPATIENT)
Dept: NURSING | Facility: CLINIC | Age: 74
End: 2024-08-17
Payer: COMMERCIAL

## 2024-08-17 NOTE — TELEPHONE ENCOUNTER
"Nurse Triage SBAR    Is this a 2nd Level Triage? NO    Situation: Rash on right leg    Background: Pt states he was seen in  for rash on right leg. Took bloodwork but PT did not receive callback. Via chart review, see CBC and D Dimer that are within normal limits. Seem as though US was ordered at [facility] but Pt refused. Pt wondering about lab results and next steps     Assessment: Denies fever, discharge from rash, infected appearance, wide spreading of rash, and states it is not painful or burning but \"a little irritating\" when not using Aquaphor cream.     Protocol Recommended Disposition:   See PCP Within 3 Days    Recommendation: Recommended PCP visit within 3 days. Gave care advice and callback instructions. Relayed that blood work returned with results within normal limits.          Does the patient meet one of the following criteria for [facility] visit consideration? 16+ years old, with an [facility] PCP     TIP  Providers, please consider if this condition is appropriate for management at one of our Acute and Diagnostic Services sites.     If patient is a good candidate, please use dotphrase <dot>triageresponse and select Refer to [facility] to document.    Reason for Disposition   Localized rash present > 7 days    Additional Information   Negative: [1] Sudden onset of rash (within last 2 hours) AND [2] difficulty breathing or swallowing   Negative: Sounds like a life-threatening emergency to the triager   Negative: [1] Localized purple or blood-colored spots or dots AND [2] not from injury or friction AND [3] fever   Negative: Patient sounds very sick or weak to the triager   Negative: [1] Red area or streak AND [2] fever   Negative: [1] Rash is painful to touch AND [2] fever   Negative: [1] Looks infected (spreading redness, pus) AND [2] large red area (> 2 in. or 5 cm)   Negative: [1] Looks infected (spreading redness, pus) AND [2] diabetes mellitus or weak immune system (e.g., HIV positive, cancer chemo, splenectomy, organ " transplant, chronic steroids)   Negative: [1] Localized purple or blood-colored spots or dots AND [2] not from injury or friction AND [3] no fever   Negative: [1] Looks infected (spreading redness, pus) AND [2] no fever   Negative: Looks like a boil, infected sore, deep ulcer or other infected rash   Negative: [1] Localized rash is very painful AND [2] no fever   Negative: Genital area rash   Negative: Lyme disease suspected (e.g., bull's eye rash or tick bite / exposure)   Negative: [1] Applying cream or ointment AND [2] causes severe itch, burning or pain   Negative: Medication patch causing local rash or itching   Negative: [1] Pimples (localized) AND [2 ] no improvement after using Care Advice   Negative: Tender bumps in armpits   Negative: [1] Severe localized itching AND [2] after 2 days of steroid cream    Protocols used: Rash or Redness - [id number]

## 2024-08-19 ENCOUNTER — OFFICE VISIT (OUTPATIENT)
Dept: URGENT CARE | Facility: URGENT CARE | Age: 74
End: 2024-08-19
Payer: COMMERCIAL

## 2024-08-19 VITALS
WEIGHT: 235 LBS | HEART RATE: 57 BPM | RESPIRATION RATE: 26 BRPM | BODY MASS INDEX: 31.83 KG/M2 | HEIGHT: 72 IN | SYSTOLIC BLOOD PRESSURE: 149 MMHG | TEMPERATURE: 97.2 F | DIASTOLIC BLOOD PRESSURE: 84 MMHG | OXYGEN SATURATION: 98 %

## 2024-08-19 DIAGNOSIS — R21 RASH: ICD-10-CM

## 2024-08-19 DIAGNOSIS — R60.9 EDEMA, UNSPECIFIED TYPE: Primary | ICD-10-CM

## 2024-08-19 PROCEDURE — 99213 OFFICE O/P EST LOW 20 MIN: CPT | Performed by: PHYSICIAN ASSISTANT

## 2024-08-19 NOTE — PROGRESS NOTES
SUBJECTIVE:   Don Samson is a 74 year old male presenting with a chief complaint of   Chief Complaint   Patient presents with    Urgent Care    Derm Problem     Pt in clinic to have eval for rash on lower legs.       He is an established patient of [facility].  Patient presented to  on 8/16 for the same and had ddimer and CBC performed.  He was then referred to [facility] but apparently was not seen and did not have an US.  He is presenting today confused about the next step.  Patient states the rash is much better with aquafor.  No itching and no pain.          Review of Systems    Past Medical History:   Diagnosis Date    Ankle pain 5/21/2016    Ankle sprain and strain 9/16/2010    CARDIOVASCULAR SCREENING; LDL GOAL LESS THAN 130 9/6/2016    Depression     declines treatment 4/1/16    HTN (hypertension)     declines treatment 4/1/16    Left knee pain 6/18/2015    Plantar fasciitis 9/16/2010    Right knee pain 12/12/2016    Tibialis posterior tendonitis 5/21/2016     Family History   Problem Relation Age of Onset    Hypertension Mother      Current Outpatient Medications   Medication Sig Dispense Refill    acetaminophen (TYLENOL) 500 MG tablet Take 1-2 tablets (500-1,000 mg) by mouth every 6 hours as needed for mild pain Maximum daily dose 4,000mg. 100 tablet 4    amLODIPine (NORVASC) 5 MG tablet Take 1 tablet (5 mg) by mouth daily 90 tablet 3    Lidocaine (LIDOCARE) 4 % Patch Place 1 patch onto the skin every 24 hours To prevent lidocaine toxicity, patient should be patch free for 12 hrs daily. 10 patch 0    naproxen (NAPROSYN) 500 MG tablet Take 1 tablet (500 mg) by mouth 2 times daily (with meals) 30 tablet 0    olmesartan (BENICAR) 20 MG tablet Take 1 tablet (20 mg) by mouth daily (with breakfast) 90 tablet 3    tadalafil (CIALIS) 10 MG tablet Take 10 mg by mouth       Social History     Tobacco Use    Smoking status: Former     Current packs/day: 0.00     Types: Cigarettes     Quit date: 1/1/1970     Years since  quittin.6     Passive exposure: Never    Smokeless tobacco: Never   Substance Use Topics    Alcohol use: Yes     Alcohol/week: 0.0 standard drinks of alcohol     Comment: rarely        OBJECTIVE  BP (!) 149/84   Pulse 57   Temp 97.2  F (36.2  C) (Temporal)   Resp 26   Ht 1.829 m (6')   Wt 106.6 kg (235 lb)   SpO2 98%   BMI 31.87 kg/m      Physical Exam  Vitals reviewed.   Constitutional:       Appearance: Normal appearance. He is obese.   Cardiovascular:      Rate and Rhythm: Normal rate.   Pulmonary:      Comments: +1-2 pitting edema bilaterally.  Skin:     Comments: Right lower leg, just above ankle with 1 cm circular, nonpalpable macules that are erythematous, nontender.  Negative patel.   Neurological:      Mental Status: He is alert.         Labs:  No results found for this or any previous visit (from the past 24 hour(s)).    ASSESSMENT:    No diagnosis found.     Medical Decision Making:    Differential Diagnosis:  Venous stasis, rash    Serious Comorbid Conditions:  Adult:   reviewed    PLAN:    Continue with aquafore.  Patient education.  Discussed reasons to seek immediate medical attention.  Additionally if no improvement or worsening in one week, may follow up with PCP and/or UC.        Followup:    If not improving or if condition worsens, follow up with your Primary Care Provider, If not improving or if conditions worsens over the next 12-24 hours, go to the Emergency Department    There are no Patient Instructions on file for this visit.

## 2024-09-13 ENCOUNTER — OFFICE VISIT (OUTPATIENT)
Dept: URGENT CARE | Facility: URGENT CARE | Age: 74
End: 2024-09-13
Payer: COMMERCIAL

## 2024-09-13 VITALS
SYSTOLIC BLOOD PRESSURE: 119 MMHG | HEART RATE: 69 BPM | TEMPERATURE: 97.2 F | DIASTOLIC BLOOD PRESSURE: 72 MMHG | OXYGEN SATURATION: 96 % | RESPIRATION RATE: 16 BRPM

## 2024-09-13 DIAGNOSIS — M54.50 ACUTE BILATERAL LOW BACK PAIN, UNSPECIFIED WHETHER SCIATICA PRESENT: Primary | ICD-10-CM

## 2024-09-13 PROCEDURE — 99214 OFFICE O/P EST MOD 30 MIN: CPT | Performed by: PHYSICIAN ASSISTANT

## 2024-09-13 RX ORDER — CYCLOBENZAPRINE HCL 5 MG
5 TABLET ORAL 3 TIMES DAILY PRN
Qty: 30 TABLET | Refills: 0 | Status: SHIPPED | OUTPATIENT
Start: 2024-09-13

## 2024-09-13 NOTE — PROGRESS NOTES
SUBJECTIVE  HPI: Don Samson is a 74 year old male who presents for evaluation of back pain  Symptoms began 2 day(s) ago, have been onset gradual and are stable.  Pain is located in the low back bilateral region, with radiation to does not radiate, and are at worst a 6 on a scale of 1-10.  Recent injury:recent heavy lifting  Personal hx of back pain is recurrent self limited episodes of low back pain in the past.  Pain is exacerbated by: resolved.  Pain is relieved by: resolved  Red flag symptoms: none    Past Medical History:   Diagnosis Date    Ankle pain 2016    Ankle sprain and strain 2010    CARDIOVASCULAR SCREENING; LDL GOAL LESS THAN 130 2016    Depression     declines treatment 16    HTN (hypertension)     declines treatment 16    Left knee pain 2015    Plantar fasciitis 2010    Right knee pain 2016    Tibialis posterior tendonitis 2016     Current Outpatient Medications   Medication Sig Dispense Refill    acetaminophen (TYLENOL) 500 MG tablet Take 1-2 tablets (500-1,000 mg) by mouth every 6 hours as needed for mild pain Maximum daily dose 4,000mg. 100 tablet 4    amLODIPine (NORVASC) 5 MG tablet Take 1 tablet (5 mg) by mouth daily 90 tablet 3    cyclobenzaprine (FLEXERIL) 5 MG tablet Take 1 tablet (5 mg) by mouth 3 times daily as needed for muscle spasms. 30 tablet 0    Lidocaine (LIDOCARE) 4 % Patch Place 1 patch onto the skin every 24 hours To prevent lidocaine toxicity, patient should be patch free for 12 hrs daily. 10 patch 0    naproxen (NAPROSYN) 500 MG tablet Take 1 tablet (500 mg) by mouth 2 times daily (with meals) 30 tablet 0    olmesartan (BENICAR) 20 MG tablet Take 1 tablet (20 mg) by mouth daily (with breakfast) 90 tablet 3    tadalafil (CIALIS) 10 MG tablet Take 10 mg by mouth       Social History     Tobacco Use    Smoking status: Former     Current packs/day: 0.00     Types: Cigarettes     Quit date: 1970     Years since quittin.7      Passive exposure: Never    Smokeless tobacco: Never   Substance Use Topics    Alcohol use: Yes     Alcohol/week: 0.0 standard drinks of alcohol     Comment: rarely        ROS:  Review of systems negative except as stated above.    OBJECTIVE:  /72 (BP Location: Right arm)   Pulse 69   Temp 97.2  F (36.2  C) (Temporal)   Resp 16   SpO2 96%   Back examination: Back symmetric, no curvature. ROM normal. No CVA tenderness.  [unfilled] leg raise test: negative  GENERAL APPEARANCE: healthy, alert and no distress  RESP: lungs clear to auscultation - no rales, rhonchi or wheezes  CV: regular rates and rhythm, normal S1 S2, no murmur noted  NEURO: Normal strength and tone with no weakness or sensory deficit noted, reflexes normal   SKIN: no suspicious lesions or rashes      No results found for any visits on 09/13/24.    ASSESSMENT/IMPRESSION:  (M54.50) Acute bilateral low back pain, unspecified whether sciatica present  (primary encounter diagnosis)  Comment: no red flags  Plan: cyclobenzaprine (FLEXERIL) 5 MG tablet        Red flags and emergent follow up discussed, and understood by patient  Follow up with PCP if symptoms worsen or fail to improve

## 2024-09-13 NOTE — LETTER
September 13, 2024      Don Samson  [address]  [facility] [place] [address]        To Whom It May Concern:    Don Samson  was seen on 9/13.  Please excuse him this week due to injury. May return when he is ready.        Sincerely,        Kwabena Clarke PA-C

## 2024-11-12 ENCOUNTER — THERAPY VISIT (OUTPATIENT)
Dept: PHYSICAL THERAPY | Facility: CLINIC | Age: 74
End: 2024-11-12
Payer: COMMERCIAL

## 2024-11-12 DIAGNOSIS — G89.29 CHRONIC RIGHT HIP PAIN: Primary | ICD-10-CM

## 2024-11-12 DIAGNOSIS — M25.551 CHRONIC RIGHT HIP PAIN: Primary | ICD-10-CM

## 2024-11-12 PROCEDURE — 97110 THERAPEUTIC EXERCISES: CPT | Mod: GP

## 2024-11-12 PROCEDURE — 97161 PT EVAL LOW COMPLEX 20 MIN: CPT | Mod: GP

## 2024-11-12 NOTE — PROGRESS NOTES
PHYSICAL THERAPY EVALUATION  Type of Visit: Evaluation       Fall Risk Screen:  Fall screen completed by: PT  Have you fallen 2 or more times in the past year?: No  Have you fallen and had an injury in the past year?: No  Is patient a fall risk?: No    Subjective         Presenting condition or subjective complaint:      Patient presents with right hip pain that has been going on for years. NKI. No regular exercise routine, but has to be active because of his work.     Date of onset: 09/13/24 (approximate date of exacerbation, date of UC visit)    Relevant medical history:   abdominal hernia, arthritis, depression, high blood pressure  Dates & types of surgery:  hernia    Prior diagnostic imaging/testing results:       Prior therapy history for the same diagnosis, illness or injury:    massage    Prior Level of Function  Transfers: Independent  Ambulation: Independent  ADL: Independent  IADL:  Independent    Living Environment  Social support:   alone  Type of home:   apartment  Stairs to enter the home:         Ramp:   no  Stairs inside the home:       no  Help at home:    Equipment owned:       Employment:    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 Standing for most of his work hours.   Hobbies/Interests:      Patient goals for therapy:  not have pain    Pain assessment: See objective evaluation for additional pain details     Objective   KEY FINDINGS:  Hip ROM WNL and pain free  Lumbar ROM limited but pain free  + ALFONSO bilaterally    LUMBAR SPINE EVALUATION  PAIN: Pain Level at Rest: 0/10  Pain Level with Use: 7/10  Pain Location: R posterior and lateral hip, sometimes into back. Denies any symptoms down into his leg.   Pain Quality: Sharp  Other symptoms: denies numbness, tingling  Pain Frequency: intermittent  Time Dependent?: no  Pain is Exacerbated By: walking, standing  Pain is Relieved By: Aleve, Tylenol, tiger balm  Pain Progression: Worsened  INTEGUMENTARY (edema, incisions):    POSTURE: Sitting Posture: Rounded shoulders, Forward head, Thoracic kyphosis increased  GAIT:   Weightbearing Status:   Assistive Device(s):   Gait Deviations:   BALANCE/PROPRIOCEPTION: Single Leg Stance Eyes Open (seconds): <10 sec bilateral  WEIGHTBEARING ALIGNMENT:   NON-WEIGHTBEARING ALIGNMENT:    LUMBAR ROM:    Left Right   Lumbar Rotation Max loss Max loss   Lumbar Sidebend Max loss Max loss   Lumbar Flexion WNL   Lumbar Extension Max loss   *indicates pain    HIP ROM: WNL bilateral with pain end ranges    PELVIC/SI SCREEN:   STRENGTH:   Proximal hip strength   Left Right   Hip Extension     Hip Abduction 5- 5-   *indicates pain      MYOTOMES:    Left Right   T12-L3 (Hip Flexion) 5 5   L2-4 (Quads)  5 5   L4 (Ankle DF) 5 5   L5 (Great Toe Ext) 5 5   S1 (Toe Raise) 5 5     DTR S:   CORD SIGNS:   DERMATOMES:   NEURAL TENSION: Lumbar WNL  FLEXIBILITY:   Bilateral hamstring and hip flexor tightness    LUMBAR/HIP Special Tests:    Left Right   ALFONSO Positive - tightness and pain Positive- tightness and pain   FADIR/Labrum/CHRISSIE Negative  Negative    Femoral Nerve     Norman's     Piriformis     Quadrant Testing     SLR     Slump     Stork with Extension     Jack             PELVIS/SI SPECIAL TESTS:   FUNCTIONAL TESTS: Double Leg Squat: Anterior knee translation, Increased trunk lean, Improper use of glutes/hips, and no pain  PALPATION: WNL  SPINAL SEGMENTAL CONCLUSIONS:       Assessment & Plan   CLINICAL IMPRESSIONS  Medical Diagnosis:      Treatment Diagnosis: Right sided low back and hip pain   Impression/Assessment: Patient is a 74 year old male with right sided hip and low back complaints.  The following significant findings have been identified: Pain, Decreased ROM/flexibility, Decreased joint mobility, Decreased strength, Impaired balance, Impaired gait, Decreased activity tolerance, and Impaired posture. These impairments interfere with their ability to perform self care tasks, work tasks, recreational  activities, household chores, household mobility, and community mobility as compared to previous level of function.     Clinical Decision Making (Complexity):  Clinical Presentation: Stable/Uncomplicated  Clinical Presentation Rationale: based on medical and personal factors listed in PT evaluation  Clinical Decision Making (Complexity): Low complexity    PLAN OF CARE  Treatment Interventions:  Modalities: Dry Needling  Interventions: Gait Training, Manual Therapy, Neuromuscular Re-education, Therapeutic Activity, Therapeutic Exercise, Self-Care/Home Management    Long Term Goals     PT Goal 1  Goal Identifier: Standing  Goal Description: Patient will be able to stand for at least 1 hour w/o increased pain  Rationale: to maximize safety and independence with performance of ADLs and functional tasks;to maximize safety and independence within the home;to maximize safety and independence within the community;to maximize safety and independence with self cares  Target Date: 02/04/25      Frequency of Treatment: 1x/week  Duration of Treatment: 4 weeks tapering to 2x/month for 8 weeks    Recommended Referrals to Other Professionals:  none at this time  Education Assessment:   Learner/Method: Patient;Demonstration;Pictures/Video;No Barriers to Learning    Risks and benefits of evaluation/treatment have been explained.   Patient/Family/caregiver agrees with Plan of Care.     Evaluation Time:     PT Eval, Low Complexity Minutes (10595): 20     Signing Clinician: Kaitlin Anderson PT

## 2024-11-13 PROBLEM — G89.29 CHRONIC RIGHT HIP PAIN: Status: ACTIVE | Noted: 2024-11-13

## 2024-11-13 PROBLEM — M25.551 CHRONIC RIGHT HIP PAIN: Status: ACTIVE | Noted: 2024-11-13

## 2024-11-13 ASSESSMENT — ACTIVITIES OF DAILY LIVING (ADL)
WALKING_UP_STEEP_HILLS: SLIGHT DIFFICULTY
WALKING_APPROXIMATELY_10_MINUTES: NO DIFFICULTY AT ALL
WALKING_DOWN_STEEP_HILLS: NO DIFFICULTY AT ALL
WALKING_INITIALLY: NO DIFFICULTY AT ALL
STANDING_FOR_15_MINUTES: MODERATE DIFFICULTY
HOS_ADL_ITEM_SCORE_TOTAL: 55
ROLLING_OVER_IN_BED: SLIGHT DIFFICULTY
HOS_ADL_COUNT: 17
RECREATIONAL_ACTIVITIES: SLIGHT DIFFICULTY
WALKING_15_MINUTES_OR_GREATER: NO DIFFICULTY AT ALL
LIGHT_TO_MODERATE_WORK: SLIGHT DIFFICULTY
HOS_ADL_HIGHEST_POTENTIAL_SCORE: 68
GETTING_INTO_AND_OUT_OF_AN_AVERAGE_CAR: SLIGHT DIFFICULTY
GETTING_INTO_AND_OUT_OF_A_BATHTUB: NO DIFFICULTY AT ALL
DEEP_SQUATTING: MODERATE DIFFICULTY
HOS_ADL_SCORE(%): 80.88
PUTTING_ON_SOCKS_AND_SHOES: NO DIFFICULTY AT ALL
GOING_UP_1_FLIGHT_OF_STAIRS: SLIGHT DIFFICULTY
STEPPING_UP_AND_DOWN_CURBS: NO DIFFICULTY AT ALL
HOW_WOULD_YOU_RATE_YOUR_CURRENT_LEVEL_OF_FUNCTION_DURING_YOUR_USUAL_ACTIVITIES_OF_DAILY_LIVING_FROM_0_TO_100_WITH_100_BEING_YOUR_LEVEL_OF_FUNCTION_PRIOR_TO_YOUR_HIP_PROBLEM_AND_0_BEING_THE_INABILITY_TO_PERFORM_ANY_OF_YOUR_USUAL_DAILY_ACTIVITIES?: 60
TWISTING/PIVOTING_ON_INVOLVED_LEG: MODERATE DIFFICULTY
SITTING_FOR_15_MINUTES: NO DIFFICULTY AT ALL
GOING_DOWN_1_FLIGHT_OF_STAIRS: NO DIFFICULTY AT ALL
HEAVY_WORK: SLIGHT DIFFICULTY

## 2024-12-02 ENCOUNTER — OFFICE VISIT (OUTPATIENT)
Dept: URGENT CARE | Facility: URGENT CARE | Age: 74
End: 2024-12-02
Payer: COMMERCIAL

## 2024-12-02 VITALS
OXYGEN SATURATION: 96 % | WEIGHT: 235 LBS | RESPIRATION RATE: 18 BRPM | HEIGHT: 72 IN | BODY MASS INDEX: 31.83 KG/M2 | HEART RATE: 84 BPM | SYSTOLIC BLOOD PRESSURE: 147 MMHG | DIASTOLIC BLOOD PRESSURE: 95 MMHG

## 2024-12-02 DIAGNOSIS — M25.551 HIP PAIN, RIGHT: Primary | ICD-10-CM

## 2024-12-02 DIAGNOSIS — G89.29 CHRONIC MIDLINE LOW BACK PAIN WITHOUT SCIATICA: ICD-10-CM

## 2024-12-02 DIAGNOSIS — M54.50 CHRONIC MIDLINE LOW BACK PAIN WITHOUT SCIATICA: ICD-10-CM

## 2024-12-02 PROCEDURE — 99214 OFFICE O/P EST MOD 30 MIN: CPT | Performed by: PHYSICIAN ASSISTANT

## 2024-12-02 RX ORDER — CYCLOBENZAPRINE HCL 5 MG
10 TABLET ORAL 3 TIMES DAILY PRN
Qty: 30 TABLET | Refills: 0 | Status: SHIPPED | OUTPATIENT
Start: 2024-12-02 | End: 2025-01-16

## 2024-12-02 NOTE — PROGRESS NOTES
SUBJECTIVE:  Chief Complaint   Patient presents with    Urgent Care     [place] bus [place]     Back Pain     Lower back pain and right side hip pain      Don Samson is a 74 year old male presents with a chief complaint of right hip pain. Chronic issue, aggravated by fall on light rail.   The injury occurred 1 week(s) ago and yesterday.     Past Medical History:   Diagnosis Date    Ankle pain 2016    Ankle sprain and strain 2010    CARDIOVASCULAR SCREENING; LDL GOAL LESS THAN 130 2016    Depression     declines treatment 16    HTN (hypertension)     declines treatment 16    Left knee pain 2015    Plantar fasciitis 2010    Right knee pain 2016    Tibialis posterior tendonitis 2016     Current Outpatient Medications   Medication Sig Dispense Refill    acetaminophen (TYLENOL) 500 MG tablet Take 1-2 tablets (500-1,000 mg) by mouth every 6 hours as needed for mild pain Maximum daily dose 4,000mg. 100 tablet 4    amLODIPine (NORVASC) 5 MG tablet Take 1 tablet (5 mg) by mouth daily 90 tablet 3    cyclobenzaprine (FLEXERIL) 5 MG tablet Take 1 tablet (5 mg) by mouth 3 times daily as needed for muscle spasms. 30 tablet 0    Lidocaine (LIDOCARE) 4 % Patch Place 1 patch onto the skin every 24 hours To prevent lidocaine toxicity, patient should be patch free for 12 hrs daily. 10 patch 0    naproxen (NAPROSYN) 500 MG tablet Take 1 tablet (500 mg) by mouth 2 times daily (with meals) 30 tablet 0    olmesartan (BENICAR) 20 MG tablet Take 1 tablet (20 mg) by mouth daily (with breakfast) 90 tablet 3    tadalafil (CIALIS) 10 MG tablet Take 10 mg by mouth       Social History     Tobacco Use    Smoking status: Former     Current packs/day: 0.00     Types: Cigarettes     Quit date: 1970     Years since quittin.9     Passive exposure: Never    Smokeless tobacco: Never   Substance Use Topics    Alcohol use: Yes     Alcohol/week: 0.0 standard drinks of alcohol     Comment:  rarely        ROS:  Review of systems negative except as stated above.    EXAM:   BP (!) 147/95   Pulse 84   Resp 18   Ht 1.829 m (6')   Wt 106.6 kg (235 lb)   SpO2 96%   BMI 31.87 kg/m    Gen: healthy,alert,no distress  Extremity: ROM intact  Pulses are +2 and CRT is brisk  GENERAL APPEARANCE: healthy, alert and no distress  CHEST: clear to auscultation  CV: regular rate and rhythm  MS: no gross deformities noted, no evidence of inflammation in joints, FROM in all extremities.  NEURO: Normal strength and tone, sensory exam grossly normal, mentation intact and speech normal        ASSESSMENT:   (M25.551) Hip pain, right  (primary encounter diagnosis)  Plan: cyclobenzaprine (FLEXERIL) 5 MG tablet,         Physical Therapy  Referral          (M54.50,  G89.29) Chronic midline low back pain without sciatica  Plan: cyclobenzaprine (FLEXERIL) 5 MG tablet,         Physical Therapy  Referral        Red flags and emergent follow up discussed, and understood by patient  Follow up with PCP if symptoms worsen or fail to improve

## 2024-12-02 NOTE — LETTER
December 2, 2024      Don Samson  [address]  [place] [address]        To Whom It May Concern:    Don Samson  was seen on 12/2.  Please excuse him until 12/4 due to injury.        Sincerely,        Kwabena Clarke PA-C

## 2024-12-05 ENCOUNTER — NURSE TRIAGE (OUTPATIENT)
Dept: FAMILY MEDICINE | Facility: CLINIC | Age: 74
End: 2024-12-05
Payer: COMMERCIAL

## 2024-12-05 ENCOUNTER — TELEPHONE (OUTPATIENT)
Dept: SURGERY | Facility: CLINIC | Age: 74
End: 2024-12-05

## 2024-12-05 ENCOUNTER — OFFICE VISIT (OUTPATIENT)
Dept: SURGERY | Facility: CLINIC | Age: 74
End: 2024-12-05
Payer: COMMERCIAL

## 2024-12-05 VITALS
HEIGHT: 72 IN | SYSTOLIC BLOOD PRESSURE: 143 MMHG | HEART RATE: 68 BPM | DIASTOLIC BLOOD PRESSURE: 82 MMHG | BODY MASS INDEX: 30.91 KG/M2 | WEIGHT: 228.2 LBS | OXYGEN SATURATION: 97 %

## 2024-12-05 DIAGNOSIS — K42.9 UMBILICAL HERNIA WITHOUT OBSTRUCTION AND WITHOUT GANGRENE: Primary | ICD-10-CM

## 2024-12-05 RX ORDER — CEFAZOLIN SODIUM 2 G/50ML
2 SOLUTION INTRAVENOUS SEE ADMIN INSTRUCTIONS
OUTPATIENT
Start: 2024-12-05

## 2024-12-05 RX ORDER — CEFAZOLIN SODIUM 2 G/50ML
2 SOLUTION INTRAVENOUS
OUTPATIENT
Start: 2024-12-05

## 2024-12-05 ASSESSMENT — PAIN SCALES - GENERAL: PAINLEVEL_OUTOF10: NO PAIN (0)

## 2024-12-05 NOTE — LETTER
12/5/2024       RE: Don Samson  [address]  [facility] [address]     Dear Colleague,    Thank you for referring your patient, Don Samson, to the [facility] [place] at [facility]. Please see a copy of my visit note below.    Patient known to me from visit earlier this year. See that note.  Here today with feeling hernia is larger. No other significant constitutional symptoms.  Past medical and surgical history completely reviewed and noted in chart.  No significant change from previous exam.  Umbilical hernia significant.  Wide-based.  Skin changes over the hernia.    Impression:  Hernia, umbilical  Recommendation:  laparoscopic surgery, mesh repair, robot assisted.    A full discussion regarding the alternatives, risks, goals, and potential complications for this surgery was completed today.  The patient understood that the potential problems included but are not limited to:  Infection, bleeding, hematoma, seroma, and recurrence.    The patient verbally expressed understanding, was given the opportunity for questions, and gives full informed consent for the procedure.      Today the patient was instructed on the need for a preoperative H&P, NPO status prior to surgery, and the need to stop anticoagulants prior to surgery.  Additional educational material, soap, and instructions will be mailed out to the patients home.    The total time spent with this patient was 2 that there were they still informed 0 minutes.  The total time was spent on the date of encounter doing chart review, history and physical, dressing changes, documentation, patient education, and any further activity as noted above.                                                                                                                                                                                       medical      Again, thank you for  allowing me to participate in the care of your patient.      Sincerely,    Shaquille Erickson MD

## 2024-12-05 NOTE — TELEPHONE ENCOUNTER
Nurse Triage SBAR    Is this a 2nd Level Triage? YES, LICENSED PRACTITIONER REVIEW IS REQUIRED    Situation:   Hernia that can't be reduced    Background:   Consultation for hernia repair 5/30/24    Assessment:   Hernia is getting bigger over the last couple of weeks, swollen to the size of a fist and cannot reduce  Right above umbilicus  Mild pain  No fever or vomiting    Protocol Recommended Disposition:   See in Office Today    Recommendation:   Patient is agreeable to be seen today. No appointments with PCP available. Open to see other providers. Transferred to central scheduling to check availability. If nothing is open       Reason for Disposition   Can't reduce the hernia (NO pain, local tenderness, or vomiting)    Additional Information   Negative: Swelling of scrotum and has not previously been diagnosed with a hernia   Negative: SEVERE abdominal pain   Negative: Hernia is painful or tender to touch   Negative: Vomiting and can't reduce the hernia   Negative: Vomiting and abdomen looks much more swollen than usual   Negative: Vomiting and hernia is more painful or swollen than usual   Negative: Swollen lump in groin and pulsating (like heartbeat)   Negative: Patient sounds very sick or weak to the triager   Negative: Constant abdominal pain and present > 2 hours (NO pain or tenderness of hernia)    Protocols used: Hernia-A-[place]

## 2024-12-05 NOTE — PROGRESS NOTES
Patient known to me from visit earlier this year. See that note.  Here today with feeling hernia is larger. No other significant constitutional symptoms.  Past medical and surgical history completely reviewed and noted in chart.  No significant change from previous exam.  Umbilical hernia significant.  Wide-based.  Skin changes over the hernia.    Impression:  Hernia, umbilical  Recommendation:  laparoscopic surgery, mesh repair, robot assisted.    A full discussion regarding the alternatives, risks, goals, and potential complications for this surgery was completed today.  The patient understood that the potential problems included but are not limited to:  Infection, bleeding, hematoma, seroma, and recurrence.    The patient verbally expressed understanding, was given the opportunity for questions, and gives full informed consent for the procedure.      Today the patient was instructed on the need for a preoperative H&P, NPO status prior to surgery, and the need to stop anticoagulants prior to surgery.  Additional educational material, soap, and instructions will be mailed out to the patients home.    The total time spent with this patient was 2 that there were they still informed 0 minutes.  The total time was spent on the date of encounter doing chart review, history and physical, dressing changes, documentation, patient education, and any further activity as noted above.                                                                                                                                                                                       medical

## 2024-12-05 NOTE — TELEPHONE ENCOUNTER
[facility] Call Center    Phone Message    May a detailed message be left on voicemail: yes     Reason for Call: Other: Pt requesting call back, has medical questions.      Action Taken: Other: gen surg     Travel Screening: Not Applicable     Date of Service:

## 2024-12-05 NOTE — PATIENT INSTRUCTIONS
You met with Dr. Shaquille Erickson.      Today's visit instructions:    Our  will call you to arrange surgery. You were provided with a new bottle of soap and a copy of the teaching sheet from May.     If you have questions please contact Radha RN or Kori RN during regular clinic hours, Monday through Friday 7:30 AM - 4:00 PM, or you can contact us via [employer] at anytime.       If you have urgent needs after-hours, weekends, or holidays please call the hospital at [phone] and ask to speak with our on-call General Surgery Team.    Appointment [phone]  Nurse Advice (Radha or Kori): [phone]   Surgery Scheduler (Kylie): [phone]  Fax: [phone]

## 2024-12-05 NOTE — NURSING NOTE
Chief Complaint   Patient presents with    RECHECK     Recheck hernia, concerns       Vitals:    12/05/24 1108   BP: (!) 143/82   BP Location: Left arm   Patient Position: Sitting   Cuff Size: Adult Regular   Pulse: 68   SpO2: 97%   Weight: 103.5 kg (228 lb 3.2 oz)   Height: 1.829 m (6')       Body mass index is 30.95 kg/m .                          Andrea Minor, EMT

## 2024-12-06 PROBLEM — K42.9 UMBILICAL HERNIA WITHOUT OBSTRUCTION AND WITHOUT GANGRENE: Status: ACTIVE | Noted: 2023-07-05

## 2024-12-08 NOTE — PROGRESS NOTES
SUBJECTIVE:   Don Samson is a 72 year old male presenting with a chief complaint of   Chief Complaint   Patient presents with     Urgent Care     Neck Pain     X4 days.     Cold Symptoms     X4 days.        He is an established patient of [facility].  (1) URI symptoms x 4 days.  Nonproductive cough, does not keep up at night.  (2) Neck pain x 1 week on and off.  No trauma.  Stiffness.  No known problems with neck.  Currently does not have neck pain.    (3) Patient also asking RF on BP medications - states he has no PCP.    Immediately states he needs note for work.  Tested for covid at home - negative.      Treatment:  Niquil, Naproxyn    Review of Systems   Constitutional: Positive for appetite change. Negative for fever.   HENT: Positive for congestion and rhinorrhea. Negative for ear pain and sore throat.    Respiratory: Positive for cough.    Gastrointestinal: Negative for diarrhea, nausea and vomiting.   Musculoskeletal: Positive for neck pain. Negative for myalgias.   Neurological: Negative for headaches.   All other systems reviewed and are negative.      Past Medical History:   Diagnosis Date     Ankle pain 5/21/2016     Ankle sprain and strain 9/16/2010     CARDIOVASCULAR SCREENING; LDL GOAL LESS THAN 130 9/6/2016     Depression     declines treatment 4/1/16     HTN (hypertension)     declines treatment 4/1/16     Left knee pain 6/18/2015     Plantar fasciitis 9/16/2010     Right knee pain 12/12/2016     Tibialis posterior tendonitis 5/21/2016     Family History   Problem Relation Age of Onset     Hypertension Mother      Current Outpatient Medications   Medication Sig Dispense Refill     amLODIPine (NORVASC) 5 MG tablet Take 1 tablet (5 mg) by mouth daily 30 tablet 0     naproxen (NAPROSYN) 500 MG tablet Take 1 tablet (500 mg) by mouth 2 times daily (with meals) 30 tablet 0     olmesartan (BENICAR) 20 MG tablet Take 1 tablet (20 mg) by mouth daily (with breakfast) 30 tablet 0     buPROPion (WELLBUTRIN  SR) 150 MG 12 hr tablet TAKE 1 TABLET(150 MG) BY MOUTH TWICE DAILY (Patient not taking: No sig reported) 60 tablet 0     buPROPion (WELLBUTRIN XL) 150 MG 24 hr tablet  (Patient not taking: Reported on 2022)       celecoxib (CELEBREX) 100 MG capsule TAKE 1 CAPSULE(100 MG) BY MOUTH TWICE DAILY (Patient not taking: Reported on 2022) 0.001 capsule 0     cyclobenzaprine (FLEXERIL) 10 MG tablet Take 1 tablet (10 mg) by mouth 3 times daily as needed for muscle spasms (Patient not taking: Reported on 2022) 30 tablet 0     cyclobenzaprine (FLEXERIL) 10 MG tablet Take 1 tablet (10 mg) by mouth 3 times daily as needed for muscle spasms (Patient not taking: Reported on 2022) 30 tablet 0     dicyclomine (BENTYL) 10 MG capsule  (Patient not taking: Reported on 2022)       Lidocaine (LIDOCARE) 4 % Patch Place 1 patch onto the skin every 24 hours To prevent lidocaine toxicity, patient should be patch free for 12 hrs daily. (Patient not taking: No sig reported) 10 patch 0     lidocaine (XYLOCAINE) 5 % external ointment Apply topically 3 times daily (Patient not taking: No sig reported) 240 g 0     naproxen (NAPROSYN) 500 MG tablet Take 1 tablet (500 mg) by mouth 2 times daily (with meals) 60 tablet 0     ondansetron (ZOFRAN) 4 MG tablet Take 1 tablet (4 mg) by mouth every 8 hours as needed for nausea (Patient not taking: No sig reported) 30 tablet 0     Social History     Tobacco Use     Smoking status: Former     Types: Cigarettes     Quit date: 1970     Years since quittin.8     Smokeless tobacco: Never   Substance Use Topics     Alcohol use: Yes     Alcohol/week: 0.0 standard drinks     Comment: rarely        OBJECTIVE  BP (!) 144/90   Pulse 72   Temp 97.8  F (36.6  C) (Temporal)   Resp 16   Wt 110.7 kg (244 lb)   SpO2 97%   BMI 34.03 kg/m      Physical Exam  Vitals and nursing note reviewed.   Constitutional:       General: He is not in acute distress.     Appearance: Normal appearance. He  is obese. He is not ill-appearing.   HENT:      Head: Normocephalic and atraumatic.      Right Ear: Tympanic membrane, ear canal and external ear normal.      Left Ear: Tympanic membrane, ear canal and external ear normal.      Nose: Nose normal.      Mouth/Throat:      Mouth: Mucous membranes are moist.      Pharynx: Oropharynx is clear.   Eyes:      Extraocular Movements: Extraocular movements intact.      Conjunctiva/sclera: Conjunctivae normal.   Cardiovascular:      Rate and Rhythm: Normal rate and regular rhythm.      Pulses: Normal pulses.      Heart sounds: Normal heart sounds.   Pulmonary:      Effort: Pulmonary effort is normal.      Breath sounds: Normal breath sounds.   Musculoskeletal:      Cervical back: Normal range of motion and neck supple. No rigidity. No muscular tenderness.      Comments: Decreased ROM at neck.  No tenderness to palpation about the neck or bony spine.     Skin:     General: Skin is warm and dry.   Neurological:      General: No focal deficit present.      Mental Status: He is alert.   Psychiatric:         Mood and Affect: Mood normal.         Behavior: Behavior normal.         Labs:  No results found for this or any previous visit (from the past 24 hour(s)).    X-Ray was not done.  Patient declined xrays.    ASSESSMENT:      ICD-10-CM    1. Neck pain  M54.2 naproxen (NAPROSYN) 500 MG tablet     CANCELED: XR Cervical Spine 2/3 Views      2. Hypertension goal BP (blood pressure) < 140/90  I10 amLODIPine (NORVASC) 5 MG tablet     olmesartan (BENICAR) 20 MG tablet      3. Uncontrolled hypertension  I10 amLODIPine (NORVASC) 5 MG tablet      4. Upper respiratory tract infection, unspecified type  J06.9 naproxen (NAPROSYN) 500 MG tablet      5. Encounter for medication refill  Z76.0            Medical Decision Making:    Differential Diagnosis:  URI Adult/Peds:  Viral upper respiratory illness    Serious Comorbid Conditions:  Adult:  reviewed    PLAN:    RF on BP medications and  naproxyn (patient requested). Patient encouraged to see  to make appointment with PCP.  Comfort care for URI symptoms.  Note for work.      Followup:    If not improving or if condition worsens, follow up with your Primary Care Provider, If not improving or if conditions worsens over the next 12-24 hours, go to the Emergency Department    There are no Patient Instructions on file for this visit.       none

## 2024-12-09 NOTE — TELEPHONE ENCOUNTER
FUTURE VISIT INFORMATION      SURGERY INFORMATION:  Date: 12/13/24  Location[facility]  Surgeon:  Shaquille Erickson MD   Anesthesia Type:  general with block  Procedure: HERNIORRHAPHY, VENTRAL umbilical , ROBOT-ASSISTED   Consult: ov 12/5    RECORDS REQUESTED FROM:       Primary Care Provider: Tay Pederson MD - [facility]    Pertinent Medical History: hypertension

## 2024-12-12 ENCOUNTER — PRE VISIT (OUTPATIENT)
Dept: SURGERY | Facility: CLINIC | Age: 74
End: 2024-12-12

## 2024-12-12 ENCOUNTER — OFFICE VISIT (OUTPATIENT)
Dept: SURGERY | Facility: CLINIC | Age: 74
End: 2024-12-12
Payer: COMMERCIAL

## 2024-12-12 ENCOUNTER — HOSPITAL ENCOUNTER (OUTPATIENT)
Dept: CARDIOLOGY | Facility: CLINIC | Age: 74
Discharge: HOME | End: 2024-12-12
Attending: PHYSICIAN ASSISTANT
Payer: COMMERCIAL

## 2024-12-12 ENCOUNTER — ANESTHESIA EVENT (OUTPATIENT)
Dept: SURGERY | Facility: AMBULATORY SURGERY CENTER | Age: 74
End: 2024-12-12
Payer: COMMERCIAL

## 2024-12-12 VITALS
HEART RATE: 81 BPM | DIASTOLIC BLOOD PRESSURE: 84 MMHG | TEMPERATURE: 97.5 F | BODY MASS INDEX: 31.69 KG/M2 | WEIGHT: 234 LBS | HEIGHT: 72 IN | SYSTOLIC BLOOD PRESSURE: 114 MMHG | OXYGEN SATURATION: 97 %

## 2024-12-12 DIAGNOSIS — R01.1 CARDIAC MURMUR: ICD-10-CM

## 2024-12-12 DIAGNOSIS — Z01.818 PRE-OPERATIVE EXAMINATION: Primary | ICD-10-CM

## 2024-12-12 DIAGNOSIS — K42.9 UMBILICAL HERNIA WITHOUT OBSTRUCTION AND WITHOUT GANGRENE: ICD-10-CM

## 2024-12-12 DIAGNOSIS — I49.9 CARDIAC ARRHYTHMIA, UNSPECIFIED CARDIAC ARRHYTHMIA TYPE: ICD-10-CM

## 2024-12-12 DIAGNOSIS — F32.A DEPRESSION, UNSPECIFIED DEPRESSION TYPE: ICD-10-CM

## 2024-12-12 DIAGNOSIS — I48.91 ATRIAL FIBRILLATION WITH SLOW VENTRICULAR RESPONSE (H): ICD-10-CM

## 2024-12-12 DIAGNOSIS — Z01.818 PRE-OPERATIVE EXAMINATION: ICD-10-CM

## 2024-12-12 LAB
ANION GAP SERPL CALCULATED.3IONS-SCNC: 13 MMOL/L (ref 7–15)
ATRIAL RATE - MUSE: 68 BPM
BUN SERPL-MCNC: 17.2 MG/DL (ref 8–23)
CALCIUM SERPL-MCNC: 9.4 MG/DL (ref 8.8–10.4)
CHLORIDE SERPL-SCNC: 105 MMOL/L (ref 98–107)
CREAT SERPL-MCNC: 1.25 MG/DL (ref 0.67–1.17)
DIASTOLIC BLOOD PRESSURE - MUSE: NORMAL MMHG
EGFRCR SERPLBLD CKD-EPI 2021: 61 ML/MIN/1.73M2
GLUCOSE SERPL-MCNC: 97 MG/DL (ref 70–99)
HCO3 SERPL-SCNC: 22 MMOL/L (ref 22–29)
INTERPRETATION ECG - MUSE: NORMAL
LVEF ECHO: NORMAL
MAGNESIUM SERPL-MCNC: 1.9 MG/DL (ref 1.7–2.3)
P AXIS - MUSE: NORMAL DEGREES
PHOSPHATE SERPL-MCNC: 2.6 MG/DL (ref 2.5–4.5)
POTASSIUM SERPL-SCNC: 4 MMOL/L (ref 3.4–5.3)
PR INTERVAL - MUSE: NORMAL MS
QRS DURATION - MUSE: 80 MS
QT - MUSE: 412 MS
QTC - MUSE: 453 MS
R AXIS - MUSE: 9 DEGREES
SODIUM SERPL-SCNC: 140 MMOL/L (ref 135–145)
SYSTOLIC BLOOD PRESSURE - MUSE: NORMAL MMHG
T AXIS - MUSE: 14 DEGREES
TSH SERPL DL<=0.005 MIU/L-ACNC: 1.55 UIU/ML (ref 0.3–4.2)
VENTRICULAR RATE- MUSE: 73 BPM

## 2024-12-12 PROCEDURE — 99000 SPECIMEN HANDLING OFFICE-LAB: CPT | Performed by: PATHOLOGY

## 2024-12-12 PROCEDURE — 84443 ASSAY THYROID STIM HORMONE: CPT | Performed by: PHYSICIAN ASSISTANT

## 2024-12-12 PROCEDURE — 80048 BASIC METABOLIC PNL TOTAL CA: CPT | Performed by: PHYSICIAN ASSISTANT

## 2024-12-12 PROCEDURE — 93306 TTE W/DOPPLER COMPLETE: CPT

## 2024-12-12 PROCEDURE — 84100 ASSAY OF PHOSPHORUS: CPT | Performed by: PHYSICIAN ASSISTANT

## 2024-12-12 PROCEDURE — 83735 ASSAY OF MAGNESIUM: CPT | Performed by: PHYSICIAN ASSISTANT

## 2024-12-12 ASSESSMENT — ENCOUNTER SYMPTOMS
DYSRHYTHMIAS: 1
SEIZURES: 0

## 2024-12-12 ASSESSMENT — LIFESTYLE VARIABLES: TOBACCO_USE: 1

## 2024-12-12 NOTE — H&P
Pre-Operative H & P     CC:  Preoperative exam to assess for increased cardiopulmonary risk while undergoing surgery and anesthesia.    Date of Encounter: 12/12/2024  Primary Care Physician:  Kassandra Calvin     Reason for visit:   Encounter Diagnoses   Name Primary?    Umbilical hernia without obstruction and without gangrene     Pre-operative examination Yes    Depression, unspecified depression type     Cardiac murmur     Cardiac arrhythmia, unspecified cardiac arrhythmia type     Atrial fibrillation with slow ventricular response (H)        HPI  Don Samson is a 74 year old male who presents for pre-operative H & P in preparation for  Procedure Information       Case: [id number] Date/Time: 12/13/24 1050    Procedure: HERNIORRHAPHY, VENTRAL umbilical , ROBOT-ASSISTED (Abdomen)    Anesthesia type: General with Block    Diagnosis: Umbilical hernia without obstruction and without gangrene [K42.9]    Pre-op diagnosis: Umbilical hernia without obstruction and without gangrene [K42.9]    Location: [place] [address] / [facility]    Providers: Shaquille Erickson MD            The patient is a 74-year-old man with a past medical history significant for hypertension, ascending aortic dilation, previous smoker, obesity, erectile dysfunction, depression and umbilical and ventral hernia.  He met with Dr. Erickson and is now scheduled for the procedure as above.    History is obtained from the patient and chart review    Hx of abnormal bleeding or anti-platelet use: none      Past Medical History  Past Medical History:   Diagnosis Date    Ankle pain 05/21/2016    Ankle sprain and strain 09/16/2010    Atrial fibrillation with slow ventricular response (H)     CARDIOVASCULAR SCREENING; LDL GOAL LESS THAN 130 09/06/2016    Depression     declines treatment 4/1/16    HTN (hypertension)     declines treatment 4/1/16    Left knee pain 06/18/2015    Plantar fasciitis 09/16/2010    Right  knee pain 12/12/2016    Tibialis posterior tendonitis 05/21/2016    Umbilical hernia without obstruction and without gangrene        Past Surgical History  Past Surgical History:   Procedure Laterality Date    EGD      HERNIORRHAPHY INGUINAL BILATERAL      RIH 2004, LIH 1985    NASAL/SINUS POLYPECTOMY      Nasal-Sinus Polypectomy       Prior to Admission Medications  Current Outpatient Medications   Medication Sig Dispense Refill    acetaminophen (TYLENOL) 500 MG tablet Take 1-2 tablets (500-1,000 mg) by mouth every 6 hours as needed for mild pain Maximum daily dose 4,000mg. 100 tablet 4    amLODIPine (NORVASC) 5 MG tablet Take 1 tablet (5 mg) by mouth daily (Patient taking differently: Take 5 mg by mouth every morning.) 90 tablet 3    cyclobenzaprine (FLEXERIL) 5 MG tablet Take 1 tablet (5 mg) by mouth 3 times daily as needed for muscle spasms. 30 tablet 0    olmesartan (BENICAR) 20 MG tablet Take 1 tablet (20 mg) by mouth daily (with breakfast) 90 tablet 3    cyclobenzaprine (FLEXERIL) 5 MG tablet Take 2 tablets (10 mg) by mouth 3 times daily as needed for muscle spasms. (Patient taking differently: Take 10 mg by mouth 3 times daily as needed for muscle spasms.) 30 tablet 0    Lidocaine (LIDOCARE) 4 % Patch Place 1 patch onto the skin every 24 hours To prevent lidocaine toxicity, patient should be patch free for 12 hrs daily. 10 patch 0    naproxen (NAPROSYN) 500 MG tablet Take 1 tablet (500 mg) by mouth 2 times daily (with meals) (Patient not taking: Reported on 12/12/2024) 30 tablet 0       Allergies  Allergies   Allergen Reactions    Ibuprofen Nausea and Other (See Comments)     Other reaction(s): Abdominal pain    Prednisone Other (See Comments)       Social History  Social History     Socioeconomic History    Marital status: Single     Spouse name: Not on file    Number of children: Not on file    Years of education: Not on file    Highest education level: Not on file   Occupational History    Not on file    Tobacco Use    Smoking status: Former     Current packs/day: 0.00     Types: Cigarettes     Quit date: 1970     Years since quittin.9     Passive exposure: Never    Smokeless tobacco: Never   Substance and Sexual Activity    Alcohol use: Not Currently    Drug use: No    Sexual activity: Yes     Partners: Female   Other Topics Concern    Parent/sibling w/ CABG, MI or angioplasty before 65F 55M? No   Social History Narrative    2019; lives alone , with family         Social Drivers of Health     Financial Resource Strain: Low Risk  (2024)    [facility]    Financial Resource Strain     Difficulty of Paying Living Expenses: 3     Difficulty of Paying Living Expenses: Not on file   Food Insecurity: No Food Insecurity (2024)    Received from [facility]    Food Insecurity     Do you worry your food will run out before you are able to buy more?: 1   Transportation Needs: No Transportation Needs (2024)    [facility]    Transportation Needs     Does lack of transportation keep you from medical appointments?: 1     Does lack of transportation keep you from work, meetings or getting things that you need?: 1   Physical Activity: Not on file   Stress: Not on file   Social Connections: Socially Isolated (2024)    [facility]    Social Connections     Do you often feel lonely or isolated from those around you?: 4   Interpersonal Safety: Low Risk  (2024)    Interpersonal Safety     Do you feel physically and emotionally safe where you currently live?: Yes     Within the past 12 months, have you been hit, slapped, kicked or otherwise physically hurt by someone?: No     Within the past 12 months, have you been humiliated or emotionally abused in other ways by your partner or ex-partner?: No   Housing Stability: Low Risk   (9/4/2024)    [facility] from [facility]    Housing Stability     What is your housing situation today?: 1       Family History  Family History   Problem Relation Age of Onset    Hypertension Mother     Anesthesia Reaction No family hx of     Bleeding Disorder No family hx of     Clotting Disorder No family hx of        Review of Systems  The complete review of systems is negative other than noted in the HPI or here.   Anesthesia Evaluation   Pt has had prior anesthetic. Type: General.    No history of anesthetic complications       ROS/MED HX  ENT/Pulmonary:     (+)                tobacco use, Past use,                       Neurologic:  - neg neurologic ROS  (-) no seizures, no CVA and no TIA   Cardiovascular: Comment: Ascending aorta 4.5 cm    (+)  hypertension- -   -  - -                        dysrhythmias, a-fib,        Previous cardiac testing   Echo: Date: 2016 Results:    Stress Test:  Date: 2016 Results:  Interpretation Summary  Normal blood pressure and heart rate response to dobutamine  No anginal symptoms during stress test.  No ECG evidence of ischemia at rest or with dobutamine.  No stress induced regional wall motion abnormalities.  Normal resting LV function with EF of approximately 55-60%, with dobutamine  left ventricular cavity size decreases and LVEF increases to 65-70%.  Normal biventricular function and no significant valvular dysfunction noted  screening 2D and Doppler examination.    ECG Reviewed:  Date: 2016 Results:  Sinus rhythm  Cath:  Date: Results:      METS/Exercise Tolerance: 4 - Raking leaves, gardening    Hematologic:  - neg hematologic  ROS     Musculoskeletal:  - neg musculoskeletal ROS     GI/Hepatic: Comment: Ventral hernia    (+) GERD (occasional food related GERD),                   Renal/Genitourinary: Comment: ED      Endo:     (+)               Obesity,       Psychiatric/Substance Use:     (+) psychiatric history depression       Infectious  Disease:  - neg infectious disease ROS     Malignancy:  - neg malignancy ROS     Other:  - neg other ROS          /84 (BP Location: Right arm, Patient Position: Sitting, Cuff Size: Adult Large)   Pulse 81   Temp 97.5  F (36.4  C) (Oral)   Ht 1.829 m (6')   Wt 106.1 kg (234 lb)   SpO2 97%   BMI 31.74 kg/m      Physical Exam   Constitutional: Awake, alert, cooperative, no apparent distress, and appears stated age.  Eyes: Pupils equal, round and reactive to light, extra ocular muscles intact, sclera clear, conjunctiva normal.  HENT: Normocephalic, oral pharynx with moist mucus membranes, poor dentition. No goiter appreciated.   Respiratory: Clear to auscultation bilaterally, no crackles or wheezing.  Cardiovascular: Irregular rate and rhythm, normal S1 and S2, and Systolic murmur noted at the RSB.  Carotids +2, no bruits. No edema. Palpable pulses to radial  DP and PT arteries.   GI: Normal bowel sounds, soft, non-distended, non-tender, no masses palpated, no hepatosplenomegaly.  Very large ventral hernial  Lymph/Hematologic: No cervical lymphadenopathy and no supraclavicular lymphadenopathy.  Genitourinary:  defer  Skin: Warm and dry.  No rashes at anticipated surgical site.   Musculoskeletal: Full ROM of neck. There is no redness, warmth, or swelling of the joints. Gross motor strength is normal.    Neurologic: Awake, alert, oriented to name, place and time. Cranial nerves II-XII are grossly intact. Gait is normal.   Neuropsychiatric: Calm, cooperative. Normal affect.     Prior Labs/Diagnostic Studies   All labs and imaging personally reviewed     EKG/ stress test - if available please see in ROS above     Echo 2016  Interpretation Summary  Global and regional left ventricular function is normal with an EF of 60-65%.  Global right ventricular function is normal.  The aortic valve is a functionally bicuspid with fusion of the right and left  coronary cusps. There is no significant stenosis.  The aorta is  dilated. The ascending aorta measures 4.5 cm and the sinuses of  Valsalva 4.3 cm.  No pericardial effusion is present.  Previous study not available for comparison.        The patient's records and results personally reviewed by this provider.     Outside records reviewed from: Care Everywhere    LAB/DIAGNOSTIC STUDIES TODAY:     Latest Reference Range & Units 12/12/24 14:29   Sodium 135 - 145 mmol/L 140   Potassium 3.4 - 5.3 mmol/L 4.0   Chloride 98 - 107 mmol/L 105   Carbon Dioxide (CO2) 22 - 29 mmol/L 22   Urea Nitrogen 8.0 - 23.0 mg/dL 17.2   Creatinine 0.67 - 1.17 mg/dL 1.25 (H)   GFR Estimate >60 mL/min/1.73m2 61   Calcium 8.8 - 10.4 mg/dL 9.4   Anion Gap 7 - 15 mmol/L 13   Magnesium 1.7 - 2.3 mg/dL 1.9   Phosphorus 2.5 - 4.5 mg/dL 2.6   Glucose 70 - 99 mg/dL 97   TSH 0.30 - 4.20 uIU/mL 1.55     Labs show slight increase in creatinine but otherwise electrolytes are within normal limits as well as TSH which would rule out electrolyte issues or thyroid issues for arrhythmia.     Assessment    Don Samson is a 74 year old male seen as a PAC referral for risk assessment and optimization for anesthesia.    Plan/Recommendations  Pt will be optimized for the proposed procedure.  See below for details on the assessment, risk, and preoperative recommendations    NEUROLOGY  - No history of TIA, CVA or seizure  - Chronic Pain  On chronic opiates, morphine equivilant = None   -Post Op delirium risk factors:  Age    ENT  - No current airway concerns.  Will need to be reassessed day of surgery.  Mallampati: II  TM: > 3    CARDIAC  - Hypertension  Well controlled  ~ Ascending aortic dilation 4.5 cm from 2016 echo  ~ The patient has newly diagnosed A fib with HR 73 on EKG today. On exam there is also a new systolic murmur at the RSB. Given the new diagnosis of A fib and new murmur heard, an echo was ordered for the patient. Labs ordered to check for electrolyte abnormalities. CHADSVASC = 2  - METS (Metabolic  Equivalents)  Patient performs 4 or more METS exercise without symptoms             Total Score: 0      RCRI-Very low risk: Class 1 0.4% complication rate             Total Score: 0    ~ The patient reports he is active running up 3 flights of stairs to catch the light rail at the [facility] exit if needed.     ** The patient completed echo on 12/12/24. He has normal EF and mild to moderate tricuspid insufficiency and aortic sclerosis without stenosis. Ascending aorta is unchanged from 8 year prior. Will await cardiology consult for new diagnosis of a fib. **    Echo 12/12/24  Interpretation Summary  Global and regional left ventricular function is normal with an EF of 60-65%.  Global right ventricular function is normal.  Mild to moderate tricuspid insufficiency .  The inferior vena cava is normal.  Dilated thoracic aorta: Sinuses of Valsalva 4.3 cm, Ascending aorta 4.5 cm.     This study was compared with the study from 1/7/16. Aorta caliber and  ventricular function is unchanged.  ______________________________________________________________________________  Left Ventricle  Global and regional left ventricular function is normal with an EF of 60-65%.  Left ventricular wall thickness is normal. Left ventricular size is normal.  Diastolic function not assessed due to atrial fibrillation. No regional wall  motion abnormalities are seen.     Right Ventricle  The right ventricle is normal size. Global right ventricular function is  normal.     Atria  Mild biatrial enlargement is present.     Mitral Valve  Mild mitral annular calcification is present. Trace mitral insufficiency is  present.     Aortic Valve  Aortic valve sclerosis is present. Trace aortic insufficiency is present.     Tricuspid Valve  The tricuspid valve is normal. Mild to moderate tricuspid insufficiency is  present. Estimated pulmonary artery systolic pressure is 30 mmHg plus right atrial pressure. Pulmonary artery systolic pressure is normal.      Pulmonic Valve  The pulmonic valve is normal. Mild pulmonic insufficiency is present.     Vessels  The inferior vena cava is normal. Sinuses of Valsalva 4.3 cm. Ascending aorta 4.5 cm.     Pericardium  No pericardial effusion is present.    PULMONARY  - Obstructive Sleep Apnea  No current risk of obstructive sleep apnea   JAGDISH Medium Risk             Total Score: 3    JAGDISH: Hypertension    JAGDISH: Over 50 ys old    JAGDISH: Male      - Denies asthma or inhaler use  - Tobacco History    History   Smoking Status    Former    Types: Cigarettes   Smokeless Tobacco    Never       GI  - Ventral/ umbilical hernia - procedure as above.   PONV Low Risk  Total Score: 1           1 AN PONV: Patient is not a current smoker        /RENAL  - Baseline Creatinine  1.03  ~ ED - hold cialis for 72 hours    ENDOCRINE    - BMI: Estimated body mass index is 31.74 kg/m  as calculated from the following:    Height as of this encounter: 1.829 m (6').    Weight as of this encounter: 106.1 kg (234 lb).  Obesity (BMI >30)  - No history of Diabetes Mellitus    HEME  VTE Low Risk 0.5%             Total Score: 3    VTE: Greater than 59 yrs old    VTE: Male      - No history of abnormal bleeding or antiplatelet use.      MSK  Patient is NOT Frail             Total Score: 0        PSYCH  - depression - The patient reports he is not currently on medication or being followed. He is interested in a behavorial health referral. This was placed for the patient.     Different anesthesia methods/types have been discussed with the patient, but they are aware that the final plan will be decided by the assigned anesthesia provider on the date of service.    Patient discussed with Dr. Hidalgo who also called and discussed the case with Dr. Tolentino. Recommendation is that the patient needs to have his work up today with labs and echo and be seen by cardiology for optimization before surgery. This was explained to the patient and message sent to the surgical team.      AVS with information on surgery time/arrival time, meds and NPO status given by nursing staff. No further diagnostic testing indicated.      On the day of service:     Prep time: 10 minutes  Visit time: 30 minutes  Documentation time: 30 minutes  ------------------------------------------  Total time: 70 minutes      Margaret Villa PA-C  Preoperative Assessment Center  [facility]  Clinic and Surgery Center  Phone: [phone]  Fax: [phone]

## 2024-12-12 NOTE — PATIENT INSTRUCTIONS
"Preparing for Your Surgery      Name:  Don Samson \"Esau\"  MRN:  [id number]   :  1950   Today's Date:  2024         Arriving for surgery:  Surgery date:  24  Arrival time:  9:20 am  Surgery time: 10:50 am    Restrictions due to COVID 19:    Please maintain social distance.  Masking is optional        parking is available for anyone with mobility limitations or disabilities. (Monday- Friday 7 am- 5 pm)    Please come to:    [facility]  [address] [place] [id number]    Check in on the 5th floor, Ambulatory Surgery Center.    What can I eat or drink?    -  Follow instructions given to you by the Colon and Rectal team regarding bowel prep- clear liquids today 24  -  You may have clear liquids until 2 hours before arrival time  (Until 7:20 am on 24)    Examples of clear liquids:  Water  Clear broth  Juices (apple, white grape, white cranberry  and cider) without pulp  Noncarbonated, powder based beverages  (lemonade and Mega-Aid)  Sodas (Sprite, 7-Up, ginger ale and seltzer)  Coffee or tea (without milk or cream)  Gatorade    --No alcohol or cannabis products for at least 24 hours before surgery    Which medicines can I take?      Hold Ibuprofen (Advil, Motrin) for 1 day before surgery--unless otherwise directed by surgeon.  Hold Naproxen (Aleve) for 4 days before surgery.    -  DO NOT take the following medications the day of surgery:   Olmesartan (Benicar)      -  PLEASE TAKE the following medications the day of surgery    Acetaminophen (Tylenol) as needed   Amlodipine (Norvasc)   Cyclobenzaprine (Flexeril) as needed   Lidocaine patch- ok to have on but may be removed in preop      How do I prepare myself?  - Please take 2 showers (one the night prior to surgery and one the morning of surgery) using Scrubcare or Hibiclens soap.    Use this soap only from the neck to your toes. Avoid genital area      Leave the soap on your skin for one " minute--then rinse thoroughly.      You may use your own shampoo and conditioner; no other hair products.   - Please remove all jewelry and body piercings.  - No lotions, deodorants or fragrance.  - Bring your ID and insurance card.    -If you have a Deep Brain Stimulator, a Spinal Cord Stimulator or any implanted Neuro device you must bring the remote to the Surgery Center          ALL PATIENTS ARE REQUIRED TO HAVE A RESPONSIBLE ADULT TO DRIVE AND BE IN ATTENDANCE WITH THEM FOR 24 HOURS FOLLOWING SURGERY       Covid testing policy as of 12/06/2022  Your surgeon will notify and schedule you for a COVID test if one is needed before surgery--please direct any questions or COVID symptoms to your surgeon      Questions or Concerns:    -For questions regarding the day of surgery please contact the Ambulatory Surgery Center at [phone].    -If you have health changes between today and your surgery please contact your surgeon.     For questions after surgery please call your surgeons office.

## 2024-12-13 ENCOUNTER — ANESTHESIA (OUTPATIENT)
Dept: SURGERY | Facility: AMBULATORY SURGERY CENTER | Age: 74
End: 2024-12-13
Payer: COMMERCIAL

## 2024-12-14 LAB
ATRIAL RATE - MUSE: 68 BPM
DIASTOLIC BLOOD PRESSURE - MUSE: NORMAL MMHG
INTERPRETATION ECG - MUSE: NORMAL
P AXIS - MUSE: NORMAL DEGREES
PR INTERVAL - MUSE: NORMAL MS
QRS DURATION - MUSE: 80 MS
QT - MUSE: 412 MS
QTC - MUSE: 453 MS
R AXIS - MUSE: 9 DEGREES
SYSTOLIC BLOOD PRESSURE - MUSE: NORMAL MMHG
T AXIS - MUSE: 14 DEGREES
VENTRICULAR RATE- MUSE: 73 BPM

## 2024-12-16 NOTE — LETTER
My Depression Action Plan  Name: Don Samson   Date of Birth 1950  Date: 10/27/2017    My doctor: William Blanco   My clinic: [address] [id number] [phone]          GREEN    ZONE   Good Control    What it looks like:     Things are going generally well. You have normal up s and down s. You may even feel depressed from time to time, but bad moods usually last less than a day.   What you need to do:  1. Continue to care for yourself (see self care plan)  2. Check your depression survival kit and update it as needed  3. Follow your physician s recommendations including any medication.  4. Do not stop taking medication unless you consult with your physician first.           YELLOW         ZONE Getting Worse    What it looks like:     Depression is starting to interfere with your life.     It may be hard to get out of bed; you may be starting to isolate yourself from others.    Symptoms of depression are starting to last most all day and this has happened for several days.     You may have suicidal thoughts but they are not constant.   What you need to do:     1. Call your care team, your response to treatment will improve if you keep your care team informed of your progress. Yellow periods are signs an adjustment may need to be made.     2. Continue your self-care, even if you have to fake it!    3. Talk to someone in your support network    4. Open up your depression survival kit           RED    ZONE Medical Alert - Get Help    What it looks like:     Depression is seriously interfering with your life.     You may experience these or other symptoms: You can t get out of bed most days, can t work or engage in other necessary activities, you have trouble taking care of basic hygiene, or basic responsibilities, thoughts of suicide or death that will not go away, self-injurious behavior.     What you need to do:  1. Call your care team and    Problem: At Risk for Falls  Goal: Patient does not fall  Outcome: Monitoring/Evaluating progress  Goal: Patient takes action to control fall-related risks  Outcome: Monitoring/Evaluating progress     Problem: At Risk for Injury Due to Fall  Goal: Patient does not fall  Outcome: Monitoring/Evaluating progress  Goal: Takes action to control condition specific risks  Outcome: Monitoring/Evaluating progress  Goal: Verbalizes understanding of fall-related injury personal risks  Description: Document education using the patient education activity  Outcome: Monitoring/Evaluating progress     Problem: Breathing Pattern Ineffective  Goal: Air exchange is effective, demonstrated by Sp02 sat of greater then or = 92% (or as ordered)  Outcome: Monitoring/Evaluating progress  Goal: Respiratory pattern is quiet and regular without report of SOB  Outcome: Monitoring/Evaluating progress  Goal: Breathing pattern demonstrates minimal apnea during sleep with appropriate use of airway pressure support devices  Outcome: Monitoring/Evaluating progress  Goal: Verbalizes/demonstrates effective breathing management strategies  Description: Document education using the patient education activity.   Outcome: Monitoring/Evaluating progress  Goal: Minimize respiratory effort related to dyspnea/shortness of breath (Hospice)  Outcome: Monitoring/Evaluating progress     Problem: Pneumonia  Goal: S/S of acute pneumonia are resolved  Description: If acute pneumonia is present, monitor for resolution of fever, cough, secretions and other test values based on presentation.  Outcome: Monitoring/Evaluating progress  Goal: Verbalizes understanding of pneumonia, treatment, and ongoing prevention  Description: Document on Patient Education Activity  Outcome: Monitoring/Evaluating progress      request a same-day appointment. If they are not available (weekends or after hours) call your local crisis line, emergency room or 911.      Electronically signed by: Dot Martin, October 27, 2017    Depression Self Care Plan / Survival Kit    Self-Care for Depression  Here s the deal. Your body and mind are really not as separate as most people think.  What you do and think affects how you feel and how you feel influences what you do and think. This means if you do things that people who feel good do, it will help you feel better.  Sometimes this is all it takes.  There is also a place for medication and therapy depending on how severe your depression is, so be sure to consult with your medical provider and/ or Behavioral Health Consultant if your symptoms are worsening or not improving.     In order to better manage my stress, I will:    Exercise  Get some form of exercise, every day. This will help reduce pain and release endorphins, the  feel good  chemicals in your brain. This is almost as good as taking antidepressants!  This is not the same as joining a gym and then never going! (they count on that by the way ) It can be as simple as just going for a walk or doing some gardening, anything that will get you moving.      Hygiene   Maintain good hygiene (Get out of bed in the morning, Make your bed, Brush your teeth, Take a shower, and Get dressed like you were going to work, even if you are unemployed).  If your clothes don't fit try to get ones that do.    Diet  I will strive to eat foods that are good for me, drink plenty of water, and avoid excessive sugar, caffeine, alcohol, and other mood-altering substances.  Some foods that are helpful in depression are: complex carbohydrates, B vitamins, flaxseed, fish or fish oil, fresh fruits and vegetables.    Psychotherapy  I agree to participate in Individual Therapy (if recommended).    Medication  If prescribed medications, I agree to take them.  Missing doses  can result in serious side effects.  I understand that drinking alcohol, or other illicit drug use, may cause potential side effects.  I will not stop my medication abruptly without first discussing it with my provider.    Staying Connected With Others  I will stay in touch with my friends, family members, and my primary care provider/team.    Use your imagination  Be creative.  We all have a creative side; it doesn t matter if it s oil painting, sand castles, or mud pies! This will also kick up the endorphins.    Witness Beauty  (AKA stop and smell the roses) Take a look outside, even in mid-winter. Notice colors, textures. Watch the squirrels and birds.     Service to others  Be of service to others.  There is always someone else in need.  By helping others we can  get out of ourselves  and remember the really important things.  This also provides opportunities for practicing all the other parts of the program.    Humor  Laugh and be silly!  Adjust your TV habits for less news and crime-drama and more comedy.    Control your stress  Try breathing deep, massage therapy, biofeedback, and meditation. Find time to relax each day.     My support system    Clinic Contact:  Phone number:    Contact 1:  Phone number:    Contact 2:  Phone number:    Rastafari/:  Phone number:    Therapist:  Phone number:    Local crisis center:    Phone number:    Other community support:  Phone number:

## 2024-12-17 ENCOUNTER — OFFICE VISIT (OUTPATIENT)
Dept: CARDIOLOGY | Facility: CLINIC | Age: 74
End: 2024-12-17
Attending: PHYSICIAN ASSISTANT
Payer: COMMERCIAL

## 2024-12-17 VITALS
OXYGEN SATURATION: 98 % | WEIGHT: 236 LBS | SYSTOLIC BLOOD PRESSURE: 151 MMHG | BODY MASS INDEX: 31.97 KG/M2 | DIASTOLIC BLOOD PRESSURE: 89 MMHG | HEART RATE: 66 BPM | HEIGHT: 72 IN

## 2024-12-17 DIAGNOSIS — Z01.818 PRE-OPERATIVE EXAMINATION: ICD-10-CM

## 2024-12-17 DIAGNOSIS — I48.91 ATRIAL FIBRILLATION WITH SLOW VENTRICULAR RESPONSE (H): ICD-10-CM

## 2024-12-17 DIAGNOSIS — I77.810 ASCENDING AORTA DILATATION (H): Primary | ICD-10-CM

## 2024-12-17 RX ORDER — METOPROLOL SUCCINATE 25 MG/1
12.5 TABLET, EXTENDED RELEASE ORAL DAILY
Qty: 60 TABLET | Refills: 3 | Status: SHIPPED | OUTPATIENT
Start: 2024-12-17

## 2024-12-17 NOTE — LETTER
12/17/2024    Kassandra Calvin PA-C  [address] [facility]  [place] [address]    RE: Don Samson       Dear Colleague,     I had the pleasure of seeing Don Samson in the [facility].  CARDIOLOGY CLINIC CONSULTATION    PRIMARY CARE PHYSICIAN:  Kassandra Calvin    HISTORY OF PRESENT ILLNESS:  The patient is a very pleasant 74-year-old gentleman with history of hypertension, umbilical hernia and recently diagnosed atrial fibrillation who is here for preoperative cardiovascular evaluation.    He was recently seen by his primary care provider for operative clearance.  Electrocardiogram was obtained which demonstrated atrial fibrillation with controlled ventricular rates.  The atrial fibrillation finding was noted.  He subsequently had transthoracic echocardiogram which demonstrated preserved LV function with an EF of 60 to 65%, no wall motion normality and mild to moderately dilated ascending aorta measuring up to 4.5 cm.    He is active and does not endorse any cardiopulmonary symptoms.  He is able to climb multiple flights of stairs without any limitation.    His blood pressure is well-controlled with current regimen.    PAST MEDICAL HISTORY:  Past Medical History:   Diagnosis Date     Ankle pain 05/21/2016     Ankle sprain and strain 09/16/2010     Atrial fibrillation with slow ventricular response (H)      CARDIOVASCULAR SCREENING; LDL GOAL LESS THAN 130 09/06/2016     Depression     declines treatment 4/1/16     HTN (hypertension)     declines treatment 4/1/16     Left knee pain 06/18/2015     Plantar fasciitis 09/16/2010     Right knee pain 12/12/2016     Tibialis posterior tendonitis 05/21/2016     Umbilical hernia without obstruction and without gangrene        MEDICATIONS:  Current Outpatient Medications   Medication Sig Dispense Refill     acetaminophen (TYLENOL) 500 MG tablet Take 1-2 tablets (500-1,000 mg) by mouth every 6 hours as needed for mild pain Maximum daily dose 4,000mg. 100  tablet 4     amLODIPine (NORVASC) 5 MG tablet Take 1 tablet (5 mg) by mouth daily (Patient taking differently: Take 5 mg by mouth every morning.) 90 tablet 3     cyclobenzaprine (FLEXERIL) 5 MG tablet Take 2 tablets (10 mg) by mouth 3 times daily as needed for muscle spasms. (Patient taking differently: Take 10 mg by mouth 3 times daily as needed for muscle spasms.) 30 tablet 0     cyclobenzaprine (FLEXERIL) 5 MG tablet Take 1 tablet (5 mg) by mouth 3 times daily as needed for muscle spasms. 30 tablet 0     Lidocaine (LIDOCARE) 4 % Patch Place 1 patch onto the skin every 24 hours To prevent lidocaine toxicity, patient should be patch free for 12 hrs daily. 10 patch 0     metoprolol succinate ER (TOPROL XL) 25 MG 24 hr tablet Take 0.5 tablets (12.5 mg) by mouth daily. 60 tablet 3     olmesartan (BENICAR) 20 MG tablet Take 1 tablet (20 mg) by mouth daily (with breakfast) 90 tablet 3     naproxen (NAPROSYN) 500 MG tablet Take 1 tablet (500 mg) by mouth 2 times daily (with meals) (Patient not taking: Reported on 12/17/2024) 30 tablet 0     No current facility-administered medications for this visit.       SOCIAL HISTORY:  I have reviewed this patient's social history and updated it with pertinent information if needed. Don CLARKE Mali  reports that he quit smoking about 54 years ago. His smoking use included cigarettes. He has never been exposed to tobacco smoke. He has never used smokeless tobacco. He reports current alcohol use. He reports that he does not use drugs.    PHYSICAL EXAM:  Pulse:  [66] 66  BP: (151)/(89) 151/89  SpO2:  [98 %] 98 %  236 lbs 0 oz    Constitutional: alert, no distress  Respiratory: Good bilateral air entry  Cardiovascular: Irregular rhythm, no murmurs  GI: nondistended  Neuropsychiatric: appropriate affact    ASSESSMENT: Pertinent issues addressed/ reviewed during this cardiology visit  Atrial fibrillation with controlled ventricular rates, chronicity unclear  Hypertension,  well-controlled  Ventral hernia  Preoperative cardiovascular evaluation    RECOMMENDATIONS:  The patient currently does not endorse any cardiopulmonary symptoms with activity such as climbing flights of stairs.  Additionally, his LV function is normal and he does not have wall motion normalities.  As such, recommend proceeding with abdominal surgery with an acceptable risk.    Although his ventricular rates are now well-controlled, I will initiate low-dose beta-blocker for rate control in case his ventricular rates increase.  After his surgery, would recommend initiation of anticoagulation to reduce risk of stroke from the atrial fibrillation.    Follow-up in 6 months with repeat echocardiogram.    It was a pleasure seeing this patient in clinic today. Please do not hesitate to contact me with any future questions.     Saad Juares MD, [facility]  Cardiology - [facility]  December 17, 2024    Review of the result(s) of each unique test - Last ECG, echocardiogram, BMP, CBC     The level of medical decision making during this visit was of moderate complexity.    This note was completed in part using dictation via the Dragon voice recognition software. Some word and grammatical errors may occur and must be interpreted in the appropriate clinical context.  If there are any questions pertaining to this issue, please contact me for further clarification.      Thank you for allowing me to participate in the care of your patient.      Sincerely,     Saad Juares MD, MD     [facility]  cc:   Margaret Villa PA-C  [address] [place] [address]

## 2024-12-21 ENCOUNTER — HEALTH MAINTENANCE LETTER (OUTPATIENT)
Age: 74
End: 2024-12-21

## 2024-12-23 ENCOUNTER — HOSPITAL ENCOUNTER (OUTPATIENT)
Facility: AMBULATORY SURGERY CENTER | Age: 74
Discharge: HOME | End: 2024-12-23
Attending: SURGERY
Payer: COMMERCIAL

## 2024-12-23 ENCOUNTER — HOSPITAL ENCOUNTER (OUTPATIENT)
Facility: CLINIC | Age: 74
Setting detail: OBSERVATION
Discharge: HOME | End: 2024-12-24
Attending: EMERGENCY MEDICINE | Admitting: SURGERY
Payer: COMMERCIAL

## 2024-12-23 VITALS
OXYGEN SATURATION: 92 % | TEMPERATURE: 97.7 F | SYSTOLIC BLOOD PRESSURE: 133 MMHG | DIASTOLIC BLOOD PRESSURE: 87 MMHG | BODY MASS INDEX: 31.83 KG/M2 | RESPIRATION RATE: 19 BRPM | HEIGHT: 72 IN | HEART RATE: 71 BPM | WEIGHT: 235 LBS

## 2024-12-23 DIAGNOSIS — Z98.890 STATUS POST HERNIA REPAIR: ICD-10-CM

## 2024-12-23 DIAGNOSIS — K42.9 UMBILICAL HERNIA WITHOUT OBSTRUCTION AND WITHOUT GANGRENE: Primary | ICD-10-CM

## 2024-12-23 DIAGNOSIS — Z87.19 H/O HERNIA REPAIR: ICD-10-CM

## 2024-12-23 DIAGNOSIS — G47.33 OBSTRUCTIVE SLEEP APNEA (ADULT) (PEDIATRIC): ICD-10-CM

## 2024-12-23 DIAGNOSIS — Z87.891 PERSONAL HISTORY OF TOBACCO USE, PRESENTING HAZARDS TO HEALTH: ICD-10-CM

## 2024-12-23 DIAGNOSIS — Z87.19 STATUS POST HERNIA REPAIR: ICD-10-CM

## 2024-12-23 DIAGNOSIS — I10 ESSENTIAL HYPERTENSION, MALIGNANT: Primary | ICD-10-CM

## 2024-12-23 DIAGNOSIS — E66.9 OBESITY, UNSPECIFIED CLASS, UNSPECIFIED OBESITY TYPE, UNSPECIFIED WHETHER SERIOUS COMORBIDITY PRESENT: ICD-10-CM

## 2024-12-23 DIAGNOSIS — R41.82 ALTERED MENTAL STATUS, UNSPECIFIED ALTERED MENTAL STATUS TYPE: ICD-10-CM

## 2024-12-23 DIAGNOSIS — Z98.890 H/O HERNIA REPAIR: ICD-10-CM

## 2024-12-23 PROBLEM — T81.9XXA POST-OPERATIVE COMPLICATION: Status: ACTIVE | Noted: 2024-12-23

## 2024-12-23 LAB
ANION GAP SERPL CALCULATED.3IONS-SCNC: 12 MMOL/L (ref 7–15)
ATRIAL RATE - MUSE: NORMAL BPM
BASOPHILS # BLD AUTO: 0 10E3/UL (ref 0–0.2)
BASOPHILS NFR BLD AUTO: 0 %
BUN SERPL-MCNC: 16.6 MG/DL (ref 8–23)
CALCIUM SERPL-MCNC: 9.3 MG/DL (ref 8.8–10.4)
CHLORIDE SERPL-SCNC: 104 MMOL/L (ref 98–107)
CREAT SERPL-MCNC: 1.22 MG/DL (ref 0.67–1.17)
DIASTOLIC BLOOD PRESSURE - MUSE: NORMAL MMHG
EGFRCR SERPLBLD CKD-EPI 2021: 63 ML/MIN/1.73M2
EOSINOPHIL # BLD AUTO: 0 10E3/UL (ref 0–0.7)
EOSINOPHIL NFR BLD AUTO: 0 %
ERYTHROCYTE [DISTWIDTH] IN BLOOD BY AUTOMATED COUNT: 12.3 % (ref 10–15)
GLUCOSE SERPL-MCNC: 132 MG/DL (ref 70–99)
HCO3 SERPL-SCNC: 22 MMOL/L (ref 22–29)
HCT VFR BLD AUTO: 45 % (ref 40–53)
HGB BLD-MCNC: 15.8 G/DL (ref 13.3–17.7)
IMM GRANULOCYTES # BLD: 0.1 10E3/UL
IMM GRANULOCYTES NFR BLD: 0 %
INTERPRETATION ECG - MUSE: NORMAL
LYMPHOCYTES # BLD AUTO: 0.9 10E3/UL (ref 0.8–5.3)
LYMPHOCYTES NFR BLD AUTO: 6 %
MAGNESIUM SERPL-MCNC: 1.9 MG/DL (ref 1.7–2.3)
MCH RBC QN AUTO: 29.2 PG (ref 26.5–33)
MCHC RBC AUTO-ENTMCNC: 35.1 G/DL (ref 31.5–36.5)
MCV RBC AUTO: 83 FL (ref 78–100)
MONOCYTES # BLD AUTO: 0.9 10E3/UL (ref 0–1.3)
MONOCYTES NFR BLD AUTO: 6 %
NEUTROPHILS # BLD AUTO: 13.9 10E3/UL (ref 1.6–8.3)
NEUTROPHILS NFR BLD AUTO: 88 %
NRBC # BLD AUTO: 0 10E3/UL
NRBC BLD AUTO-RTO: 0 /100
P AXIS - MUSE: NORMAL DEGREES
PLATELET # BLD AUTO: 212 10E3/UL (ref 150–450)
POTASSIUM SERPL-SCNC: 4.7 MMOL/L (ref 3.4–5.3)
PR INTERVAL - MUSE: NORMAL MS
QRS DURATION - MUSE: 76 MS
QT - MUSE: 418 MS
QTC - MUSE: 454 MS
R AXIS - MUSE: -10 DEGREES
RBC # BLD AUTO: 5.42 10E6/UL (ref 4.4–5.9)
SODIUM SERPL-SCNC: 138 MMOL/L (ref 135–145)
SYSTOLIC BLOOD PRESSURE - MUSE: NORMAL MMHG
T AXIS - MUSE: -6 DEGREES
VENTRICULAR RATE- MUSE: 71 BPM
WBC # BLD AUTO: 15.8 10E3/UL (ref 4–11)

## 2024-12-23 PROCEDURE — 85025 COMPLETE CBC W/AUTO DIFF WBC: CPT | Performed by: PHYSICIAN ASSISTANT

## 2024-12-23 PROCEDURE — 36415 COLL VENOUS BLD VENIPUNCTURE: CPT | Performed by: PHYSICIAN ASSISTANT

## 2024-12-23 PROCEDURE — 99285 EMERGENCY DEPT VISIT HI MDM: CPT | Performed by: EMERGENCY MEDICINE

## 2024-12-23 PROCEDURE — 93005 ELECTROCARDIOGRAM TRACING: CPT | Performed by: EMERGENCY MEDICINE

## 2024-12-23 PROCEDURE — 85041 AUTOMATED RBC COUNT: CPT | Performed by: PHYSICIAN ASSISTANT

## 2024-12-23 PROCEDURE — 93010 ELECTROCARDIOGRAM REPORT: CPT | Performed by: EMERGENCY MEDICINE

## 2024-12-23 PROCEDURE — 80048 BASIC METABOLIC PNL TOTAL CA: CPT | Performed by: PHYSICIAN ASSISTANT

## 2024-12-23 PROCEDURE — 83735 ASSAY OF MAGNESIUM: CPT | Performed by: PHYSICIAN ASSISTANT

## 2024-12-23 PROCEDURE — 82374 ASSAY BLOOD CARBON DIOXIDE: CPT | Performed by: PHYSICIAN ASSISTANT

## 2024-12-23 PROCEDURE — 250N000013 HC RX MED GY IP 250 OP 250 PS 637: Performed by: PHYSICIAN ASSISTANT

## 2024-12-23 PROCEDURE — 99285 EMERGENCY DEPT VISIT HI MDM: CPT | Mod: FS | Performed by: EMERGENCY MEDICINE

## 2024-12-23 PROCEDURE — G0378 HOSPITAL OBSERVATION PER HR: HCPCS

## 2024-12-23 DEVICE — IMPLANTABLE DEVICE: Type: IMPLANTABLE DEVICE | Site: ABDOMEN | Status: FUNCTIONAL

## 2024-12-23 RX ORDER — AMOXICILLIN 250 MG
2 CAPSULE ORAL 2 TIMES DAILY PRN
Status: DISCONTINUED | OUTPATIENT
Start: 2024-12-23 | End: 2024-12-24 | Stop reason: HOSPADM

## 2024-12-23 RX ORDER — NALOXONE HYDROCHLORIDE 0.4 MG/ML
0.4 INJECTION, SOLUTION INTRAMUSCULAR; INTRAVENOUS; SUBCUTANEOUS
Status: DISCONTINUED | OUTPATIENT
Start: 2024-12-23 | End: 2024-12-24 | Stop reason: HOSPADM

## 2024-12-23 RX ORDER — NALOXONE HYDROCHLORIDE 0.4 MG/ML
0.4 INJECTION, SOLUTION INTRAMUSCULAR; INTRAVENOUS; SUBCUTANEOUS
Status: DISCONTINUED | OUTPATIENT
Start: 2024-12-23 | End: 2024-12-23 | Stop reason: HOSPADM

## 2024-12-23 RX ORDER — EPHEDRINE SULFATE 50 MG/ML
INJECTION, SOLUTION INTRAMUSCULAR; INTRAVENOUS; SUBCUTANEOUS PRN
Status: DISCONTINUED | OUTPATIENT
Start: 2024-12-23 | End: 2024-12-23

## 2024-12-23 RX ORDER — NALOXONE HYDROCHLORIDE 0.4 MG/ML
0.2 INJECTION, SOLUTION INTRAMUSCULAR; INTRAVENOUS; SUBCUTANEOUS
Status: DISCONTINUED | OUTPATIENT
Start: 2024-12-23 | End: 2024-12-24 | Stop reason: HOSPADM

## 2024-12-23 RX ORDER — FENTANYL CITRATE 50 UG/ML
25 INJECTION, SOLUTION INTRAMUSCULAR; INTRAVENOUS
Status: DISCONTINUED | OUTPATIENT
Start: 2024-12-23 | End: 2024-12-24 | Stop reason: HOSPADM

## 2024-12-23 RX ORDER — CEFAZOLIN SODIUM 2 G/50ML
2 SOLUTION INTRAVENOUS
Status: COMPLETED | OUTPATIENT
Start: 2024-12-23 | End: 2024-12-23

## 2024-12-23 RX ORDER — PROCHLORPERAZINE MALEATE 5 MG/1
5 TABLET ORAL EVERY 6 HOURS PRN
Status: DISCONTINUED | OUTPATIENT
Start: 2024-12-23 | End: 2024-12-24 | Stop reason: HOSPADM

## 2024-12-23 RX ORDER — CEFAZOLIN SODIUM 2 G/50ML
2 SOLUTION INTRAVENOUS SEE ADMIN INSTRUCTIONS
Status: DISCONTINUED | OUTPATIENT
Start: 2024-12-23 | End: 2024-12-23 | Stop reason: HOSPADM

## 2024-12-23 RX ORDER — FENTANYL CITRATE 50 UG/ML
50 INJECTION, SOLUTION INTRAMUSCULAR; INTRAVENOUS EVERY 5 MIN PRN
Status: DISCONTINUED | OUTPATIENT
Start: 2024-12-23 | End: 2024-12-23 | Stop reason: HOSPADM

## 2024-12-23 RX ORDER — ONDANSETRON 4 MG/1
4 TABLET, ORALLY DISINTEGRATING ORAL EVERY 30 MIN PRN
Status: DISCONTINUED | OUTPATIENT
Start: 2024-12-23 | End: 2024-12-23 | Stop reason: HOSPADM

## 2024-12-23 RX ORDER — ONDANSETRON 2 MG/ML
4 INJECTION INTRAMUSCULAR; INTRAVENOUS EVERY 6 HOURS PRN
Status: DISCONTINUED | OUTPATIENT
Start: 2024-12-23 | End: 2024-12-24 | Stop reason: HOSPADM

## 2024-12-23 RX ORDER — AMOXICILLIN 250 MG
1 CAPSULE ORAL 2 TIMES DAILY PRN
Status: DISCONTINUED | OUTPATIENT
Start: 2024-12-23 | End: 2024-12-24 | Stop reason: HOSPADM

## 2024-12-23 RX ORDER — FENTANYL CITRATE 50 UG/ML
25-50 INJECTION, SOLUTION INTRAMUSCULAR; INTRAVENOUS
Status: DISCONTINUED | OUTPATIENT
Start: 2024-12-23 | End: 2024-12-23 | Stop reason: HOSPADM

## 2024-12-23 RX ORDER — ONDANSETRON 2 MG/ML
4 INJECTION INTRAMUSCULAR; INTRAVENOUS EVERY 30 MIN PRN
Status: DISCONTINUED | OUTPATIENT
Start: 2024-12-23 | End: 2024-12-23 | Stop reason: HOSPADM

## 2024-12-23 RX ORDER — FENTANYL CITRATE 50 UG/ML
25 INJECTION, SOLUTION INTRAMUSCULAR; INTRAVENOUS EVERY 5 MIN PRN
Status: DISCONTINUED | OUTPATIENT
Start: 2024-12-23 | End: 2024-12-23 | Stop reason: HOSPADM

## 2024-12-23 RX ORDER — PROPOFOL 10 MG/ML
INJECTION, EMULSION INTRAVENOUS PRN
Status: DISCONTINUED | OUTPATIENT
Start: 2024-12-23 | End: 2024-12-23

## 2024-12-23 RX ORDER — BUPIVACAINE HYDROCHLORIDE 5 MG/ML
INJECTION, SOLUTION EPIDURAL; INTRACAUDAL PRN
Status: DISCONTINUED | OUTPATIENT
Start: 2024-12-23 | End: 2024-12-23 | Stop reason: HOSPADM

## 2024-12-23 RX ORDER — ONDANSETRON 4 MG/1
4 TABLET, ORALLY DISINTEGRATING ORAL EVERY 30 MIN PRN
Status: DISCONTINUED | OUTPATIENT
Start: 2024-12-23 | End: 2024-12-24 | Stop reason: HOSPADM

## 2024-12-23 RX ORDER — FLUMAZENIL 0.1 MG/ML
0.2 INJECTION, SOLUTION INTRAVENOUS
Status: DISCONTINUED | OUTPATIENT
Start: 2024-12-23 | End: 2024-12-23 | Stop reason: HOSPADM

## 2024-12-23 RX ORDER — OXYCODONE HYDROCHLORIDE 5 MG/1
10 TABLET ORAL
Status: DISCONTINUED | OUTPATIENT
Start: 2024-12-23 | End: 2024-12-24 | Stop reason: HOSPADM

## 2024-12-23 RX ORDER — PROPOFOL 10 MG/ML
INJECTION, EMULSION INTRAVENOUS CONTINUOUS PRN
Status: DISCONTINUED | OUTPATIENT
Start: 2024-12-23 | End: 2024-12-23

## 2024-12-23 RX ORDER — GLYCOPYRROLATE 0.2 MG/ML
INJECTION, SOLUTION INTRAMUSCULAR; INTRAVENOUS PRN
Status: DISCONTINUED | OUTPATIENT
Start: 2024-12-23 | End: 2024-12-23

## 2024-12-23 RX ORDER — BUPIVACAINE HYDROCHLORIDE 2.5 MG/ML
INJECTION, SOLUTION EPIDURAL; INFILTRATION; INTRACAUDAL
Status: COMPLETED | OUTPATIENT
Start: 2024-12-23 | End: 2024-12-23

## 2024-12-23 RX ORDER — AMOXICILLIN 250 MG
1-2 CAPSULE ORAL 2 TIMES DAILY
Qty: 30 TABLET | Refills: 0 | Status: SHIPPED | OUTPATIENT
Start: 2024-12-23

## 2024-12-23 RX ORDER — OXYCODONE HYDROCHLORIDE 5 MG/1
5 TABLET ORAL EVERY 4 HOURS PRN
Status: DISCONTINUED | OUTPATIENT
Start: 2024-12-23 | End: 2024-12-24 | Stop reason: HOSPADM

## 2024-12-23 RX ORDER — KETAMINE HYDROCHLORIDE 10 MG/ML
INJECTION INTRAMUSCULAR; INTRAVENOUS PRN
Status: DISCONTINUED | OUTPATIENT
Start: 2024-12-23 | End: 2024-12-23

## 2024-12-23 RX ORDER — HYDROMORPHONE HYDROCHLORIDE 1 MG/ML
0.4 INJECTION, SOLUTION INTRAMUSCULAR; INTRAVENOUS; SUBCUTANEOUS EVERY 5 MIN PRN
Status: DISCONTINUED | OUTPATIENT
Start: 2024-12-23 | End: 2024-12-23 | Stop reason: HOSPADM

## 2024-12-23 RX ORDER — DEXAMETHASONE SODIUM PHOSPHATE 10 MG/ML
4 INJECTION, SOLUTION INTRAMUSCULAR; INTRAVENOUS
Status: DISCONTINUED | OUTPATIENT
Start: 2024-12-23 | End: 2024-12-24 | Stop reason: HOSPADM

## 2024-12-23 RX ORDER — HYDROCODONE BITARTRATE AND ACETAMINOPHEN 5; 325 MG/1; MG/1
1-2 TABLET ORAL EVERY 4 HOURS PRN
Qty: 20 TABLET | Refills: 0 | Status: SHIPPED | OUTPATIENT
Start: 2024-12-23

## 2024-12-23 RX ORDER — ACETAMINOPHEN 325 MG/1
975 TABLET ORAL ONCE
Status: COMPLETED | OUTPATIENT
Start: 2024-12-23 | End: 2024-12-23

## 2024-12-23 RX ORDER — SODIUM CHLORIDE, SODIUM LACTATE, POTASSIUM CHLORIDE, CALCIUM CHLORIDE 600; 310; 30; 20 MG/100ML; MG/100ML; MG/100ML; MG/100ML
INJECTION, SOLUTION INTRAVENOUS CONTINUOUS
Status: DISCONTINUED | OUTPATIENT
Start: 2024-12-23 | End: 2024-12-23 | Stop reason: HOSPADM

## 2024-12-23 RX ORDER — LIDOCAINE HYDROCHLORIDE 20 MG/ML
INJECTION, SOLUTION INFILTRATION; PERINEURAL PRN
Status: DISCONTINUED | OUTPATIENT
Start: 2024-12-23 | End: 2024-12-23

## 2024-12-23 RX ORDER — OXYCODONE HYDROCHLORIDE 5 MG/1
5 TABLET ORAL
Status: DISCONTINUED | OUTPATIENT
Start: 2024-12-23 | End: 2024-12-24 | Stop reason: HOSPADM

## 2024-12-23 RX ORDER — DEXAMETHASONE SODIUM PHOSPHATE 10 MG/ML
4 INJECTION, SOLUTION INTRAMUSCULAR; INTRAVENOUS
Status: DISCONTINUED | OUTPATIENT
Start: 2024-12-23 | End: 2024-12-23 | Stop reason: HOSPADM

## 2024-12-23 RX ORDER — AMLODIPINE BESYLATE 5 MG/1
5 TABLET ORAL DAILY
Status: DISCONTINUED | OUTPATIENT
Start: 2024-12-23 | End: 2024-12-24 | Stop reason: HOSPADM

## 2024-12-23 RX ORDER — FENTANYL CITRATE 50 UG/ML
INJECTION, SOLUTION INTRAMUSCULAR; INTRAVENOUS PRN
Status: DISCONTINUED | OUTPATIENT
Start: 2024-12-23 | End: 2024-12-23

## 2024-12-23 RX ORDER — ACETAMINOPHEN 325 MG/1
650 TABLET ORAL EVERY 4 HOURS PRN
Status: DISCONTINUED | OUTPATIENT
Start: 2024-12-23 | End: 2024-12-24 | Stop reason: HOSPADM

## 2024-12-23 RX ORDER — NALOXONE HYDROCHLORIDE 0.4 MG/ML
0.2 INJECTION, SOLUTION INTRAMUSCULAR; INTRAVENOUS; SUBCUTANEOUS
Status: DISCONTINUED | OUTPATIENT
Start: 2024-12-23 | End: 2024-12-23 | Stop reason: HOSPADM

## 2024-12-23 RX ORDER — HYDROMORPHONE HYDROCHLORIDE 1 MG/ML
0.2 INJECTION, SOLUTION INTRAMUSCULAR; INTRAVENOUS; SUBCUTANEOUS EVERY 5 MIN PRN
Status: DISCONTINUED | OUTPATIENT
Start: 2024-12-23 | End: 2024-12-23 | Stop reason: HOSPADM

## 2024-12-23 RX ORDER — NALOXONE HYDROCHLORIDE 0.4 MG/ML
0.1 INJECTION, SOLUTION INTRAMUSCULAR; INTRAVENOUS; SUBCUTANEOUS
Status: DISCONTINUED | OUTPATIENT
Start: 2024-12-23 | End: 2024-12-23 | Stop reason: HOSPADM

## 2024-12-23 RX ORDER — ACETAMINOPHEN 650 MG/1
650 SUPPOSITORY RECTAL EVERY 4 HOURS PRN
Status: DISCONTINUED | OUTPATIENT
Start: 2024-12-23 | End: 2024-12-24 | Stop reason: HOSPADM

## 2024-12-23 RX ORDER — LIDOCAINE 40 MG/G
CREAM TOPICAL
Status: DISCONTINUED | OUTPATIENT
Start: 2024-12-23 | End: 2024-12-23 | Stop reason: HOSPADM

## 2024-12-23 RX ORDER — ONDANSETRON 2 MG/ML
4 INJECTION INTRAMUSCULAR; INTRAVENOUS EVERY 30 MIN PRN
Status: DISCONTINUED | OUTPATIENT
Start: 2024-12-23 | End: 2024-12-24 | Stop reason: HOSPADM

## 2024-12-23 RX ORDER — ONDANSETRON 2 MG/ML
INJECTION INTRAMUSCULAR; INTRAVENOUS PRN
Status: DISCONTINUED | OUTPATIENT
Start: 2024-12-23 | End: 2024-12-23

## 2024-12-23 RX ORDER — NALOXONE HYDROCHLORIDE 0.4 MG/ML
0.1 INJECTION, SOLUTION INTRAMUSCULAR; INTRAVENOUS; SUBCUTANEOUS
Status: DISCONTINUED | OUTPATIENT
Start: 2024-12-23 | End: 2024-12-24 | Stop reason: HOSPADM

## 2024-12-23 RX ORDER — ONDANSETRON 4 MG/1
4 TABLET, ORALLY DISINTEGRATING ORAL EVERY 6 HOURS PRN
Status: DISCONTINUED | OUTPATIENT
Start: 2024-12-23 | End: 2024-12-24 | Stop reason: HOSPADM

## 2024-12-23 RX ADMIN — ACETAMINOPHEN 975 MG: 325 TABLET ORAL at 11:30

## 2024-12-23 RX ADMIN — Medication 100 MCG: at 15:25

## 2024-12-23 RX ADMIN — Medication 100 MCG: at 15:41

## 2024-12-23 RX ADMIN — Medication 20 MG: at 14:05

## 2024-12-23 RX ADMIN — PROPOFOL 150 MCG/KG/MIN: 10 INJECTION, EMULSION INTRAVENOUS at 14:17

## 2024-12-23 RX ADMIN — PROPOFOL 150 MCG/KG/MIN: 10 INJECTION, EMULSION INTRAVENOUS at 13:50

## 2024-12-23 RX ADMIN — CEFAZOLIN SODIUM 2 G: 2 SOLUTION INTRAVENOUS at 13:15

## 2024-12-23 RX ADMIN — PROPOFOL 200 MG: 10 INJECTION, EMULSION INTRAVENOUS at 13:21

## 2024-12-23 RX ADMIN — Medication 100 MCG: at 14:55

## 2024-12-23 RX ADMIN — Medication 100 MCG: at 14:24

## 2024-12-23 RX ADMIN — Medication 50 MG: at 13:21

## 2024-12-23 RX ADMIN — KETAMINE HYDROCHLORIDE 20 MG: 10 INJECTION INTRAMUSCULAR; INTRAVENOUS at 13:35

## 2024-12-23 RX ADMIN — LIDOCAINE HYDROCHLORIDE 100 MG: 20 INJECTION, SOLUTION INFILTRATION; PERINEURAL at 13:21

## 2024-12-23 RX ADMIN — FENTANYL CITRATE 50 MCG: 50 INJECTION, SOLUTION INTRAMUSCULAR; INTRAVENOUS at 12:43

## 2024-12-23 RX ADMIN — ONDANSETRON 4 MG: 2 INJECTION INTRAMUSCULAR; INTRAVENOUS at 14:54

## 2024-12-23 RX ADMIN — Medication 10 MG: at 15:24

## 2024-12-23 RX ADMIN — METOPROLOL TARTRATE 12.5 MG: 25 TABLET, FILM COATED ORAL at 21:25

## 2024-12-23 RX ADMIN — GLYCOPYRROLATE 0.2 MG: 0.2 INJECTION, SOLUTION INTRAMUSCULAR; INTRAVENOUS at 13:35

## 2024-12-23 RX ADMIN — BUPIVACAINE HYDROCHLORIDE 20 ML: 2.5 INJECTION, SOLUTION EPIDURAL; INFILTRATION; INTRACAUDAL at 12:45

## 2024-12-23 RX ADMIN — Medication 100 MCG: at 14:59

## 2024-12-23 RX ADMIN — AMLODIPINE BESYLATE 5 MG: 5 TABLET ORAL at 21:25

## 2024-12-23 RX ADMIN — EPHEDRINE SULFATE 5 MG: 50 INJECTION, SOLUTION INTRAMUSCULAR; INTRAVENOUS; SUBCUTANEOUS at 14:39

## 2024-12-23 RX ADMIN — PROPOFOL 150 MCG/KG/MIN: 10 INJECTION, EMULSION INTRAVENOUS at 13:21

## 2024-12-23 RX ADMIN — FENTANYL CITRATE 50 MCG: 50 INJECTION, SOLUTION INTRAMUSCULAR; INTRAVENOUS at 13:21

## 2024-12-23 RX ADMIN — EPHEDRINE SULFATE 5 MG: 50 INJECTION, SOLUTION INTRAMUSCULAR; INTRAVENOUS; SUBCUTANEOUS at 13:50

## 2024-12-23 RX ADMIN — Medication 100 MCG: at 13:26

## 2024-12-23 RX ADMIN — PROPOFOL 130 MCG/KG/MIN: 10 INJECTION, EMULSION INTRAVENOUS at 15:28

## 2024-12-23 RX ADMIN — Medication 100 MCG: at 14:06

## 2024-12-23 RX ADMIN — SODIUM CHLORIDE, SODIUM LACTATE, POTASSIUM CHLORIDE, CALCIUM CHLORIDE: 600; 310; 30; 20 INJECTION, SOLUTION INTRAVENOUS at 11:36

## 2024-12-23 RX ADMIN — PROPOFOL 115 MCG/KG/MIN: 10 INJECTION, EMULSION INTRAVENOUS at 15:41

## 2024-12-23 RX ADMIN — Medication 100 MCG: at 13:44

## 2024-12-23 ASSESSMENT — ENCOUNTER SYMPTOMS
RESPIRATORY NEGATIVE: 1
EYES NEGATIVE: 1
DYSRHYTHMIAS: 1
CONSTITUTIONAL NEGATIVE: 1
MUSCULOSKELETAL NEGATIVE: 1
NEUROLOGICAL NEGATIVE: 1
NAUSEA: 1
HEMATOLOGIC/LYMPHATIC NEGATIVE: 1
ALLERGIC/IMMUNOLOGIC NEGATIVE: 1
PSYCHIATRIC NEGATIVE: 1
SEIZURES: 0
CARDIOVASCULAR NEGATIVE: 1
ABDOMINAL PAIN: 1
ENDOCRINE NEGATIVE: 1

## 2024-12-23 ASSESSMENT — ACTIVITIES OF DAILY LIVING (ADL)
ADLS_ACUITY_SCORE: 41

## 2024-12-23 ASSESSMENT — LIFESTYLE VARIABLES: TOBACCO_USE: 1

## 2024-12-23 NOTE — DISCHARGE INSTRUCTIONS
"[facility]  Home Care Following Anesthesia  For 24 hours after surgery:  Get plenty of rest.  A responsible adult must stay with you for at least 24 hours after you leave the surgery center.  Do not drive or use heavy equipment.  If you have weakness or tingling, don't drive or use heavy equipment until this feeling goes away.   Do not drink alcohol.   Avoid strenuous or risky activities.  Ask for help when climbing stairs.  You may feel lightheaded.  IF so, sit for a few minutes before standing.  Have someone help you get up.   If you have nausea (feel sick to your stomach): Drink only clear liquids such as apple juice, ginger ale, broth or 7-Up.  Rest may also help.  Be sure to drink enough fluids.  Move to a regular diet as you feel able.   You may have a slight fever.  Call the doctor if your fever is over 100 F (37.7 C) (taken under the tongue) or lasts longer than 24 hours.  You may have a dry mouth, a sore throat, muscle aches or trouble sleeping. These should go away after 24 hours.  Do not make important or legal decisions.   It is recommended to avoid smoking.        Today you received an Exparel block to numb the nerves near your surgery site.  This is a block using local anesthetic or \"numbing\" medication injected around the nerves to anesthetize or \"numb\" the area supplied by those nerves.  This block is injected into the muscle layer near your surgical site.  This medication may numb the location where you had surgery up to 72 hours.  If your surgical site is an arm or leg you should be careful with your affected limb, since it is possible to injure your limb without being aware of it due to the numbing.  Until full feeling returns, you should guard against bumping or hitting your limb, and avoid extreme hot or cold temperatures on the skin.  As the block wears off, the feeling will return as a tingling or prickly sensation near your surgical site.  You will " experince more discomfort from your incision as the feeling returns.  You may want to take a pain pill (a narcotic or Tylenol if this was prescribed by your surgeon) when you start to experience mild pain before the pain beomes more severe.  If your pain medications do not control your pain, you should notify your surgeon.    Tips for taking pain medications  To get the best pain relief possible, remember these points:  Take pain medications as directed, before pain becomes severe.  Pain medication can upset your stomach: taking it with food may help.  Constipation is a common side effect of pain medication. Drink plenty of  fluids.  Eat foods high in fiber. Take a stool softener if recommended by your doctor or pharmacist.  Do not drink alcohol, drive or operate machinery while taking pain medications.  Ask about other ways to control pain, such as with heat, ice or relaxation.    Tylenol/Acetaminophen Consumption    If you feel your pain relief is insufficient, you may take Tylenol/Acetaminophen in addition to your narcotic pain medication.   Be careful not to exceed 4,000 mg of Tylenol/Acetaminophen in a 24 hour period from all sources.  If you are taking extra strength Tylenol/acetaminophen (500 mg), the maximum dose is 8 tablets in 24 hours.  If you are taking regular strength acetaminophen (325 mg), the maximum dose is 12 tablets in 24 hours.    Call a doctor for any of the following:  Signs of infection (fever, growing tenderness at the surgery site, a large amount of drainage or bleeding, severe pain, foul-smelling drainage, redness, swelling).  It has been over 8 to 10 hours since surgery and you are still not able to urinate (pass water).  Headache for over 24 hours.  Numbness, tingling or weakness the day after surgery (if you had spinal anesthesia).  Signs of Covid-19 infection (temperature over 100 degrees, shortness of breath, cough, loss of taste/smell, generalized body aches, persistent headache,  chills, sore throat, nausea/vomiting/diarrhea)    Your doctor is:  Dr. Shaquille Erickson, General Surgery: [phone]                  After hours and weekends call the hospital @ [phone] and ask for the resident on call for:  General Surgery  For emergency care, call the:  [facility]:  [phone] (TTY for hearing impaired: [phone])    Tylenol 975 mg given at 11:30 am.   Ok to take more after 5:30 pm today.

## 2024-12-23 NOTE — ANESTHESIA PROCEDURE NOTES
"TAP Procedure Note    Pre-Procedure   Staff -        Anesthesiologist:  Don Bender MD       Resident/Fellow: Keyshawn Mancilla MD       Performed By: resident       Location: pre-op       Procedure Start/Stop Times: 12/23/2024 12:40 PM and 12/23/2024 12:50 PM       Pre-Anesthestic Checklist: patient identified, IV checked, site marked, risks and benefits discussed, informed consent, monitors and equipment checked, pre-op evaluation, at physician/surgeon's request and post-op pain management  Timeout:       Correct Patient: Yes        Correct Procedure: Yes        Correct Site: Yes        Correct Position: Yes        Correct Laterality: Yes   Procedure Documentation  Procedure: TAP       Diagnosis: POST OP PAIN       Laterality: bilateral       Patient Position: supine       Skin prep: Chloraprep       Insertion Site: T8-9.       Needle Type: short bevel       Needle Gauge: 21.        Needle Length (millimeters): 110        Ultrasound guided       1. Ultrasound was used to identify targeted nerve, plexus, vascular marker, or fascial plane and place a needle adjacent to it in real-time.       2. Ultrasound was used to visualize the spread of anesthetic in close proximity to the above referenced structure.    Assessment/Narrative         The placement was negative for: blood aspirated, painful injection and site bleeding       Paresthesias: No.       Bolus given via needle. no blood aspirated via catheter.        Secured via.        Insertion/Infusion Method: Single Shot       Complications: none    Medication(s) Administered   Bupivacaine 0.25% PF (Infiltration) - Infiltration   20 mL - 12/23/2024 12:45:00 PM  Bupivacaine liposome (Exparel) 1.3% LA inj susp (Infiltration) - Infiltration   20 mL - 12/23/2024 12:45:00 PM  Medication Administration Time: 12/23/2024 12:40 PM      FOR [facility] ([facility]) ONLY:   Pain Team Contact information: please page the Pain Team Via Armor5. Search \"Pain\". During daytime " hours, please page the attending first. At night please page the resident first.

## 2024-12-23 NOTE — ANESTHESIA CARE TRANSFER NOTE
Patient: Don Samson    Procedure: Procedure(s):  HERNIORRHAPHY, VENTRAL umbilical , ROBOT-ASSISTED       Diagnosis: Umbilical hernia without obstruction and without gangrene [K42.9]  Diagnosis Additional Information: No value filed.    Anesthesia Type:   General     Note:    Oropharynx: oral airway in place and spontaneously breathing  Level of Consciousness: awake  Oxygen Supplementation: face mask  Level of Supplemental Oxygen (L/min / FiO2): 8  Independent Airway: airway patency satisfactory and stable  Dentition: dentition unchanged  Vital Signs Stable: post-procedure vital signs reviewed and stable  Report to RN Given: handoff report given  Patient transferred to: PACU  Comments: VSS and WNL, comfortable, no PONV, report to Asiya RN  Handoff Report: Identifed the Patient, Identified the Reponsible Provider, Reviewed the pertinent medical history, Discussed the surgical course, Reviewed Intra-OP anesthesia mangement and issues during anesthesia, Set expectations for post-procedure period and Allowed opportunity for questions and acknowledgement of understanding      Vitals:  Vitals Value Taken Time   BP     Temp     Pulse     Resp     SpO2         Electronically Signed By: CONSTANCE Landers CRNA  December 23, 2024  4:32 PM

## 2024-12-23 NOTE — OP NOTE
Operative report    Preop diagnosis: Umbilical hernia  Postop diagnosis: Same    Operative procedure: Laparoscopic umbilical herniorrhaphy robot-assisted    Surgeon: YOSSI Erickson  Assistant: BRADY Woo  Anesthesia: General EndoTracheal    Indications for procedure: Patient presents with a significant umbilical hernia with incarcerated omentum.  Patient understood the possibility of excising umbilicus after the surgery pending our intraoperative findings.  Full informed consent was obtain in clinic and reconfirmed in today's preoperative discussion.    Operative findings: Umbilical hernia approximately 5 x 4 cm defect.  Incarcerated omentum.  All omentum viable.  Overlying umbilical skin redundant and marginally viable and as such excised refer to below.    Procedure: Patient brought to the operating room put under general anesthesia abdomen widely prepped and draped in usual sterile fashion.  Left subcostal skin incision made Veress needle introduced abdomen insufflated a port placed here.  Camera introduced noting no injury from needle or trocar another a port was placed at left lateral and left lower quadrant.  Using these ports was able to docked the robot where a ruler was placed in the actual defect appeared to be approximately 5 cm in greatest length.  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  The fascial at the fascial defect was then circumferentially cleared of peritoneum.  Oh V-Loc's were then used to close the defect without difficulty.  A 15 x 10 cm pariah Josef Symbotex mesh was then placed abdominal cavity and positioned correctly per my landmarks packed anterior abdominal wall over the actual closure.  The length of the closure was approximately 7 cm.  The edge of the mesh was then tacked to the overlying abdominal wall with running 3 oh STRATAFIX per my routine.'s were then removed under direct vision  and correct count was noted.  This point abdomen is deflated our ports were removed and attention then turned to the anterior abdominal skin.  This was overlying the previous umbilicus and was marginally viable.  As such this was excised completely as well as the hernia sac which was adherent to the underside.  The skin was then closed after the deep layers were closed with a running 2-0 Vicryl.  Steri-Strips applied.    Estimated blood loss: Minimal  Pathology: None  The patient was taken to the recovery room where he was without difficulty or apparent complication.

## 2024-12-23 NOTE — ANESTHESIA PROCEDURE NOTES
Airway       Patient location during procedure: OR       Procedure Start/Stop Times: 12/23/2024 1:23 PM  Staff -        Performed By: CRNA  Consent for Airway        Urgency: elective  Indications and Patient Condition       Indications for airway management: lorenzo-procedural       Induction type:intravenous       Mask difficulty assessment: 1 - vent by mask    Final Airway Details       Final airway type: endotracheal airway       Successful airway: ETT - single and Oral  Endotracheal Airway Details        ETT size (mm): 8.0       Cuffed: yes       Cuff volume (mL): 6       Successful intubation technique: video laryngoscopy       VL Blade Size: MAC 4       Grade View of Cords: 1       Adjucts: stylet       Position: Right       Measured from: lips       Secured at (cm): 23       Bite block used: Soft    Post intubation assessment        Placement verified by: capnometry, equal breath sounds and chest rise        Number of attempts at approach: 1       Secured with: tape       Ease of procedure: easy       Dentition: Intact and Unchanged    Medication(s) Administered   Medication Administration Time: 12/23/2024 1:23 PM

## 2024-12-23 NOTE — ANESTHESIA PREPROCEDURE EVALUATION
Anesthesia Pre-Procedure Evaluation    Patient: Don Samson   MRN: [id number] : 1950        Procedure : Procedure(s):  HERNIORRHAPHY, VENTRAL umbilical , ROBOT-ASSISTED          Past Medical History:   Diagnosis Date    Ankle pain 2016    Ankle sprain and strain 2010    Atrial fibrillation with slow ventricular response (H)     CARDIOVASCULAR SCREENING; LDL GOAL LESS THAN 130 2016    Depression     declines treatment 16    HTN (hypertension)     declines treatment 16    Left knee pain 2015    Plantar fasciitis 2010    Right knee pain 2016    Tibialis posterior tendonitis 2016    Umbilical hernia without obstruction and without gangrene       Past Surgical History:   Procedure Laterality Date    EGD      HERNIORRHAPHY INGUINAL BILATERAL      RIH , LIH     NASAL/SINUS POLYPECTOMY      Nasal-Sinus Polypectomy      Allergies   Allergen Reactions    Ibuprofen Nausea and Other (See Comments)     Other reaction(s): Abdominal pain    Prednisone Other (See Comments)      Social History     Tobacco Use    Smoking status: Former     Current packs/day: 0.00     Types: Cigarettes     Quit date: 1970     Years since quittin.0     Passive exposure: Never    Smokeless tobacco: Never   Substance Use Topics    Alcohol use: Yes     Comment: occ      Wt Readings from Last 1 Encounters:   24 106.6 kg (235 lb)        Anesthesia Evaluation   Pt has had prior anesthetic. Type: General.    No history of anesthetic complications       ROS/MED HX  ENT/Pulmonary:     (+)                tobacco use, Past use,                       Neurologic:  - neg neurologic ROS  (-) no seizures, no CVA and no TIA   Cardiovascular: Comment: Ascending aorta 4.5 cm    (+)  hypertension- -   -  - -                        dysrhythmias, a-fib,        Previous cardiac testing   Echo: Date:  Results:    Stress Test:  Date:  Results:  Interpretation Summary  Normal blood  "pressure and heart rate response to dobutamine  No anginal symptoms during stress test.  No ECG evidence of ischemia at rest or with dobutamine.  No stress induced regional wall motion abnormalities.  Normal resting LV function with EF of approximately 55-60%, with dobutamine  left ventricular cavity size decreases and LVEF increases to 65-70%.  Normal biventricular function and no significant valvular dysfunction noted  screening 2D and Doppler examination.    ECG Reviewed:  Date: 2016 Results:  Sinus rhythm  Cath:  Date: Results:      METS/Exercise Tolerance: 4 - Raking leaves, gardening    Hematologic:  - neg hematologic  ROS     Musculoskeletal:  - neg musculoskeletal ROS     GI/Hepatic: Comment: Ventral hernia    (+) GERD (occasional food related GERD),                   Renal/Genitourinary: Comment: ED      Endo:     (+)               Obesity,       Psychiatric/Substance Use:     (+) psychiatric history depression       Infectious Disease:  - neg infectious disease ROS     Malignancy:  - neg malignancy ROS     Other:  - neg other ROS             OUTSIDE LABS:  CBC:   Lab Results   Component Value Date    WBC 6.5 08/16/2024    WBC 7.9 05/21/2022    HGB 14.1 08/16/2024    HGB 13.5 05/21/2022    HCT 42.9 08/16/2024    HCT 40.4 05/21/2022     08/16/2024     05/21/2022     BMP:   Lab Results   Component Value Date     12/12/2024     02/14/2024    POTASSIUM 4.0 12/12/2024    POTASSIUM 4.2 02/14/2024    CHLORIDE 105 12/12/2024    CHLORIDE 108 (H) 02/14/2024    CO2 22 12/12/2024    CO2 21 (L) 02/14/2024    BUN 17.2 12/12/2024    BUN 20.6 02/14/2024    CR 1.25 (H) 12/12/2024    CR 1.03 02/14/2024    GLC 97 12/12/2024    GLC 99 02/14/2024     COAGS: No results found for: \"PTT\", \"INR\", \"FIBR\"  POC: No results found for: \"BGM\", \"HCG\", \"HCGS\"  HEPATIC:   Lab Results   Component Value Date    ALBUMIN 4.2 02/14/2024    PROTTOTAL 6.9 02/14/2024    ALT 21 02/14/2024    AST 25 02/14/2024    ALKPHOS 112 " 02/14/2024    BILITOTAL 0.2 02/14/2024     OTHER:   Lab Results   Component Value Date    LACT 0.7 09/15/2016    A1C 5.6 04/05/2021    MICHELLE 9.4 12/12/2024    PHOS 2.6 12/12/2024    MAG 1.9 12/12/2024    LIPASE 90 09/15/2016    TSH 1.55 12/12/2024       Anesthesia Plan    ASA Status:  3       Anesthesia Type: General.     - Airway: ETT      Maintenance: Balanced.        Consents            Postoperative Care    Pain management: Multi-modal analgesia.   PONV prophylaxis: Ondansetron (or other 5HT-3), Dexamethasone or Solumedrol     Comments:               Keyshawn Mancilla MD    I have reviewed the pertinent notes and labs in the chart from the past 30 days and (re)examined the patient.  Any updates or changes from those notes are reflected in this note.               # Hypertension: Noted on problem list           # Obesity: Estimated body mass index is 31.87 kg/m  as calculated from the following:    Height as of this encounter: 1.829 m (6').    Weight as of this encounter: 106.6 kg (235 lb).

## 2024-12-24 VITALS
RESPIRATION RATE: 16 BRPM | HEART RATE: 79 BPM | TEMPERATURE: 98.4 F | DIASTOLIC BLOOD PRESSURE: 77 MMHG | OXYGEN SATURATION: 92 % | SYSTOLIC BLOOD PRESSURE: 134 MMHG

## 2024-12-24 PROCEDURE — 250N000013 HC RX MED GY IP 250 OP 250 PS 637: Performed by: PHYSICIAN ASSISTANT

## 2024-12-24 PROCEDURE — G0378 HOSPITAL OBSERVATION PER HR: HCPCS

## 2024-12-24 PROCEDURE — 250N000013 HC RX MED GY IP 250 OP 250 PS 637

## 2024-12-24 PROCEDURE — 250N000011 HC RX IP 250 OP 636

## 2024-12-24 PROCEDURE — 96374 THER/PROPH/DIAG INJ IV PUSH: CPT

## 2024-12-24 RX ORDER — OXYCODONE HYDROCHLORIDE 5 MG/1
2.5 TABLET ORAL EVERY 6 HOURS PRN
Qty: 7 TABLET | Refills: 0 | Status: SHIPPED | OUTPATIENT
Start: 2024-12-24 | End: 2024-12-29

## 2024-12-24 RX ORDER — HYDROMORPHONE HCL IN WATER/PF 6 MG/30 ML
0.2 PATIENT CONTROLLED ANALGESIA SYRINGE INTRAVENOUS ONCE
Status: COMPLETED | OUTPATIENT
Start: 2024-12-24 | End: 2024-12-24

## 2024-12-24 RX ORDER — METHOCARBAMOL 500 MG/1
500 TABLET, FILM COATED ORAL 4 TIMES DAILY PRN
Qty: 20 TABLET | Refills: 0 | Status: SHIPPED | OUTPATIENT
Start: 2024-12-24 | End: 2024-12-29

## 2024-12-24 RX ADMIN — SENNOSIDES AND DOCUSATE SODIUM 2 TABLET: 50; 8.6 TABLET ORAL at 12:44

## 2024-12-24 RX ADMIN — ACETAMINOPHEN 650 MG: 325 TABLET, FILM COATED ORAL at 12:44

## 2024-12-24 RX ADMIN — OXYCODONE HYDROCHLORIDE 2.5 MG: 5 TABLET ORAL at 07:41

## 2024-12-24 RX ADMIN — OXYCODONE HYDROCHLORIDE 5 MG: 5 TABLET ORAL at 02:22

## 2024-12-24 RX ADMIN — METOPROLOL TARTRATE 12.5 MG: 25 TABLET, FILM COATED ORAL at 07:41

## 2024-12-24 RX ADMIN — ACETAMINOPHEN 650 MG: 325 TABLET, FILM COATED ORAL at 07:41

## 2024-12-24 RX ADMIN — ACETAMINOPHEN 650 MG: 325 TABLET, FILM COATED ORAL at 00:04

## 2024-12-24 RX ADMIN — OXYCODONE HYDROCHLORIDE 5 MG: 5 TABLET ORAL at 12:44

## 2024-12-24 RX ADMIN — AMLODIPINE BESYLATE 5 MG: 5 TABLET ORAL at 07:41

## 2024-12-24 RX ADMIN — HYDROMORPHONE HYDROCHLORIDE 0.2 MG: 0.2 INJECTION, SOLUTION INTRAMUSCULAR; INTRAVENOUS; SUBCUTANEOUS at 09:19

## 2024-12-24 ASSESSMENT — ACTIVITIES OF DAILY LIVING (ADL)
ADLS_ACUITY_SCORE: 38
ADLS_ACUITY_SCORE: 37
ADLS_ACUITY_SCORE: 38
ADLS_ACUITY_SCORE: 35
ADLS_ACUITY_SCORE: 35
ADLS_ACUITY_SCORE: 38

## 2024-12-24 NOTE — PLAN OF CARE
PRIMARY DIAGNOSIS: Post-op Recovery  OUTPATIENT/OBSERVATION GOALS TO BE MET BEFORE DISCHARGE:  ADLs back to baseline: No    Activity and level of assistance: Up with standby assistance.    Pain status: Improved-controlled with oral pain medications.    Return to near baseline physical activity: No     Discharge Planner Nurse   Safe discharge environment identified: No  Barriers to discharge: Yes       Entered by: Susana Ortiz RN 12/24/2024      -vital signs normal or at patient baseline: progressing  - 24H monitoring post-anesthesia: progressing    Please review provider order for any additional goals.   Nurse to notify provider when observation goals have been met and patient is ready for discharge.Goal Outcome Evaluation:

## 2024-12-24 NOTE — ANESTHESIA POSTPROCEDURE EVALUATION
Patient: Don Samson    Procedure: Procedure(s):  HERNIORRHAPHY, VENTRAL umbilical , ROBOT-ASSISTED       Anesthesia Type:  General    Note:  Disposition: Admission   Postop Pain Control: Uneventful            Sign Out: Well controlled pain   PONV: No   Neuro/Psych: Uneventful            Sign Out: Acceptable/Baseline neuro status   Airway/Respiratory: Uneventful            Sign Out: Acceptable/Baseline resp. status   CV/Hemodynamics: Uneventful            Sign Out: Acceptable CV status; No obvious hypovolemia; No obvious fluid overload   Other NRE: NONE   DID A NON-ROUTINE EVENT OCCUR?     Event details/Postop Comments:  Admitted due to lack of responsible adult.            Last vitals:  Vitals Value Taken Time   /86 12/23/24 1830   Temp 36.5  C (97.7  F) 12/23/24 1634   Pulse 76 12/23/24 1833   Resp 28 12/23/24 1833   SpO2 94 % 12/23/24 1833   Vitals shown include unfiled device data.    Electronically Signed By: Don Bender MD  December 23, 2024  6:34 PM

## 2024-12-24 NOTE — OP NOTE
Pt arrived to PACU around 4:30 pm today, somnolent with no response to stimulus. VSS.  [place] RN called pt's ride, Ambika and Bora, who said they were only his , and not his responsible adult overnight and they just knew him from their coffee shop.  There was no other contact information in patient's chart or in his paperwork as a responsible adult.  By 5:30 pm he was off his oxygen and sitting up in bed but with minimal communication.  Questions were asked multiple times.   He was able to wiggle toes and finally state his name was Don.   By 6 pm he could answer some questions after multiple asks but speech was slurred and hard to distinguish.  He confirmed  Ambika and Bora were driving him home.  When asked if he had someone to meet him there he stopped talking.    Eventually he was able to verbalize his neighbor in his apartment building was there, but he could not give me this person's name or contact info.   We attempted to call pt's cell phone with no answer, and we did not find one in his belongings.  6.22 pm:  Pt is able to follow all commands.  Facial symmetry is equal, able to squeeze both hands and wiggle both toes.   Still lethargic, and communication is slow and slurred.   Pt declines something to eat or drink.  It was explained to him that he was being transferred to the hospital due to no responsible adult.   6:30 pm  Pt was offered something to eat and drink but he declined.  He was given warm blankets, and he did ask what time it was.  When asked if he understood why he was going to the hospital he said yes.  6:45 pm  Report called to [place] E[facility] Maria RN.

## 2024-12-24 NOTE — PROGRESS NOTES
DISCHARGE                         No discharge date for patient encounter.  ----------------------------------------------------------------------------  Discharged to: Home  Via: Automobile  Accompanied by: Friends  Discharge Instructions: diet, activity, medications, follow up appointments, when to call the MD, aftercare instructions, and what to watchout for (i.e. s/s of infection, increasing SOB, palpitations, chest pain,)  Prescriptions: To be filled by another pharmacy per pt's request; medication list reviewed & sent with pt. Oxycodone script sent with patient. Educated patient on importance of following our discharge instructions, as opposed to the ones from the surgical center until his follow up.   Follow Up Appointments: arranged; information given  Belongings: All sent with pt  IV: out  Telemetry: off  Pt exhibits understanding of above discharge instructions; all questions answered.    Discharge Paperwork: Signed, copied, and sent home with patient.

## 2024-12-24 NOTE — H&P
EGS Surgery Consult  2024    Don Samson  : 1950    Date of Service: 2024 9:07 PM    Assessment and Plan:  Don Samson is a 74 year old male who underwent robot-assisted umbilical herniorrhaphy with Dr. Erickson at [facility] earlier today who presents to the ED for post-anesthesia monitoring. Patient is hypertensive but otherwise vital signs are normal. Pain is well-controlled. Denies any nausea or vomiting    - admit to EGS for observation   - okay for regular diet  - plan to restart PTA antihypertensives     Discussed with chief resident Dr. Greco and Dr. Kanu Buenrostro Son, MD  PGY-2 General Surgery Resident    History of Present Illness:    Don Samson is a 74 year old male who underwent robot-assisted umbilical herniorrhaphy with Dr. Erickson at [facility] earlier today. He presents to the ED from [facility] as the patient had no one to monitor him at home following his surgery today. He states his pain is relatively well controlled. He denies any nausea or vomiting. He understands that he needs to stay in the hospital for monitoring following general anesthesia.     Past Medical History:  Past Medical History:   Diagnosis Date    Ankle pain 2016    Ankle sprain and strain 2010    Atrial fibrillation with slow ventricular response (H)     CARDIOVASCULAR SCREENING; LDL GOAL LESS THAN 130 2016    Depression     declines treatment 16    HTN (hypertension)     declines treatment 16    Left knee pain 2015    Plantar fasciitis 2010    Right knee pain 2016    Tibialis posterior tendonitis 2016    Umbilical hernia without obstruction and without gangrene        Past Surgical History  Past Surgical History:   Procedure Laterality Date    EGD      HERNIORRHAPHY INGUINAL BILATERAL      RIH 2004, LIH 1985    NASAL/SINUS POLYPECTOMY      Nasal-Sinus Polypectomy       Family History:  Family History   Problem Relation Age of Onset    Hypertension Mother      Anesthesia Reaction No family hx of     Bleeding Disorder No family hx of     Clotting Disorder No family hx of        Social History:  Social History     Socioeconomic History    Marital status: Single     Spouse name: Not on file    Number of children: Not on file    Years of education: Not on file    Highest education level: Not on file   Occupational History    Not on file   Tobacco Use    Smoking status: Former     Current packs/day: 0.00     Types: Cigarettes     Quit date: 1970     Years since quittin.0     Passive exposure: Never    Smokeless tobacco: Never   Substance and Sexual Activity    Alcohol use: Yes     Comment: occ    Drug use: No    Sexual activity: Yes     Partners: Female   Other Topics Concern    Parent/sibling w/ CABG, MI or angioplasty before 65F 55M? No   Social History Narrative    2019; lives alone , with family         Social Drivers of Health     Financial Resource Strain: Low Risk  (2024)    Received from [facility]    Financial Resource Strain     Difficulty of Paying Living Expenses: 3     Difficulty of Paying Living Expenses: Not on file   Food Insecurity: No Food Insecurity (2024)    Received from [facility]    Food Insecurity     Do you worry your food will run out before you are able to buy more?: 1   Transportation Needs: No Transportation Needs (2024)    Received from [employer]    Transportation Needs     Does lack of transportation keep you from medical appointments?: 1     Does lack of transportation keep you from work, meetings or getting things that you need?: 1   Physical Activity: Not on file   Stress: Not on file   Social Connections: Socially Isolated (2024)    Received from [facility]    Social Connections     Do you often feel lonely or isolated from those around you?: 4   Interpersonal Safety: Low Risk   (12/23/2024)    Interpersonal Safety     Do you feel physically and emotionally safe where you currently live?: Yes     Within the past 12 months, have you been hit, slapped, kicked or otherwise physically hurt by someone?: No     Within the past 12 months, have you been humiliated or emotionally abused in other ways by your partner or ex-partner?: No   Housing Stability: Low Risk  (9/4/2024)    Received from [facility]    Housing Stability     What is your housing situation today?: 1       Medications:  Current Outpatient Medications   Medication Sig Dispense Refill    acetaminophen (TYLENOL) 500 MG tablet Take 1-2 tablets (500-1,000 mg) by mouth every 6 hours as needed for mild pain Maximum daily dose 4,000mg. 100 tablet 4    amLODIPine (NORVASC) 5 MG tablet Take 1 tablet (5 mg) by mouth daily (Patient taking differently: Take 5 mg by mouth every morning.) 90 tablet 3    cyclobenzaprine (FLEXERIL) 5 MG tablet Take 2 tablets (10 mg) by mouth 3 times daily as needed for muscle spasms. (Patient taking differently: Take 10 mg by mouth 3 times daily as needed for muscle spasms.) 30 tablet 0    cyclobenzaprine (FLEXERIL) 5 MG tablet Take 1 tablet (5 mg) by mouth 3 times daily as needed for muscle spasms. 30 tablet 0    HYDROcodone-acetaminophen (NORCO) 5-325 MG tablet Take 1-2 tablets by mouth every 4 hours as needed for moderate to severe pain. 20 tablet 0    Lidocaine (LIDOCARE) 4 % Patch Place 1 patch onto the skin every 24 hours To prevent lidocaine toxicity, patient should be patch free for 12 hrs daily. 10 patch 0    metoprolol succinate ER (TOPROL XL) 25 MG 24 hr tablet Take 0.5 tablets (12.5 mg) by mouth daily. 60 tablet 3    naproxen (NAPROSYN) 500 MG tablet Take 1 tablet (500 mg) by mouth 2 times daily (with meals) (Patient not taking: Reported on 12/12/2024) 30 tablet 0    olmesartan (BENICAR) 20 MG tablet Take 1 tablet (20 mg) by mouth daily (with breakfast) 90 tablet 3     senna-docusate (SENOKOT-S/PERICOLACE) 8.6-50 MG tablet Take 1-2 tablets by mouth 2 times daily. 30 tablet 0       Allergies:     Allergies   Allergen Reactions    Ibuprofen Nausea and Other (See Comments)     Other reaction(s): Abdominal pain    Prednisone Other (See Comments)       Review of Symptoms:  A 10 point review of symptoms has been conducted and is negative except for that mentioned in the above HPI.    Physical Exam:    Blood pressure (!) 157/100, temperature 98  F (36.7  C), resp. rate 20, SpO2 97%.  Gen:    Lying in bed in NAD, A&OX3  HEENT: Normocephalic and atraumatic  CV:  RRR  Pulm:  Non-labored breathing  Abd:  Soft, protuberant, appropriately tender, dressing over midline with some strikethrough. Laparoscopic port incisions covered with steri-strips.   Ext:  Warm and well perfused, no obvious deformities    Labs:  CBC RESULTS:   Recent Labs   Lab Test 12/23/24 2015   WBC 15.8*   RBC 5.42   HGB 15.8   HCT 45.0   MCV 83   MCH 29.2   MCHC 35.1   RDW 12.3        BMP RESULTS:   Recent Labs   Lab 12/23/24 2015      POTASSIUM 4.7   CHLORIDE 104   CO2 22   BUN 16.6   CR 1.22*   *     LFT RESULTS: No lab results found in last 7 days.    Imaging:  No imaging reviewed at this time.

## 2024-12-24 NOTE — PLAN OF CARE
Goal Outcome Evaluation:    -vital signs normal or at patient baseline Met   - 24H monitoring post-anesthesia Met weaned off O2    PRIMARY DIAGNOSIS: Post Anesthesia   OUTPATIENT/OBSERVATION GOALS TO BE MET BEFORE DISCHARGE:  1. Stable vital signs Yes  2. Tolerating diet:No  3. Pain controlled with oral pain medications:  No  4. Positive bowel sounds:  Yes  5. Voiding without difficulty:  Yes  6. Able to ambulate:  No  7. Provider specific discharge goals met:  No    Discharge Planner Nurse   Safe discharge environment identified: Yes  Barriers to discharge: No       Entered by: Kori Barraza RN 12/24/2024 12:31 PM     Please review provider order for any additional goals.   Nurse to notify provider when observation goals have been met and patient is ready for discharge.

## 2024-12-24 NOTE — PLAN OF CARE
Goal Outcome Evaluation:      Plan of Care Reviewed With: patient          Outcome Evaluation: Pt will discharge to home when med ready.

## 2024-12-24 NOTE — PLAN OF CARE
Goal Outcome Evaluation:    -vital signs normal or at patient baseline Met   - 24H monitoring post-anesthesia Met    PRIMARY DIAGNOSIS: Post Anesthesia   OUTPATIENT/OBSERVATION GOALS TO BE MET BEFORE DISCHARGE:  1. Stable vital signs Yes  2. Tolerating diet:No  3. Pain controlled with oral pain medications:  No  4. Positive bowel sounds:  Yes  5. Voiding without difficulty:  Yes  6. Able to ambulate:  No  7. Provider specific discharge goals met:  No    Discharge Planner Nurse   Safe discharge environment identified: Yes  Barriers to discharge: No       Entered by: Kori Barraza RN 12/24/2024 10:08 AM     Please review provider order for any additional goals.   Nurse to notify provider when observation goals have been met and patient is ready for discharge.

## 2024-12-24 NOTE — CONSULTS
SDOH consult received for transportation needs. Consult entered in error.   No further care management intervention anticipated at this time. Please re-consult if further needs arise. Care management signing off.     EZRA Barclay, ABDIAS  OBS/ED   Phone: [phone]  Fax: [phone]    Vocera: 1A Obs SW

## 2024-12-24 NOTE — DISCHARGE SUMMARY
[facility]  Discharge Summary  General Surgery     Don Samson MRN# [id number]   YOB: 1950 Age: 74 year old     Date of Admission:  12/23/2024  Date of Discharge::  December 24, 2024  Admitting Physician:  Mandie Bobby MD  Discharge Physician:  Same  Primary Care Physician:        Kassandra Calvin          Admission Diagnoses:   Status post hernia repair [Z98.890, Z87.19]          Discharge Diagnosis:   Same as above         Procedures:   Robotic assisted laparoscopic ventral hernia repair with Dr. Erickson on 12/23/2024            Consultations:   CARE MANAGEMENT / SOCIAL WORK IP CONSULT          Brief History of Illness:   Patient presents with a significant umbilical hernia with incarcerated omentum. He [place] [facility] [place] on 12/23/24, but postoperatively was not cleared for discharge and did not have a responsible adult to discharge home with. He was therefore referred to the ED.            Hospital Course:   The patient was therefore admitted to observation. The remainder of his postoperative course was unremarkable. Diet was advanced and pain well controlled on oral regimen. POD1 he was tolerating regular diet, his pain was well controlled with oral pain medications, he was voiding spontaneously, and ambulating independently. Patient with follow up with Dr. Erickson in clinic as scheduled.            Imaging Studies:     Results for orders placed or performed during the hospital encounter of 12/23/24   POC US Guidance Needle Placement    Narrative    Subcostal block              Medications Prior to Admission:     No medications prior to admission.              Discharge Medications:     Current Discharge Medication List        START taking these medications    Details   methocarbamol (ROBAXIN) 500 MG tablet Take 1 tablet (500 mg) by mouth 4 times daily as needed for muscle spasms.  Qty: 20 tablet, Refills: 0    Associated Diagnoses: Status  post hernia repair      oxyCODONE (ROXICODONE) 5 MG tablet Take 0.5 tablets (2.5 mg) by mouth every 6 hours as needed for pain.  Qty: 7 tablet, Refills: 0    Associated Diagnoses: Status post hernia repair           CONTINUE these medications which have NOT CHANGED    Details   acetaminophen (TYLENOL) 500 MG tablet Take 1-2 tablets (500-1,000 mg) by mouth every 6 hours as needed for mild pain Maximum daily dose 4,000mg.  Qty: 100 tablet, Refills: 4    Associated Diagnoses: Acute midline low back pain without sciatica      amLODIPine (NORVASC) 5 MG tablet Take 1 tablet (5 mg) by mouth daily  Qty: 90 tablet, Refills: 3    Associated Diagnoses: Hypertension goal BP (blood pressure) < 140/90      cyclobenzaprine (FLEXERIL) 5 MG tablet Take 1 tablet (5 mg) by mouth 3 times daily as needed for muscle spasms.  Qty: 30 tablet, Refills: 0    Associated Diagnoses: Acute bilateral low back pain, unspecified whether sciatica present      HYDROcodone-acetaminophen (NORCO) 5-325 MG tablet Take 1-2 tablets by mouth every 4 hours as needed for moderate to severe pain.  Qty: 20 tablet, Refills: 0    Associated Diagnoses: Umbilical hernia without obstruction and without gangrene      Lidocaine (LIDOCARE) 4 % Patch Place 1 patch onto the skin every 24 hours To prevent lidocaine toxicity, patient should be patch free for 12 hrs daily.  Qty: 10 patch, Refills: 0    Associated Diagnoses: Bilateral low back pain without sciatica, unspecified chronicity      metoprolol succinate ER (TOPROL XL) 25 MG 24 hr tablet Take 0.5 tablets (12.5 mg) by mouth daily.  Qty: 60 tablet, Refills: 3    Associated Diagnoses: Atrial fibrillation with slow ventricular response (H)      olmesartan (BENICAR) 20 MG tablet Take 1 tablet (20 mg) by mouth daily (with breakfast)  Qty: 90 tablet, Refills: 3    Associated Diagnoses: Hypertension goal BP (blood pressure) < 140/90      senna-docusate (SENOKOT-S/PERICOLACE) 8.6-50 MG tablet Take 1-2 tablets by mouth 2  times daily.  Qty: 30 tablet, Refills: 0    Associated Diagnoses: Umbilical hernia without obstruction and without gangrene           STOP taking these medications       naproxen (NAPROSYN) 500 MG tablet Comments:   Reason for Stopping:                       Medications Discontinued or Adjusted During This Hospitalization:   No change           Antibiotics Prescribed at Discharge:   None prescribed           Day of Discharge Physical Exam:   Temp:  [97.7  F (36.5  C)-98.7  F (37.1  C)] 98.4  F (36.9  C)  Pulse:  [56-97] 79  Resp:  [16-30] 16  BP: ()/() 134/77  SpO2:  [90 %-99 %] 92 %    Gen:                Lying in bed in NAD, A&OX3  HEENT:Normocephalic and atraumatic  CV:                  RRR  Pulm:               Non-labored breathing  Abd:                 Soft, protuberant, appropriately tender, dressing over midline with some strikethrough, removed with underlying incision c/d/I with steri strips, no drainage. Laparoscopic port incisions covered with steri-strips.   Ext:                  Warm and well perfused, no obvious deformities           Final Pathology Result:   N/A           Discharge Instructions and Follow-Up:     Discharge Procedure Orders   Reason for your hospital stay   Order Comments: Post-anesthesia monitoring after robot-assisted umbilical herniorrhaphy     Activity   Order Comments: Your activity upon discharge: no contact sports, no lifting, driving, or strenuous exercise for 4 weeks     Order Specific Question Answer Comments   Is discharge order? Yes      Adult [facility] Follow-up and recommended labs and tests   Order Comments: Follow up with Dr. Erickson, at [facility] [place] at [address] [place] [address] in [place],  , within 1 weeks      Appointments on [place] and/or [facility] (with [facility] or [facility] provider or service). Call [phone] if you haven't heard regarding these appointments within 7 days of discharge.     Discharge Instructions   Order  Comments: Discharge Instructions  Activity  - No lifting, pushing, pulling more than 15-20 pounds for 3-4 weeks  - Do not drive until you can press the brake pedal quickly and fully without pain  - Do not operate a motor vehicle while taking narcotic pain medications    Incisions  - Leave incision open to air.  Cover with gauze only if needed for comfort or to protect clothing from drainage    Drain Care  - You do not have a drain.      Medications  - Do not take any additional Tylenol (acetaminophen) while using a narcotic pain medication which includes acetaminophen  - Do not take more than 4,000mg of acetaminophen in any 24 hour period, as this can cause liver damage  - Take stool softeners such as Senna or Miralax while you are using narcotics, but stop if you develop diarrhea  - Wean yourself off of narcotic pain medications       Follow-Up:  - Call your primary care provider to touch base regarding your recent admission.    - Call or return sooner than your regularly scheduled visit if you develop any of the following: fever >101.5, uncontrolled pain, uncontrolled nausea or vomiting, as well as increased redness, swelling, or drainage from your wound.   -  Follow up appointment with Dr. Erickson in 1 week. We will schedule this appointment for you. If you do not hear from us in 3 business days, please call the General Surgery Clinic at [phone].  Call [phone] and ask to speak with the Surgery resident on call if you are having troubles in the evenings, at night, or on the weekend.     Diet   Order Comments: Follow this diet upon discharge: Current Diet:Orders Placed This Encounter      Regular Diet Adult     Order Specific Question Answer Comments   Is discharge order? Yes               Home Health Care:     Not needed           Discharge Disposition:     Discharged to home      Condition at discharge: Stable    Patient was discussed with staff, Dr. Bobby, on the day of discharge.    Bindu  MD Dalila  PGY-5 General Surgery
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
## 2024-12-26 ENCOUNTER — PATIENT OUTREACH (OUTPATIENT)
Dept: FAMILY MEDICINE | Facility: CLINIC | Age: 74
End: 2024-12-26
Payer: COMMERCIAL

## 2024-12-26 NOTE — TELEPHONE ENCOUNTER
ED / Discharge Outreach Protocol    Patient Contact    Attempt # 1    Was call answered?  No.  Left message on voicemail with information to call me back.    RITCHIE Childress, BSN, PHN, AMB-BC (she/her)  [facility] RN

## 2024-12-28 ENCOUNTER — NURSE TRIAGE (OUTPATIENT)
Dept: NURSING | Facility: CLINIC | Age: 74
End: 2024-12-28
Payer: COMMERCIAL
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
## 2024-12-30 NOTE — TELEPHONE ENCOUNTER
ED / Discharge Outreach Protocol    Patient Contact    Attempt # 2    Was call answered?  No.  Left message on voicemail with information to call me back.   MADHURI Aden

## 2025-01-07 ENCOUNTER — OFFICE VISIT (OUTPATIENT)
Dept: SURGERY | Facility: CLINIC | Age: 75
End: 2025-01-07
Payer: COMMERCIAL

## 2025-01-07 VITALS
HEIGHT: 72 IN | OXYGEN SATURATION: 99 % | DIASTOLIC BLOOD PRESSURE: 79 MMHG | SYSTOLIC BLOOD PRESSURE: 155 MMHG | BODY MASS INDEX: 30.88 KG/M2 | HEART RATE: 73 BPM | WEIGHT: 228 LBS

## 2025-01-07 DIAGNOSIS — Z98.890 POSTOPERATIVE STATE: Primary | ICD-10-CM

## 2025-01-07 ASSESSMENT — PAIN SCALES - GENERAL: PAINLEVEL_OUTOF10: NO PAIN (0)

## 2025-01-07 NOTE — PROGRESS NOTES
Don CLARKE Samson is status post:  12/23/2024  Davinci herniorrhaphy ventral (N/A) Procedure: HERNIORRHAPHY, VENTRAL umbilical , ROBOT-ASSISTED;  Surgeon: Shaquille Erickson MD;  Location: [facility] OR  Surgery without complications.  Had overnight stay because did not have ride home.  Post-op course without complications, still hurts but getting better.  Incisions examined, no signs of infection. Fascia intact.  All of the patients questions were answered.  Standard post-op instructions and restrictions given.  Letter for work, not to lift more than 5 lbs for 6 weeks.  Follow-up as needed

## 2025-01-07 NOTE — PATIENT INSTRUCTIONS
You met with Dr. Shaquille Erickson.      Today's visit instructions:    Return to the Surgery Clinic on an as needed basis. Please wear the abdominal binder during your waking hours.     If you have questions please contact Radha RN or Kori RN during regular clinic hours, Monday through Friday 7:30 AM - 4:00 PM, or you can contact us via Wordlock at anytime.       If you have urgent needs after-hours, weekends, or holidays please call the hospital at [phone] and ask to speak with our on-call General Surgery Team.    Appointment [phone]  Nurse Advice (Radha or Kori): [phone]   Surgery Scheduler (Kylie): [phone]  Fax: [phone]

## 2025-01-07 NOTE — NURSING NOTE
Chief Complaint   Patient presents with    Post-Op - General Surgery     PO, pt request       Vitals:    01/07/25 0930   BP: (!) 155/79   BP Location: Left arm   Patient Position: Sitting   Cuff Size: Adult Regular   Pulse: 73   SpO2: 99%   Weight: 103.4 kg (228 lb)   Height: 1.829 m (6')       Body mass index is 30.92 kg/m .                          Andrea Minor, EMT

## 2025-01-21 ENCOUNTER — NURSE TRIAGE (OUTPATIENT)
Dept: FAMILY MEDICINE | Facility: CLINIC | Age: 75
End: 2025-01-21
Payer: COMMERCIAL

## 2025-01-21 ENCOUNTER — OFFICE VISIT (OUTPATIENT)
Dept: URGENT CARE | Facility: URGENT CARE | Age: 75
End: 2025-01-21
Payer: COMMERCIAL

## 2025-01-21 ENCOUNTER — ANCILLARY PROCEDURE (OUTPATIENT)
Dept: GENERAL RADIOLOGY | Facility: CLINIC | Age: 75
End: 2025-01-21
Attending: FAMILY MEDICINE
Payer: COMMERCIAL

## 2025-01-21 VITALS
DIASTOLIC BLOOD PRESSURE: 101 MMHG | HEART RATE: 92 BPM | TEMPERATURE: 97.8 F | BODY MASS INDEX: 31.15 KG/M2 | SYSTOLIC BLOOD PRESSURE: 154 MMHG | HEIGHT: 72 IN | WEIGHT: 230 LBS | RESPIRATION RATE: 17 BRPM | OXYGEN SATURATION: 99 %

## 2025-01-21 DIAGNOSIS — R19.8 ABNORMAL BOWEL MOVEMENT: Primary | ICD-10-CM

## 2025-01-21 DIAGNOSIS — R19.8 ABNORMAL BOWEL MOVEMENT: ICD-10-CM

## 2025-01-21 PROCEDURE — 74019 RADEX ABDOMEN 2 VIEWS: CPT | Mod: TC | Performed by: RADIOLOGY

## 2025-01-21 PROCEDURE — 99214 OFFICE O/P EST MOD 30 MIN: CPT | Performed by: FAMILY MEDICINE

## 2025-01-21 NOTE — TELEPHONE ENCOUNTER
Pt has been experiencing constipation, intermittent nausea, intermittent abdominal cramps, occasional diarrhea since hernia repair on 12/23  Occasionally taking stool softeners but makes the stool very loose, has tried pepto bismol but sx with some relief  Started taking metamucil 1-2 days ago  Pt has been eating a bland diet with softer foods  Afebrile, no emesis, denies blood in stool  Patient had gen surg follow up on 1/7 and was told to follow up with PCP for further recommendations regarding this concern    Scheduled for telephone visit tomorrow 1/22 at 0800. Home care advice reviewed. The patient indicates understanding of these issues and agrees with the plan.    RITCHIE Childress BSN, PHN, AMB-BC (she/her)  [facility] RN    Reason for Disposition   Patient wants to be seen    Additional Information   Negative: Abdomen bloating or swelling are main symptoms   Negative: Abdomen pain is main symptom and male   Negative: Abdomen pain is main symptom and female   Negative: Rectal bleeding or blood in stool is main symptom   Negative: Vomiting bile (green color)   Negative: Patient sounds very sick or weak to the triager   Negative: Constant abdominal pain lasting > 2 hours   Negative: Vomiting and abdomen looks much more swollen than usual   Negative: Rectal pain or fullness from fecal impaction (rectum full of stool) and NOT better after SITZ bath, suppository or enema   Negative: Abdomen is more swollen than usual   Negative: Last bowel movement (BM) > 4 days ago   Negative: Leaking stool   Negative: MILD abdominal pain (e.g., does not interfere with normal activities) that comes and goes (cramps) and fever   Negative: Unable to have a bowel movement (BM) without manually removing stool (using finger to pull out stool or perform disimpaction)   Negative: Unable to have a bowel movement (BM) without using a laxative, suppository, or enema   Negative: Significant weight loss (more than 10  pounds [4.5 kg]; 5% or more) and not dieting   Negative: Mild swelling of abdomen (e.g., looks distended or swollen) AND new-onset or getting worse   Negative: Pencil-like, narrow stools    Protocols used: Constipation-A-OH

## 2025-01-22 NOTE — PATIENT INSTRUCTIONS
Seek help right away if you develop fever or new abdominal pain or vomiting      We will call you if your test is abnormal please be available by phone      If your symptoms are not improving in the next 3 days please call your doctors office

## 2025-01-22 NOTE — PROGRESS NOTES
Assessment & Plan     Abnormal bowel movement    - XR Abdomen 2 Views     Patient showing no pain with palpation or distention.  X-ray imaging was not strongly suggestive of obstruction but as we discussed if having issues with passing stool or developing new abdominal pain should be reevaluated may need to do CT imaging to investigate further this time is nontoxic-appearing and tolerating orals well .  He is choosing to proceed with Metamucil for mild constipation symptoms.  I expect ongoing improvement in his bowel quality now that he is no longer relying on opioid medication for post procedural pain    Jim Graham MD   [place] [facility]    Subjective     Esau is a 74 year old male who presents to clinic today for the following health issues:  Chief Complaint   Patient presents with    Gastrointestinal Problem     Having constipation at times then diarrhea, has been on pain meds so causing gi problems     Urgent Care     HPI    Had a recent umbilical hernia repair on December 23 recently last week he is having intermittent constipation and diarrhea this may be due to the medications he has been taking for pain but has been over a week since he is took a narcotic medication.  He has tried stool softeners which caused diarrhea but otherwise is having periods of constipation and rarely having good well-formed stools.  No fevers.  No increased abdominal pain.  No issues with incisional scars of the abdomen        Patient Active Problem List    Diagnosis Date Noted    Post-operative complication 12/23/2024     Priority: Medium    Chronic right hip pain 11/13/2024     Priority: Medium    Umbilical hernia without obstruction and without gangrene 07/05/2023     Priority: Medium    Venous stasis dermatitis of both lower extremities 07/05/2023     Priority: Medium    Hypertension goal BP (blood pressure) < 140/90 03/15/2018     Priority: Medium    Major depressive disorder, recurrent, moderate (H) 03/15/2018      Priority: Medium    Fatigue 09/15/2016     Priority: Medium       Current Outpatient Medications   Medication Sig Dispense Refill    acetaminophen (TYLENOL) 500 MG tablet Take 1-2 tablets (500-1,000 mg) by mouth every 6 hours as needed for mild pain Maximum daily dose 4,000mg. 100 tablet 4    amLODIPine (NORVASC) 5 MG tablet Take 1 tablet (5 mg) by mouth daily (Patient taking differently: Take 5 mg by mouth every morning.) 90 tablet 3    Lidocaine (LIDOCARE) 4 % Patch Place 1 patch onto the skin every 24 hours To prevent lidocaine toxicity, patient should be patch free for 12 hrs daily. 10 patch 0    metoprolol succinate ER (TOPROL XL) 25 MG 24 hr tablet Take 0.5 tablets (12.5 mg) by mouth daily. 60 tablet 3    olmesartan (BENICAR) 20 MG tablet Take 1 tablet (20 mg) by mouth daily (with breakfast) 90 tablet 3    cyclobenzaprine (FLEXERIL) 5 MG tablet Take 1 tablet (5 mg) by mouth 3 times daily as needed for muscle spasms. (Patient not taking: Reported on 1/21/2025) 30 tablet 0    HYDROcodone-acetaminophen (NORCO) 5-325 MG tablet Take 1-2 tablets by mouth every 4 hours as needed for moderate to severe pain. (Patient not taking: Reported on 1/21/2025) 20 tablet 0    senna-docusate (SENOKOT-S/PERICOLACE) 8.6-50 MG tablet Take 1-2 tablets by mouth 2 times daily. (Patient not taking: Reported on 1/21/2025) 30 tablet 0     No current facility-administered medications for this visit.           Objective    BP (!) 154/101   Pulse 92   Temp 97.8  F (36.6  C) (Temporal)   Resp 17   Ht 1.829 m (6')   Wt 104.3 kg (230 lb)   SpO2 99%   BMI 31.19 kg/m    Physical Exam   As noted above and including:   GEN: NAD, normal mentation  Soft, no pain with palpation, no distention, incisional scar clean dry intact    Results for orders placed or performed in visit on 01/21/25   XR Abdomen 2 Views     Status: None    Narrative    EXAM: XR ABDOMEN 2 VIEWS  LOCATION: [facility]  DATE:  1/21/2025    INDICATION: 1 month ago hernia procedure. irregular bowel movements and loose stools.  COMPARISON: None.      Impression    IMPRESSION: Mildly prominent small segment loop of bowel within the left quadrant which may be normal or possibly due to early obstruction. Could consider CT for further evaluation if indicated. Remaining bowel gas pattern within normal limits. Colon   within normal limits.                     The use of Dragon/[employer] dictation services may have been used to construct the content in this note; any grammatical or spelling errors are non-intentional. Please contact the author of this note directly if you are in need of any clarification.

## 2025-02-06 ENCOUNTER — OFFICE VISIT (OUTPATIENT)
Dept: URGENT CARE | Facility: URGENT CARE | Age: 75
End: 2025-02-06
Payer: COMMERCIAL

## 2025-02-06 VITALS
TEMPERATURE: 98.1 F | OXYGEN SATURATION: 94 % | RESPIRATION RATE: 12 BRPM | HEART RATE: 65 BPM | DIASTOLIC BLOOD PRESSURE: 80 MMHG | SYSTOLIC BLOOD PRESSURE: 136 MMHG

## 2025-02-06 DIAGNOSIS — M54.50 ACUTE MIDLINE LOW BACK PAIN WITHOUT SCIATICA: Primary | ICD-10-CM

## 2025-02-06 DIAGNOSIS — I48.91 ATRIAL FIBRILLATION, UNSPECIFIED TYPE (H): ICD-10-CM

## 2025-02-06 RX ORDER — KETOROLAC TROMETHAMINE 30 MG/ML
15 INJECTION, SOLUTION INTRAMUSCULAR; INTRAVENOUS ONCE
Status: COMPLETED | OUTPATIENT
Start: 2025-02-06 | End: 2025-02-06

## 2025-02-06 RX ADMIN — KETOROLAC TROMETHAMINE 15 MG: 30 INJECTION, SOLUTION INTRAMUSCULAR; INTRAVENOUS at 11:04

## 2025-02-06 NOTE — LETTER
February 6, 2025      Don CLARKE Samson  [address]  [facility] [address]        To Whom It May Concern:    Don Samson  was seen on 2//625.  Please excuse him form work yesterday 2/5/25 due to back pain. Please call us with any questions.        Sincerely,        Jim Graham MD    Electronically signed

## 2025-02-06 NOTE — PATIENT INSTRUCTIONS
Until your meeting with primary care in a couple weeks start once daily 81 mg baby aspirin as a blood thinner      Continue metoprolol as prescribed      can also try Tylenol as a pain medication for your low back when it is causing a flareup

## 2025-02-06 NOTE — PROGRESS NOTES
Assessment & Plan     Acute midline low back pain without sciatica  - ketorolac (TORADOL) injection 15 mg    Atrial fibrillation, unspecified type (H)       Reviewing medical records patient was supposed to initiate anticoagulation he is against the idea of using warfarin I will leave it up to him in his PCPs office to determine their plan is other options to meet with cardiology but they did not feel that he needed to be evaluated for 6 months from that last appointment in which they recommended. At the minimum I will ask he start a baby aspirin daily until then    Ventricular rate in the normal range but patient is still indeed in atrial fibrillation --I highly advised patient to continue the noted metoprolol which she is having doubts about.    Patient was given 15 mg of Toradol for his low back pain there is no signs of cauda equina syndrome patient declined an x-ray to screen for compression fracture. Can also take tylenol for discomfort.       Patient was set up for appointment with primary care on February 24    Jim Graham MD   [place]    Negro Cifuentes is a 74 year old male who presents to clinic today for the following health issues:  Chief Complaint   Patient presents with    Pain     Low back pain x1day, pt states he moved something last night, come and goes, pt rate pain 4/10, took muscle relaxer last night, takes aleve once in a while     HPI    Patient reporting about 2 months [place] him down onto the floor he landed on his lower back he is not going to be evaluated.  He denies any saddle anesthesia occasionally has pain rating down to his right thigh but most the pain is in his lower lumbar area he has tried Aleve for his discomfort.    Patient also discussing questions about whether or not he should be taking metoprolol      Patient Active Problem List    Diagnosis Date Noted    Post-operative  complication 12/23/2024     Priority: Medium    Chronic right hip pain 11/13/2024     Priority: Medium    Umbilical hernia without obstruction and without gangrene 07/05/2023     Priority: Medium    Venous stasis dermatitis of both lower extremities 07/05/2023     Priority: Medium    Hypertension goal BP (blood pressure) < 140/90 03/15/2018     Priority: Medium    Major depressive disorder, recurrent, moderate (H) 03/15/2018     Priority: Medium    Fatigue 09/15/2016     Priority: Medium       Current Outpatient Medications   Medication Sig Dispense Refill    acetaminophen (TYLENOL) 500 MG tablet Take 1-2 tablets (500-1,000 mg) by mouth every 6 hours as needed for mild pain Maximum daily dose 4,000mg. 100 tablet 4    amLODIPine (NORVASC) 5 MG tablet Take 1 tablet (5 mg) by mouth daily 90 tablet 3    cyclobenzaprine (FLEXERIL) 5 MG tablet Take 1 tablet (5 mg) by mouth 3 times daily as needed for muscle spasms. 30 tablet 0    Lidocaine (LIDOCARE) 4 % Patch Place 1 patch onto the skin every 24 hours To prevent lidocaine toxicity, patient should be patch free for 12 hrs daily. 10 patch 0    metoprolol succinate ER (TOPROL XL) 25 MG 24 hr tablet Take 0.5 tablets (12.5 mg) by mouth daily. 60 tablet 3    olmesartan (BENICAR) 20 MG tablet Take 1 tablet (20 mg) by mouth daily (with breakfast) 90 tablet 3    HYDROcodone-acetaminophen (NORCO) 5-325 MG tablet Take 1-2 tablets by mouth every 4 hours as needed for moderate to severe pain. (Patient not taking: Reported on 2/6/2025) 20 tablet 0    senna-docusate (SENOKOT-S/PERICOLACE) 8.6-50 MG tablet Take 1-2 tablets by mouth 2 times daily. (Patient not taking: Reported on 2/6/2025) 30 tablet 0     Current Facility-Administered Medications   Medication Dose Route Frequency Provider Last Rate Last Admin    ketorolac (TORADOL) injection 15 mg  15 mg Intramuscular Once                Objective    /80   Pulse 65   Temp 98.1  F (36.7  C) (Oral)   Resp 12   SpO2 94%   Physical  Exam   As noted above and including:   GEN: NAD  Gait: normal  CV: irregularly irregular, normal rate        Estimated Creatinine Clearance: 69.7 mL/min (A) (based on SCr of 1.22 mg/dL (H)).    No results found for any visits on 02/06/25.                  The use of Dragon/[employer]ation services may have been used to construct the content in this note; any grammatical or spelling errors are non-intentional. Please contact the author of this note directly if you are in need of any clarification.

## 2025-02-24 ENCOUNTER — OFFICE VISIT (OUTPATIENT)
Dept: FAMILY MEDICINE | Facility: CLINIC | Age: 75
End: 2025-02-24
Payer: COMMERCIAL

## 2025-02-24 VITALS
OXYGEN SATURATION: 98 % | TEMPERATURE: 97.1 F | DIASTOLIC BLOOD PRESSURE: 83 MMHG | HEART RATE: 89 BPM | RESPIRATION RATE: 20 BRPM | SYSTOLIC BLOOD PRESSURE: 138 MMHG

## 2025-02-24 DIAGNOSIS — I48.91 ATRIAL FIBRILLATION WITH SLOW VENTRICULAR RESPONSE (H): Primary | ICD-10-CM

## 2025-02-24 PROCEDURE — 99214 OFFICE O/P EST MOD 30 MIN: CPT | Performed by: STUDENT IN AN ORGANIZED HEALTH CARE EDUCATION/TRAINING PROGRAM

## 2025-02-24 RX ORDER — METOPROLOL SUCCINATE 25 MG/1
12.5 TABLET, EXTENDED RELEASE ORAL DAILY
Qty: 60 TABLET | Refills: 3 | Status: CANCELLED | OUTPATIENT
Start: 2025-02-24

## 2025-02-24 ASSESSMENT — PAIN SCALES - GENERAL: PAINLEVEL_OUTOF10: MILD PAIN (3)

## 2025-02-24 NOTE — PROGRESS NOTES
Assessment & Plan     Atrial fibrillation with slow ventricular response (H)  Pt diagnosed with Afib in December via EKG. Echo showed normal EF. Has met with cardiology. Pt does not want warfain. He is on metoprolol ER 12.5mg daily for rate control. Rate 89 today. Elected to start eliquis 5mg BID for stroke prevention. Counseled on risks/benefits. Should follow up with cardiology in June with repeat echo.  - apixaban ANTICOAGULANT (ELIQUIS ANTICOAGULANT) 5 MG tablet  Dispense: 180 tablet; Refill: 1      Subjective   Esau is a 75 year old, presenting for the following health issues:  Recheck Medication        2/24/2025     3:45 PM   Additional Questions   Roomed by Susan RUIZ         2/24/2025   Declines Weight   Did patient decline having their weight taken? Yes     History of Present Illness       Reason for visit:  Med check - blood thinners        Afib  -does not want warfarin  -noted in December 2024  -echo showed normal EF  -saw cards, started metoprolol and recommended anticoagulation for stroke reduction  -due for card follow up in June for repeat echo          Objective    /83   Pulse 89   Temp 97.1  F (36.2  C) (Temporal)   Resp 20   SpO2 98%   There is no height or weight on file to calculate BMI.    Physical Exam  Constitutional:       General: He is not in acute distress.     Appearance: He is not ill-appearing.   Pulmonary:      Effort: Pulmonary effort is normal.   Neurological:      General: No focal deficit present.      Mental Status: He is alert and oriented to person, place, and time.                Signed Electronically by: Darrick Schuler DO

## 2025-02-24 NOTE — PATIENT INSTRUCTIONS
Start eliquis 5mg twice/day. Let me know if it is too expensive.  Let us know if you hit your head or fall.  Let us know if you are having blood in the stool or urine, or having severe bruising.      Dr. Schuler

## 2025-02-25 ENCOUNTER — THERAPY VISIT (OUTPATIENT)
Dept: PHYSICAL THERAPY | Facility: CLINIC | Age: 75
End: 2025-02-25
Payer: COMMERCIAL

## 2025-02-25 DIAGNOSIS — G89.29 CHRONIC MIDLINE LOW BACK PAIN WITHOUT SCIATICA: ICD-10-CM

## 2025-02-25 DIAGNOSIS — M54.50 CHRONIC MIDLINE LOW BACK PAIN WITHOUT SCIATICA: ICD-10-CM

## 2025-02-25 DIAGNOSIS — M25.551 HIP PAIN, RIGHT: Primary | ICD-10-CM

## 2025-02-25 PROCEDURE — 97161 PT EVAL LOW COMPLEX 20 MIN: CPT | Mod: GP

## 2025-02-25 PROCEDURE — 97110 THERAPEUTIC EXERCISES: CPT | Mod: GP

## 2025-02-25 NOTE — PROGRESS NOTES
PHYSICAL THERAPY EVALUATION  Type of Visit: Evaluation       Fall Risk Screen:  Fall screen completed by: PT  Have you fallen 2 or more times in the past year?: No  Have you fallen and had an injury in the past year?: No  Is patient a fall risk?: No    Subjective         Presenting condition or subjective complaint:    Date of onset: 11/25/24    Relevant medical history:     Dates & types of surgery:      Prior diagnostic imaging/testing results:       Prior therapy history for the same diagnosis, illness or injury:        KEY PT FINDINGS:  1) Symptom improvement with lumbar roll  2) LE weakness (glutes, quads, and hamstrings)  3) Limited lumbar extension    Physical Therapy Initial Evaluation: Subjective History     Injury/Condition Details:  Presenting Complaint Patient presents to therapy to therapy with low back and hip pain. Pain is located on the R side of low back. Pain is described as a sharp pain - frequency depends on the day.    Onset Timing/Date Pain worsened over the past 3 months   Mechanism Gradual onset     Symptom Behavior Details    Primary Symptoms Constant symptoms; worsen with activity   Worst Pain 6/10   Symptom Provocators Standing, lifting   Best Pain 0/10    Symptom Relievers Aleve   Time of day dependent? No   Recent symptom change? 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   Usual physical activities  (within past year) N/A   Orthopaedic history No   Notable medical history See Epic Chart   Patient Reported Health good        Living Environment  Social support:  Alone  Type of home: APartment    Stairs to enter the home: 2        Ramp:   No  Stairs inside the home:   14      Help at home:  No  Equipment owned:   No    Employment:    Yes, wash dishes and works at [employer]  Hobbies/Interests:  N/A    Patient goals for therapy:   For the pain to go away       Objective     LUMBAR:    Posture: Forward flexed, increased thoracic flexion      Functional:  - Sit to stand: anterior knee translation    Neurological:    Motor Deficit: Intact  Myotomes L R   L1-2 (hip flexion)     L3 (knee extension)     L4 (ankle DF)     L5 (g. toe ext)     S1 (ankle PF or knee flex)       Sensory Deficit, Reflexes: Reflexes not formally assessed    Dural Signs:   L R   Slump - +   SLR         AROM: (Major, Moderate, Minimal or Nil loss)  Movement Loss Guillermo Mod Min Nil Pain   Flexion    x    Extension  x      Side Gliding L x       Side Gliding R  x            Repeated Motions Testing: Improvement with lumbar extension    Palpation: Slight tenderness to upper to mid lumbar spine           Assessment & Plan   CLINICAL IMPRESSIONS  Medical Diagnosis: Hip pain, right (M25.551), Chronic midline low back pain without sciatica (M54.50, G89.29)    Treatment Diagnosis: Low back pain with mobility impairment   Impression/Assessment: Patient is a 75 year old male with R sided low back pain complaints.  The following significant findings have been identified: Pain, Decreased ROM/flexibility, Decreased strength, Impaired muscle performance, Decreased activity tolerance, and Impaired posture. These impairments interfere with their ability to perform self care tasks, work tasks, household mobility, and community mobility as compared to previous level of function.     Clinical Decision Making (Complexity):  Clinical Presentation: Stable/Uncomplicated  Clinical Presentation Rationale: based on medical and personal factors listed in PT evaluation  Clinical Decision Making (Complexity): Low complexity    PLAN OF CARE  Treatment Interventions:  Interventions: Manual Therapy, Neuromuscular Re-education, Therapeutic Activity, Therapeutic Exercise, Self-Care/Home Management    Long Term Goals     PT Goal 1  Goal Identifier: Standing tolerance  Goal Description: Patient will be able to  stand for >1 hr. with no low back pain  Rationale: to maximize safety and independence with performance of ADLs and functional tasks  Target Date: 04/01/25  PT Goal 2  Goal Identifier: Lifting  Goal Description: Patient will be able to lift a (20lbs) object from floor to waist w/o low back pain  Rationale: to maximize safety and independence within the community;to maximize safety and independence within the home  Target Date: 05/06/25      Frequency of Treatment: 1x / week  Duration of Treatment: 10 weeks    Recommended Referrals to Other Professionals:  Patient appropriate for physical therapy, no referral required.  Education Assessment:   Learner/Method: Patient;No Barriers to Learning  Education Comments: Discussed relevant anatomy, proper exercise progression, and goals from physical therapy    Risks and benefits of evaluation/treatment have been explained.   Patient/Family/caregiver agrees with Plan of Care.     Evaluation Time:     PT Eval, Low Complexity Minutes (62608): 20       Signing Clinician: Torin Thomas, PT

## 2025-02-26 ENCOUNTER — TELEPHONE (OUTPATIENT)
Dept: FAMILY MEDICINE | Facility: CLINIC | Age: 75
End: 2025-02-26
Payer: COMMERCIAL

## 2025-03-10 ENCOUNTER — ANCILLARY PROCEDURE (OUTPATIENT)
Dept: GENERAL RADIOLOGY | Facility: CLINIC | Age: 75
End: 2025-03-10
Attending: PHYSICIAN ASSISTANT
Payer: COMMERCIAL

## 2025-03-10 ENCOUNTER — OFFICE VISIT (OUTPATIENT)
Dept: URGENT CARE | Facility: URGENT CARE | Age: 75
End: 2025-03-10
Payer: COMMERCIAL

## 2025-03-10 VITALS
TEMPERATURE: 96.9 F | HEIGHT: 72 IN | HEART RATE: 76 BPM | SYSTOLIC BLOOD PRESSURE: 139 MMHG | WEIGHT: 223 LBS | RESPIRATION RATE: 16 BRPM | BODY MASS INDEX: 30.2 KG/M2 | OXYGEN SATURATION: 97 % | DIASTOLIC BLOOD PRESSURE: 87 MMHG

## 2025-03-10 DIAGNOSIS — I48.91 ATRIAL FIBRILLATION, UNSPECIFIED TYPE (H): ICD-10-CM

## 2025-03-10 DIAGNOSIS — M54.50 ACUTE MIDLINE LOW BACK PAIN WITHOUT SCIATICA: Primary | ICD-10-CM

## 2025-03-10 PROCEDURE — 3075F SYST BP GE 130 - 139MM HG: CPT | Performed by: PHYSICIAN ASSISTANT

## 2025-03-10 PROCEDURE — 3079F DIAST BP 80-89 MM HG: CPT | Performed by: PHYSICIAN ASSISTANT

## 2025-03-10 PROCEDURE — 72080 X-RAY EXAM THORACOLMB 2/> VW: CPT | Mod: TC | Performed by: RADIOLOGY

## 2025-03-10 PROCEDURE — 1125F AMNT PAIN NOTED PAIN PRSNT: CPT | Performed by: PHYSICIAN ASSISTANT

## 2025-03-10 PROCEDURE — 99214 OFFICE O/P EST MOD 30 MIN: CPT | Performed by: PHYSICIAN ASSISTANT

## 2025-03-10 RX ORDER — CYCLOBENZAPRINE HCL 5 MG
TABLET ORAL
Qty: 30 TABLET | Refills: 0 | Status: SHIPPED | OUTPATIENT
Start: 2025-03-10

## 2025-03-10 ASSESSMENT — PAIN SCALES - GENERAL: PAINLEVEL_OUTOF10: MODERATE PAIN (5)

## 2025-03-10 NOTE — PATIENT INSTRUCTIONS
(M54.50) Acute midline low back pain without sciatica  (primary encounter diagnosis)  Comment:   Plan: XR Thoracic Lumbar Spine 2 Views,         cyclobenzaprine (FLEXERIL) 5 MG tablet  Continue with Aleve twice a day as needed.  Take with food.      You may benefit from follow up with the Spine clinic should symptoms persist or recur.  Discuss with your new primary clinician at your next visit 3/13/2025.            (I48.91) Atrial fibrillation, unspecified type (H)  Comment:   Plan: Follow up with Brent Armendariz on 3/13/2025 at 4:40pm.
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
## 2025-03-10 NOTE — LETTER
March 10, 2025      Don Samson  [address]  [facility] [address]        To Whom It May Concern:    Don Samson was seen in our clinic. He may return to work on Thursday, 3/13/2025.      Sincerely,      Brittnee OREILLY PA-C      Electronically signed

## 2025-03-13 ENCOUNTER — OFFICE VISIT (OUTPATIENT)
Dept: FAMILY MEDICINE | Facility: CLINIC | Age: 75
End: 2025-03-13
Payer: COMMERCIAL

## 2025-03-13 VITALS
OXYGEN SATURATION: 99 % | SYSTOLIC BLOOD PRESSURE: 110 MMHG | HEART RATE: 83 BPM | HEIGHT: 72 IN | WEIGHT: 221.6 LBS | DIASTOLIC BLOOD PRESSURE: 70 MMHG | TEMPERATURE: 98.3 F | BODY MASS INDEX: 30.02 KG/M2 | RESPIRATION RATE: 16 BRPM

## 2025-03-13 DIAGNOSIS — I48.91 ATRIAL FIBRILLATION WITH SLOW VENTRICULAR RESPONSE (H): ICD-10-CM

## 2025-03-13 DIAGNOSIS — F33.1 MAJOR DEPRESSIVE DISORDER, RECURRENT, MODERATE (H): Primary | ICD-10-CM

## 2025-03-13 ASSESSMENT — PATIENT HEALTH QUESTIONNAIRE - PHQ9: SUM OF ALL RESPONSES TO PHQ QUESTIONS 1-9: 4

## 2025-03-13 ASSESSMENT — PAIN SCALES - GENERAL: PAINLEVEL_OUTOF10: NO PAIN (0)

## 2025-03-13 NOTE — PATIENT INSTRUCTIONS
Hernando Guillen, EZRA, LICSW (he/him)  Psychotherapist     [facility] ([place])  [address] [place] [address],  [address]  [place], [place]. [phone]  [email or web address]
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
## 2025-03-18 ENCOUNTER — DOCUMENTATION ONLY (OUTPATIENT)
Dept: ANTICOAGULATION | Facility: CLINIC | Age: 75
End: 2025-03-18
Payer: COMMERCIAL

## 2025-03-18 NOTE — PROGRESS NOTES
Anticoagulant Therapeutic Duplication    Duplicate orders identified: different medication of the same therapeutic class    Both apixaban and rivaroxaban ordered to see which was most cost effective. Duplicate anticoagulant has been discontinued.    Active anticoagulant: both rivaroxaban and eliquis for now until cost is determined    Plan made per [facility] anticoagulation protocol.    Anna Wagner RN  3/18/2025

## 2025-03-21 NOTE — TELEPHONE ENCOUNTER
Caller: Patient    Doctor:      Reason for call: Called to Schedule a consult.    Insurance is [employer] [facility], Advise insurance is par with [facility]    Call back#:      Patient has been scheduled. Please close encounter thank you!

## 2025-04-28 DIAGNOSIS — I10 HYPERTENSION GOAL BP (BLOOD PRESSURE) < 140/90: ICD-10-CM

## 2025-04-28 RX ORDER — OLMESARTAN MEDOXOMIL 20 MG/1
20 TABLET ORAL
Qty: 90 TABLET | Refills: 1 | Status: SHIPPED | OUTPATIENT
Start: 2025-04-28

## 2025-04-28 RX ORDER — AMLODIPINE BESYLATE 5 MG/1
5 TABLET ORAL DAILY
Qty: 90 TABLET | Refills: 2 | Status: SHIPPED | OUTPATIENT
Start: 2025-04-28

## 2025-05-03 ENCOUNTER — HEALTH MAINTENANCE LETTER (OUTPATIENT)
Age: 75
End: 2025-05-03

## 2025-05-04 ENCOUNTER — OFFICE VISIT (OUTPATIENT)
Dept: URGENT CARE | Facility: URGENT CARE | Age: 75
End: 2025-05-04
Payer: COMMERCIAL

## 2025-05-04 VITALS
TEMPERATURE: 98.6 F | DIASTOLIC BLOOD PRESSURE: 79 MMHG | HEART RATE: 72 BPM | WEIGHT: 228 LBS | SYSTOLIC BLOOD PRESSURE: 147 MMHG | OXYGEN SATURATION: 98 % | BODY MASS INDEX: 30.92 KG/M2 | RESPIRATION RATE: 12 BRPM

## 2025-05-04 DIAGNOSIS — M25.572 ACUTE LEFT ANKLE PAIN: Primary | ICD-10-CM

## 2025-05-04 PROCEDURE — 96372 THER/PROPH/DIAG INJ SC/IM: CPT | Performed by: NURSE PRACTITIONER

## 2025-05-04 PROCEDURE — 99214 OFFICE O/P EST MOD 30 MIN: CPT | Mod: 25 | Performed by: NURSE PRACTITIONER

## 2025-05-04 PROCEDURE — 3077F SYST BP >= 140 MM HG: CPT | Performed by: NURSE PRACTITIONER

## 2025-05-04 PROCEDURE — 3078F DIAST BP <80 MM HG: CPT | Performed by: NURSE PRACTITIONER

## 2025-05-04 RX ORDER — KETOROLAC TROMETHAMINE 30 MG/ML
15 INJECTION, SOLUTION INTRAMUSCULAR; INTRAVENOUS ONCE
Status: COMPLETED | OUTPATIENT
Start: 2025-05-04 | End: 2025-05-04

## 2025-05-04 RX ADMIN — KETOROLAC TROMETHAMINE 15 MG: 30 INJECTION, SOLUTION INTRAMUSCULAR; INTRAVENOUS at 16:33

## 2025-05-04 NOTE — PROGRESS NOTES
Chief Complaint   Patient presents with    Pain     X 1 week  Left ankle pain  No known injury  Throbbing   Denies swelling, numb or tingling      SUBJECTIVE:  Don Samson is a 75 year old male presenting with left ankle pain throbbing tenderness over the past week.  Believes it is from walking in old shoes.  No redness warmth pitting edema bulging varicose veins.  No obvious injury. Has not tried anything for it, compression makes his legs worse. Declines history of DVT PE gout cellulitis.  He was prescribed Eliquis and only took a couple doses, did not like the side effects and does not feel he needs to be on this.  Patient endorses that he is quite agitated with the healthcare system and being told what to do.  He absolutely does not want to go anywhere for an ultrasound on this leg and says he might as well die.  When further questioned he declines active suicidal ideation or plan.  He mostly needs a work note.  Has benefited from Toradol pain shot in the clinic previously.    Estimated Creatinine Clearance: 67 mL/min (A) (based on SCr of 1.22 mg/dL (H)).    Past Medical History:   Diagnosis Date    Ankle pain 05/21/2016    Ankle sprain and strain 09/16/2010    Atrial fibrillation with slow ventricular response (H)     CARDIOVASCULAR SCREENING; LDL GOAL LESS THAN 130 09/06/2016    Depression     declines treatment 4/1/16    HTN (hypertension)     declines treatment 4/1/16    Left knee pain 06/18/2015    Plantar fasciitis 09/16/2010    Right knee pain 12/12/2016    Tibialis posterior tendonitis 05/21/2016    Umbilical hernia without obstruction and without gangrene      Current Outpatient Medications   Medication Sig Dispense Refill    acetaminophen (TYLENOL) 500 MG tablet Take 1-2 tablets (500-1,000 mg) by mouth every 6 hours as needed for mild pain Maximum daily dose 4,000mg. 100 tablet 4    amLODIPine (NORVASC) 5 MG tablet Take 1 tablet (5 mg) by mouth daily 90 tablet 2    cyclobenzaprine (FLEXERIL) 5 MG  tablet Take 1 po at bedtime prn back spasms, may repeat q 8 hours prn (causes drowsiness) 30 tablet 0    cyclobenzaprine (FLEXERIL) 5 MG tablet Take 1 tablet (5 mg) by mouth 3 times daily as needed for muscle spasms. 30 tablet 0    Lidocaine (LIDOCARE) 4 % Patch Place 1 patch onto the skin every 24 hours To prevent lidocaine toxicity, patient should be patch free for 12 hrs daily. 10 patch 0    metoprolol succinate ER (TOPROL XL) 25 MG 24 hr tablet Take 0.5 tablets (12.5 mg) by mouth daily. 60 tablet 3    olmesartan (BENICAR) 20 MG tablet Take 1 tablet (20 mg) by mouth daily (with breakfast) 90 tablet 1     No current facility-administered medications for this visit.     Social History     Tobacco Use    Smoking status: Former     Current packs/day: 0.00     Types: Cigarettes     Quit date: 1970     Years since quittin.3     Passive exposure: Never    Smokeless tobacco: Never   Substance Use Topics    Alcohol use: Yes     Comment: occ     Allergies   Allergen Reactions    Ibuprofen Nausea and Other (See Comments)     Other reaction(s): Abdominal pain    Prednisone Other (See Comments)     Review of Systems   ROS: 10 point ROS neg other than the symptoms noted above in the HPI.    OBJECTIVE:   BP (!) 147/79   Pulse 72   Temp 98.6  F (37  C) (Temporal)   Resp 12   Wt 103.4 kg (228 lb)   SpO2 98%   BMI 30.92 kg/m      Physical Exam  Vitals reviewed.   Constitutional:       Appearance: Normal appearance.   HENT:      Head: Normocephalic and atraumatic.      Nose: Nose normal.      Mouth/Throat:      Mouth: Mucous membranes are moist.      Pharynx: Oropharynx is clear.   Eyes:      Extraocular Movements: Extraocular movements intact.      Conjunctiva/sclera: Conjunctivae normal.      Pupils: Pupils are equal, round, and reactive to light.   Cardiovascular:      Rate and Rhythm: Normal rate.      Pulses: Normal pulses.   Pulmonary:      Effort: Pulmonary effort is normal.   Musculoskeletal:         General:  Tenderness present. No swelling. Normal range of motion.      Cervical back: Normal range of motion and neck supple.      Right lower leg: No edema (equal calf sizes no pitting edema erythema warth).      Left lower leg: No edema (bilateral varicosities).   Skin:     General: Skin is warm and dry.      Findings: No bruising or rash.   Neurological:      General: No focal deficit present.      Mental Status: He is alert and oriented to person, place, and time.       ASSESSMENT:    ICD-10-CM    1. Acute left ankle pain  M25.572 ketorolac (TORADOL) injection 15 mg        PLAN:     Left ankle pain differentials include sprain tendinitis gout cellulitis DVT thrombophlebitis  Discussed option for workup including x-ray CBC uric acid D-dimer  Patient declines further workup here today as he absolutely will not go to get an ultrasound if his D-dimer is elevated  He understands the risks involved  Encouraged close follow-up with primary care  Rest ice compression sleeve elevation  Toradol given in clinic given request  Tylenol and topicals  ER if severe worsening pain redness swelling warmth pitting edema traveling up the leg cough shortness of breath bloody sputum fevers      Follow up with primary care provider with any problems, questions or concerns or if symptoms worsen or fail to improve. Patient agreed to plan and verbalized understanding.    MICHELLE Duran-BC  [facility]

## 2025-05-04 NOTE — PROGRESS NOTES
Urgent Care Clinic Visit    Chief Complaint   Patient presents with    Pain     X 1 week  Left ankle pain  No known injury  Throbbing   Denies swelling, numb or tingling

## 2025-05-04 NOTE — LETTER
May 4, 2025      Don Samson  [address]  [facility] [id number]        To Whom It May Concern:    Don Samson was seen in our clinic today. Please excuse medical related absences. May return to work once improved and tolerated.      Sincerely,      Josette Ramos      Electronically signed

## 2025-05-06 ENCOUNTER — OFFICE VISIT (OUTPATIENT)
Dept: URGENT CARE | Facility: URGENT CARE | Age: 75
End: 2025-05-06
Payer: COMMERCIAL

## 2025-05-06 ENCOUNTER — ANCILLARY PROCEDURE (OUTPATIENT)
Dept: GENERAL RADIOLOGY | Facility: CLINIC | Age: 75
End: 2025-05-06
Attending: FAMILY MEDICINE
Payer: COMMERCIAL

## 2025-05-06 VITALS
OXYGEN SATURATION: 96 % | DIASTOLIC BLOOD PRESSURE: 87 MMHG | HEIGHT: 72 IN | HEART RATE: 62 BPM | TEMPERATURE: 97.7 F | SYSTOLIC BLOOD PRESSURE: 134 MMHG | RESPIRATION RATE: 15 BRPM | WEIGHT: 228 LBS | BODY MASS INDEX: 30.88 KG/M2

## 2025-05-06 DIAGNOSIS — M25.572 ACUTE LEFT ANKLE PAIN: Primary | ICD-10-CM

## 2025-05-06 DIAGNOSIS — M77.32 CALCANEAL SPUR OF FOOT, LEFT: ICD-10-CM

## 2025-05-06 PROCEDURE — 99214 OFFICE O/P EST MOD 30 MIN: CPT | Performed by: FAMILY MEDICINE

## 2025-05-06 PROCEDURE — 3075F SYST BP GE 130 - 139MM HG: CPT | Performed by: FAMILY MEDICINE

## 2025-05-06 PROCEDURE — 3079F DIAST BP 80-89 MM HG: CPT | Performed by: FAMILY MEDICINE

## 2025-05-06 PROCEDURE — 73610 X-RAY EXAM OF ANKLE: CPT | Mod: TC | Performed by: RADIOLOGY

## 2025-05-06 NOTE — PROGRESS NOTES
Chief Complaint   Patient presents with    Urgent Care    Foot Pain     Wants xray of left ankle   Was seen 5/4 for pain in ankle/foot        Esau was seen today for urgent care and foot pain.    Diagnoses and all orders for this visit:    Acute left ankle pain  -     XR Ankle Left G/E 3 Views        D/d  Ankle  strain, sprain, contusion, fracture, dislocation, tendon injury, and underlying chronic DJD likely    PLAN:  activity modification  Did discuss with patient of the negative x-ray result no fracture was noted but there was arthritic changes noted , calcaneal spurring   Advised to continue using insoles to help with the pain patient did notice the pain was worse with the change of shoe.  Also reviewed with patient if pain continues should follow-up with podiatrist for  See orders in [facility].    SUBJECTIVE:  Don Samson is a 75 year old male who complains of pain on the  left ankle 7 days ago.  Patient does not recall any recent injury but does say he might have had some ankle sprain but does not recall any recent 1.  Pain is mostly when he is bearing weight pain is mostly located in the lateral aspect of the left ankle and sometimes in the medial aspect.  Denies any significant swelling redness in the ankle.  Patient has been doing Aleve with some relief in his symptoms There is pain swelling at the lateral aspect of that ankle.     OBJECTIVE:  He appears well, vital signs are normal. There is  tenderness over the lateral malleolus and the medial malleolus . No tenderness over the medial aspect of the ankle. The fifth metatarsal is not tender. The ankle joint is intact without excessive opening on stressing. X-ray: no fracture or dislocation noted The rest of the foot, ankle and leg exam is normal.    Yolis Grijalva MD

## 2025-05-06 NOTE — PROGRESS NOTES
Urgent Care Clinic Visit    Chief Complaint   Patient presents with    Urgent Care    Foot Pain     Wants xray of left ankle   Was seen 5/4 for pain in ankle/foot                5/6/2025    12:33 PM   Additional Questions   Roomed by juana lira

## 2025-05-23 ENCOUNTER — RESULTS FOLLOW-UP (OUTPATIENT)
Dept: URGENT CARE | Facility: URGENT CARE | Age: 75
End: 2025-05-23

## 2025-05-23 ENCOUNTER — ANCILLARY PROCEDURE (OUTPATIENT)
Dept: GENERAL RADIOLOGY | Facility: CLINIC | Age: 75
End: 2025-05-23
Attending: PHYSICIAN ASSISTANT
Payer: COMMERCIAL

## 2025-05-23 DIAGNOSIS — M54.50 ACUTE BILATERAL LOW BACK PAIN WITHOUT SCIATICA: ICD-10-CM

## 2025-05-23 PROCEDURE — 71046 X-RAY EXAM CHEST 2 VIEWS: CPT | Mod: TC | Performed by: RADIOLOGY

## 2025-07-28 ENCOUNTER — OFFICE VISIT (OUTPATIENT)
Dept: URGENT CARE | Facility: URGENT CARE | Age: 75
End: 2025-07-28
Payer: COMMERCIAL

## 2025-07-28 VITALS
TEMPERATURE: 97 F | OXYGEN SATURATION: 97 % | BODY MASS INDEX: 29.29 KG/M2 | HEART RATE: 84 BPM | DIASTOLIC BLOOD PRESSURE: 89 MMHG | WEIGHT: 216 LBS | SYSTOLIC BLOOD PRESSURE: 131 MMHG | RESPIRATION RATE: 20 BRPM

## 2025-07-28 DIAGNOSIS — G44.209 ACUTE NON INTRACTABLE TENSION-TYPE HEADACHE: ICD-10-CM

## 2025-07-28 DIAGNOSIS — M54.50 ACUTE BILATERAL LOW BACK PAIN WITHOUT SCIATICA: Primary | ICD-10-CM

## 2025-07-28 PROCEDURE — 3075F SYST BP GE 130 - 139MM HG: CPT | Performed by: PHYSICIAN ASSISTANT

## 2025-07-28 PROCEDURE — 3079F DIAST BP 80-89 MM HG: CPT | Performed by: PHYSICIAN ASSISTANT

## 2025-07-28 PROCEDURE — 99214 OFFICE O/P EST MOD 30 MIN: CPT | Mod: 25 | Performed by: PHYSICIAN ASSISTANT

## 2025-07-28 PROCEDURE — 96372 THER/PROPH/DIAG INJ SC/IM: CPT | Performed by: PHYSICIAN ASSISTANT

## 2025-07-28 RX ORDER — KETOROLAC TROMETHAMINE 30 MG/ML
15 INJECTION, SOLUTION INTRAMUSCULAR; INTRAVENOUS ONCE
Status: COMPLETED | OUTPATIENT
Start: 2025-07-28 | End: 2025-07-28

## 2025-07-28 RX ADMIN — KETOROLAC TROMETHAMINE 15 MG: 30 INJECTION, SOLUTION INTRAMUSCULAR; INTRAVENOUS at 11:05

## 2025-07-28 NOTE — PROGRESS NOTES
Urgent Care Clinic Visit    Chief Complaint   Patient presents with    Back Pain     No injury, pain on and off for a while     Headache     Since 1 week ago                7/28/2025    10:15 AM   Additional Questions   Roomed by KOBE Escudero

## 2025-07-28 NOTE — PATIENT INSTRUCTIONS
No NSAIDs (Aleve, Advil, Ibuprofen) for the rest of the day.  OK to take Tylenol for pain  Gentle stretching  Use ice / heat on the area

## 2025-07-28 NOTE — PROGRESS NOTES
"Assessment & Plan     1. Acute bilateral low back pain without sciatica (Primary)  Differential diagnosis includes, but is not limited to, musculoskeletal sprain/strain, bony fracture, radiculopathy (spinal stenosis, herniated disc, degenerative disease), cauda equina/conus medullaris syndrome, infectious pathology (epidural abscess, osteomyelitis), metastatic lesion, renal colic, AAA, aortic dissection, pyelonephritis, zoster, and referred intra-abdominal processes.  Patient requests toradol, Latest creatinine and GFR normal. Toradol given. Patient was instructed to stay for AVS and to see if he was improved. He left after injection, attempted to call, but went to  X3. No NSAIDs for the rest of the day  He declines PT and/or spine follow-up.  - ketorolac (TORADOL) injection 15 mg      2. Acute non intractable tension-type headache  Mild at this time.  He is neurologically intact.  Tylenol for HA.   RED FLAG symptoms discussed for follow-up         Diagnosis and treatment plan was reviewed with patient and/or family.   We went over any labs or imaging. Discussed worsening symptoms or little to no relief despite treatment plan to follow-up with PCP or return to clinic.  Patient verbalizes understanding. All questions were addressed and answered.     Ginny Zhu PA-C  [facility]    CHIEF COMPLAINT:   Chief Complaint   Patient presents with    Back Pain     No injury, pain on and off for a while     Headache     Since 1 week ago      Subjective     Esau is a 75 year old male who presents to clinic today for evaluation.    ## Back Pain. Off and on for \"a while\" Located in the mid lower back in the middle.   Pain is currently \"not as bad\" He takes Tylenol and aleve for his pain.   He has done physical therapy in the past, but did not have good results.    Patient denies fever, chills, fatigue, weight loss, fecal or urinary incontinence, lower extremity numbness or tingling, or " lower extremity weakness.    ##Headache. Been present for about one week. He rates it as a 4/10. Located in the frontal aspect. He has taken Tylenol, but does not seem to help. He denies having sinus congestion.   He ate a lot of watermelon on Saturday, felt indigestion that evening. No abdominal pain.   Headache has been bothering him.     No change in vision. No numbness / tingling. No neck pain or weakness.     Past Medical History:   Diagnosis Date    Ankle pain 2016    Ankle sprain and strain 2010    Atrial fibrillation with slow ventricular response (H)     CARDIOVASCULAR SCREENING; LDL GOAL LESS THAN 130 2016    Depression     declines treatment 16    HTN (hypertension)     declines treatment 16    Left knee pain 2015    Plantar fasciitis 2010    Right knee pain 2016    Tibialis posterior tendonitis 2016    Umbilical hernia without obstruction and without gangrene      Past Surgical History:   Procedure Laterality Date    DAVINCI HERNIORRHAPHY VENTRAL N/A 2024    Procedure: HERNIORRHAPHY, VENTRAL umbilical , ROBOT-ASSISTED;  Surgeon: Shaquille Erickson MD;  Location: [facility] OR    EGD      HERNIORRHAPHY INGUINAL BILATERAL      RIH 2004, [facility]     NASAL/SINUS POLYPECTOMY      Nasal-Sinus Polypectomy     Social History     Tobacco Use    Smoking status: Former     Current packs/day: 0.00     Types: Cigarettes     Quit date: 1970     Years since quittin.6     Passive exposure: Never    Smokeless tobacco: Never   Substance Use Topics    Alcohol use: Yes     Comment: occ     Current Outpatient Medications   Medication Sig Dispense Refill    acetaminophen (TYLENOL) 500 MG tablet Take 1-2 tablets (500-1,000 mg) by mouth every 6 hours as needed for mild pain Maximum daily dose 4,000mg. 100 tablet 4    amLODIPine (NORVASC) 5 MG tablet Take 1 tablet (5 mg) by mouth daily 90 tablet 2    Lidocaine (LIDOCARE) 4 % Patch Place 1 patch onto the skin every 24  hours To prevent lidocaine toxicity, patient should be patch free for 12 hrs daily. 10 patch 0    metoprolol succinate ER (TOPROL XL) 25 MG 24 hr tablet Take 0.5 tablets (12.5 mg) by mouth daily. 60 tablet 3    olmesartan (BENICAR) 20 MG tablet Take 1 tablet (20 mg) by mouth daily (with breakfast) 90 tablet 1    cyclobenzaprine (FLEXERIL) 5 MG tablet Take 1 po at bedtime prn back spasms, may repeat q 8 hours prn (causes drowsiness) (Patient not taking: Reported on 7/28/2025) 30 tablet 0    cyclobenzaprine (FLEXERIL) 5 MG tablet Take 1 tablet (5 mg) by mouth 3 times daily as needed for muscle spasms. (Patient not taking: Reported on 7/28/2025) 30 tablet 0     No current facility-administered medications for this visit.     Allergies   Allergen Reactions    Ibuprofen Nausea and Other (See Comments)     Other reaction(s): Abdominal pain    Prednisone Other (See Comments)       10 point ROS of systems were all negative except for pertinent positives noted in my HPI.      Exam:   /89 (BP Location: Right arm, Patient Position: Sitting, Cuff Size: Adult Large)   Pulse 84   Temp 97  F (36.1  C) (Temporal)   Resp 20   Wt 98 kg (216 lb)   SpO2 97%   BMI 29.29 kg/m    Physical Exam  Constitutional:       Appearance: Normal appearance.   HENT:      Head: Normocephalic and atraumatic.   Eyes:      Extraocular Movements: Extraocular movements intact.      Conjunctiva/sclera: Conjunctivae normal.   Cardiovascular:      Rate and Rhythm: Normal rate and regular rhythm.   Pulmonary:      Effort: Pulmonary effort is normal.      Breath sounds: Normal breath sounds.   Musculoskeletal:      Cervical back: Full passive range of motion without pain.      Thoracic back: Normal.      Lumbar back: Tenderness present. No spasms. Normal range of motion. Negative right straight leg raise test and negative left straight leg raise test.      Comments: Strength and sensation are intact B/L.   Skin:     General: Skin is warm and dry.    Neurological:      General: No focal deficit present.      Mental Status: He is alert.      Cranial Nerves: Cranial nerves 2-12 are intact.      Motor: Motor function is intact.      Coordination: Coordination is intact.      Gait: Gait is intact.

## 2025-07-28 NOTE — LETTER
2025    Don Samson   1950        To Whom it May Concern;    Please excuse Don Samson from work 25 - 25. He may return to work sooner if symptoms resolve.     Sincerely,        Ginny Zhu PA-C

## 2025-08-19 ENCOUNTER — OFFICE VISIT (OUTPATIENT)
Dept: URGENT CARE | Facility: URGENT CARE | Age: 75
End: 2025-08-19
Payer: COMMERCIAL

## 2025-08-19 VITALS
HEART RATE: 81 BPM | RESPIRATION RATE: 18 BRPM | OXYGEN SATURATION: 96 % | HEIGHT: 72 IN | TEMPERATURE: 97.2 F | DIASTOLIC BLOOD PRESSURE: 86 MMHG | WEIGHT: 215 LBS | BODY MASS INDEX: 29.12 KG/M2 | SYSTOLIC BLOOD PRESSURE: 120 MMHG

## 2025-08-19 DIAGNOSIS — M54.50 ACUTE BILATERAL LOW BACK PAIN WITHOUT SCIATICA: Primary | ICD-10-CM

## 2025-08-19 DIAGNOSIS — R80.9 PROTEINURIA, UNSPECIFIED TYPE: ICD-10-CM

## 2025-08-19 DIAGNOSIS — R11.0 NAUSEA: ICD-10-CM

## 2025-08-19 LAB
ALBUMIN SERPL-MCNC: 4 G/DL (ref 3.4–5)
ALBUMIN UR-MCNC: >=300 MG/DL
ALP SERPL-CCNC: 94 U/L (ref 40–150)
ALT SERPL W P-5'-P-CCNC: 16 U/L (ref 0–70)
ANION GAP SERPL CALCULATED.3IONS-SCNC: 6 MMOL/L (ref 3–14)
APPEARANCE UR: CLEAR
AST SERPL W P-5'-P-CCNC: 25 U/L (ref 0–45)
BACTERIA #/AREA URNS HPF: ABNORMAL /HPF
BASOPHILS # BLD AUTO: 0.04 10E3/UL (ref 0–0.2)
BASOPHILS NFR BLD AUTO: 0.6 %
BILIRUB SERPL-MCNC: 0.8 MG/DL (ref 0.2–1.3)
BILIRUB UR QL STRIP: ABNORMAL
BUN SERPL-MCNC: 17 MG/DL (ref 7–30)
CALCIUM SERPL-MCNC: 9.2 MG/DL (ref 8.5–10.1)
CHLORIDE BLD-SCNC: 110 MMOL/L (ref 94–109)
CO2 SERPL-SCNC: 27 MMOL/L (ref 20–32)
COLOR UR AUTO: YELLOW
CREAT SERPL-MCNC: 1.1 MG/DL (ref 0.66–1.25)
EGFRCR SERPLBLD CKD-EPI 2021: 71 ML/MIN/1.73M2
EOSINOPHIL # BLD AUTO: 0.17 10E3/UL (ref 0–0.7)
EOSINOPHIL NFR BLD AUTO: 2.6 %
ERYTHROCYTE [DISTWIDTH] IN BLOOD BY AUTOMATED COUNT: 12.5 % (ref 10–15)
GLUCOSE BLD-MCNC: 111 MG/DL (ref 70–99)
GLUCOSE UR STRIP-MCNC: NEGATIVE MG/DL
HCT VFR BLD AUTO: 49.3 % (ref 40–53)
HGB BLD-MCNC: 16.4 G/DL (ref 13.3–17.7)
HGB UR QL STRIP: NEGATIVE
IMM GRANULOCYTES # BLD: <0.04 10E3/UL
IMM GRANULOCYTES NFR BLD: 0 %
KETONES UR STRIP-MCNC: NEGATIVE MG/DL
LEUKOCYTE ESTERASE UR QL STRIP: NEGATIVE
LYMPHOCYTES # BLD AUTO: 1.52 10E3/UL (ref 0.8–5.3)
LYMPHOCYTES NFR BLD AUTO: 23.2 %
MCH RBC QN AUTO: 28.1 PG (ref 26.5–33)
MCHC RBC AUTO-ENTMCNC: 33.3 G/DL (ref 31.5–36.5)
MCV RBC AUTO: 84.6 FL (ref 78–100)
MONOCYTES # BLD AUTO: 0.44 10E3/UL (ref 0–1.3)
MONOCYTES NFR BLD AUTO: 6.7 %
MUCOUS THREADS #/AREA URNS LPF: PRESENT /LPF
NEUTROPHILS # BLD AUTO: 4.37 10E3/UL (ref 1.6–8.3)
NEUTROPHILS NFR BLD AUTO: 66.9 %
NITRATE UR QL: NEGATIVE
PH UR STRIP: 5.5 [PH] (ref 5–7)
PLATELET # BLD AUTO: 238 10E3/UL (ref 150–450)
POTASSIUM BLD-SCNC: 4.2 MMOL/L (ref 3.4–5.3)
PROT SERPL-MCNC: 7.3 G/DL (ref 6.8–8.8)
RBC # BLD AUTO: 5.83 10E6/UL (ref 4.4–5.9)
RBC #/AREA URNS AUTO: ABNORMAL /HPF
SODIUM SERPL-SCNC: 143 MMOL/L (ref 135–145)
SP GR UR STRIP: >=1.03 (ref 1–1.03)
SQUAMOUS #/AREA URNS AUTO: ABNORMAL /LPF
UROBILINOGEN UR STRIP-ACNC: 0.2 E.U./DL
WBC # BLD AUTO: 6.54 10E3/UL (ref 4–11)
WBC #/AREA URNS AUTO: ABNORMAL /HPF

## 2025-08-19 PROCEDURE — 80053 COMPREHEN METABOLIC PANEL: CPT | Performed by: FAMILY MEDICINE

## 2025-08-19 PROCEDURE — 3079F DIAST BP 80-89 MM HG: CPT | Performed by: FAMILY MEDICINE

## 2025-08-19 PROCEDURE — 36415 COLL VENOUS BLD VENIPUNCTURE: CPT | Performed by: FAMILY MEDICINE

## 2025-08-19 PROCEDURE — 3074F SYST BP LT 130 MM HG: CPT | Performed by: FAMILY MEDICINE

## 2025-08-19 PROCEDURE — 99214 OFFICE O/P EST MOD 30 MIN: CPT | Performed by: FAMILY MEDICINE

## 2025-08-19 PROCEDURE — 85025 COMPLETE CBC W/AUTO DIFF WBC: CPT | Performed by: FAMILY MEDICINE

## 2025-08-19 PROCEDURE — 81001 URINALYSIS AUTO W/SCOPE: CPT | Performed by: FAMILY MEDICINE

## 2025-08-20 ENCOUNTER — OFFICE VISIT (OUTPATIENT)
Dept: URGENT CARE | Facility: URGENT CARE | Age: 75
End: 2025-08-20
Payer: COMMERCIAL

## 2025-08-20 VITALS
DIASTOLIC BLOOD PRESSURE: 108 MMHG | WEIGHT: 221 LBS | TEMPERATURE: 97 F | HEART RATE: 72 BPM | RESPIRATION RATE: 16 BRPM | BODY MASS INDEX: 29.97 KG/M2 | SYSTOLIC BLOOD PRESSURE: 159 MMHG | OXYGEN SATURATION: 96 %

## 2025-08-20 DIAGNOSIS — R11.0 NAUSEA: Primary | ICD-10-CM

## 2025-08-20 DIAGNOSIS — I10 HYPERTENSION, UNSPECIFIED TYPE: ICD-10-CM

## 2025-08-20 PROCEDURE — 3080F DIAST BP >= 90 MM HG: CPT | Performed by: FAMILY MEDICINE

## 2025-08-20 PROCEDURE — 99214 OFFICE O/P EST MOD 30 MIN: CPT | Performed by: FAMILY MEDICINE

## 2025-08-20 PROCEDURE — 3077F SYST BP >= 140 MM HG: CPT | Performed by: FAMILY MEDICINE

## 2025-08-20 RX ORDER — ONDANSETRON 4 MG/1
4 TABLET, ORALLY DISINTEGRATING ORAL EVERY 8 HOURS PRN
Qty: 10 TABLET | Refills: 0 | Status: SHIPPED | OUTPATIENT
Start: 2025-08-20

## (undated) DEVICE — SU VICRYL+ 3-0 27IN SH UND VCP416H

## (undated) DEVICE — DRSG KERLIX FLUFFS X5

## (undated) DEVICE — SU VICRYL 2-0 SH 27" UND J417H

## (undated) DEVICE — NDL INSUFFLATION 13GA 150MM C2202

## (undated) DEVICE — SU STRATAFIX PDS PLUS 3-0 SPIRAL SH 15CM SXPP1B420

## (undated) DEVICE — SOL WATER IRRIG 500ML BOTTLE 2F7113

## (undated) DEVICE — ESU PENCIL SMOKE EVAC W/ROCKER SWITCH 0703-047-000

## (undated) DEVICE — DRAPE MAYO STAND 23X54 8337

## (undated) DEVICE — BNDG ABDOMINAL BINDER PROCARE FLANNEL UNIV 12" 79-89091

## (undated) DEVICE — DAVINCI XI SEAL UNIVERSAL 5-8MM 470361

## (undated) DEVICE — DRSG STERI STRIP 1/2X4" R1547

## (undated) DEVICE — ESU GROUND PAD ADULT W/CORD E7507

## (undated) DEVICE — SU MONOCRYL 4-0 PS-2 27" UND Y426H

## (undated) DEVICE — SU VICRYL+ 0 27IN CT-2 VLT VCP334H

## (undated) DEVICE — DAVINCI XI NDL DRIVER MEGA SUTURE CUT 8MM 470309

## (undated) DEVICE — PACK LAP CHOLE CUSTOM ASC

## (undated) DEVICE — PREP CHLORAPREP 26ML TINTED HI-LITE ORANGE 930815

## (undated) DEVICE — DAVINCI XI DRAPE COLUMN 470341

## (undated) DEVICE — BNDG ABDOMINAL BINDER 9X62-84" 79-89210

## (undated) DEVICE — DAVINCI XI FCP BIPOLAR FENESTRATED 470205

## (undated) DEVICE — GLOVE BIOGEL PI MICRO SZ 7.5 48575

## (undated) DEVICE — TUBING SUCTION MEDI-VAC 1/4"X20' N620A

## (undated) DEVICE — DEVICE SUTURE PASSER 14GA WECK EFX EFXSP2

## (undated) DEVICE — DAVINCI XI DRAPE ARM 470015

## (undated) DEVICE — LINEN TOWEL PACK X5 5464

## (undated) DEVICE — BINDER ABD 12IN 4 PNL ELC CNTCT CLSR PRCR 62-74IN 79-89220

## (undated) DEVICE — DAVINCI HOT SHEARS TIP COVER  400180

## (undated) RX ORDER — ACETAMINOPHEN 325 MG/1
TABLET ORAL
Status: DISPENSED
Start: 2024-12-23

## (undated) RX ORDER — PROPOFOL 10 MG/ML
INJECTION, EMULSION INTRAVENOUS
Status: DISPENSED
Start: 2024-12-23

## (undated) RX ORDER — FENTANYL CITRATE 50 UG/ML
INJECTION, SOLUTION INTRAMUSCULAR; INTRAVENOUS
Status: DISPENSED
Start: 2024-12-23

## (undated) RX ORDER — FENTANYL CITRATE-0.9 % NACL/PF 10 MCG/ML
PLASTIC BAG, INJECTION (ML) INTRAVENOUS
Status: DISPENSED
Start: 2024-12-23

## (undated) RX ORDER — EPHEDRINE SULFATE 50 MG/ML
INJECTION, SOLUTION INTRAMUSCULAR; INTRAVENOUS; SUBCUTANEOUS
Status: DISPENSED
Start: 2024-12-23

## (undated) RX ORDER — CEFAZOLIN SODIUM 2 G/50ML
SOLUTION INTRAVENOUS
Status: DISPENSED
Start: 2024-12-23